# Patient Record
Sex: MALE | Race: BLACK OR AFRICAN AMERICAN | NOT HISPANIC OR LATINO | Employment: OTHER | URBAN - METROPOLITAN AREA
[De-identification: names, ages, dates, MRNs, and addresses within clinical notes are randomized per-mention and may not be internally consistent; named-entity substitution may affect disease eponyms.]

---

## 2017-01-04 ENCOUNTER — TELEPHONE (OUTPATIENT)
Dept: CARDIOLOGY | Facility: CLINIC | Age: 57
End: 2017-01-04

## 2017-01-04 NOTE — TELEPHONE ENCOUNTER
Spoke with Dr. Vanegas and patient. Will refer to dysautonomia clinic. No work for 2 more weeks. Will send letter.

## 2017-01-18 ENCOUNTER — OFFICE VISIT (OUTPATIENT)
Dept: CARDIOLOGY | Facility: CLINIC | Age: 57
End: 2017-01-18

## 2017-01-18 VITALS
BODY MASS INDEX: 38.3 KG/M2 | SYSTOLIC BLOOD PRESSURE: 140 MMHG | HEART RATE: 64 BPM | HEIGHT: 75 IN | WEIGHT: 308 LBS | DIASTOLIC BLOOD PRESSURE: 88 MMHG

## 2017-01-18 DIAGNOSIS — I10 BENIGN ESSENTIAL HTN: ICD-10-CM

## 2017-01-18 DIAGNOSIS — I48.0 PAF (PAROXYSMAL ATRIAL FIBRILLATION) (HCC): ICD-10-CM

## 2017-01-18 DIAGNOSIS — R55 NEAR SYNCOPE: ICD-10-CM

## 2017-01-18 DIAGNOSIS — R42 DIZZY SPELLS: Primary | ICD-10-CM

## 2017-01-18 PROCEDURE — 99213 OFFICE O/P EST LOW 20 MIN: CPT | Performed by: NURSE PRACTITIONER

## 2017-01-18 PROCEDURE — 93000 ELECTROCARDIOGRAM COMPLETE: CPT | Performed by: NURSE PRACTITIONER

## 2017-01-18 NOTE — PROGRESS NOTES
Date of Office Visit: 2017  Encounter Provider: DENNIS Conklin  Place of Service: [facility] CARDIOLOGY  Patient Name: Shashi Engel  :1960    Chief Complaint   Patient presents with   • Dizziness   :     HPI: Shashi Engel is a 56 y.o. male comes in today for follow up for dizziness. He is a patient of Dr. Vanegas and myself.        We first saw him in 2013 when he came in to the Chest Pain Unit. He had had five days of short-windedness at that time and had risk factors for cardiac issues including diabetes mellitus, hypertension, and hyperlipidemia. He underwent an echocardiogram on 2013 which revealed severe concentric left ventricular hypertrophy, an ejection fraction of 66%, mild-to-moderate mitral insufficiency, mild-to-moderate tricuspid insufficiency, mildelevation of right ventricular systolic pressure at 40 mmHg. He then wore a 24-hour Holter monitor which showed atrial fibrillation with average heart rate of 70 beats per minute with brief episodes of tachycardia and bradycardia. He had had a stress nuclear perfusion study performed in 2012 which showed no evidence of ischemia. Ejection fraction was mildly reduced due to atrial fibrillation.       He had a repeat 2D echocardiogram dated 2015, which showed an ejection fraction of 67%, severe concentric left ventricular hypertrophy, left and right atrium severely dilated, mild mitral valve daily with mild mitral regurgitation, aortic valve calcification without evidence of stenosis, mild aortic regurgitation, mild tricuspid regurgitation, and RV systolic pressure elevated at 54 mmHg, which is moderately elevated. He had a nonischemic 2 day stress nuclear done 2015.       He does have obstructive sleep apnea and uses his CPAP faithfully.       His CHADS-VASc score is 2 giving him a 2.2% risk of strokes per year.      He started having episodes of dizziness. An event recorder was  ordered. He was seen by Sabrina Lopez for dizziness and near syncope which occurred in our waiting room as he was waiting to get is that recorder. She changed the order to a Zio patch.       He was in atrial fibrillation at the time and on Xarelto. He then presented to the emergency department 5/1/2016 with lightheadedness which lasted a few minutes. In the emergency department they did not see anything that was unusual. His CMP was normal, CBC was normal and magnesium are normal. His blood pressure at that time was 116/63.      Early May 2016 he was having dizzy episodes lasting 30 minutes but no syncope. He had no chest pain with near syncope. He has no dyspnea but after these episodes, he is very fatigued. They only happen when he is head-upright and he doesn't feel his heart racing. His blood sugars have been good when it happens but his BP has been a little low when it happens.   a heart monitor was placed and he was found to have some significant bradycardia. He went for pacemaker placement on June 6 2016.       He complains that since his pacemaker has been placed he has not been much better. In the office, we paced him to see if it was making him feel worse and he did not notice a difference and he did not feel worse.       He continued to have issues with short-windedness and fatigue, and some episodes of near syncope. He did feel like he could feel his heart racing. On interrogation of his pacemaker, he was having tachycardia due to atrial fibrillation with ventricular pacing. His pacemaker was tracking the atrial fibrillation. His heart rate was running up into the 170s. It was not ventricular tachycardia. Metoprolol was initiated at 25 mg twice daily and he is feeling much better.      I saw him in office last week and he was well enough to go back to work.He presented to the emergency room on November 1, 2016 complaining of feeling like he was given a pass out while driving. His episode lasted  approximately 15 minutes. His blood pressure was stable at 119/76 when he went to the ER. His CMP was stable, troponin negative and CBC unremarkable. His ICD was interrogated and it was noted that he had a 10 beat run of unknown tachyarrhythmia on October 14, 2016 but does not have any on the day of the event. His threshold was set to record a greater than 160. His threshold was reduced to 140.    We have asked him to see EP and they did not think his dizziness is related to his device. Dr. Vanegas has concluded that the pt has dysautonomia.     He comes in today just for follow-up.  He has had 2 episodes since he was last seen in the office.  He did not faint but felt like he was going to.  He is wearing his compression stockings.  His wife is working on his diet never beginning to exercise.  At this time today.  He denies palpitations or tachycardia, shortness of air, edema, lightheadedness, chest pain, fatigue, orthopnea or PND.  We were looking into referring him to a dysautonomia clinic at [facility].  This does not exist per staff at [facility].  We will refer him to [place].    Past Medical History   Diagnosis Date   • Abnormal electrocardiogram    • Atrial fibrillation    • Cardiomyopathy, hypertrophic, primary familial    • Diabetes mellitus    • Difficulty breathing 05/02/2013     DURING EXERTION; RESOLVED 03/07/2014   • Dizziness 03/07/2014     RESOLVED; 04/18/2014   • ED (erectile dysfunction)    • Health care maintenance    • Hyperlipidemia    • Hypertension    • Near syncope    • Noncompliance    • VENESSA (obstructive sleep apnea)    • PAF (paroxysmal atrial fibrillation)    • Palpitations        Past Surgical History   Procedure Laterality Date   • Other surgical history       physical therapy for rotator cuff   • Knee arthroscopy     • Cardiac electrophysiology procedure Left 6/6/2016     Procedure: Pacemaker DC new  [place];  Surgeon: Juan Chacon MD;  Location: [facility] INVASIVE LOCATION;   "Service:    • Insert / replace / remove pacemaker             Review of Systems   Constitution: Negative for fever and malaise/fatigue.   HENT: Negative for ear pain, hearing loss, nosebleeds and sore throat.    Eyes: Negative for double vision, pain, vision loss in left eye, vision loss in right eye and visual disturbance.   Cardiovascular: Negative for claudication and leg swelling.   Respiratory: Negative for cough, snoring and wheezing.    Endocrine: Negative for cold intolerance, heat intolerance and polyuria.   Skin: Negative for color change, itching and rash.   Musculoskeletal: Negative for joint pain, joint swelling and muscle cramps.   Gastrointestinal: Negative for abdominal pain, diarrhea, melena, nausea and vomiting.   Genitourinary: Negative for bladder incontinence and hematuria.   Neurological: Positive for dizziness. Negative for excessive daytime sleepiness, light-headedness, paresthesias and seizures.   Psychiatric/Behavioral: Negative for depression. The patient is not nervous/anxious.    All other systems reviewed and are negative.    All other systems reviewed and are negative    No Known Allergies    All aspects of family and social history reviewed.          Objective:     Vitals:    01/18/17 1250   BP: 140/88   BP Location: Left arm   Pulse: 64   Weight: (!) 308 lb (140 kg)   Height: 75\" (190.5 cm)     Body mass index is 38.5 kg/(m^2).    PHYSICAL EXAM:  Physical Exam   Constitutional: He is oriented to person, place, and time. He appears well-developed and well-nourished. No distress.   obese   HENT:   Head: Normocephalic and atraumatic.   Eyes: Conjunctivae are normal. No scleral icterus.   Neck: Neck supple. No JVD present. Carotid bruit is not present. No thyromegaly present.   Cardiovascular: Normal rate, regular rhythm, S1 normal, S2 normal, normal heart sounds and intact distal pulses.   No extrasystoles are present. PMI is not displaced.  Exam reveals no gallop.    No murmur " heard.  Pulses:       Carotid pulses are 2+ on the right side, and 2+ on the left side.       Radial pulses are 2+ on the right side, and 2+ on the left side.        Dorsalis pedis pulses are 2+ on the right side, and 2+ on the left side.        Posterior tibial pulses are 2+ on the right side, and 2+ on the left side.   Pulmonary/Chest: Effort normal and breath sounds normal. No respiratory distress. He has no wheezes. He has no rhonchi. He has no rales. He exhibits no tenderness.   Abdominal: Soft. Bowel sounds are normal. He exhibits no distension, no abdominal bruit and no mass. There is no tenderness.   Musculoskeletal: He exhibits no edema or deformity.   Lymphadenopathy:     He has no cervical adenopathy.   Neurological: He is alert and oriented to person, place, and time. No cranial nerve deficit.   Skin: Skin is warm and dry. No rash noted. He is not diaphoretic. No cyanosis. No pallor. Nails show no clubbing.   Psychiatric: He has a normal mood and affect. Judgment normal.   Vitals reviewed.        ECG 12 Lead  Date/Time: 1/18/2017 1:14 PM  Performed by: ARLEY MITCHELL  Authorized by: ARLEY MITCHELL   Comparison: compared with previous ECG from 12/20/2016  Similar to previous ECG  Rhythm: atrial fibrillation  BPM: 64  Clinical impression: abnormal ECG  Comments: Indication: afib                Assessment:       Diagnosis Plan   1. Dizzy spells  Ambulatory Referral to Neurology   2. PAF (paroxysmal atrial fibrillation)     3. Benign essential HTN     4. Near syncope          Orders Placed This Encounter   Procedures   • Ambulatory Referral to Neurology     Referral Priority:   Routine     Referral Type:   Consultation     Referral Reason:   Specialty Services Required     Referral Location[facility]   [facility]     Requested Specialty:   Neurology     Number of Visits Requested:   1   • ECG 12 Lead     This order was created via procedure documentation       Current Outpatient  Prescriptions   Medication Sig Dispense Refill   • amLODIPine (NORVASC) 5 MG tablet Take 1 tablet by mouth Daily. 30 tablet 3   • atorvastatin (LIPITOR) 40 MG tablet TAKE 1 TABLET BY MOUTH EVERY NIGHT AT BEDTIME 90 tablet 0   • hydrochlorothiazide (HYDRODIURIL) 25 MG tablet Take 1/2 tablet (12.5mg) daily 45 tablet 1   • metFORMIN (GLUCOPHAGE) 1000 MG tablet Take 1 tablet by mouth 2 (Two) Times a Day With Meals. 60 tablet 3   • metoprolol tartrate (LOPRESSOR) 25 MG tablet Take 1 tablet by mouth 2 (two) times a day. 60 tablet 5   • Multiple Vitamins-Minerals (MULTIVITAMIN ADULT PO) Take 1 tablet by mouth daily.     • quinapril (ACCUPRIL) 40 MG tablet TAKE ONE TABLET BY MOUTH TWICE A DAY. 180 tablet 1   • rivaroxaban (XARELTO) 20 MG tablet Take 1 tablet by mouth Daily. 28 tablet 0     No current facility-administered medications for this visit.             Plan:       Pt continues to have dizzy episodes. Likely dysautonomia. We have discussed exercise. Wife is going to a nutrition class tonight. We will refer to Dysautonomia clinic at [place]. Pt has concerns about working and the dizziness. Pt is going to apply for disability. I will put him off work till his next visit.         Follow up in office in 6 weeks    As always, it has been a pleasure to participate in this patient's care.      Sincerely,      DENNIS Conklin

## 2017-01-25 ENCOUNTER — TELEPHONE (OUTPATIENT)
Dept: FAMILY MEDICINE CLINIC | Facility: CLINIC | Age: 57
End: 2017-01-25

## 2017-01-25 NOTE — TELEPHONE ENCOUNTER
"----- Message from Oxana Arvizu sent at 1/23/2017  2:51 PM EST -----  Regarding: Cancellation of Order # [id number]  Order number [id number] for the procedure COLOGUARD [[id number]] has   been canceled by Oxana Arvizu [[id number]]. This procedure was   ordered by you on Nov 8, 2016 for the patient Shashi Engel   [[id number]]. The reason for cancellation was \"Other\".  "

## 2017-01-26 NOTE — TELEPHONE ENCOUNTER
[facility] I have placed an order online and advised him to call me by Friday if he has not heard from them.

## 2017-02-07 ENCOUNTER — CLINICAL SUPPORT NO REQUIREMENTS (OUTPATIENT)
Dept: CARDIOLOGY | Facility: CLINIC | Age: 57
End: 2017-02-07

## 2017-02-07 DIAGNOSIS — I42.0 CONGESTIVE CARDIOMYOPATHY (HCC): Primary | ICD-10-CM

## 2017-02-07 PROCEDURE — 93294 REM INTERROG EVL PM/LDLS PM: CPT | Performed by: INTERNAL MEDICINE

## 2017-02-07 PROCEDURE — 93296 REM INTERROG EVL PM/IDS: CPT | Performed by: INTERNAL MEDICINE

## 2017-02-13 ENCOUNTER — TELEPHONE (OUTPATIENT)
Dept: FAMILY MEDICINE CLINIC | Facility: CLINIC | Age: 57
End: 2017-02-13

## 2017-02-13 NOTE — TELEPHONE ENCOUNTER
02.13.17 [facility]  I spoke to patient and he is going to continue to see Dr. Kirkpatrick. F/U scheduled.    ----- Message from Keesha Kirkpatrick MD sent at 2/13/2017  8:38 AM EST -----  He needs a doctor. He can schedule with me or he can go elsewhere. No, they need to be filled by me    ----- Message -----     From: Myrna Kendall MA     Sent: 2/10/2017   1:34 PM       To: Keesha Kirkpatrick MD    I'm a little confused as to what to do for his Rx's. Pt wanted to switch to [facility]. She did not mind seeing him, but because his health is so complex she wanted you to continue to care for him. He has been in to see [facility] multiple times, BUT will not schedule with you. Should [facility] fill his medication?

## 2017-02-24 ENCOUNTER — OFFICE VISIT (OUTPATIENT)
Dept: FAMILY MEDICINE CLINIC | Facility: CLINIC | Age: 57
End: 2017-02-24

## 2017-02-24 VITALS
HEIGHT: 75 IN | SYSTOLIC BLOOD PRESSURE: 150 MMHG | HEART RATE: 66 BPM | DIASTOLIC BLOOD PRESSURE: 88 MMHG | WEIGHT: 313 LBS | BODY MASS INDEX: 38.92 KG/M2 | OXYGEN SATURATION: 98 %

## 2017-02-24 DIAGNOSIS — E78.2 MIXED HYPERLIPIDEMIA: ICD-10-CM

## 2017-02-24 DIAGNOSIS — R11.0 NAUSEA: ICD-10-CM

## 2017-02-24 DIAGNOSIS — I10 BENIGN ESSENTIAL HTN: ICD-10-CM

## 2017-02-24 DIAGNOSIS — E11.9 DIABETES MELLITUS TYPE 2, NONINSULIN DEPENDENT (HCC): Primary | ICD-10-CM

## 2017-02-24 DIAGNOSIS — Z00.00 HEALTHCARE MAINTENANCE: ICD-10-CM

## 2017-02-24 PROCEDURE — 90715 TDAP VACCINE 7 YRS/> IM: CPT | Performed by: FAMILY MEDICINE

## 2017-02-24 PROCEDURE — 99214 OFFICE O/P EST MOD 30 MIN: CPT | Performed by: FAMILY MEDICINE

## 2017-02-24 PROCEDURE — 90471 IMMUNIZATION ADMIN: CPT | Performed by: FAMILY MEDICINE

## 2017-02-24 RX ORDER — TADALAFIL 20 MG
TABLET ORAL
Refills: 3 | COMMUNITY
Start: 2017-01-21 | End: 2017-11-25 | Stop reason: SDUPTHER

## 2017-02-24 RX ORDER — DIMETHICONE 13 MG/ML
LOTION TOPICAL
Qty: 100 EACH | Refills: 11 | Status: SHIPPED | OUTPATIENT
Start: 2017-02-24 | End: 2018-02-23 | Stop reason: SDUPTHER

## 2017-02-24 RX ORDER — QUINAPRIL 40 MG/1
40 TABLET ORAL DAILY
Qty: 180 TABLET | Refills: 1 | Status: SHIPPED | OUTPATIENT
Start: 2017-02-24 | End: 2017-02-24 | Stop reason: SDUPTHER

## 2017-02-24 RX ORDER — ATORVASTATIN CALCIUM 40 MG/1
40 TABLET, FILM COATED ORAL DAILY
Qty: 90 TABLET | Refills: 3 | Status: SHIPPED | OUTPATIENT
Start: 2017-02-24 | End: 2018-02-23 | Stop reason: SDUPTHER

## 2017-02-24 RX ORDER — QUINAPRIL 40 MG/1
80 TABLET ORAL DAILY
Qty: 180 TABLET | Refills: 1 | Status: SHIPPED | OUTPATIENT
Start: 2017-02-24 | End: 2017-11-25 | Stop reason: SDUPTHER

## 2017-02-24 NOTE — PROGRESS NOTES
Subjective   Shashi Engel is a 56 y.o. male.     Chief Complaint   Patient presents with   • Diabetes   • Hypertension   • Med Refill     Social History   Substance Use Topics   • Smoking status: Former Smoker   • Smokeless tobacco: None      Comment: caffeine use   • Alcohol use Yes      Comment: RARE       Diabetes   He has type 2 diabetes mellitus. No MedicAlert identification noted. The initial diagnosis of diabetes was made 8 years ago. Hypoglycemia symptoms include dizziness and nervousness/anxiousness. Pertinent negatives for hypoglycemia include no confusion, headaches, hunger, mood changes, pallor, seizures, sleepiness, speech difficulty, sweats or tremors. Associated symptoms include fatigue and weakness. Pertinent negatives for diabetes include no chest pain. Pertinent negatives for hypoglycemia complications include no blackouts, no hospitalization, no nocturnal hypoglycemia, no required assistance and no required glucagon injection. Symptoms are stable. Pertinent negatives for diabetic complications include no CVA, heart disease, nephropathy, peripheral neuropathy or retinopathy. Risk factors for coronary artery disease include dyslipidemia, hypertension and obesity. Current diabetic treatment includes oral agent (monotherapy). He is compliant with treatment all of the time. His weight is fluctuating minimally. He is following a generally healthy diet. Meal planning includes avoidance of concentrated sweets. He has not had a previous visit with a dietitian. He participates in exercise intermittently. He monitors blood glucose at home 1-2 x per day. Blood glucose monitoring compliance is fair. There is no change in his home blood glucose trend. His highest blood glucose is 110-130 mg/dl. His overall blood glucose range is 110-130 mg/dl. An ACE inhibitor/angiotensin II receptor blocker is being taken. He does not see a podiatrist.Eye exam is not current.      He is having dizzy spells that he has been  "unable to work with his exhaustion. He is very heat sensitive with it. He has found other individuals with similar autonomic dysfunction. [facility] is supposed to see him about this. He is having some am nausea. One episode with blood but he thinks that is from his mouth. It is not obviously related to the BP problems. He recently did the cologuard. No abdominal pain, just nausea.     The following portions of the patient's history were reviewed and updated as appropriate: allergies, current medications, past social history and problem list.     Review of Systems   Constitutional: Positive for fatigue. Negative for activity change, appetite change, chills, fever and unexpected weight change.   HENT: Negative for congestion, ear pain, hearing loss, mouth sores, nosebleeds, rhinorrhea and sore throat.    Eyes: Negative for pain and visual disturbance.   Respiratory: Positive for shortness of breath. Negative for cough and wheezing.    Cardiovascular: Negative for chest pain, palpitations and leg swelling.   Gastrointestinal: Negative for abdominal distention, abdominal pain, blood in stool, constipation, diarrhea, nausea and vomiting.   Endocrine: Negative for cold intolerance and heat intolerance.   Genitourinary: Negative for difficulty urinating, discharge, dysuria, frequency, hematuria and urgency.   Musculoskeletal: Negative for back pain and joint swelling.   Skin: Negative for pallor, rash and wound.   Neurological: Positive for dizziness and weakness. Negative for tremors, seizures, speech difficulty, numbness and headaches.   Hematological: Does not bruise/bleed easily.   Psychiatric/Behavioral: Negative for confusion, dysphoric mood, sleep disturbance and suicidal ideas. The patient is nervous/anxious.        Objective   Vitals:    02/24/17 1012   BP: 150/88   Pulse: 66   SpO2: 98%   Weight: (!) 313 lb (142 kg)   Height: 75\" (190.5 cm)     Body mass index is 39.12 kg/(m^2).    Physical Exam "   Constitutional: He is oriented to person, place, and time. Vital signs are normal. He appears well-developed. No distress.   obese   HENT:   Head: Normocephalic.   Cardiovascular: Normal rate, regular rhythm and normal heart sounds.    Pulmonary/Chest: Effort normal and breath sounds normal.   Neurological: He is alert and oriented to person, place, and time. Gait normal.   Psychiatric: He has a normal mood and affect. His behavior is normal. Judgment and thought content normal.   Vitals reviewed.      Assessment/Plan   Problem List Items Addressed This Visit        Cardiovascular and Mediastinum    Benign essential HTN    Overview     Impression: 01/09/2015 - Not controlled, off meds;          Relevant Medications    quinapril (ACCUPRIL) 40 MG tablet    HLD (hyperlipidemia)    Relevant Medications    atorvastatin (LIPITOR) 40 MG tablet       Endocrine    Diabetes mellitus type 2, noninsulin dependent - Primary    Relevant Medications    [facility] ORIGINAL misc    quinapril (ACCUPRIL) 40 MG tablet    Other Relevant Orders    Microalbumin / Creatinine Urine Ratio      Other Visit Diagnoses     Nausea        When he occ vomits he has on occ seen blood. If this persists will see GI. He needs to d/w [place].     Healthcare maintenance        Relevant Orders    Tdap Vaccine Greater Than or Equal To 6yo IM      See the dentist.

## 2017-02-25 LAB
ALBUMIN/CREAT UR: 3.5 MG/G CREAT (ref 0–30)
CREAT UR-MCNC: 133.2 MG/DL
MICROALBUMIN UR-MCNC: 4.7 UG/ML

## 2017-03-14 ENCOUNTER — TELEPHONE (OUTPATIENT)
Dept: FAMILY MEDICINE CLINIC | Facility: CLINIC | Age: 57
End: 2017-03-14

## 2017-03-15 ENCOUNTER — TELEPHONE (OUTPATIENT)
Dept: FAMILY MEDICINE CLINIC | Facility: CLINIC | Age: 57
End: 2017-03-15

## 2017-03-21 ENCOUNTER — OFFICE VISIT (OUTPATIENT)
Dept: CARDIOLOGY | Facility: CLINIC | Age: 57
End: 2017-03-21

## 2017-03-21 VITALS
SYSTOLIC BLOOD PRESSURE: 140 MMHG | HEIGHT: 75 IN | HEART RATE: 69 BPM | BODY MASS INDEX: 39.17 KG/M2 | WEIGHT: 315 LBS | DIASTOLIC BLOOD PRESSURE: 80 MMHG

## 2017-03-21 DIAGNOSIS — I48.0 PAF (PAROXYSMAL ATRIAL FIBRILLATION) (HCC): Primary | ICD-10-CM

## 2017-03-21 DIAGNOSIS — I11.9 LVH (LEFT VENTRICULAR HYPERTROPHY) DUE TO HYPERTENSIVE DISEASE, WITHOUT HEART FAILURE: ICD-10-CM

## 2017-03-21 DIAGNOSIS — G47.33 OBSTRUCTIVE APNEA: ICD-10-CM

## 2017-03-21 DIAGNOSIS — I49.5 SSS (SICK SINUS SYNDROME) (HCC): ICD-10-CM

## 2017-03-21 DIAGNOSIS — R55 NEAR SYNCOPE: ICD-10-CM

## 2017-03-21 DIAGNOSIS — E11.9 DIABETES MELLITUS TYPE 2, NONINSULIN DEPENDENT (HCC): ICD-10-CM

## 2017-03-21 DIAGNOSIS — I10 BENIGN ESSENTIAL HTN: ICD-10-CM

## 2017-03-21 PROCEDURE — 99214 OFFICE O/P EST MOD 30 MIN: CPT | Performed by: INTERNAL MEDICINE

## 2017-03-21 PROCEDURE — 93000 ELECTROCARDIOGRAM COMPLETE: CPT | Performed by: INTERNAL MEDICINE

## 2017-03-21 NOTE — PROGRESS NOTES
Date of Office Visit: 2017  Encounter Provider: Jennie Vanegas MD  Place of Service: [facility] CARDIOLOGY  Patient Name: Shashi Engel  :1960      Patient ID:  Shashi Engel is a 56 y.o. male is here for  followup for near syncope        History of Present Illness       I first saw him in 2013 when he came in to the Chest Pain Unit. He had had five days of short-windedness at that time and had risk factors for cardiac issues including diabetes mellitus, hypertension, and hyperlipidemia. He underwent an echocardiogram on 2013 which revealed severe concentric left ventricular hypertrophy, an ejection fraction of 66%, mild-to-moderate mitral insufficiency, mild-to-moderate tricuspid insufficiency, mildelevation of right ventricular systolic pressure at 40 mmHg. He then wore a 24-hour Holter monitor which showed atrial fibrillation with average heart rate of 70 beats per minute with brief episodes of tachycardia and bradycardia. He had had a stress nuclear perfusion study performed in 2012 which showed no evidence of ischemia. Ejection fraction was mildly reduced due to atrial fibrillation.        He had a repeat 2D echocardiogram dated 2015, which showed an ejection fraction of 67%, severe concentric left ventricular hypertrophy, left and right atrium severely dilated, mild mitral valve daily with mild mitral regurgitation, aortic valve calcification without evidence of stenosis, mild aortic regurgitation, mild tricuspid regurgitation, and RV systolic pressure elevated at 54 mmHg, which is moderately elevated. He had a nonischemic 2 day stress nuclear done 2015.        He does have obstructive sleep apnea and uses his CPAP faithfully.        His CHADS-VASc score is 2 giving him a 2.2% risk of strokes per year. He is on Xarelto.      Early May 2016 he was having dizzy episodes lasting 30 minutes but no syncope. He had no chest pain with near  syncope. He has no dyspnea but after these episodes, he is very fatigued. They only happen when he is head-upright and he doesn't feel his heart racing. His blood sugars have been good when it happens but his BP has been a little low when it happens.    a heart monitor was placed and he was found to have some significant bradycardia. He went for pacemaker placement on June 6 2016.           He continued to have issues with short-windedness and fatigue, and some episodes of near syncope. He did feel like he could feel his heart racing. On interrogation of his pacemaker, he was having tachycardia due to atrial fibrillation with ventricular pacing. His pacemaker was tracking the atrial fibrillation. His heart rate was running up into the 170s. It was not ventricular tachycardia. Metoprolol was initiated at 25 mg twice daily and he felt better. He did have a 2-D echocardiogram with Doppler performed on 09/08/2016 which revealed an ejection fraction of 60%, severe biatrial enlargement, mild mitral and mild tricuspid insufficiency. There was mild-to-moderate left ventricular hypertrophy.       Mr. Engel has seen our nurse practitioner Heidi Espinoza twice since I have seen him last. He saw her on 10/25/2016 for followup of near syncope and hypertension. He was feeling better on metoprolol. She then saw him again on 11/03/2016 when he was having some episodes of near syncope and his pacemaker showed what appeared to be a tachyarrhythmia. His blood pressure at that time was somewhat labile as well. He then saw my partner Dr. Eimliano Covarrubias on 11/08/2016 and Emiliano did not feel that the tachycardia was problematic and certainly was not causing his symptoms of near syncope, and he felt the patient's pacemaker was working well to prevent bradycardia. Emiliano felt that the patient's episodes of near syncope were more related to dysautonomia and recommended compression stockings as he thought he was having some orthostasis.          He  saw Heidi Espinoza for dizziness on 01/18/2017.  He had had two episodes of near syncope without palpitations.  When Heidi saw him, she thought that this was dysautonomia and referred him to the dysautonomia clinic at [facility] and advised him to go ahead and apply for disability.  He is here today for follow-up from that visit.       At this time, he continues to have episodes of near syncope.  He can feel them coming on and, when he does, he sits down and tries to elevate his legs.  He does not feel his heart racing or skipping and he has had no exertional chest tightness or pressure, but when he does feel near syncopal he gets very fatigued and has dyspnea.  He is wearing his compression stockings faithfully.  He is really trying to watch what he is eating.  He is taking his medications as directed.  He has an appointment at [facility] but now it is not until 07/2017.  He thankfully has had no full-blown syncopal episodes.          Past Medical History   Diagnosis Date   • Abnormal electrocardiogram    • Anxiety    • Atrial fibrillation    • Cardiomyopathy, hypertrophic, primary familial    • Diabetes mellitus    • Difficulty breathing 05/02/2013     DURING EXERTION; RESOLVED 03/07/2014   • Dizziness 03/07/2014     RESOLVED; 04/18/2014   • ED (erectile dysfunction)    • Erectile dysfunction    • Health care maintenance    • Hyperlipidemia    • Hypertension    • Near syncope    • Noncompliance    • Obesity    • VENESSA (obstructive sleep apnea)    • PAF (paroxysmal atrial fibrillation)    • Palpitations    • Pneumonia    • Type 2 diabetes mellitus          Past Surgical History   Procedure Laterality Date   • Other surgical history       physical therapy for rotator cuff   • Knee arthroscopy     • Cardiac electrophysiology procedure Left 6/6/2016     Procedure: Pacemaker JONATHAN MARX;  Surgeon: Juan Chacon MD;  Location: [facility] INVASIVE LOCATION;  Service:    • Insert / replace / remove pacemaker          Current Outpatient Prescriptions on File Prior to Visit   Medication Sig Dispense Refill   • amLODIPine (NORVASC) 5 MG tablet Take 1 tablet by mouth Daily. 30 tablet 3   • atorvastatin (LIPITOR) 40 MG tablet Take 1 tablet by mouth Daily. 90 tablet 3   • CIALIS 20 MG tablet TAKE ONE TABLET BY MOUTH EVERY 3 DAYS AS DIRECTED  3   • [employer] LANCETS ORIGINAL misc Use as directed and appropo lancet for his monitor 100 each 11   • glucose blood test strip Use as instructed 100 each 12   • hydrochlorothiazide (HYDRODIURIL) 25 MG tablet Take 1/2 tablet (12.5mg) daily 45 tablet 1   • metFORMIN (GLUCOPHAGE) 1000 MG tablet Take 1 tablet by mouth 2 (Two) Times a Day With Meals. 60 tablet 3   • metoprolol tartrate (LOPRESSOR) 25 MG tablet Take 1 tablet by mouth 2 (two) times a day. 60 tablet 5   • Multiple Vitamins-Minerals (MULTIVITAMIN ADULT PO) Take 1 tablet by mouth daily.     • quinapril (ACCUPRIL) 40 MG tablet Take 2 tablets by mouth Daily. 180 tablet 1   • rivaroxaban (XARELTO) 20 MG tablet Take 1 tablet by mouth Daily. 56 tablet 0     No current facility-administered medications on file prior to visit.        Social History     Social History   • Marital status:      Spouse name: N/A   • Number of children: N/A   • Years of education: N/A     Occupational History   • Not on file.     Social History Main Topics   • Smoking status: Never Smoker   • Smokeless tobacco: Not on file      Comment: No caffeine use   • Alcohol use Yes      Comment: RARE   • Drug use: No      Comment: FORMER,    • Sexual activity: Not Currently     Partners: Female     Other Topics Concern   • Not on file     Social History Narrative           Review of Systems   Constitution: Negative.   HENT: Negative for congestion and headaches.    Eyes: Negative for vision loss in left eye and vision loss in right eye.   Respiratory: Negative.  Negative for cough, hemoptysis, shortness of breath, sleep disturbances due to breathing, snoring, sputum  "production and wheezing.    Endocrine: Negative.    Hematologic/Lymphatic: Negative.    Skin: Negative for poor wound healing and rash.   Musculoskeletal: Negative for falls, gout, muscle cramps and myalgias.   Gastrointestinal: Negative for abdominal pain, diarrhea, dysphagia, hematemesis, melena, nausea and vomiting.   Neurological: Negative for excessive daytime sleepiness, dizziness, light-headedness, loss of balance, seizures and vertigo.   Psychiatric/Behavioral: Negative for depression and substance abuse. The patient is not nervous/anxious.        Procedures    ECG 12 Lead  Date/Time: 3/21/2017 10:20 AM  Performed by: GUICHO MCKEON  Authorized by: GUICHO MCKEON   Comparison: compared with previous ECG   Similar to previous ECG  Rhythm: atrial fibrillation  Clinical impression: abnormal ECG  Comments: V paced               Objective:      Vitals:    03/21/17 0944   BP: 140/80   Pulse: 69   Weight: (!) 318 lb (144 kg)   Height: 75\" (190.5 cm)     Body mass index is 39.75 kg/(m^2).    Physical Exam   Constitutional: He is oriented to person, place, and time. He appears well-developed and well-nourished. No distress.   obese   HENT:   Head: Normocephalic and atraumatic.   Eyes: Conjunctivae are normal. No scleral icterus.   Neck: Neck supple. No JVD present. Carotid bruit is not present. No thyromegaly present.   Cardiovascular: Normal rate, regular rhythm, S1 normal, S2 normal, normal heart sounds and intact distal pulses.   No extrasystoles are present. PMI is not displaced.  Exam reveals no gallop.    No murmur heard.  Pulses:       Carotid pulses are 2+ on the right side, and 2+ on the left side.       Radial pulses are 2+ on the right side, and 2+ on the left side.        Dorsalis pedis pulses are 2+ on the right side, and 2+ on the left side.        Posterior tibial pulses are 2+ on the right side, and 2+ on the left side.   Pulmonary/Chest: Effort normal and breath sounds normal. No " respiratory distress. He has no wheezes. He has no rhonchi. He has no rales. He exhibits no tenderness.   Abdominal: Soft. Bowel sounds are normal. He exhibits no distension, no abdominal bruit and no mass. There is no tenderness.   Musculoskeletal: He exhibits no edema or deformity.   Lymphadenopathy:     He has no cervical adenopathy.   Neurological: He is alert and oriented to person, place, and time. No cranial nerve deficit.   Skin: Skin is warm and dry. No rash noted. He is not diaphoretic. No cyanosis. No pallor. Nails show no clubbing.   Psychiatric: He has a normal mood and affect. Judgment normal.   Vitals reviewed.      Lab Review:       Assessment:      Diagnosis Plan   1. PAF (paroxysmal atrial fibrillation)     2. SSS (sick sinus syndrome)     3. Near syncope     4. Benign essential HTN     5. LVH (left ventricular hypertrophy) due to hypertensive disease, without heart failure     6. Obstructive apnea     7. Diabetes mellitus type 2, noninsulin dependent          1. Fatigue and lightheadedness. Continued due to dysautonomia.   2. Atrial fibrillation, rate controlled. On xarelto.   3. Hypertension. This is under fair control.   4. Hyperlipidemia. We will get the patient's labs from Dr. Kirkpatrick's office.   5. History of left ventricular hypertrophy on echocardiogram. Will recheck the  echocardiogram.   6. Diabetes mellitus type 2.   7. Obstructive sleep apnea on CPAP.   8. Obesity.   9. SSS, s/p pacer.      Plan:       See back in 3 months. Stop hctz and see [place]    Atrial Fibrillation and Atrial Flutter  Assessment  • The patient has paroxysmal atrial fibrillation  • This is non-valvular in etiology  • The patient's CHADS2-VASc score is 1  • A [id number] score of 1 is considered an intermediate risk for a thromboembolic event    Plan  • Continue in atrial fibrillation with rate control  • Continue rivaroxaban for antithrombotic therapy, bleeding issues discussed  • Continue beta blocker for rate  control

## 2017-03-30 ENCOUNTER — TELEPHONE (OUTPATIENT)
Dept: CARDIOLOGY | Facility: CLINIC | Age: 57
End: 2017-03-30

## 2017-03-30 NOTE — TELEPHONE ENCOUNTER
NEEMAI:Pt called and states that his left arm is twitching. He contacted his PCP he had an appointment tomorrow at 11:30....Yvette SHAH

## 2017-03-31 ENCOUNTER — OFFICE VISIT (OUTPATIENT)
Dept: FAMILY MEDICINE CLINIC | Facility: CLINIC | Age: 57
End: 2017-03-31

## 2017-03-31 VITALS
OXYGEN SATURATION: 97 % | WEIGHT: 315 LBS | HEART RATE: 67 BPM | HEIGHT: 75 IN | SYSTOLIC BLOOD PRESSURE: 138 MMHG | BODY MASS INDEX: 39.17 KG/M2 | DIASTOLIC BLOOD PRESSURE: 82 MMHG

## 2017-03-31 DIAGNOSIS — R25.3 MUSCLE TWITCH: Primary | ICD-10-CM

## 2017-03-31 PROCEDURE — 99212 OFFICE O/P EST SF 10 MIN: CPT | Performed by: FAMILY MEDICINE

## 2017-03-31 NOTE — PROGRESS NOTES
Subjective   Shashi Engel is a 56 y.o. male.     Chief Complaint   Patient presents with   • L arm twitching     x 5 days      Social History   Substance Use Topics   • Smoking status: Never Smoker   • Smokeless tobacco: None      Comment: No caffeine use   • Alcohol use Yes      Comment: RARE       History of Present Illness     His left arm is twitching and started about five days ago. His right leg was sore but that is better. He moved a treadmill on Saturday. This started on Monday, he had no symptoms on Tuesday. Then when he was lifting his 8 month old twin grandkids it came back.    The following portions of the patient's history were reviewed and updated as appropriate:PMHroutine: Social history , Allergies, Current Medications, Active Problem List and Health Maintenance    Review of Systems   Constitutional: Negative for activity change, appetite change, chills, fatigue, fever and unexpected weight change.   HENT: Negative for congestion, ear pain, hearing loss, mouth sores, nosebleeds, rhinorrhea and sore throat.    Eyes: Negative for pain and visual disturbance.   Respiratory: Negative for cough, shortness of breath and wheezing.    Cardiovascular: Negative for chest pain, palpitations and leg swelling.   Gastrointestinal: Negative for abdominal distention, abdominal pain, blood in stool, constipation, diarrhea, nausea and vomiting.   Endocrine: Negative for cold intolerance and heat intolerance.   Genitourinary: Negative for difficulty urinating, discharge, dysuria, frequency, hematuria and urgency.   Musculoskeletal: Negative for back pain and joint swelling.   Skin: Negative for rash and wound.   Neurological: Negative for dizziness, weakness, numbness and headaches.   Hematological: Does not bruise/bleed easily.   Psychiatric/Behavioral: Negative for confusion, dysphoric mood, sleep disturbance and suicidal ideas. The patient is not nervous/anxious.        Objective   Vitals:    03/31/17 1157   BP:  "138/82   Pulse: 67   SpO2: 97%   Weight: (!) 316 lb (143 kg)   Height: 75\" (190.5 cm)     Body mass index is 39.5 kg/(m^2).    Physical Exam   Constitutional: He appears well-developed and well-nourished.   Musculoskeletal: Normal range of motion.   Psychiatric: He has a normal mood and affect. His behavior is normal. Judgment and thought content normal.   Vitals reviewed.         Assessment/Plan   Problem List Items Addressed This Visit     None      Visit Diagnoses     Muscle twitch - Left arm    -  Primary    Probably re to lifting a heavy object, then reinjuring with lifting twin babies. Expect full recovery           "

## 2017-04-17 RX ORDER — HYDROCHLOROTHIAZIDE 25 MG/1
TABLET ORAL
Qty: 45 TABLET | Refills: 0 | OUTPATIENT
Start: 2017-04-17

## 2017-05-09 ENCOUNTER — CLINICAL SUPPORT NO REQUIREMENTS (OUTPATIENT)
Dept: CARDIOLOGY | Facility: CLINIC | Age: 57
End: 2017-05-09

## 2017-05-09 DIAGNOSIS — I48.20 CHRONIC ATRIAL FIBRILLATION (HCC): Primary | ICD-10-CM

## 2017-05-09 PROCEDURE — 93296 REM INTERROG EVL PM/IDS: CPT | Performed by: INTERNAL MEDICINE

## 2017-05-09 PROCEDURE — 93294 REM INTERROG EVL PM/LDLS PM: CPT | Performed by: INTERNAL MEDICINE

## 2017-06-21 ENCOUNTER — CLINICAL SUPPORT NO REQUIREMENTS (OUTPATIENT)
Dept: CARDIOLOGY | Facility: CLINIC | Age: 57
End: 2017-06-21

## 2017-06-21 ENCOUNTER — OFFICE VISIT (OUTPATIENT)
Dept: CARDIOLOGY | Facility: CLINIC | Age: 57
End: 2017-06-21

## 2017-06-21 VITALS
BODY MASS INDEX: 39.17 KG/M2 | HEART RATE: 51 BPM | SYSTOLIC BLOOD PRESSURE: 146 MMHG | WEIGHT: 315 LBS | DIASTOLIC BLOOD PRESSURE: 90 MMHG | HEIGHT: 75 IN

## 2017-06-21 DIAGNOSIS — I11.9 LVH (LEFT VENTRICULAR HYPERTROPHY) DUE TO HYPERTENSIVE DISEASE, WITHOUT HEART FAILURE: ICD-10-CM

## 2017-06-21 DIAGNOSIS — E78.2 MIXED HYPERLIPIDEMIA: ICD-10-CM

## 2017-06-21 DIAGNOSIS — E11.9 DIABETES MELLITUS TYPE 2, NONINSULIN DEPENDENT (HCC): ICD-10-CM

## 2017-06-21 DIAGNOSIS — I42.9 CARDIOMYOPATHY (HCC): Primary | ICD-10-CM

## 2017-06-21 DIAGNOSIS — G47.33 OBSTRUCTIVE APNEA: ICD-10-CM

## 2017-06-21 DIAGNOSIS — R55 NEAR SYNCOPE: ICD-10-CM

## 2017-06-21 DIAGNOSIS — I95.1 ORTHOSTASIS: ICD-10-CM

## 2017-06-21 DIAGNOSIS — I10 BENIGN ESSENTIAL HTN: Primary | ICD-10-CM

## 2017-06-21 DIAGNOSIS — I49.5 SSS (SICK SINUS SYNDROME) (HCC): ICD-10-CM

## 2017-06-21 DIAGNOSIS — I48.0 PAF (PAROXYSMAL ATRIAL FIBRILLATION) (HCC): ICD-10-CM

## 2017-06-21 PROCEDURE — 99214 OFFICE O/P EST MOD 30 MIN: CPT | Performed by: INTERNAL MEDICINE

## 2017-06-21 PROCEDURE — 93282 PRGRMG EVAL IMPLANTABLE DFB: CPT | Performed by: INTERNAL MEDICINE

## 2017-06-21 NOTE — PROGRESS NOTES
Date of Office Visit: 2017  Encounter Provider: Jennie Vanegas MD  Place of Service: [facility] CARDIOLOGY  Patient Name: Shashi Engel  :1960      Patient ID:  Shashi Engel is a 56 y.o. male is here for  followup for Near syncope.         History of Present Illness    I first saw him in 2013 when he came in to the Chest Pain Unit. He had had five days of short-windedness at that time and had risk factors for cardiac issues including diabetes mellitus, hypertension, and hyperlipidemia. He underwent an echocardiogram on 2013 which revealed severe concentric left ventricular hypertrophy, an ejection fraction of 66%, mild-to-moderate mitral insufficiency, mild-to-moderate tricuspid insufficiency, mildelevation of right ventricular systolic pressure at 40 mmHg. He then wore a 24-hour Holter monitor which showed atrial fibrillation with average heart rate of 70 beats per minute with brief episodes of tachycardia and bradycardia. He had had a stress nuclear perfusion study performed in 2012 which showed no evidence of ischemia. Ejection fraction was mildly reduced due to atrial fibrillation.        He had a repeat 2D echocardiogram dated 2015, which showed an ejection fraction of 67%, severe concentric left ventricular hypertrophy, left and right atrium severely dilated, mild mitral valve daily with mild mitral regurgitation, aortic valve calcification without evidence of stenosis, mild aortic regurgitation, mild tricuspid regurgitation, and RV systolic pressure elevated at 54 mmHg, which is moderately elevated. He had a nonischemic 2 day stress nuclear done 2015.        He does have obstructive sleep apnea and uses his CPAP faithfully.        His CHADS-VASc score is 2 giving him a 2.2% risk of strokes per year. He is on Xarelto.      Early May 2016 he was having dizzy episodes lasting 30 minutes but no syncope. He had no chest pain with near  syncope. He has no dyspnea but after these episodes, he is very fatigued. They only happen when he is head-upright and he doesn't feel his heart racing. His blood sugars have been good when it happens but his BP has been a little low when it happens.    a heart monitor was placed and he was found to have some significant bradycardia. He went for pacemaker placement on June 6 2016.            He continued to have issues with short-windedness and fatigue, and some episodes of near syncope. He did feel like he could feel his heart racing. On interrogation of his pacemaker, he was having tachycardia due to atrial fibrillation with ventricular pacing. His pacemaker was tracking the atrial fibrillation. His heart rate was running up into the 170s. It was not ventricular tachycardia. Metoprolol was initiated at 25 mg twice daily and he felt better. He did have a 2-D echocardiogram with Doppler performed on 09/08/2016 which revealed an ejection fraction of 60%, severe biatrial enlargement, mild mitral and mild tricuspid insufficiency. There was mild-to-moderate left ventricular hypertrophy.        Mr. Engel has seen our nurse practitioner Heidi Espinoza twice since I have seen him last. He saw her on 10/25/2016 for followup of near syncope and hypertension. He was feeling better on metoprolol. She then saw him again on 11/03/2016 when he was having some episodes of near syncope and his pacemaker showed what appeared to be a tachyarrhythmia. His blood pressure at that time was somewhat labile as well. He then saw my partner Dr. Emiliano Covarrubias on 11/08/2016 and Emiliano did not feel that the tachycardia was problematic and certainly was not causing his symptoms of near syncope, and he felt the patient's pacemaker was working well to prevent bradycardia. Emiliano felt that the patient's episodes of near syncope were more related to dysautonomia and recommended compression stockings as he thought he was having some orthostasis.             He saw Heidi Espinoza for dizziness on 01/18/2017. He had had two episodes of near syncope without palpitations. When Heidi saw him, she thought that this was dysautonomia and referred him to the dysautonomia clinic at [place] and advised him to go ahead and apply for disability. He is here today for follow-up from that visit.           The patient had his AICD checked today.  It shows chronic atrial fibrillation with ventricular pacing 64% of the time.  He had two tachycardic events, which were both atrial fibrillation with RVR, heart rate of 140-214 beats per minute.  The longest episode was 34 seconds long.       The patient is here today for followup, and he continues to struggle. He is essentially unchanged from when I saw him in March. He has hypertension which has required multiple medications to treat, but he also has episodes of severe near-syncope usually associated with bending over or trying to pick something up. When this occurs, he has to lie down. His blood pressure drops precipitously, and after about 10-15 minutes, he gets better. It happens on almost a daily basis. He has not had any fredy syncope in a while. He does not feel his heart racing or skipping often, but it does occur occasionally. Because of this, he really cannot tolerate prolonged standing. He cannot tolerate heat. He cannot tolerate bending over. He has difficulty picking up anything more than 30 pounds. This has been an ongoing issue for him. I have referred him to the Dysautonomic Clinic at [facility], and they are going to see him 08/2017. I do not think at this point that he is able to work with this ongoing issue. It basically has been going on since 2016. There are no good therapies for this other than lying down to prevent syncope. The other things are avoidance of dehydration, heat and straining. Because of this, I do not think he is going to be able to continue his current employment which  really requires all of the above. He does not have any active chest pain or pressure. He does get somewhat short-winded with activity. It is interesting because he also says that when he has a near-syncopal episode, he needs to lie down in a quiet room, that noise even bothers him. He has really curtailed a lot of his activities because of this. I have recommended that he remain in air conditioning, that he consider using a cooling scarf, that when an episode comes on he lies down and gets his legs up, and to continue the use of compression stockings as well as hydration.         Past Medical History:   Diagnosis Date   • Abnormal electrocardiogram    • Anxiety    • Atrial fibrillation    • Cardiomyopathy, hypertrophic, primary familial    • Diabetes mellitus    • Difficulty breathing 05/02/2013    DURING EXERTION; RESOLVED 03/07/2014   • Dizziness 03/07/2014    RESOLVED; 04/18/2014   • ED (erectile dysfunction)    • Erectile dysfunction    • Health care maintenance    • Hyperlipidemia    • Hypertension    • Near syncope    • Noncompliance    • Obesity    • VENESSA (obstructive sleep apnea)    • Osteoarthritis    • PAF (paroxysmal atrial fibrillation)    • Palpitations    • Pneumonia    • Type 2 diabetes mellitus          Past Surgical History:   Procedure Laterality Date   • CARDIAC ELECTROPHYSIOLOGY PROCEDURE Left 6/6/2016    Procedure: Pacemaker DC new  [place];  Surgeon: Juan Chacon MD;  Location: [facility] INVASIVE LOCATION;  Service:    • INSERT / REPLACE / REMOVE PACEMAKER     • KNEE ARTHROSCOPY     • OTHER SURGICAL HISTORY      physical therapy for rotator cuff       Current Outpatient Prescriptions on File Prior to Visit   Medication Sig Dispense Refill   • amLODIPine (NORVASC) 5 MG tablet Take 1 tablet by mouth Daily. 30 tablet 3   • atorvastatin (LIPITOR) 40 MG tablet Take 1 tablet by mouth Daily. 90 tablet 3   • CIALIS 20 MG tablet TAKE ONE TABLET BY MOUTH EVERY 3 DAYS AS DIRECTED  3   • [employer] LANCETS  ORIGINAL misc Use as directed and appropo lancet for his monitor 100 each 11   • glucose blood test strip Use as instructed 100 each 12   • metFORMIN (GLUCOPHAGE) 1000 MG tablet Take 1 tablet by mouth 2 (Two) Times a Day With Meals. 60 tablet 3   • metoprolol tartrate (LOPRESSOR) 25 MG tablet Take 1 tablet by mouth 2 (two) times a day. 60 tablet 5   • Multiple Vitamins-Minerals (MULTIVITAMIN ADULT PO) Take 1 tablet by mouth daily.     • quinapril (ACCUPRIL) 40 MG tablet Take 2 tablets by mouth Daily. 180 tablet 1   • rivaroxaban (XARELTO) 20 MG tablet Take 1 tablet by mouth Daily. 30 tablet 3     No current facility-administered medications on file prior to visit.        Social History     Social History   • Marital status:      Spouse name: N/A   • Number of children: N/A   • Years of education: N/A     Occupational History   • Not on file.     Social History Main Topics   • Smoking status: Never Smoker   • Smokeless tobacco: Not on file      Comment: No caffeine use   • Alcohol use Yes      Comment: RARE   • Drug use: No      Comment: FORMER,    • Sexual activity: Not Currently     Partners: Female     Other Topics Concern   • Not on file     Social History Narrative           Review of Systems   Constitution: Negative. Negative for fever.   HENT: Negative for congestion, ear pain, headaches and sore throat.    Eyes: Negative for vision loss in left eye and vision loss in right eye.   Cardiovascular: Positive for leg swelling and orthopnea. Negative for chest pain, claudication, paroxysmal nocturnal dyspnea and syncope.   Respiratory: Positive for shortness of breath and sputum production. Negative for cough, hemoptysis, sleep disturbances due to breathing, snoring and wheezing.    Endocrine: Negative.    Hematologic/Lymphatic: Negative.    Skin: Negative for poor wound healing and rash.   Musculoskeletal: Negative for falls, gout, muscle cramps, myalgias and neck pain.   Gastrointestinal: Negative for  "abdominal pain, diarrhea, dysphagia, hematemesis, melena, nausea and vomiting.   Neurological: Negative for excessive daytime sleepiness, dizziness, light-headedness, loss of balance, seizures and vertigo.   Psychiatric/Behavioral: Negative for depression and substance abuse. The patient is not nervous/anxious.        Procedures  Procedures       Objective:      Vitals:    06/21/17 1403   BP: 146/90   BP Location: Right arm   Patient Position: Sitting   Pulse: 51   Weight: (!) 316 lb (143 kg)   Height: 75\" (190.5 cm)     Body mass index is 39.5 kg/(m^2).    Physical Exam   Constitutional: He is oriented to person, place, and time. He appears well-developed and well-nourished. No distress.   HENT:   Head: Normocephalic and atraumatic.   Eyes: Conjunctivae are normal. No scleral icterus.   Neck: Neck supple. No JVD present. Carotid bruit is not present. No thyromegaly present.   Cardiovascular: Normal rate, regular rhythm, S1 normal, S2 normal, normal heart sounds and intact distal pulses.   No extrasystoles are present. PMI is not displaced.  Exam reveals no gallop.    No murmur heard.  Pulses:       Carotid pulses are 2+ on the right side, and 2+ on the left side.       Radial pulses are 2+ on the right side, and 2+ on the left side.        Dorsalis pedis pulses are 2+ on the right side, and 2+ on the left side.        Posterior tibial pulses are 2+ on the right side, and 2+ on the left side.   Pulmonary/Chest: Effort normal and breath sounds normal. No respiratory distress. He has no wheezes. He has no rhonchi. He has no rales. He exhibits no tenderness.   Abdominal: Soft. Bowel sounds are normal. He exhibits no distension, no abdominal bruit and no mass. There is no tenderness.   Musculoskeletal: He exhibits no edema or deformity.   Lymphadenopathy:     He has no cervical adenopathy.   Neurological: He is alert and oriented to person, place, and time. No cranial nerve deficit.   Skin: Skin is warm and dry. No rash " noted. He is not diaphoretic. No cyanosis. No pallor. Nails show no clubbing.   Psychiatric: He has a normal mood and affect. Judgment normal.   Vitals reviewed.      Lab Review:       Assessment:      Diagnosis Plan   1. Benign essential HTN     2. LVH (left ventricular hypertrophy) due to hypertensive disease, without heart failure     3. Near syncope     4. PAF (paroxysmal atrial fibrillation)     5. SSS (sick sinus syndrome)     6. Obstructive apnea     7. Diabetes mellitus type 2, noninsulin dependent     8. Mixed hyperlipidemia     9. Orthostasis       1. Fatigue and lightheadedness. Continued, due to dysautonomia.   2. Atrial fibrillation, rate controlled. On xarelto.   3. Hypertension. This is under fair control.   4. Hyperlipidemia. We will get the patient's labs from Dr. Kirkpatrick's office.   5. History of left ventricular hypertrophy on echocardiogram. Will recheck the  echocardiogram.   6. Diabetes mellitus type 2.   7. Obstructive sleep apnea on CPAP.   8. Obesity.   9. SSS, s/p pacer.     Mr. Engel is difficult to manage medically as he has hypertension requiring multiple medications but he also has dysautonomia causing sudden near-syncope due to low BP.  His near-syncope occurs multiple times per week and almost daily.  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      Plan:       Is to go to the dyautonomia clinic at [place] august 2017.  See back in 3 months, take amlodipine 2.5 BID.  Remain off work. Continue all other conservative measures.

## 2017-07-11 ENCOUNTER — OFFICE VISIT (OUTPATIENT)
Dept: FAMILY MEDICINE CLINIC | Facility: CLINIC | Age: 57
End: 2017-07-11

## 2017-07-11 VITALS
OXYGEN SATURATION: 98 % | DIASTOLIC BLOOD PRESSURE: 80 MMHG | SYSTOLIC BLOOD PRESSURE: 140 MMHG | WEIGHT: 312.5 LBS | HEART RATE: 64 BPM | BODY MASS INDEX: 39.06 KG/M2

## 2017-07-11 DIAGNOSIS — D17.21 LIPOMA OF RIGHT UPPER EXTREMITY: Primary | ICD-10-CM

## 2017-07-11 PROCEDURE — 99213 OFFICE O/P EST LOW 20 MIN: CPT | Performed by: NURSE PRACTITIONER

## 2017-07-11 NOTE — PROGRESS NOTES
Shashi Engel is a 56 y.o. male.  Seen 07/11/2017    Assessment/Plan   Problem List Items Addressed This Visit     None      Visit Diagnoses     Lipoma of right upper extremity    -  Primary             No Follow-up on file.  There are no Patient Instructions on file for this visit.    Vitals:    07/11/17 1146   BP: 140/80   Pulse: 64   SpO2: 98%   Weight: (!) 312 lb 8 oz (142 kg)     Body mass index is 39.06 kg/(m^2).    Subjective   Pt of Dr Kirkpatrick, here today with the complaint of 2 nodules on his rt upper arm. He reports that they have been there for over a year. They are not painful and have not gotten any bigger.  Also reports that his legs sometime twitch. He had the same complaint of his left arm and was seen on 3/17 but that stopped after a while.  Is scheduled to go to the dysautonomia clinic in [place] in August of 2017 as scheduled by his cardiologist Dr Vanegas.  Chief Complaint   Patient presents with   • Mass     Right arm and chest off and on for a year   • twitiching     arm and leg     Social History   Substance Use Topics   • Smoking status: Never Smoker   • Smokeless tobacco: Never Used      Comment: No caffeine use   • Alcohol use Yes      Comment: RARE       History of Present Illness     The following portions of the patient's history were reviewed and updated as appropriate:PMHroutine: Social history , Past Medical History, Allergies, Current Medications and Active Problem List    Review of Systems   Musculoskeletal:        Leg and arm twitching.   Skin:        Bump on chest and arm   Neurological: Negative for dizziness, tremors, light-headedness and headaches.        Legs twitch intermittently   All other systems reviewed and are negative.      Objective   Physical Exam   Constitutional: He appears well-developed and well-nourished. No distress.   HENT:   Head: Normocephalic and atraumatic.   Eyes: EOM are normal.   Musculoskeletal: Normal range of motion.   2 small nontender areas  to left upper arm medial aspect   Nursing note and vitals reviewed.    Reviewed Data:  No visits with results within 1 Month(s) from this visit.  Latest known visit with results is:    Office Visit on 02/24/2017   Component Date Value Ref Range Status   • Creatinine, Urine 02/24/2017 133.2  Not Estab. mg/dL Final   • Microalbumin, Urine 02/24/2017 4.7  Not Estab. ug/mL Final   • Microalbumin/Creatinine Ratio Urine 02/24/2017 3.5  0.0 - 30.0 mg/g creat Final       Will talk to Dr Kirkpatrick on Friday and see what she thinks, i think that a watchful wait would be in order

## 2017-09-20 ENCOUNTER — OFFICE VISIT (OUTPATIENT)
Dept: CARDIOLOGY | Facility: CLINIC | Age: 57
End: 2017-09-20

## 2017-09-20 ENCOUNTER — TELEPHONE (OUTPATIENT)
Dept: CARDIOLOGY | Facility: CLINIC | Age: 57
End: 2017-09-20

## 2017-09-20 ENCOUNTER — CLINICAL SUPPORT NO REQUIREMENTS (OUTPATIENT)
Dept: CARDIOLOGY | Facility: CLINIC | Age: 57
End: 2017-09-20

## 2017-09-20 ENCOUNTER — LAB (OUTPATIENT)
Dept: LAB | Facility: HOSPITAL | Age: 57
End: 2017-09-20

## 2017-09-20 VITALS
HEIGHT: 75 IN | SYSTOLIC BLOOD PRESSURE: 160 MMHG | WEIGHT: 308 LBS | DIASTOLIC BLOOD PRESSURE: 100 MMHG | BODY MASS INDEX: 38.3 KG/M2 | HEART RATE: 63 BPM

## 2017-09-20 DIAGNOSIS — E11.9 DIABETES MELLITUS TYPE 2, NONINSULIN DEPENDENT (HCC): ICD-10-CM

## 2017-09-20 DIAGNOSIS — R55 NEAR SYNCOPE: ICD-10-CM

## 2017-09-20 DIAGNOSIS — I10 BENIGN ESSENTIAL HTN: ICD-10-CM

## 2017-09-20 DIAGNOSIS — I47.29 PAROXYSMAL VT (HCC): ICD-10-CM

## 2017-09-20 DIAGNOSIS — I11.9 LVH (LEFT VENTRICULAR HYPERTROPHY) DUE TO HYPERTENSIVE DISEASE, WITHOUT HEART FAILURE: ICD-10-CM

## 2017-09-20 DIAGNOSIS — I95.1 ORTHOSTASIS: ICD-10-CM

## 2017-09-20 DIAGNOSIS — I48.0 PAF (PAROXYSMAL ATRIAL FIBRILLATION) (HCC): ICD-10-CM

## 2017-09-20 DIAGNOSIS — I48.0 PAF (PAROXYSMAL ATRIAL FIBRILLATION) (HCC): Primary | ICD-10-CM

## 2017-09-20 DIAGNOSIS — G47.33 OBSTRUCTIVE APNEA: ICD-10-CM

## 2017-09-20 DIAGNOSIS — I49.5 SSS (SICK SINUS SYNDROME) (HCC): ICD-10-CM

## 2017-09-20 DIAGNOSIS — I49.5 SSS (SICK SINUS SYNDROME) (HCC): Primary | ICD-10-CM

## 2017-09-20 DIAGNOSIS — R06.02 SHORTNESS OF BREATH: ICD-10-CM

## 2017-09-20 LAB
ANION GAP SERPL CALCULATED.3IONS-SCNC: 10.9 MMOL/L
BUN BLD-MCNC: 14 MG/DL (ref 6–20)
BUN/CREAT SERPL: 16.5 (ref 7–25)
CALCIUM SPEC-SCNC: 9.3 MG/DL (ref 8.6–10.5)
CHLORIDE SERPL-SCNC: 104 MMOL/L (ref 98–107)
CO2 SERPL-SCNC: 26.1 MMOL/L (ref 22–29)
CREAT BLD-MCNC: 0.85 MG/DL (ref 0.76–1.27)
DEPRECATED RDW RBC AUTO: 46.4 FL (ref 37–54)
ERYTHROCYTE [DISTWIDTH] IN BLOOD BY AUTOMATED COUNT: 15.2 % (ref 11.5–14.5)
GFR SERPL CREATININE-BSD FRML MDRD: 113 ML/MIN/1.73
GLUCOSE BLD-MCNC: 86 MG/DL (ref 65–99)
HCT VFR BLD AUTO: 40.9 % (ref 40.4–52.2)
HGB BLD-MCNC: 13.4 G/DL (ref 13.7–17.6)
MAGNESIUM SERPL-MCNC: 2.1 MG/DL (ref 1.6–2.6)
MCH RBC QN AUTO: 27.2 PG (ref 27–32.7)
MCHC RBC AUTO-ENTMCNC: 32.8 G/DL (ref 32.6–36.4)
MCV RBC AUTO: 83.1 FL (ref 79.8–96.2)
PLATELET # BLD AUTO: 251 10*3/MM3 (ref 140–500)
PMV BLD AUTO: 10.1 FL (ref 6–12)
POTASSIUM BLD-SCNC: 3.9 MMOL/L (ref 3.5–5.2)
RBC # BLD AUTO: 4.92 10*6/MM3 (ref 4.6–6)
SODIUM BLD-SCNC: 141 MMOL/L (ref 136–145)
TSH SERPL DL<=0.05 MIU/L-ACNC: 2.21 MIU/ML (ref 0.27–4.2)
WBC NRBC COR # BLD: 5.8 10*3/MM3 (ref 4.5–10.7)

## 2017-09-20 PROCEDURE — 85027 COMPLETE CBC AUTOMATED: CPT

## 2017-09-20 PROCEDURE — 99214 OFFICE O/P EST MOD 30 MIN: CPT | Performed by: INTERNAL MEDICINE

## 2017-09-20 PROCEDURE — 36415 COLL VENOUS BLD VENIPUNCTURE: CPT | Performed by: INTERNAL MEDICINE

## 2017-09-20 PROCEDURE — 80048 BASIC METABOLIC PNL TOTAL CA: CPT | Performed by: INTERNAL MEDICINE

## 2017-09-20 PROCEDURE — 83735 ASSAY OF MAGNESIUM: CPT

## 2017-09-20 PROCEDURE — 84443 ASSAY THYROID STIM HORMONE: CPT

## 2017-09-20 PROCEDURE — 82652 VIT D 1 25-DIHYDROXY: CPT

## 2017-09-20 PROCEDURE — 93279 PRGRMG DEV EVAL PM/LDLS PM: CPT | Performed by: INTERNAL MEDICINE

## 2017-09-20 RX ORDER — SPIRONOLACTONE 25 MG/1
25 TABLET ORAL DAILY
Qty: 90 TABLET | Refills: 3 | Status: SHIPPED | OUTPATIENT
Start: 2017-09-20 | End: 2018-12-22 | Stop reason: SDUPTHER

## 2017-09-20 NOTE — PROGRESS NOTES
Date of Office Visit: 2017  Encounter Provider: Jennie Vanegas MD  Place of Service: [facility] CARDIOLOGY  Patient Name: Shashi Engel  :1960      Patient ID:  Shashi Engel is a 56 y.o. male is here for  followup for near-syncope        History of Present Illness       I first saw him in 2013 when he came in to the Chest Pain Unit. He had had five days of short-windedness at that time and had risk factors for cardiac issues including diabetes mellitus, hypertension, and hyperlipidemia. He underwent an echocardiogram on 2013 which revealed severe concentric left ventricular hypertrophy, an ejection fraction of 66%, mild-to-moderate mitral insufficiency, mild-to-moderate tricuspid insufficiency, mildelevation of right ventricular systolic pressure at 40 mmHg. He then wore a 24-hour Holter monitor which showed atrial fibrillation with average heart rate of 70 beats per minute with brief episodes of tachycardia and bradycardia. He had had a stress nuclear perfusion study performed in 2012 which showed no evidence of ischemia. Ejection fraction was mildly reduced due to atrial fibrillation.        He had a repeat 2D echocardiogram dated 2015, which showed an ejection fraction of 67%, severe concentric left ventricular hypertrophy, left and right atrium severely dilated, mild mitral valve daily with mild mitral regurgitation, aortic valve calcification without evidence of stenosis, mild aortic regurgitation, mild tricuspid regurgitation, and RV systolic pressure elevated at 54 mmHg, which is moderately elevated. He had a nonischemic 2 day stress nuclear done 2015.        He does have obstructive sleep apnea and uses his CPAP faithfully.        His CHADS-VASc score is 2 giving him a 2.2% risk of strokes per year. He is on Xarelto.      Early May 2016 he was having dizzy episodes lasting 30 minutes but no syncope. He had no chest pain with near  syncope. He has no dyspnea but after these episodes, he is very fatigued. They only happen when he is head-upright and he doesn't feel his heart racing. His blood sugars have been good when it happens but his BP has been a little low when it happens.    a heart monitor was placed and he was found to have some significant bradycardia. He went for pacemaker placement on June 6 2016.            He continued to have issues with short-windedness and fatigue, and some episodes of near syncope. He did feel like he could feel his heart racing. On interrogation of his pacemaker, he was having tachycardia due to atrial fibrillation with ventricular pacing. His pacemaker was tracking the atrial fibrillation. His heart rate was running up into the 170s. It was not ventricular tachycardia. Metoprolol was initiated at 25 mg twice daily and he felt better. He did have a 2-D echocardiogram with Doppler performed on 09/08/2016 which revealed an ejection fraction of 60%, severe biatrial enlargement, mild mitral and mild tricuspid insufficiency. There was mild-to-moderate left ventricular hypertrophy.        Mr. Engel has seen our nurse practitioner Heidi Espinoza twice since I have seen him last. He saw her on 10/25/2016 for followup of near syncope and hypertension. He was feeling better on metoprolol. She then saw him again on 11/03/2016 when he was having some episodes of near syncope and his pacemaker showed what appeared to be a tachyarrhythmia. His blood pressure at that time was somewhat labile as well. He then saw my partner Dr. Emiliano Covarrubias on 11/08/2016 and Emiliano did not feel that the tachycardia was problematic and certainly was not causing his symptoms of near syncope, and he felt the patient's pacemaker was working well to prevent bradycardia. Emiliano felt that the patient's episodes of near syncope were more related to dysautonomia and recommended compression stockings as he thought he was having some orthostasis.                He saw Heidi Espinoza for dizziness on 01/18/2017. He had had two episodes of near syncope without palpitations. When Heidi saw him, she thought that this was dysautonomia and referred him to the dysautonomia clinic at [place] and advised him to go ahead and apply for disability. He is here today for follow-up from that visit.                The patient had his AICD checked today.  It shows chronic atrial fibrillation with ventricular pacing 64% of the time.  He had two tachycardic events, which were both atrial fibrillation with RVR, heart rate of 140-214 beats per minute.  The longest episode was 34 seconds long.        He has hypertension which has required multiple medications to treat, but he also has episodes of severe near-syncope usually associated with bending over or trying to pick something up. When this occurs, he has to lie down. His blood pressure drops precipitously, and after about 10-15 minutes, he gets better. It happens on almost a daily basis. He has not had any fredy syncope in a while. He does not feel his heart racing or skipping often, but it does occur occasionally. Because of this, he really cannot tolerate prolonged standing. He cannot tolerate heat. He cannot tolerate bending over. He has difficulty picking up anything more than 30 pounds. This has been an ongoing issue for him. I have referred him to the Dysautonomic Clinic at [facility], and they are going to see him [id number]. I do not think at this point that he is able to work with this ongoing issue. It basically has been going on since 2016. There are no good therapies for this other than lying down to prevent syncope. The other things are avoidance of dehydration, heat and straining. Because of this, I do not think he is going to be able to continue his current employment which really requires all of the above.           He did go to [place] and was evaluated in the dysautonomic clinic  8/23/17.  There testing showed grossly intact autonomic function.  They thought that possibly his atrial fibrillation was driving his symptoms of fatigue and near syncope.  Device interrogation today shows short bursts of ventricular ventricular tachycardia, 13 beats as well as premature ventricular complexes noted 25% at time.       He is still having daily near-syncope which requires him to lie down.  He cannot tolerate the heat.  He cannot tolerate standing really more than about 10 or 15 minutes.  He can't repetitively  anything more than about 25-30 pounds.  His energy level is low.  His blood pressures now high again in the morning.  He has not had his sleep apnea machine evaluated in some time.  With a multiple medication changes.  Despite this he still very fatigued.  His breathing is somewhat difficult with activity but is having no chest pain.  He doesn't feels heart racing or skipping.    He really has difficulty with any task that requires him to be head upright for more than about 15 minutes and he really doesn't have any exercise tolerance at all this point.  If he does lift anything from 25-30 pounds, it exhausts him.  I'm not sure if this is all related to his atrial fibrillation hypertension but I think that the PVCs and nonsustained ventricular tachycardia is also causing a problem for him.      Because of this I have advised him that he can no longer work.  I do not want him having a syncopal episode at work because it is dangerous to him.    Past Medical History:   Diagnosis Date   • Abnormal electrocardiogram    • Anxiety    • Atrial fibrillation    • Cardiomyopathy, hypertrophic, primary familial    • Diabetes mellitus    • Difficulty breathing 05/02/2013    DURING EXERTION; RESOLVED 03/07/2014   • Dizziness 03/07/2014    RESOLVED; 04/18/2014   • ED (erectile dysfunction)    • Erectile dysfunction    • Health care maintenance    • Hyperlipidemia    • Hypertension    • Near syncope    •  Noncompliance    • Obesity    • VENESSA (obstructive sleep apnea)    • Osteoarthritis    • PAF (paroxysmal atrial fibrillation)    • Palpitations    • Pneumonia    • Type 2 diabetes mellitus          Past Surgical History:   Procedure Laterality Date   • CARDIAC ELECTROPHYSIOLOGY PROCEDURE Left 6/6/2016    Procedure: Pacemaker DC gerson MARX;  Surgeon: Juan Chacon MD;  Location: [facility] INVASIVE LOCATION;  Service:    • INSERT / REPLACE / REMOVE PACEMAKER     • KNEE ARTHROSCOPY     • OTHER SURGICAL HISTORY      physical therapy for rotator cuff       Current Outpatient Prescriptions on File Prior to Visit   Medication Sig Dispense Refill   • amLODIPine (NORVASC) 5 MG tablet Take 1 tablet by mouth Daily. 30 tablet 3   • atorvastatin (LIPITOR) 40 MG tablet Take 1 tablet by mouth Daily. 90 tablet 3   • CIALIS 20 MG tablet TAKE ONE TABLET BY MOUTH EVERY 3 DAYS AS DIRECTED  3   • [facility] LANCETS [facility]IG[facility]AL [facility] Use as directed and appropo lancet for his monitor 100 each 11   • glucose blood test strip Use as instructed 100 each 12   • metFORMIN (GLUCOPHAGE) 1000 MG tablet Take 1 tablet by mouth 2 (Two) Times a Day With Meals. 60 tablet 3   • metoprolol tartrate (LOPRESSOR) 25 MG tablet Take 1 tablet by mouth 2 (two) times a day. 60 tablet 5   • Multiple Vitamins-Minerals (MULTIVITAMIN ADULT PO) Take 1 tablet by mouth daily.     • quinapril (ACCUPRIL) 40 MG tablet Take 2 tablets by mouth Daily. 180 tablet 1   • rivaroxaban (XARELTO) 20 MG tablet Take 1 tablet by mouth Daily. 42 tablet 3     No current facility-administered medications on file prior to visit.        Social History     Social History   • Marital status:      Spouse name: N/A   • Number of children: N/A   • Years of education: N/A     Occupational History   • Not on file.     Social History Main Topics   • Smoking status: Never Smoker   • Smokeless tobacco: Never Used      Comment: No caffeine use   • Alcohol use Yes      Comment: RARE   • Drug use:  "No      Comment: FORMER,    • Sexual activity: Not Currently     Partners: Female     Other Topics Concern   • Not on file     Social History Narrative           Review of Systems   Constitution: Negative.   HENT: Negative for congestion.    Eyes: Negative for vision loss in left eye and vision loss in right eye.   Respiratory: Negative.  Negative for cough, hemoptysis, shortness of breath, sleep disturbances due to breathing, snoring, sputum production and wheezing.    Endocrine: Negative.    Hematologic/Lymphatic: Negative.    Skin: Negative for poor wound healing and rash.   Musculoskeletal: Negative for falls, gout, muscle cramps and myalgias.   Gastrointestinal: Negative for abdominal pain, diarrhea, dysphagia, hematemesis, melena, nausea and vomiting.   Neurological: Negative for excessive daytime sleepiness, dizziness, headaches, light-headedness, loss of balance, seizures and vertigo.   Psychiatric/Behavioral: Negative for depression and substance abuse. The patient is not nervous/anxious.        Procedures  Procedures       Objective:      Vitals:    09/20/17 1206   BP: 160/100   Pulse: 63   Weight: (!) 308 lb (140 kg)   Height: 75\" (190.5 cm)     Body mass index is 38.5 kg/(m^2).    Physical Exam   Constitutional: He is oriented to person, place, and time. He appears well-developed and well-nourished. No distress.   obese   HENT:   Head: Normocephalic and atraumatic.   Eyes: Conjunctivae are normal. No scleral icterus.   Neck: Neck supple. No JVD present. Carotid bruit is not present. No thyromegaly present.   Cardiovascular: Normal rate, regular rhythm, S1 normal, S2 normal, normal heart sounds and intact distal pulses.   No extrasystoles are present. PMI is not displaced.  Exam reveals no gallop.    No murmur heard.  Pulses:       Carotid pulses are 2+ on the right side, and 2+ on the left side.       Radial pulses are 2+ on the right side, and 2+ on the left side.        Dorsalis pedis pulses are 2+ on " the right side, and 2+ on the left side.        Posterior tibial pulses are 2+ on the right side, and 2+ on the left side.   Pulmonary/Chest: Effort normal and breath sounds normal. No respiratory distress. He has no wheezes. He has no rhonchi. He has no rales. He exhibits no tenderness.   Abdominal: Soft. Bowel sounds are normal. He exhibits no distension, no abdominal bruit and no mass. There is no tenderness.   Musculoskeletal: He exhibits no edema or deformity.   Lymphadenopathy:     He has no cervical adenopathy.   Neurological: He is alert and oriented to person, place, and time. No cranial nerve deficit.   Skin: Skin is warm and dry. No rash noted. He is not diaphoretic. No cyanosis. No pallor. Nails show no clubbing.   Psychiatric: He has a normal mood and affect. Judgment normal.   Vitals reviewed.      Lab Review:       Assessment:      Diagnosis Plan   1. PAF (paroxysmal atrial fibrillation)     2. SSS (sick sinus syndrome)     3. Near syncope     4. Orthostasis     5. LVH (left ventricular hypertrophy) due to hypertensive disease, without heart failure     6. Benign essential HTN     7. Obstructive apnea     8. Diabetes mellitus type 2, noninsulin dependent       1. Fatigue and lightheadedness. Continued, likely due to labile BP, PVCs and PAF.   2. Atrial fibrillation, rate controlled. On xarelto.   3. Hypertension. BP is high.   4. Hyperlipidemia. We will get the patient's labs from Dr. Kirkpatrick's office.   5. History of left ventricular hypertrophy on echocardiogram. Will recheck the  echocardiogram.   6. Diabetes mellitus type 2.   7. Obstructive sleep apnea on CPAP.   8. Obesity.   9. SSS, s/p pacer.   10. Nonsustained VT and frequent PVCs.      Plan:       Referral to sleep medicine, start spironolactone 25mg daily.  See dominga in 4 weeks to recheck BP.  May need to see Dr. Sánchez for frequent PVCs.     Dr. Sánchez reviewed his rhythm strips.  He recommended Zio patch for probable paroxysmal atrial  fibrillation and also recommended doing a stress echocardiogram to see if he has pacemaker induced cardiomyopathy.    Atrial Fibrillation and Atrial Flutter  Assessment  • The patient has paroxysmal atrial fibrillation  • This is non-valvular in etiology  • The patient's CHADS2-VASc score is 1  • A [id number] score of 1 is considered an intermediate risk for a thromboembolic event  • Rivaroxaban prescribed    Plan  • Attempt to maintain sinus rhythm  • Continue rivaroxaban for antithrombotic therapy, bleeding issues discussed  • Continue beta blocker for rate control

## 2017-09-20 NOTE — TELEPHONE ENCOUNTER
----- Message from Jennie Vanegas MD sent at 9/20/2017  3:39 PM EDT -----  I called and left message to call back about other testing.

## 2017-09-22 LAB — 1,25(OH)2D3 SERPL-MCNC: 49.9 PG/ML (ref 19.9–79.3)

## 2017-11-13 DIAGNOSIS — R55 SYNCOPE, UNSPECIFIED SYNCOPE TYPE: Primary | ICD-10-CM

## 2017-11-20 ENCOUNTER — APPOINTMENT (OUTPATIENT)
Dept: SLEEP MEDICINE | Facility: HOSPITAL | Age: 57
End: 2017-11-20
Attending: INTERNAL MEDICINE

## 2017-11-21 ENCOUNTER — HOSPITAL ENCOUNTER (OUTPATIENT)
Dept: CARDIOLOGY | Facility: HOSPITAL | Age: 57
Discharge: HOME | End: 2017-11-21
Attending: INTERNAL MEDICINE

## 2017-11-21 VITALS — BODY MASS INDEX: 38.3 KG/M2 | WEIGHT: 308 LBS | HEIGHT: 75 IN

## 2017-11-21 DIAGNOSIS — I48.0 PAF (PAROXYSMAL ATRIAL FIBRILLATION) (HCC): ICD-10-CM

## 2017-11-21 DIAGNOSIS — R55 NEAR SYNCOPE: ICD-10-CM

## 2017-11-21 DIAGNOSIS — R06.02 SHORTNESS OF BREATH: ICD-10-CM

## 2017-11-25 DIAGNOSIS — I10 BENIGN ESSENTIAL HTN: ICD-10-CM

## 2017-11-25 DIAGNOSIS — E11.9 DIABETES MELLITUS TYPE 2, NONINSULIN DEPENDENT (HCC): ICD-10-CM

## 2017-11-27 RX ORDER — QUINAPRIL 40 MG/1
TABLET ORAL
Qty: 60 TABLET | Refills: 0 | Status: SHIPPED | OUTPATIENT
Start: 2017-11-27 | End: 2018-01-05 | Stop reason: SDUPTHER

## 2017-11-27 RX ORDER — TADALAFIL 20 MG
TABLET ORAL
Qty: 6 TABLET | Refills: 2 | Status: SHIPPED | OUTPATIENT
Start: 2017-11-27 | End: 2020-04-23 | Stop reason: SDUPTHER

## 2017-12-01 ENCOUNTER — LAB (OUTPATIENT)
Dept: LAB | Facility: HOSPITAL | Age: 57
End: 2017-12-01

## 2017-12-01 ENCOUNTER — OFFICE VISIT (OUTPATIENT)
Dept: CARDIOLOGY | Facility: CLINIC | Age: 57
End: 2017-12-01

## 2017-12-01 VITALS
WEIGHT: 312 LBS | BODY MASS INDEX: 38.79 KG/M2 | HEART RATE: 65 BPM | SYSTOLIC BLOOD PRESSURE: 150 MMHG | HEIGHT: 75 IN | DIASTOLIC BLOOD PRESSURE: 68 MMHG

## 2017-12-01 DIAGNOSIS — I48.0 PAF (PAROXYSMAL ATRIAL FIBRILLATION) (HCC): ICD-10-CM

## 2017-12-01 DIAGNOSIS — R55 NEAR SYNCOPE: ICD-10-CM

## 2017-12-01 DIAGNOSIS — I10 BENIGN ESSENTIAL HTN: ICD-10-CM

## 2017-12-01 DIAGNOSIS — I10 BENIGN ESSENTIAL HTN: Primary | ICD-10-CM

## 2017-12-01 LAB
ANION GAP SERPL CALCULATED.3IONS-SCNC: 9.1 MMOL/L
BUN BLD-MCNC: 16 MG/DL (ref 6–20)
BUN/CREAT SERPL: 17.8 (ref 7–25)
CALCIUM SPEC-SCNC: 9.3 MG/DL (ref 8.6–10.5)
CHLORIDE SERPL-SCNC: 103 MMOL/L (ref 98–107)
CO2 SERPL-SCNC: 29.9 MMOL/L (ref 22–29)
CREAT BLD-MCNC: 0.9 MG/DL (ref 0.76–1.27)
GFR SERPL CREATININE-BSD FRML MDRD: 106 ML/MIN/1.73
GLUCOSE BLD-MCNC: 120 MG/DL (ref 65–99)
POTASSIUM BLD-SCNC: 4.3 MMOL/L (ref 3.5–5.2)
SODIUM BLD-SCNC: 142 MMOL/L (ref 136–145)

## 2017-12-01 PROCEDURE — 36415 COLL VENOUS BLD VENIPUNCTURE: CPT

## 2017-12-01 PROCEDURE — 80048 BASIC METABOLIC PNL TOTAL CA: CPT

## 2017-12-01 PROCEDURE — 99213 OFFICE O/P EST LOW 20 MIN: CPT | Performed by: NURSE PRACTITIONER

## 2017-12-01 PROCEDURE — 93000 ELECTROCARDIOGRAM COMPLETE: CPT | Performed by: NURSE PRACTITIONER

## 2017-12-01 NOTE — PROGRESS NOTES
Date of Office Visit: 2017  Encounter Provider: DENNIS Conklin  Place of Service: [facility] CARDIOLOGY  Patient Name: Shashi Engel  :1960    Chief Complaint   Patient presents with   • Loss of Consciousness   • Hypertension   :     HPI: Shashi Engel is a 56 y.o. male comes in today for 4 week follow up for hypertension.     He has a history of syncope, afib (Xarelto), hypertrophic cardiomyopathy, HTN, hyperlipidemia and pacemaker placement for bradycardia.     He was first seen here at [facility] in May/2013 for chest pain and dyspnea. Work up showed LVH on echo and afib on holter monitor. Negative nuclear for ischemia. Negative ischemic workup again in .     Over the past year, he has had episodes of near syncope. 2016 PM placed for bradycardia but did not resolve his near syncopal episodes. Continued to have episodes of rapid afib so was started on metoprolol. IT was thought that he may have dysautonomia and he was advised to wear compression stockings, watch diet, exercise and we referred to [facility] for evaluation. Their testing showed grossly intact autonomic function.  They thought that possibly his atrial fibrillation was driving his symptoms of fatigue and near syncope    He still has episodes of near syncope which require him to lay down . He cannot stand for more than 10-15 min without having episodes. At his last visit, he was having more PVCs and NSVT. Dr. Vanegas discussed with Dr. Sánchez and he advised a Zio and stress echo. Zio to eval afib/PVCs and stress echo to eval for pacemaker induced cardiomyopathy. BP also high last visit, spironolactone started.     I cannot find where either the Zio nor the stress echocardiogram were completed. They were scheduled on 17.     Today, he comes in for follow-up and has been feeling well.  He did have an episode 4 days ago where he felt very fatigued and syncopal.  He did  not have a full fledged syncopal episode.  He does full better after starting the spironolactone.  Sometimes he does full palpitations.  He has not had his testing done as there is an issue with his insurance.  He denies edema, dizziness at this time.  Denies chest pain, orthopnea or PND.        Past Medical History:   Diagnosis Date   • Abnormal electrocardiogram    • Anxiety    • Atrial fibrillation    • Bradycardia    • Cardiomyopathy, hypertrophic, primary familial    • Diabetes mellitus    • Difficulty breathing 05/02/2013    DURING EXERTION; RESOLVED 03/07/2014   • Dizziness 03/07/2014    RESOLVED; 04/18/2014   • ED (erectile dysfunction)    • Erectile dysfunction    • Health care maintenance    • Hyperlipidemia    • Hypertension    • Mild concentric left ventricular hypertrophy (LVH)    • Mild mitral regurgitation    • Mild tricuspid regurgitation    • Mitral insufficiency     mild to moderate   • Near syncope    • Noncompliance    • Obesity    • VENESSA (obstructive sleep apnea)     uses CPAP faithfully   • Osteoarthritis    • PAF (paroxysmal atrial fibrillation)    • Palpitations    • Pneumonia    • Tachycardia    • Tricuspid insufficiency     mild to moderate   • Type 2 diabetes mellitus        Past Surgical History:   Procedure Laterality Date   • CARDIAC ELECTROPHYSIOLOGY PROCEDURE Left 6/6/2016    Procedure: Pacemaker DC new  [employer];  Surgeon: Juan Chacon MD;  Location: [facility] INVASIVE LOCATION;  Service:    • INSERT / REPLACE / REMOVE PACEMAKER     • KNEE ARTHROSCOPY     • OTHER SURGICAL HISTORY      physical therapy for rotator cuff           Review of Systems   Constitution: Positive for malaise/fatigue. Negative for fever.   HENT: Negative for ear pain, hearing loss, nosebleeds and sore throat.    Eyes: Negative for double vision, pain, vision loss in left eye, vision loss in right eye and visual disturbance.   Cardiovascular: Positive for dyspnea on exertion. Negative for claudication and  "leg swelling.   Respiratory: Negative for cough, snoring and wheezing.    Endocrine: Negative for cold intolerance, heat intolerance and polyuria.   Skin: Positive for color change. Negative for itching and rash.   Musculoskeletal: Negative for joint pain, joint swelling and muscle cramps.   Gastrointestinal: Negative for abdominal pain, diarrhea, melena, nausea and vomiting.   Genitourinary: Negative for bladder incontinence and hematuria.   Neurological: Negative for excessive daytime sleepiness, dizziness, light-headedness, paresthesias and seizures.   Psychiatric/Behavioral: Negative for depression. The patient is not nervous/anxious.    All other systems reviewed and are negative.    All other systems reviewed and are negative    No Known Allergies    All aspects of family and social history reviewed.          Objective:     Vitals:    12/01/17 0815   BP: 150/68   BP Location: Left arm   Pulse: 65   Weight: (!) 312 lb (142 kg)   Height: 75\" (190.5 cm)     Body mass index is 39 kg/(m^2).    PHYSICAL EXAM:  Physical Exam   Constitutional: He is oriented to person, place, and time. He appears well-developed and well-nourished. No distress.   obese   HENT:   Head: Normocephalic and atraumatic.   Eyes: Conjunctivae are normal. No scleral icterus.   Neck: Neck supple. No JVD present. Carotid bruit is not present. No thyromegaly present.   Cardiovascular: Normal rate, regular rhythm, S1 normal, S2 normal, normal heart sounds and intact distal pulses.   No extrasystoles are present. PMI is not displaced.  Exam reveals no gallop.    No murmur heard.  Pulses:       Carotid pulses are 2+ on the right side, and 2+ on the left side.       Radial pulses are 2+ on the right side, and 2+ on the left side.        Dorsalis pedis pulses are 2+ on the right side, and 2+ on the left side.        Posterior tibial pulses are 2+ on the right side, and 2+ on the left side.   Pulmonary/Chest: Effort normal and breath sounds normal. No " respiratory distress. He has no wheezes. He has no rhonchi. He has no rales. He exhibits no tenderness.   Abdominal: Soft. Bowel sounds are normal. He exhibits no distension, no abdominal bruit and no mass. There is no tenderness.   Musculoskeletal: He exhibits no edema or deformity.   Lymphadenopathy:     He has no cervical adenopathy.   Neurological: He is alert and oriented to person, place, and time. No cranial nerve deficit.   Skin: Skin is warm and dry. No rash noted. He is not diaphoretic. No cyanosis. No pallor. Nails show no clubbing.   Psychiatric: He has a normal mood and affect. Judgment normal.   Vitals reviewed.        ECG 12 Lead  Date/Time: 12/1/2017 8:46 AM  Performed by: ARLEY MITCHELL  Authorized by: ARLEY MITCHELL   Comparison: compared with previous ECG from 3/21/2017  Similar to previous ECG  Rhythm: atrial fibrillation  Ectopy: PVCs  Rate: normal  BPM: 65  Conduction: conduction normal  ST Segments: ST segments normal  T Waves: T waves normal  QRS axis: normal  Clinical impression: abnormal ECG  Comments: Indication: afib/ pvcs                Assessment:       Diagnosis Plan   1. Benign essential HTN  Basic Metabolic Panel   2. PAF (paroxysmal atrial fibrillation)     3. Near syncope          Orders Placed This Encounter   Procedures   • Basic Metabolic Panel     Standing Status:   Future     Standing Expiration Date:   12/1/2018   • ECG 12 Lead     This order was created via procedure documentation       Current Outpatient Prescriptions   Medication Sig Dispense Refill   • amLODIPine (NORVASC) 5 MG tablet Take 1 tablet by mouth Daily. 30 tablet 3   • atorvastatin (LIPITOR) 40 MG tablet Take 1 tablet by mouth Daily. 90 tablet 3   • CIALIS 20 MG tablet TAKE ONE TABLET BY MOUTH EVERY 3 DAYS AS DIRECTED 6 tablet 2   • [facility] LANCETS [facility] Use as directed and appropo lancet for his monitor 100 each 11   • glucose blood test strip Use as instructed 100 each 12   • metFORMIN (GLUCOPHAGE)  1000 MG tablet Take 1 tablet by mouth 2 (Two) Times a Day With Meals. 60 tablet 3   • metoprolol tartrate (LOPRESSOR) 25 MG tablet Take 1 tablet by mouth 2 (two) times a day. 60 tablet 5   • Multiple Vitamins-Minerals (MULTIVITAMIN ADULT PO) Take 1 tablet by mouth daily.     • rivaroxaban (XARELTO) 20 MG tablet Take 1 tablet by mouth Daily. 30 tablet 0   • spironolactone (ALDACTONE) 25 MG tablet Take 1 tablet by mouth Daily. 90 tablet 3   • quinapril (ACCUPRIL) 40 MG tablet TAKE 2 TABLETS BY MOUTH EVERY DAY (Patient taking differently: one tablet bid) 60 tablet 0     No current facility-administered medications for this visit.             Plan:       1. HTN-more controlled on spironolactone.  Patient feels better.  Continue amlodipine 5 mg daily, metoprolol 25 twice a day and spironolactone 25 mg daily along with quinapril 40 twice a day.  Check a BMP.    2.Atrial Fibrillation and Atrial Flutter  Assessment  • The patient has paroxysmal atrial fibrillation  • This is non-valvular in etiology  • The patient's CHADS2-VASc score is 1  • A [id number] score of 1 is considered an intermediate risk for a thromboembolic event  • Rivaroxaban prescribed    Plan  • Attempt to maintain sinus rhythm  • Continue rivaroxaban for antithrombotic therapy, bleeding issues discussed  • Continue beta blocker for rate control    3.  Near syncope-near syncopal episodes still continue.  Awaiting Zio patch to even valve for A. fib and PVC burden.  Also await stress echocardiogram.    I will have the patient come back to see Dr. Vanegas in 2 months.  If testing abnormal will be seen before then.    As always, it has been a pleasure to participate in this patient's care.      Sincerely,      DENNIS Conklin

## 2017-12-04 ENCOUNTER — OFFICE VISIT (OUTPATIENT)
Dept: SLEEP MEDICINE | Facility: HOSPITAL | Age: 57
End: 2017-12-04
Attending: INTERNAL MEDICINE

## 2017-12-04 VITALS
BODY MASS INDEX: 38.54 KG/M2 | HEART RATE: 59 BPM | DIASTOLIC BLOOD PRESSURE: 93 MMHG | HEIGHT: 75 IN | WEIGHT: 310 LBS | SYSTOLIC BLOOD PRESSURE: 158 MMHG

## 2017-12-04 DIAGNOSIS — E66.9 OBESITY (BMI 30-39.9): ICD-10-CM

## 2017-12-04 DIAGNOSIS — I48.0 PAF (PAROXYSMAL ATRIAL FIBRILLATION) (HCC): Primary | ICD-10-CM

## 2017-12-04 DIAGNOSIS — G47.34 SLEEP RELATED HYPOXIA: ICD-10-CM

## 2017-12-04 DIAGNOSIS — G47.10 HYPERSOMNIA WITH SLEEP APNEA: ICD-10-CM

## 2017-12-04 DIAGNOSIS — I11.9 HYPERTENSIVE LEFT VENTRICULAR HYPERTROPHY, WITHOUT HEART FAILURE: ICD-10-CM

## 2017-12-04 DIAGNOSIS — I10 BENIGN ESSENTIAL HTN: ICD-10-CM

## 2017-12-04 DIAGNOSIS — G47.33 OSA ON CPAP: ICD-10-CM

## 2017-12-04 DIAGNOSIS — Z99.89 OSA ON CPAP: ICD-10-CM

## 2017-12-04 DIAGNOSIS — G47.30 HYPERSOMNIA WITH SLEEP APNEA: ICD-10-CM

## 2017-12-04 PROCEDURE — G0463 HOSPITAL OUTPT CLINIC VISIT: HCPCS

## 2017-12-04 NOTE — PROGRESS NOTES
Sleep Consultation    Patient Name: Shashi Engel  Age/Sex: 57 y.o. male  : 1960  MRN: [id number]    Date of Encounter Visit: 2017  Encounter Provider: Ashley Collins MD  Referring Provider: Jennie Vanegas MD  Place of Service: [facility]  Patient Care Team:  Keesha Kirkpatrcik MD as PCP - General (Family Medicine)  Keesha Kirkpatrick MD as PCP - Family Medicine    Subjective:     Reason for Consult: history of VENESSA and needs a new machine    History of Present Illness:  Shashi Engel is a 57 y.o. male is here for evaluation of VENESSA. Patient was diagnosed with severe obstructive sleep apnea in  by Dr. Holman.  His initial sleep study showed severe obstructive sleep apnea with an AHI of 80 and was started on CPAP at 17 cm H2O.  He was re-titrated on CPAP on 2010 and ended up on BiPAP at 15/10 cm H2O. Current compliance is poor because of machine malfunction.  He did better on BiPAP when first started and would like to continue BiPAP therapy.  In , he weighed 320 pounds and is down 10 pounds since then despite his reports of recent weight gain.    Please see sleep questionnaire on page 2 for more details.   Patient complains of daytime fatigue and sleepiness with an [place] Sleepiness Scale (ESS) of 0.  Patient denies any complaints of snoring or gasping for breath since he has been on CPAP therapy.   Denies any symptoms of restless leg syndrome.   Patient denies any cataplexy, sleep paralysis or other symptoms to suggest narcolepsy.  Patient denies any parasomnias.  Denies any history of seizure disorder or recent head trauma.  Patient spends adequate amount of time in bed with no evidence of sleep restriction or improper sleep hygiene. He goes to bed around midnight and wakes up at 6 or 7 in the morning averaging 6 hours of sleep per night.  On the weekends he will occasionally go to bed between 1-2 in the morning and wake up at 7 AM averaging about 5 or 6  hours of sleep per night.  He reports that it takes him 1 hour to fall asleep and wakes up feeling okay.  He denies taking any naps.  He denies any rotating her night shift work.  Comorbidities include: hypertension, hyperlipidemia, paroxysmal atrial fibrillation, SSS s/p PPM, diabetes, sleep-related hypoxemia and obesity.    Review of Systems:   Positive for irregular heartbeat and shortness of breath.  A twelve-system review was conducted and was otherwise negative.   Please refer to page 4 of on the sleep questionnaire for more details on system review findings.    Past Medical History:  Past Medical History:   Diagnosis Date   • Abnormal electrocardiogram    • Anxiety    • Atrial fibrillation    • Bradycardia    • Cardiomyopathy, hypertrophic, primary familial    • Diabetes mellitus    • Difficulty breathing 05/02/2013    DURING EXERTION; RESOLVED 03/07/2014   • Dizziness 03/07/2014    RESOLVED; 04/18/2014   • ED (erectile dysfunction)    • Erectile dysfunction    • Health care maintenance    • Hyperlipidemia    • Hypertension    • Mild concentric left ventricular hypertrophy (LVH)    • Mild mitral regurgitation    • Mild tricuspid regurgitation    • Mitral insufficiency     mild to moderate   • Near syncope    • Noncompliance    • Obesity    • VENESSA (obstructive sleep apnea)     uses CPAP faithfully   • Osteoarthritis    • PAF (paroxysmal atrial fibrillation)    • Palpitations    • Pneumonia    • Tachycardia    • Tricuspid insufficiency     mild to moderate   • Type 2 diabetes mellitus        Past Surgical History:   Procedure Laterality Date   • CARDIAC ELECTROPHYSIOLOGY PROCEDURE Left 6/6/2016    Procedure: Pacemaker JONATHAN MARX;  Surgeon: Juan Chacon MD;  [facility]: [facility] INVAS[facility];  Service:    • INSERT / REPLACE / REMOVE PACEMAKER     • KNEE ARTHROSCOPY     • OTHER SURGICAL HISTORY      physical therapy for rotator cuff       Home Medications:     Current Outpatient Prescriptions:   •   amLODIPine (NORVASC) 5 MG tablet, Take 1 tablet by mouth Daily., Disp: 30 tablet, Rfl: 3  •  atorvastatin (LIPITOR) 40 MG tablet, Take 1 tablet by mouth Daily., Disp: 90 tablet, Rfl: 3  •  CIALIS 20 MG tablet, TAKE ONE TABLET BY MOUTH EVERY 3 DAYS AS DIRECTED, Disp: 6 tablet, Rfl: 2  •  [employer] LANCETS ORIGINAL misc, Use as directed and appropo lancet for his monitor, Disp: 100 each, Rfl: 11  •  glucose blood test strip, Use as instructed, Disp: 100 each, Rfl: 12  •  metFORMIN (GLUCOPHAGE) 1000 MG tablet, Take 1 tablet by mouth 2 (Two) Times a Day With Meals., Disp: 60 tablet, Rfl: 3  •  metoprolol tartrate (LOPRESSOR) 25 MG tablet, Take 1 tablet by mouth 2 (two) times a day., Disp: 60 tablet, Rfl: 5  •  Multiple Vitamins-Minerals (MULTIVITAMIN ADULT PO), Take 1 tablet by mouth daily., Disp: , Rfl:   •  quinapril (ACCUPRIL) 40 MG tablet, TAKE 2 TABLETS BY MOUTH EVERY DAY (Patient taking differently: one tablet bid), Disp: 60 tablet, Rfl: 0  •  rivaroxaban (XARELTO) 20 MG tablet, Take 1 tablet by mouth Daily., Disp: 30 tablet, Rfl: 0  •  spironolactone (ALDACTONE) 25 MG tablet, Take 1 tablet by mouth Daily., Disp: 90 tablet, Rfl: 3    Allergies:  No Known Allergies    Past Social History:  Social History     Social History   • Marital status:      Spouse name: N/A   • Number of children: N/A   • Years of education: N/A     Occupational History   • disabled      Social History Main Topics   • Smoking status: Never Smoker   • Smokeless tobacco: Never Used   • Alcohol use Yes      Comment: RARE   • Drug use: No      Comment: FORMER.  No caffeine use   • Sexual activity: Not Currently     Partners: Female     Other Topics Concern   • None     Social History Narrative       Past Family History:  Family History   Problem Relation Age of Onset   • Depression Mother    • Hypertension Mother    • Heart disease Mother    • Kidney disease Other    • Sleep apnea Other    • Atrial fibrillation Sister    • Hypertension Sister   "  • Heart disease Sister    • Hypertension Brother    • Hypertension Sister    • Heart disease Sister    • Diabetes Neg Hx        Objective:   Done and documented on sleep disorders center physical examination sheet, please refer to the hand written note on records for details about the pertinent negative findings  Vital Signs:   Visit Vitals   • /93 (BP Location: Left arm, Patient Position: Sitting)   • Pulse 59   • Ht 190.5 cm (75\")   • Wt (!) 141 kg (310 lb)   • BMI 38.75 kg/m2     Wt Readings from Last 3 Encounters:   12/11/17 (!) 141 kg (310 lb)   12/04/17 (!) 141 kg (310 lb)   12/01/17 (!) 142 kg (312 lb)     Neck Circumference: 18.25 inches    Physical Exam:   General: AAOx3. Normal mood and affect.   HEENT:  Conjunctiva normal.  PERRLA.  Moist mucous membranes.  Septum midline. Mallampati 4 airway. enlarged tongue. edematous uvula. Enlarged tonsils   Neck: Neck supple. Trachea midline.  Normal thyroid. No cervical or supraclavicular lymphadenopathy.  LUNGS: Non-labored breathing. CTAB.  No wheezing.  HEART: regular rate and rhythm. No murmur. Normal s1/s2.  EXTREMITIES: trace edema. No cyanosis or clubbing. Normal gait.    Diagnostic Data:  NPSG 3/4/2004 showed severe checked sleep apnea with an H of 80 and sleep-related hypoxemia with 33.5% total sleep time spent less than 90%.  Arousal index of 81.7.  Was started on CPAP at 17 cm H2O.    Re-titration study on 5/20/2010 at a weight of 320 pounds.  Patient was not able to tolerate CPAP and was titrated to a BiPAP at 15/10.    Compliance download from the last 30 days showed overall compliance of 23.3% compliance and 0% compliance of greater than 4 hours with average use of 1 hours and 51 minutes per night. Residual AHI of 13.5 (CA 1.3, OA 2.4, hypopnea 9.8, RERA 0.2) on BiPAP at 15/10 cm H20 with 1 hours 24 minutes and 34 seconds of average time in large air leak per day.    Assessment and Plan:       ICD-10-CM ICD-9-CM   1. PAF (paroxysmal atrial " fibrillation) I48.0 427.31   2. VNEESSA on CPAP G47.33 327.23    Z99.89 V46.8   3. Sleep related hypoxia G47.34 327.24   4. Benign essential HTN I10 401.1   5. Hypertensive left ventricular hypertrophy, without heart failure I11.9 402.90   6. Obesity (BMI 30-39.9) E66.9 278.00   7. Hypersomnia with sleep apnea G47.10 780.53    G47.30        Recommendations:     Patient has severe objective sleep apnea with significant sleep-related hypoxemia and felt better on BiPAP, but is currently not using his machine due to malfunctioning of the machine. He had prior complaints of hypersomnia that improved with BiPAP therapy and is expected to improve with optimizing treatment of underlying sleep apnea.     Reorder BiPAP at 15/10 and patient will obtain new supplies.  Check overnight pulse oximetry and compliance download in 2 months as he did have a residual apnea with mostly hypoxemia is on his last download.    If residual AHI is still greater than 5 and air leak is minimal will consider increasing pressure to optimize control of sleep apnea and sleep-related hypoxemia.    Patient was educated in depth about VENESSA and cardiovascular consequences if left untreated, including but not limited to CHF, CAD, arrhythmias, CVA, and/or HTN. Adherence to the BiPAP is a key factor in successful treatment of VENESSA and the patient was encouraged to contact us in case of problem with the BiPAP or the mask that can limit the tolerance of the compliance with the therapy.    Encouraged weight loss as can help reduce symptoms of sleep apnea and improve overall health.     Blood pressure was mildly elevated on today's exam patient was encouraged to continue to monitor pressure and follow-up with cardiology and primary care physician.    No orders of the defined types were placed in this encounter.    Return in about 2 months (around 2/4/2018). for further adjustments and recommendations.     Ashley Collins MD   [facility]    12/28/17  4:37 AM    KATE Dragon/Transcription disclaimer:   Much of this encounter note is an electronic transcription/translation of spoken language to printed text. The electronic translation of spoken language may permit erroneous, or at times, nonsensical words or phrases to be inadvertently transcribed; Although I have reviewed the note for such errors, some may still exist.

## 2017-12-11 ENCOUNTER — HOSPITAL ENCOUNTER (OUTPATIENT)
Dept: CARDIOLOGY | Facility: HOSPITAL | Age: 57
Discharge: HOME | End: 2017-12-11
Attending: INTERNAL MEDICINE | Admitting: INTERNAL MEDICINE

## 2017-12-11 VITALS — HEIGHT: 75 IN | WEIGHT: 310 LBS | BODY MASS INDEX: 38.54 KG/M2 | HEART RATE: 67 BPM

## 2017-12-11 PROCEDURE — 93325 DOPPLER ECHO COLOR FLOW MAPG: CPT

## 2017-12-11 PROCEDURE — 93016 CV STRESS TEST SUPVJ ONLY: CPT | Performed by: INTERNAL MEDICINE

## 2017-12-11 PROCEDURE — 93018 CV STRESS TEST I&R ONLY: CPT | Performed by: INTERNAL MEDICINE

## 2017-12-11 PROCEDURE — 93017 CV STRESS TEST TRACING ONLY: CPT

## 2017-12-11 PROCEDURE — 93320 DOPPLER ECHO COMPLETE: CPT

## 2017-12-11 PROCEDURE — 93325 DOPPLER ECHO COLOR FLOW MAPG: CPT | Performed by: INTERNAL MEDICINE

## 2017-12-11 PROCEDURE — 93350 STRESS TTE ONLY: CPT | Performed by: INTERNAL MEDICINE

## 2017-12-11 PROCEDURE — 93350 STRESS TTE ONLY: CPT

## 2017-12-11 PROCEDURE — 93320 DOPPLER ECHO COMPLETE: CPT | Performed by: INTERNAL MEDICINE

## 2017-12-13 LAB
BH CV ECHO MEAS - ACS: 2.3 CM
BH CV ECHO MEAS - AO MAX PG: 9.2 MMHG
BH CV ECHO MEAS - AO ROOT AREA (BSA CORRECTED): 1.1
BH CV ECHO MEAS - AO ROOT AREA: 6.5 CM^2
BH CV ECHO MEAS - AO ROOT DIAM: 2.9 CM
BH CV ECHO MEAS - AO V2 MAX: 152 CM/SEC
BH CV ECHO MEAS - BSA(HAYCOCK): 2.8 M^2
BH CV ECHO MEAS - BSA: 2.6 M^2
BH CV ECHO MEAS - BZI_BMI: 38.7 KILOGRAMS/M^2
BH CV ECHO MEAS - BZI_METRIC_HEIGHT: 190.5 CM
BH CV ECHO MEAS - BZI_METRIC_WEIGHT: 140.6 KG
BH CV ECHO MEAS - CONTRAST EF 4CH: 50 ML/M^2
BH CV ECHO MEAS - EDV(MOD-SP4): 94 ML
BH CV ECHO MEAS - EDV(TEICH): 192.3 ML
BH CV ECHO MEAS - EF(CUBED): 62.1 %
BH CV ECHO MEAS - EF(MOD-SP4): 50 %
BH CV ECHO MEAS - EF(TEICH): 52.7 %
BH CV ECHO MEAS - ESV(MOD-SP4): 47 ML
BH CV ECHO MEAS - ESV(TEICH): 91.1 ML
BH CV ECHO MEAS - FS: 27.6 %
BH CV ECHO MEAS - IVS/LVPW: 1.2
BH CV ECHO MEAS - IVSD: 1.7 CM
BH CV ECHO MEAS - LAT PEAK E' VEL: 15 CM/SEC
BH CV ECHO MEAS - LV DIASTOLIC VOL/BSA (35-75): 35.6 ML/M^2
BH CV ECHO MEAS - LV MASS(C)D: 466.2 GRAMS
BH CV ECHO MEAS - LV MASS(C)DI: 176.3 GRAMS/M^2
BH CV ECHO MEAS - LV SYSTOLIC VOL/BSA (12-30): 17.8 ML/M^2
BH CV ECHO MEAS - LVIDD: 6.2 CM
BH CV ECHO MEAS - LVIDS: 4.5 CM
BH CV ECHO MEAS - LVLD AP4: 7.1 CM
BH CV ECHO MEAS - LVLS AP4: 6.5 CM
BH CV ECHO MEAS - LVPWD: 1.4 CM
BH CV ECHO MEAS - MED PEAK E' VEL: 7 CM/SEC
BH CV ECHO MEAS - MV DEC SLOPE: 483.2 CM/SEC^2
BH CV ECHO MEAS - MV DEC TIME: 0.23 SEC
BH CV ECHO MEAS - MV E MAX VEL: 108.2 CM/SEC
BH CV ECHO MEAS - MV P1/2T MAX VEL: 109.1 CM/SEC
BH CV ECHO MEAS - MV P1/2T: 66.1 MSEC
BH CV ECHO MEAS - MVA P1/2T LCG: 2 CM^2
BH CV ECHO MEAS - MVA(P1/2T): 3.3 CM^2
BH CV ECHO MEAS - RVSP: 46 MMHG
BH CV ECHO MEAS - SI(CUBED): 55.3 ML/M^2
BH CV ECHO MEAS - SI(MOD-SP4): 17.8 ML/M^2
BH CV ECHO MEAS - SI(TEICH): 38.3 ML/M^2
BH CV ECHO MEAS - SV(CUBED): 146.2 ML
BH CV ECHO MEAS - SV(MOD-SP4): 47 ML
BH CV ECHO MEAS - SV(TEICH): 101.3 ML
BH CV ECHO MEAS - TAPSE (>1.6): 1.2 CM2
BH CV ECHO MEAS - TR MAX VEL: 283.7 CM/SEC
BH CV STRESS BP STAGE 1: NORMAL
BH CV STRESS BP STAGE 2: NORMAL
BH CV STRESS BP STAGE 3: NORMAL
BH CV STRESS DURATION MIN STAGE 1: 3
BH CV STRESS DURATION MIN STAGE 2: 3
BH CV STRESS DURATION MIN STAGE 3: 3
BH CV STRESS DURATION MIN STAGE 4: 1
BH CV STRESS DURATION SEC STAGE 1: 0
BH CV STRESS DURATION SEC STAGE 2: 0
BH CV STRESS DURATION SEC STAGE 3: 0
BH CV STRESS DURATION SEC STAGE 4: 0
BH CV STRESS ECHO POST STRESS EJECTION FRACTION EF: 53 %
BH CV STRESS GRADE STAGE 1: 0
BH CV STRESS GRADE STAGE 2: 5
BH CV STRESS GRADE STAGE 3: 10
BH CV STRESS GRADE STAGE 4: 12
BH CV STRESS HR STAGE 1: 92
BH CV STRESS HR STAGE 2: 92
BH CV STRESS HR STAGE 3: 109
BH CV STRESS HR STAGE 4: 135
BH CV STRESS METS STAGE 1: 2.3
BH CV STRESS METS STAGE 2: 3.5
BH CV STRESS METS STAGE 3: 4.6
BH CV STRESS METS STAGE 4: 7
BH CV STRESS PROTOCOL 1: NORMAL
BH CV STRESS SPEED STAGE 1: 1.7
BH CV STRESS SPEED STAGE 2: 1.7
BH CV STRESS SPEED STAGE 3: 1.7
BH CV STRESS SPEED STAGE 4: 2.4
BH CV STRESS STAGE 1: 1
BH CV STRESS STAGE 2: 2
BH CV STRESS STAGE 3: 3
BH CV STRESS STAGE 4: 4
BH CV STRESS STAGE 5: 5
BH CV XLRA - RV BASE: 4.8 CM
BH CV XLRA - TDI S': 9 CM/SEC
E/E' RATIO: 11
LEFT ATRIUM VOLUME INDEX: 60 ML/M2
LV EF 2D ECHO EST: 50 %
MAXIMAL PREDICTED HEART RATE: 165 BPM
PERCENT MAX PREDICTED HR: 81.82 %
STRESS BASELINE BP: NORMAL MMHG
STRESS BASELINE HR: 65 BPM
STRESS O2 SAT REST: 99 %
STRESS PERCENT HR: 96 %
STRESS POST ESTIMATED WORKLOAD: 7 METS
STRESS POST EXERCISE DUR MIN: 10 MIN
STRESS POST EXERCISE DUR SEC: 0 SEC
STRESS POST PEAK BP: NORMAL MMHG
STRESS POST PEAK HR: 135 BPM
STRESS TARGET HR: 140 BPM

## 2017-12-14 DIAGNOSIS — I51.7 LEFT VENTRICULAR HYPERTROPHY: Primary | ICD-10-CM

## 2017-12-18 ENCOUNTER — LAB (OUTPATIENT)
Dept: LAB | Facility: HOSPITAL | Age: 57
End: 2017-12-18

## 2017-12-18 DIAGNOSIS — I51.7 LEFT VENTRICULAR HYPERTROPHY: ICD-10-CM

## 2017-12-18 PROCEDURE — 86334 IMMUNOFIX E-PHORESIS SERUM: CPT

## 2017-12-18 PROCEDURE — 83883 ASSAY NEPHELOMETRY NOT SPEC: CPT

## 2017-12-18 PROCEDURE — 36415 COLL VENOUS BLD VENIPUNCTURE: CPT

## 2017-12-18 PROCEDURE — 84165 PROTEIN E-PHORESIS SERUM: CPT

## 2017-12-18 PROCEDURE — 84155 ASSAY OF PROTEIN SERUM: CPT

## 2017-12-19 LAB
ALBUMIN SERPL-MCNC: 3.7 G/DL (ref 2.9–4.4)
ALBUMIN/GLOB SERPL: 1.1 {RATIO} (ref 0.7–1.7)
ALPHA1 GLOB FLD ELPH-MCNC: 0.2 G/DL (ref 0–0.4)
ALPHA2 GLOB SERPL ELPH-MCNC: 0.8 G/DL (ref 0.4–1)
B-GLOBULIN SERPL ELPH-MCNC: 1.3 G/DL (ref 0.7–1.3)
GAMMA GLOB SERPL ELPH-MCNC: 1.2 G/DL (ref 0.4–1.8)
GLOBULIN SER CALC-MCNC: 3.4 G/DL (ref 2.2–3.9)
IGA SERPL-MCNC: 247 MG/DL (ref 90–386)
IGG SERPL-MCNC: 1118 MG/DL (ref 700–1600)
IGM SERPL-MCNC: 64 MG/DL (ref 20–172)
INTERPRETATION SERPL IEP-IMP: NORMAL
KAPPA LC SERPL-MCNC: 16.4 MG/L (ref 3.3–19.4)
KAPPA LC/LAMBDA SER: 1.1 {RATIO} (ref 0.26–1.65)
LAMBDA LC FREE SERPL-MCNC: 14.9 MG/L (ref 5.7–26.3)
Lab: NORMAL
M-SPIKE: NORMAL G/DL
PROT SERPL-MCNC: 7.1 G/DL (ref 6–8.5)

## 2017-12-20 ENCOUNTER — CLINICAL SUPPORT NO REQUIREMENTS (OUTPATIENT)
Dept: CARDIOLOGY | Facility: CLINIC | Age: 57
End: 2017-12-20

## 2017-12-20 DIAGNOSIS — I48.0 PAF (PAROXYSMAL ATRIAL FIBRILLATION) (HCC): Primary | ICD-10-CM

## 2017-12-20 PROCEDURE — 93296 REM INTERROG EVL PM/IDS: CPT | Performed by: INTERNAL MEDICINE

## 2017-12-20 PROCEDURE — 93294 REM INTERROG EVL PM/LDLS PM: CPT | Performed by: INTERNAL MEDICINE

## 2018-01-05 DIAGNOSIS — I10 BENIGN ESSENTIAL HTN: ICD-10-CM

## 2018-01-05 DIAGNOSIS — E11.9 DIABETES MELLITUS TYPE 2, NONINSULIN DEPENDENT (HCC): ICD-10-CM

## 2018-01-05 RX ORDER — QUINAPRIL 40 MG/1
TABLET ORAL
Qty: 30 TABLET | Refills: 0 | Status: SHIPPED | OUTPATIENT
Start: 2018-01-05 | End: 2018-02-19 | Stop reason: SDUPTHER

## 2018-01-29 ENCOUNTER — OFFICE VISIT (OUTPATIENT)
Dept: SLEEP MEDICINE | Facility: HOSPITAL | Age: 58
End: 2018-01-29
Attending: INTERNAL MEDICINE

## 2018-01-29 VITALS
DIASTOLIC BLOOD PRESSURE: 89 MMHG | SYSTOLIC BLOOD PRESSURE: 149 MMHG | HEART RATE: 62 BPM | WEIGHT: 304 LBS | HEIGHT: 75 IN | BODY MASS INDEX: 37.8 KG/M2

## 2018-01-29 DIAGNOSIS — E66.9 OBESITY (BMI 30-39.9): ICD-10-CM

## 2018-01-29 DIAGNOSIS — I10 BENIGN ESSENTIAL HTN: ICD-10-CM

## 2018-01-29 DIAGNOSIS — I48.0 PAF (PAROXYSMAL ATRIAL FIBRILLATION) (HCC): ICD-10-CM

## 2018-01-29 DIAGNOSIS — G47.33 OSA TREATED WITH BIPAP: Primary | ICD-10-CM

## 2018-01-29 DIAGNOSIS — G47.10 HYPERSOMNIA WITH SLEEP APNEA: ICD-10-CM

## 2018-01-29 DIAGNOSIS — G47.34 SLEEP RELATED HYPOXIA: ICD-10-CM

## 2018-01-29 DIAGNOSIS — G47.30 HYPERSOMNIA WITH SLEEP APNEA: ICD-10-CM

## 2018-01-29 DIAGNOSIS — E78.2 MIXED HYPERLIPIDEMIA: ICD-10-CM

## 2018-01-29 PROBLEM — Z99.89 OSA ON CPAP: Status: ACTIVE | Noted: 2018-01-29

## 2018-01-29 PROCEDURE — G0463 HOSPITAL OUTPT CLINIC VISIT: HCPCS

## 2018-02-01 DIAGNOSIS — E11.9 DIABETES MELLITUS TYPE 2, NONINSULIN DEPENDENT (HCC): ICD-10-CM

## 2018-02-01 DIAGNOSIS — I10 BENIGN ESSENTIAL HTN: ICD-10-CM

## 2018-02-01 RX ORDER — QUINAPRIL 40 MG/1
TABLET ORAL
Qty: 30 TABLET | Refills: 0 | OUTPATIENT
Start: 2018-02-01

## 2018-02-14 DIAGNOSIS — I10 BENIGN ESSENTIAL HYPERTENSION: ICD-10-CM

## 2018-02-14 RX ORDER — AMLODIPINE BESYLATE 5 MG/1
TABLET ORAL
Qty: 30 TABLET | Refills: 0 | Status: SHIPPED | OUTPATIENT
Start: 2018-02-14 | End: 2018-02-23 | Stop reason: SDUPTHER

## 2018-02-17 DIAGNOSIS — E11.9 DIABETES MELLITUS TYPE 2, NONINSULIN DEPENDENT (HCC): ICD-10-CM

## 2018-02-17 DIAGNOSIS — I10 BENIGN ESSENTIAL HTN: ICD-10-CM

## 2018-02-19 DIAGNOSIS — I10 BENIGN ESSENTIAL HTN: ICD-10-CM

## 2018-02-19 DIAGNOSIS — E11.9 DIABETES MELLITUS TYPE 2, NONINSULIN DEPENDENT (HCC): ICD-10-CM

## 2018-02-19 RX ORDER — QUINAPRIL 40 MG/1
80 TABLET ORAL DAILY
Qty: 60 TABLET | Refills: 0 | Status: SHIPPED | OUTPATIENT
Start: 2018-02-19 | End: 2018-03-30 | Stop reason: SDUPTHER

## 2018-02-19 RX ORDER — QUINAPRIL 40 MG/1
TABLET ORAL
Qty: 30 TABLET | Refills: 0 | OUTPATIENT
Start: 2018-02-19

## 2018-02-23 ENCOUNTER — OFFICE VISIT (OUTPATIENT)
Dept: FAMILY MEDICINE CLINIC | Facility: CLINIC | Age: 58
End: 2018-02-23

## 2018-02-23 VITALS
OXYGEN SATURATION: 98 % | HEART RATE: 60 BPM | SYSTOLIC BLOOD PRESSURE: 120 MMHG | DIASTOLIC BLOOD PRESSURE: 82 MMHG | WEIGHT: 307 LBS | BODY MASS INDEX: 38.17 KG/M2 | HEIGHT: 75 IN

## 2018-02-23 DIAGNOSIS — R55 NEAR SYNCOPE: ICD-10-CM

## 2018-02-23 DIAGNOSIS — E11.9 DIABETES MELLITUS TYPE 2, NONINSULIN DEPENDENT (HCC): Primary | ICD-10-CM

## 2018-02-23 DIAGNOSIS — E78.2 MIXED HYPERLIPIDEMIA: ICD-10-CM

## 2018-02-23 DIAGNOSIS — I10 BENIGN ESSENTIAL HTN: ICD-10-CM

## 2018-02-23 PROCEDURE — 99214 OFFICE O/P EST MOD 30 MIN: CPT | Performed by: FAMILY MEDICINE

## 2018-02-23 RX ORDER — ATORVASTATIN CALCIUM 40 MG/1
40 TABLET, FILM COATED ORAL DAILY
Qty: 90 TABLET | Refills: 3 | Status: SHIPPED | OUTPATIENT
Start: 2018-02-23 | End: 2020-04-02 | Stop reason: SDUPTHER

## 2018-02-23 RX ORDER — DIMETHICONE 13 MG/ML
LOTION TOPICAL
Qty: 100 EACH | Refills: 11 | Status: SHIPPED | OUTPATIENT
Start: 2018-02-23 | End: 2021-04-01 | Stop reason: SDUPTHER

## 2018-02-23 RX ORDER — AMLODIPINE BESYLATE 5 MG/1
5 TABLET ORAL DAILY
Qty: 90 TABLET | Refills: 1 | Status: SHIPPED | OUTPATIENT
Start: 2018-02-23 | End: 2019-01-14 | Stop reason: SDUPTHER

## 2018-02-23 NOTE — PROGRESS NOTES
Shashi Engel is a 57 y.o. male.      Assessment/Plan   Problem List Items Addressed This Visit        Cardiovascular and Mediastinum    Benign essential HTN    Current Assessment & Plan     [facility] 2/23/2018 His BP is sometimes so low he is dizzy and fatigued. Wonders if he should skip med at that time. Suggested that he hold the spironolatone but disc w/dr. Anders when he sees her next week.            Relevant Medications    amLODIPine (NORVASC) 5 MG tablet    HLD (hyperlipidemia)    Relevant Medications    atorvastatin (LIPITOR) 40 MG tablet    Other Relevant Orders    Lipid Panel With LDL / HDL Ratio    Near syncope    Current Assessment & Plan     [facility] 2/23/2018  This is better lately. [place] to be related to afib and low heart rate. Seen at [facility] and not felt to have Banks            Endocrine    Diabetes mellitus type 2, noninsulin dependent - Primary    Current Assessment & Plan     [facility] 2/23/2018  Diabetes is improving with treatment.   Continue current treatment regimen.  Diabetes will be reassessed in 6 months.         Relevant Medications    [employer] LANCETS ORIGINAL misc    metFORMIN (GLUCOPHAGE) 1000 MG tablet    Other Relevant Orders    Hemoglobin A1c    Microalbumin / Creatinine Urine Ratio - Urine, Clean Catch             Return in about 6 months (around 8/23/2018).      Chief Complaint   Patient presents with   • Diabetes   • Hypertension   • Med Refill     Social History   Substance Use Topics   • Smoking status: Never Smoker   • Smokeless tobacco: Never Used   • Alcohol use Yes      Comment: RARE       History of Present Illness     Hasn't had an A1c in some time. He is due for the eye doctor. He denies numbness in his feet. Heart has been better lately. He is taking meds as ordered. He checks sugars about four times a week. Occ he will have a very low BP and wonders about skipping his BP meds when that happens. He needs lipids and other labs done.     The following portions of the  "patient's history were reviewed and updated as appropriate: Problem List, SH, Medications    Review of Systems   Constitutional: Negative for activity change, appetite change, chills, fatigue, fever and unexpected weight change.   HENT: Negative for congestion, ear pain, hearing loss, mouth sores, nosebleeds, rhinorrhea and sore throat.    Eyes: Negative for pain and visual disturbance.   Respiratory: Negative for cough, shortness of breath and wheezing.    Cardiovascular: Negative for chest pain, palpitations and leg swelling.   Gastrointestinal: Negative for abdominal distention, abdominal pain, blood in stool, constipation, diarrhea, nausea and vomiting.   Endocrine: Negative for cold intolerance and heat intolerance.   Genitourinary: Negative for difficulty urinating, discharge, dysuria, frequency, hematuria and urgency.   Musculoskeletal: Negative for back pain and joint swelling.   Skin: Negative for rash and wound.   Neurological: Negative for dizziness, weakness, numbness and headaches.   Hematological: Does not bruise/bleed easily.   Psychiatric/Behavioral: Negative for confusion, dysphoric mood, sleep disturbance and suicidal ideas. The patient is not nervous/anxious.        Objective   Vitals:    02/23/18 1503   BP: 120/82   Pulse: 60   SpO2: 98%   Weight: (!) 139 kg (307 lb)   Height: 190.5 cm (75\")     Body mass index is 38.37 kg/(m^2).  Physical Exam   Constitutional: He is oriented to person, place, and time. Vital signs are normal. He appears well-developed. No distress.   Obese     HENT:   Head: Normocephalic.   Cardiovascular: Normal rate and normal heart sounds.    Bradycardiac, irreg irreg   Pulmonary/Chest: Effort normal and breath sounds normal.   Neurological: He is alert and oriented to person, place, and time. Gait normal.   Psychiatric: He has a normal mood and affect. His behavior is normal. Judgment and thought content normal.   Vitals reviewed.    Reviewed Data:        "

## 2018-02-23 NOTE — ASSESSMENT & PLAN NOTE
[facility] 2/23/2018 His BP is sometimes so low he is dizzy and fatigued. Wonders if he should skip med at that time. Suggested that he hold the spironolatone but disc w/dr. Anders when he sees her next week.

## 2018-02-23 NOTE — ASSESSMENT & PLAN NOTE
[facility] 2/23/2018  This is better lately. [place] to be related to afib and low heart rate. Seen at [place] and not felt to have Banks

## 2018-02-24 LAB
ALBUMIN/CREAT UR: 2.3 MG/G CREAT (ref 0–30)
CHOLEST SERPL-MCNC: 177 MG/DL (ref 100–199)
CREAT UR-MCNC: 163.2 MG/DL
HBA1C MFR BLD: 6.3 % (ref 4.8–5.6)
HDLC SERPL-MCNC: 50 MG/DL
LDLC SERPL CALC-MCNC: 111 MG/DL (ref 0–99)
LDLC/HDLC SERPL: 2.2 RATIO UNITS (ref 0–3.6)
MICROALBUMIN UR-MCNC: 3.8 UG/ML
TRIGL SERPL-MCNC: 78 MG/DL (ref 0–149)
VLDLC SERPL CALC-MCNC: 16 MG/DL (ref 5–40)

## 2018-02-26 ENCOUNTER — OFFICE VISIT (OUTPATIENT)
Dept: SLEEP MEDICINE | Facility: HOSPITAL | Age: 58
End: 2018-02-26
Attending: INTERNAL MEDICINE

## 2018-02-26 VITALS
BODY MASS INDEX: 37.92 KG/M2 | SYSTOLIC BLOOD PRESSURE: 162 MMHG | WEIGHT: 305 LBS | DIASTOLIC BLOOD PRESSURE: 85 MMHG | HEIGHT: 75 IN | HEART RATE: 62 BPM

## 2018-02-26 DIAGNOSIS — G47.30 HYPERSOMNIA WITH SLEEP APNEA: ICD-10-CM

## 2018-02-26 DIAGNOSIS — G47.10 HYPERSOMNIA WITH SLEEP APNEA: ICD-10-CM

## 2018-02-26 DIAGNOSIS — Z99.89 OSA ON CPAP: Primary | ICD-10-CM

## 2018-02-26 DIAGNOSIS — G47.33 OSA ON CPAP: Primary | ICD-10-CM

## 2018-02-26 DIAGNOSIS — I10 BENIGN ESSENTIAL HTN: ICD-10-CM

## 2018-02-26 DIAGNOSIS — I48.0 PAF (PAROXYSMAL ATRIAL FIBRILLATION) (HCC): ICD-10-CM

## 2018-02-26 DIAGNOSIS — E66.01 OBESITY, MORBID, BMI 40.0-49.9 (HCC): ICD-10-CM

## 2018-02-26 DIAGNOSIS — E11.9 DIABETES MELLITUS TYPE 2, NONINSULIN DEPENDENT (HCC): ICD-10-CM

## 2018-02-26 PROCEDURE — G0463 HOSPITAL OUTPT CLINIC VISIT: HCPCS

## 2018-02-26 NOTE — PROGRESS NOTES
I have reviewed all lab results which are normal or stable.  Patient  Has viewed his results on [facility].

## 2018-03-01 ENCOUNTER — OFFICE VISIT (OUTPATIENT)
Dept: CARDIOLOGY | Facility: CLINIC | Age: 58
End: 2018-03-01

## 2018-03-01 VITALS
SYSTOLIC BLOOD PRESSURE: 130 MMHG | WEIGHT: 306.8 LBS | DIASTOLIC BLOOD PRESSURE: 80 MMHG | HEIGHT: 75 IN | HEART RATE: 60 BPM | BODY MASS INDEX: 38.15 KG/M2

## 2018-03-01 DIAGNOSIS — E11.9 DIABETES MELLITUS TYPE 2, NONINSULIN DEPENDENT (HCC): ICD-10-CM

## 2018-03-01 DIAGNOSIS — I11.9 HYPERTENSIVE LEFT VENTRICULAR HYPERTROPHY, WITHOUT HEART FAILURE: ICD-10-CM

## 2018-03-01 DIAGNOSIS — E66.9 OBESITY (BMI 30-39.9): ICD-10-CM

## 2018-03-01 DIAGNOSIS — G47.33 OSA ON CPAP: ICD-10-CM

## 2018-03-01 DIAGNOSIS — E78.2 MIXED HYPERLIPIDEMIA: ICD-10-CM

## 2018-03-01 DIAGNOSIS — R55 NEAR SYNCOPE: Primary | ICD-10-CM

## 2018-03-01 DIAGNOSIS — I10 BENIGN ESSENTIAL HTN: ICD-10-CM

## 2018-03-01 DIAGNOSIS — I49.5 SSS (SICK SINUS SYNDROME) (HCC): ICD-10-CM

## 2018-03-01 DIAGNOSIS — I48.0 PAF (PAROXYSMAL ATRIAL FIBRILLATION) (HCC): ICD-10-CM

## 2018-03-01 DIAGNOSIS — Z99.89 OSA ON CPAP: ICD-10-CM

## 2018-03-01 PROBLEM — E66.01 OBESITY, MORBID, BMI 40.0-49.9: Status: RESOLVED | Noted: 2018-02-26 | Resolved: 2018-03-01

## 2018-03-01 PROCEDURE — 99214 OFFICE O/P EST MOD 30 MIN: CPT | Performed by: INTERNAL MEDICINE

## 2018-03-01 NOTE — PROGRESS NOTES
Date of Office Visit: 2018  Encounter Provider: Jennie Vanegas MD  Place of Service: [facility] CARDIOLOGY  Patient Name: Shashi Engel  :1960      Patient ID:  Shashi Engel is a 57 y.o. male is here for  followup for near-syncope.       History of Present Illness     I first saw him in 2013 when he came in to the Chest Pain Unit. He had had five days of short-windedness at that time and had risk factors for cardiac issues including diabetes mellitus, hypertension, and hyperlipidemia. He underwent an echocardiogram on 2013 which revealed severe concentric left ventricular hypertrophy, an ejection fraction of 66%, mild-to-moderate mitral insufficiency, mild-to-moderate tricuspid insufficiency, mildelevation of right ventricular systolic pressure at 40 mmHg. He then wore a 24-hour Holter monitor which showed atrial fibrillation with average heart rate of 70 beats per minute with brief episodes of tachycardia and bradycardia. He had had a stress nuclear perfusion study performed in 2012 which showed no evidence of ischemia. Ejection fraction was mildly reduced due to atrial fibrillation.           He does have obstructive sleep apnea and uses his CPAP faithfully.        His CHADS-VASc score is 2 giving him a 2.2% risk of strokes per year. He is on Xarelto.         Mr. Engel has seen our nurse practitioner Heidi Espinoza twice since I have seen him last. He saw her on 10/25/2016 for followup of near syncope and hypertension. She then saw him again on 2016 when he was having some episodes of near syncope and his pacemaker showed what appeared to be a tachyarrhythmia. His blood pressure at that time was somewhat labile as well. He then saw my partner Dr. Emiliano Covarrubias on 2016 and Emiliano did not feel that the tachycardia was problematic and certainly was not causing his symptoms of near syncope, and he felt the patient's pacemaker was working well  to prevent bradycardia. Emiliano felt that the patient's episodes of near syncope were more related to dysautonomia and recommended compression stockings as he thought he was having some orthostasis.     He did go to [place] and was evaluated in the dysautonomic clinic 8/23/17.  There testing showed grossly intact autonomic function.  They thought that possibly his atrial fibrillation was driving his symptoms of fatigue and near syncope.  Device interrogation today shows short bursts of ventricular ventricular tachycardia, 13 beats as well as premature ventricular complexes noted 25% at time.         He has stress echocardiogram done 12/11/17 for dyspnea and decreased exercise tolerance.  This showed ejection fraction 50% with severe concentric left hypertrophy, severe left atrial enlargement, RVSP 46 mmHg mildly reduced right ventricular systolic function but there is no evidence of ischemia on the stress echocardiogram portion.  He also had a 12 day monitor done 12/2017 which showed atrial fibrillation with bursts of tachycardia but there were no significant pulses are bradycardia.  He had normal serum protein electrophoresis done December 2017.  This is done due to severe left hypertrophy.    He's had recurrent episodes of near syncope which we believe are probably due to his blood pressure dropping as he is document this at times.  In addition all been may also be related to bursts of atrial fibrillation with rapid ventricular response.  They also could be due to low blood sugar.    He is back today for follow-up.  He is overall feeling better.  He doesn't feels heart racing at times.  He's had 2 episodes of near syncope.  One of which she document his blood pressure was low but he also thinks it might be related to blood sugar.  He is exercising and feels well.  He got a new BiPAP mask and he feels like it's really helping his obstructive sleep apnea.  He has no chest pain or pressure.  He says his breathing is  getting better.  He has no orthopnea or PND.      Past Medical History:   Diagnosis Date   • Abnormal electrocardiogram    • Anxiety    • Atrial fibrillation    • Bradycardia    • Cardiomyopathy, hypertrophic, primary familial    • Diabetes mellitus    • Difficulty breathing 05/02/2013    DURING EXERTION; RESOLVED 03/07/2014   • Dizziness 03/07/2014    RESOLVED; 04/18/2014   • ED (erectile dysfunction)    • Erectile dysfunction    • Health care maintenance    • Hyperlipidemia    • Hypertension    • Mild concentric left ventricular hypertrophy (LVH)    • Mild mitral regurgitation    • Mild tricuspid regurgitation    • Mitral insufficiency     mild to moderate   • Near syncope    • Noncompliance    • Obesity    • VENESSA (obstructive sleep apnea)     uses CPAP faithfully   • Osteoarthritis    • PAF (paroxysmal atrial fibrillation)    • Palpitations    • Pneumonia    • Tachycardia    • Tricuspid insufficiency     mild to moderate   • Type 2 diabetes mellitus          Past Surgical History:   Procedure Laterality Date   • CARDIAC ELECTROPHYSIOLOGY PROCEDURE Left 6/6/2016    Procedure: Pacemaker JONATHAN MARX;  Surgeon: Juan Chacon MD;  Location: [facility] INVASIVE LOCATION;  Service:    • INSERT / REPLACE / REMOVE PACEMAKER     • KNEE ARTHROSCOPY     • OTHER SURGICAL HISTORY      physical therapy for rotator cuff       Current Outpatient Prescriptions on File Prior to Visit   Medication Sig Dispense Refill   • amLODIPine (NORVASC) 5 MG tablet Take 1 tablet by mouth Daily. 90 tablet 1   • atorvastatin (LIPITOR) 40 MG tablet Take 1 tablet by mouth Daily. 90 tablet 3   • CIALIS 20 MG tablet TAKE ONE TABLET BY MOUTH EVERY 3 DAYS AS DIRECTED 6 tablet 2   • [facility] LANCETS ORIGINAL [facility] Use as directed and appropo lancet for his monitor 100 each 11   • glucose blood test strip Use as instructed 100 each 12   • metFORMIN (GLUCOPHAGE) 1000 MG tablet Take 1 tablet by mouth 2 (Two) Times a Day With Meals. 180 tablet 1   •  metoprolol tartrate (LOPRESSOR) 25 MG tablet Take 1 tablet by mouth 2 (two) times a day. 60 tablet 5   • Multiple Vitamins-Minerals (MULTIVITAMIN ADULT PO) Take 1 tablet by mouth daily.     • quinapril (ACCUPRIL) 40 MG tablet Take 2 tablets by mouth Daily. 60 tablet 0   • rivaroxaban (XARELTO) 20 MG tablet Take 1 tablet by mouth Daily. 35 tablet 0   • spironolactone (ALDACTONE) 25 MG tablet Take 1 tablet by mouth Daily. 90 tablet 3     No current facility-administered medications on file prior to visit.        Social History     Social History   • Marital status:      Spouse name: N/A   • Number of children: N/A   • Years of education: N/A     Occupational History   • disabled      Social History Main Topics   • Smoking status: Never Smoker   • Smokeless tobacco: Never Used   • Alcohol use Yes      Comment: RARE   • Drug use: No      Comment: FORMER.  No caffeine use   • Sexual activity: Not Currently     Partners: Female     Other Topics Concern   • Not on file     Social History Narrative           Review of Systems   Constitution: Negative.   HENT: Negative for congestion.    Eyes: Negative for vision loss in left eye and vision loss in right eye.   Respiratory: Negative.  Negative for cough, hemoptysis, shortness of breath, sleep disturbances due to breathing, snoring, sputum production and wheezing.    Endocrine: Negative.    Hematologic/Lymphatic: Negative.    Skin: Negative for poor wound healing and rash.   Musculoskeletal: Negative for falls, gout, muscle cramps and myalgias.   Gastrointestinal: Negative for abdominal pain, diarrhea, dysphagia, hematemesis, melena, nausea and vomiting.   Neurological: Negative for excessive daytime sleepiness, dizziness, headaches, light-headedness, loss of balance, seizures and vertigo.   Psychiatric/Behavioral: Negative for depression and substance abuse. The patient is not nervous/anxious.        Procedures  Procedures       Objective:      Vitals:    03/01/18  "1207   BP: 130/80   BP Location: Right arm   Patient Position: Sitting   Pulse: 60   Weight: (!) 139 kg (306 lb 12.8 oz)   Height: 190.5 cm (75\")     Body mass index is 38.35 kg/(m^2).    Physical Exam   Constitutional: He is oriented to person, place, and time. He appears well-developed and well-nourished. No distress.   HENT:   Head: Normocephalic and atraumatic.   Eyes: Conjunctivae are normal. No scleral icterus.   Neck: Neck supple. No JVD present. Carotid bruit is not present. No thyromegaly present.   Cardiovascular: Normal rate, S1 normal, S2 normal, normal heart sounds and intact distal pulses.  An irregularly irregular rhythm present.  No extrasystoles are present. PMI is not displaced.  Exam reveals no gallop.    No murmur heard.  Pulses:       Carotid pulses are 2+ on the right side, and 2+ on the left side.       Radial pulses are 2+ on the right side, and 2+ on the left side.        Dorsalis pedis pulses are 2+ on the right side, and 2+ on the left side.        Posterior tibial pulses are 2+ on the right side, and 2+ on the left side.   Pulmonary/Chest: Effort normal and breath sounds normal. No respiratory distress. He has no wheezes. He has no rhonchi. He has no rales. He exhibits no tenderness.   Abdominal: Soft. Bowel sounds are normal. He exhibits no distension, no abdominal bruit and no mass. There is no tenderness.   Musculoskeletal: He exhibits no edema or deformity.   Lymphadenopathy:     He has no cervical adenopathy.   Neurological: He is alert and oriented to person, place, and time. No cranial nerve deficit.   Skin: Skin is warm and dry. No rash noted. He is not diaphoretic. No cyanosis. No pallor. Nails show no clubbing.   Psychiatric: He has a normal mood and affect. Judgment normal.   Vitals reviewed.      Lab Review:       Assessment:      Diagnosis Plan   1. Near syncope     2. PAF (paroxysmal atrial fibrillation)     3. SSS (sick sinus syndrome)     4. Hypertensive left ventricular " hypertrophy, without heart failure     5. Mixed hyperlipidemia     6. Benign essential HTN     7. Severe VENESSA on CPAP     8. Obesity (BMI 30-39.9)     9. Diabetes mellitus type 2, noninsulin dependent       1. Fatigue and lightheadedness. Continued, likely due to labile BP, PVCs and PAF and low blood sugar.   2. Atrial fibrillation, rate controlled. On xarelto. Occasional gets fast.   3. Hypertension. BP is well controlled.   4. Hyperlipidemia. We will get the patient's labs from Dr. Kirkpatrick's office.   5. History of left ventricular hypertrophy on echocardiogram.   6. Diabetes mellitus type 2.   7. Obstructive sleep apnea on CPAP. Has a new bipap mask and feels better.   8. Obesity.   9. SSS, s/p pacer.   10. Nonsustained VT and frequent PVCs.      Plan:       Overall he is feeling better.  See amy in 6 months.  No changes at this time.  I did tell him he can take an extra metoprolol when he has atrial fibrillation with rapid ventricular response.    Atrial Fibrillation and Atrial Flutter  Assessment  • The patient has permanent atrial fibrillation  • This is non-valvular in etiology  • The patient's CHADS2-VASc score is 2  • A [id number] score of 2 or more is considered a high risk for a thromboembolic event  • Rivaroxaban prescribed    Plan  • Continue in atrial fibrillation with rate control  • Continue rivaroxaban for antithrombotic therapy, bleeding issues discussed  • Continue beta blocker for rate control

## 2018-03-30 DIAGNOSIS — E11.9 DIABETES MELLITUS TYPE 2, NONINSULIN DEPENDENT (HCC): ICD-10-CM

## 2018-03-30 DIAGNOSIS — I10 BENIGN ESSENTIAL HTN: ICD-10-CM

## 2018-04-02 RX ORDER — QUINAPRIL 40 MG/1
80 TABLET ORAL DAILY
Qty: 60 TABLET | Refills: 0 | Status: SHIPPED | OUTPATIENT
Start: 2018-04-02 | End: 2018-07-16 | Stop reason: SDUPTHER

## 2018-04-23 ENCOUNTER — CLINICAL SUPPORT NO REQUIREMENTS (OUTPATIENT)
Dept: CARDIOLOGY | Facility: CLINIC | Age: 58
End: 2018-04-23

## 2018-04-23 DIAGNOSIS — I48.20 CHRONIC ATRIAL FIBRILLATION (HCC): Primary | ICD-10-CM

## 2018-04-23 PROCEDURE — 93294 REM INTERROG EVL PM/LDLS PM: CPT | Performed by: INTERNAL MEDICINE

## 2018-04-23 PROCEDURE — 93296 REM INTERROG EVL PM/IDS: CPT | Performed by: INTERNAL MEDICINE

## 2018-04-24 ENCOUNTER — TELEPHONE (OUTPATIENT)
Dept: CARDIOLOGY | Facility: CLINIC | Age: 58
End: 2018-04-24

## 2018-04-24 NOTE — TELEPHONE ENCOUNTER
Pt is calling for samples of xarelto 20mg. Can you see if you all have some at the office? Pt can be reached at [phone].

## 2018-07-16 DIAGNOSIS — I10 BENIGN ESSENTIAL HTN: ICD-10-CM

## 2018-07-16 DIAGNOSIS — E11.9 DIABETES MELLITUS TYPE 2, NONINSULIN DEPENDENT (HCC): ICD-10-CM

## 2018-07-16 RX ORDER — QUINAPRIL 40 MG/1
80 TABLET ORAL DAILY
Qty: 60 TABLET | Refills: 0 | Status: SHIPPED | OUTPATIENT
Start: 2018-07-16 | End: 2018-10-10 | Stop reason: SDUPTHER

## 2018-07-27 ENCOUNTER — TELEPHONE (OUTPATIENT)
Dept: CARDIOLOGY | Facility: CLINIC | Age: 58
End: 2018-07-27

## 2018-07-27 ENCOUNTER — CLINICAL SUPPORT NO REQUIREMENTS (OUTPATIENT)
Dept: CARDIOLOGY | Facility: CLINIC | Age: 58
End: 2018-07-27

## 2018-07-27 DIAGNOSIS — I48.20 CHRONIC ATRIAL FIBRILLATION (HCC): Primary | ICD-10-CM

## 2018-07-27 PROCEDURE — 93296 REM INTERROG EVL PM/IDS: CPT | Performed by: INTERNAL MEDICINE

## 2018-07-27 PROCEDURE — 93294 REM INTERROG EVL PM/LDLS PM: CPT | Performed by: INTERNAL MEDICINE

## 2018-07-27 NOTE — TELEPHONE ENCOUNTER
Routine pacer transmission revealed he has had 12 new nst episodes since last remote on 4/23/18.  The most recent was yesterday morning at 0535 and is 10 beats.  There are only 2 with available egms to view and the other is 11 beats.  Each of these ha sudden onset and sudden termination and are fairly regular.  Pt denied any symptoms yesterday morning, stating he was either asleep or up to bathroom.  He has an apt to see Sabrina in Sept with a pacer check.  These events are not new for him and similar events were thought to be rvr in the past.  I feel like they could be vt due to one occurred when pt was  at lrl of 61 and once terminated went back to lrl pacing at 61.

## 2018-08-29 ENCOUNTER — TELEPHONE (OUTPATIENT)
Dept: CARDIOLOGY | Facility: CLINIC | Age: 58
End: 2018-08-29

## 2018-09-05 ENCOUNTER — OFFICE VISIT (OUTPATIENT)
Dept: CARDIOLOGY | Facility: CLINIC | Age: 58
End: 2018-09-05

## 2018-09-05 ENCOUNTER — CLINICAL SUPPORT NO REQUIREMENTS (OUTPATIENT)
Dept: CARDIOLOGY | Facility: CLINIC | Age: 58
End: 2018-09-05

## 2018-09-05 VITALS
HEIGHT: 75 IN | BODY MASS INDEX: 39.17 KG/M2 | SYSTOLIC BLOOD PRESSURE: 140 MMHG | HEART RATE: 60 BPM | DIASTOLIC BLOOD PRESSURE: 82 MMHG | WEIGHT: 315 LBS

## 2018-09-05 DIAGNOSIS — G47.33 OSA ON CPAP: ICD-10-CM

## 2018-09-05 DIAGNOSIS — Z95.0 STATUS POST PLACEMENT OF CARDIAC PACEMAKER: ICD-10-CM

## 2018-09-05 DIAGNOSIS — I42.9 CARDIOMYOPATHY, UNSPECIFIED TYPE (HCC): Primary | ICD-10-CM

## 2018-09-05 DIAGNOSIS — I49.5 SSS (SICK SINUS SYNDROME) (HCC): ICD-10-CM

## 2018-09-05 DIAGNOSIS — I48.0 PAF (PAROXYSMAL ATRIAL FIBRILLATION) (HCC): Primary | ICD-10-CM

## 2018-09-05 DIAGNOSIS — IMO0001 CLASS 3 OBESITY WITHOUT SERIOUS COMORBIDITY WITH BODY MASS INDEX (BMI) OF 40.0 TO 44.9 IN ADULT, UNSPECIFIED OBESITY TYPE: ICD-10-CM

## 2018-09-05 DIAGNOSIS — Z99.89 OSA ON CPAP: ICD-10-CM

## 2018-09-05 DIAGNOSIS — E11.9 DIABETES MELLITUS TYPE 2, NONINSULIN DEPENDENT (HCC): ICD-10-CM

## 2018-09-05 DIAGNOSIS — E78.2 MIXED HYPERLIPIDEMIA: ICD-10-CM

## 2018-09-05 DIAGNOSIS — I10 BENIGN ESSENTIAL HTN: ICD-10-CM

## 2018-09-05 DIAGNOSIS — Z79.01 CHRONIC ANTICOAGULATION: ICD-10-CM

## 2018-09-05 PROBLEM — G47.10 HYPERSOMNIA WITH SLEEP APNEA: Status: RESOLVED | Noted: 2017-12-04 | Resolved: 2018-09-05

## 2018-09-05 PROBLEM — I95.1 ORTHOSTASIS: Status: RESOLVED | Noted: 2017-06-21 | Resolved: 2018-09-05

## 2018-09-05 PROBLEM — G47.30 HYPERSOMNIA WITH SLEEP APNEA: Status: RESOLVED | Noted: 2017-12-04 | Resolved: 2018-09-05

## 2018-09-05 PROBLEM — E66.9 OBESITY (BMI 30-39.9): Status: RESOLVED | Noted: 2017-12-04 | Resolved: 2018-09-05

## 2018-09-05 PROBLEM — I47.29 NONSUSTAINED VENTRICULAR TACHYCARDIA: Status: ACTIVE | Noted: 2018-09-05

## 2018-09-05 PROCEDURE — 99214 OFFICE O/P EST MOD 30 MIN: CPT | Performed by: NURSE PRACTITIONER

## 2018-09-05 PROCEDURE — 93000 ELECTROCARDIOGRAM COMPLETE: CPT | Performed by: NURSE PRACTITIONER

## 2018-09-05 PROCEDURE — 93282 PRGRMG EVAL IMPLANTABLE DFB: CPT | Performed by: INTERNAL MEDICINE

## 2018-09-05 NOTE — PROGRESS NOTES
Date of Office Visit: 2018  Encounter Provider: DENNIS Martinez  Place of Service: [facility] CARDIOLOGY  Patient Name: Shashi Engel  :1960    Chief Complaint   Patient presents with   • Atrial Fibrillation   :     HPI: Shashi Engel is a 57 y.o. male who presents today for cardiac follow up.  He is a new patient to me and her previous records have been reviewed.  He has a history of diabetes mellitus, hypertension, hyperlipidemia, left ventricular hypertrophy, atrial fibrillation, and sleep apnea.  He recently received a new BiPAP machine.      He also has had intermittent episodes of lightheadedness and near syncope dating back to 2016.  His pacemaker was showed tachyarrhythmias in the past, but not felt to have caused near syncopal episodes.  He was referred to the dysautonomia clinic at [place] and the testing showed grossly intact autonomic function.  They felt that possibly the atrial fibrillation was driving symptoms of fatigue and near syncope.  He is had interrogation of his pacemaker in the past which showed nonsustained ventricular tachycardia.    In  he had a stress echocardiogram completed which revealed an EF of 50%, severe LVH, severe left atrial enlargement, aortic sclerosis, RVSP elevated at 46 mmHg, and no ischemia.  He wore a 12 day monitor in 2017 which showed atrial fibrillation with bursts of tachycardia.  Had a normal serum protein electrophoresis done in 2017 for evaluation of severe left hypertrophy and this was normal.    He is an established patient of Dr. Jennie Vanegas and was last evaluated by her in 2018.  He was recommended to continue with rivaroxaban for stroke prevention.  She told him that he could take an extra metoprolol if he developed atrial fibrillation with rapid ventricular response.    He presents today with his wife accompanying him.  His blood pressure is elevated today  in the office he states is averaging 130-140 over 80s at home.  He continues to experience shortness of breath both with exertion and nonexertional activities which he feels is unchanged. He's had a couple episodes of dizziness, but no near-syncope or syncopal episodes.  He has intermittent fatigue which he feels has improved.  He has a lot of sinus drainage from allergies.  He denies chest pain, PND, orthopnea, edema, palpitations or bleeding on the blood thinner.    Pacemaker Encounter Summary 9/5/2018  Normal single chamber ICD function and RV lead testing results. Presenting egm,  @ 60bpm. Chronic AF, =77% Since last remote on 7/27/18, 3  nonsustained episodes with 2 stored egms. 9/3/18 @ 8:48am, 12 beats @ 146bpm and 8/29/18 @ 4:53am, 20 beats @ 145bpm. Strips on the chart  for Sabrina's review, seeing pt. today. 0 permanent changes. remote eri. in 3 months. am [id number]    The following portion of the patient's history were reviewed and updated as appropriate: past medical history, past surgical history, past social history, past family history, allergies, current medications, and problem list.    Past Medical History:   Diagnosis Date   • Abnormal electrocardiogram    • Anxiety    • Cardiomyopathy, hypertrophic, primary familial (CMS/HCC)    • Erectile dysfunction    • Health care maintenance    • Hyperlipidemia    • Hypertension    • Mild concentric left ventricular hypertrophy (LVH)    • Mild mitral regurgitation    • Mild tricuspid regurgitation    • Near syncope    • Noncompliance    • Obesity    • VENESSA (obstructive sleep apnea)     uses CPAP faithfully   • Osteoarthritis    • PAF (paroxysmal atrial fibrillation) (CMS/HCC)    • Pneumonia    • Type 2 diabetes mellitus (CMS/HCC)        Past Surgical History:   Procedure Laterality Date   • CARDIAC ELECTROPHYSIOLOGY PROCEDURE Left 6/6/2016    Procedure: Pacemaker DC new  [place];  Surgeon: Juan Chacon MD;  Location: [facility] INVASIVE LOCATION;   Service:    • INSERT / REPLACE / REMOVE PACEMAKER     • KNEE ARTHROSCOPY     • OTHER SURGICAL HISTORY      physical therapy for rotator cuff       Social History     Social History   • Marital status:      Spouse name: N/A   • Number of children: N/A   • Years of education: N/A     Occupational History   • disabled      Social History Main Topics   • Smoking status: Former Smoker   • Smokeless tobacco: Never Used      Comment: caffeine use   • Alcohol use No   • Drug use: No      Comment: FORMER.  No caffeine use   • Sexual activity: Not Currently     Partners: Female       Family History   Problem Relation Age of Onset   • Depression Mother    • Hypertension Mother    • Heart disease Mother    • Kidney disease Other    • Sleep apnea Other    • Atrial fibrillation Sister    • Hypertension Sister    • Heart disease Sister    • Hypertension Brother    • Hypertension Sister    • Heart disease Sister    • Diabetes Neg Hx        Review of Systems   Constitution: Positive for malaise/fatigue. Negative for chills, diaphoresis, fever, night sweats, weight gain and weight loss.   HENT: Negative for hearing loss, nosebleeds, sore throat and tinnitus.    Eyes: Negative for blurred vision, double vision, pain and visual disturbance.   Cardiovascular: Positive for dyspnea on exertion. Negative for chest pain, claudication, cyanosis, irregular heartbeat, leg swelling, near-syncope, orthopnea, palpitations, paroxysmal nocturnal dyspnea and syncope.   Respiratory: Positive for shortness of breath. Negative for cough, hemoptysis, snoring and wheezing.    Endocrine: Negative for cold intolerance, heat intolerance and polyuria.   Hematologic/Lymphatic: Negative for bleeding problem. Does not bruise/bleed easily.   Skin: Negative for color change, dry skin, flushing and itching.   Musculoskeletal: Negative for falls, joint pain, joint swelling, muscle cramps, muscle weakness and myalgias.   Gastrointestinal: Negative for  "abdominal pain, constipation, heartburn, melena, nausea and vomiting.   Genitourinary: Negative for dysuria and hematuria.   Neurological: Positive for dizziness. Negative for excessive daytime sleepiness, light-headedness, loss of balance, numbness, paresthesias, seizures and vertigo.   Psychiatric/Behavioral: Negative for altered mental status, depression, memory loss and substance abuse. The patient does not have insomnia and is not nervous/anxious.    Allergic/Immunologic: Negative for environmental allergies.       No Known Allergies      Current Outpatient Prescriptions:   •  amLODIPine (NORVASC) 5 MG tablet, Take 1 tablet by mouth Daily. (Patient taking differently: Take 2.5 mg by mouth Daily.), Disp: 90 tablet, Rfl: 1  •  atorvastatin (LIPITOR) 40 MG tablet, Take 1 tablet by mouth Daily., Disp: 90 tablet, Rfl: 3  •  CIALIS 20 MG tablet, TAKE ONE TABLET BY MOUTH EVERY 3 DAYS AS DIRECTED, Disp: 6 tablet, Rfl: 2  •  [employer] LANCETS ORIGINAL [facility] [place], Use as directed and appropo lancet for his monitor, Disp: 100 each, Rfl: 11  •  glucose blood test strip, Use as instructed, Disp: 100 each, Rfl: 12  •  metFORMIN (GLUCOPHAGE) 1000 MG tablet, Take 1 tablet by mouth 2 (Two) Times a Day With Meals., Disp: 180 tablet, Rfl: 1  •  metoprolol tartrate (LOPRESSOR) 25 MG tablet, Take 1 tablet by mouth 2 (two) times a day., Disp: 60 tablet, Rfl: 5  •  Multiple Vitamins-Minerals (MULTIVITAMIN ADULT PO), Take 1 tablet by mouth daily., Disp: , Rfl:   •  quinapril (ACCUPRIL) 40 MG tablet, TAKE 2 TABLETS BY MOUTH DAILY., Disp: 60 tablet, Rfl: 0  •  rivaroxaban (XARELTO) 20 MG tablet, Take 1 tablet by mouth Daily., Disp: 30 tablet, Rfl: 3  •  spironolactone (ALDACTONE) 25 MG tablet, Take 1 tablet by mouth Daily., Disp: 90 tablet, Rfl: 3      Objective:     Vitals:    09/05/18 1114   BP: 140/82   Pulse: 60   Weight: (!) 146 kg (322 lb)   Height: 190.5 cm (75\")     Body mass index is 40.25 kg/m².    PHYSICAL EXAM:    Vitals Reviewed. "   General Appearance: No acute distress, well developed and well nourished. Obese.   Eyes: Conjunctiva and lids: No erythema, swelling, or discharge. Sclera non-icteric.   HENT: Atraumatic, normocephalic. External eyes, ears, and nose normal. No hearing loss noted. Mucous membranes normal. Lips not cyanotic. Neck supple with no tenderness.  Respiratory: No signs of respiratory distress. Respiration rhythm and depth normal.   Clear to auscultation. No rales, crackles, rhonchi, or wheezing auscultated.   Cardiovascular:  Jugular Venous Pressure: Normal  Heart Rate and Rhythm: Normal, Heart Sounds: Normal S1 and S2. No S3 or S4 noted.  Pacemaker.  Murmurs: RUSB grade 1/6 systolic murmurs noted. No rubs, thrills, or gallops.   Arterial Pulses: Carotid pulses normal. No carotid bruit noted. Posterior tibialis and dorsalis pedis pulses normal.   Lower Extremities: No edema noted.  Gastrointestinal:  Abdomen soft, non-distended, non-tender. Normal bowel sounds. No hepatomegaly.   Musculoskeletal: Normal movement of extremities  Skin and Nails: General appearance normal. No pallor, cyanosis, diaphoresis. Skin temperature normal. No clubbing of fingernails.   Psychiatric: Patient alert and oriented to person, place, and time. Speech and behavior appropriate. Normal mood and affect.       ECG 12 Lead  Date/Time: 2018 11:08 AM  Performed by: PAULA ARREOLA  Authorized by: PAULA ARREOLA   Comparison: compared with previous ECG from 2017  Similar to previous ECG  Rhythm: sinus rhythm and paced  Rate: normal  BPM: 60  QRS axis: normal  Pacin% capture  Clinical impression: abnormal ECG              Assessment:       Diagnosis Plan   1. PAF (paroxysmal atrial fibrillation) (CMS/HCC)     2. Chronic anticoagulation     3. SSS (sick sinus syndrome) (CMS/HCC)     4. Status post placement of cardiac pacemaker     5. Benign essential HTN     6. Mixed hyperlipidemia     7. Diabetes mellitus type 2, noninsulin dependent  (CMS/[facility])     8. Severe VENESSA on CPAP     9. Class 3 obesity without serious comorbidity with body mass index (BMI) of 40.0 to 44.9 in adult, unspecified obesity type (CMS/[facility])            Plan:       1.  Paroxysmal Atrial Fibrillation: He has a history of paroxysmal atrial fibrillation.  He remains on metoprolol tartrate and rivaroxaban for stroke prevention.  He denies any adverse effects of the medications and they will be continued.  The rivaroxaban is expensive and I have provided him the 's patient assistant phone number.  One bottle of samples was provided today.  He needs to have a repeat CBC and CMP completed.  I offered for him to have the blood work completed at [facility] and he said he will follow-up with his PCP.     Atrial Fibrillation and Atrial Flutter  Assessment  • The patient has paroxysmal atrial fibrillation  • The patient's CHADS2-VASc score is 2  • A [id number] score of 2 or more is considered a high risk for a thromboembolic event  • Rivaroxaban prescribed    Plan  • Continue in atrial fibrillation with rate control  • Continue rivaroxaban for antithrombotic therapy, bleeding issues discussed  • Continue beta blocker for rate control    2.  Pacemaker Check: 3 nonsustained runs of possible ventricular tachycardia noted on today's pacemaker check and this was occurred in the past.  Patient is asymptomatic and recommended to continue with metoprolol tartrate.    3.  Hypertension: Blood pressure borderline elevated.  He says he is only taking amlodipine 2.5 mg daily.  Before increasing that to 5 mg daily he would like to try diet and exercise.  We discussed a low-sodium diet.  I told him I will follow-up with him in 2-3 months for further evaluation.    4.  Hyperlipidemia: He remains on atorvastatin.    5.  Type 2 diabetes Mellitus: Counseled on the importance of good blood sugar control.    6.  Obstructive Sleep Apnea: Compliant with BiPAP machine.    7.  Obesity: BMI is 40.2.   He is going to follow a heart healthy diet, low sodium and exercise regularly.    8.  Follow-up with Dr. Jennie Vanegas in 6 months, unless otherwise needed sooner.    As always, it has been a pleasure to participate in your patient's care.      Sincerely,         DENNIS Sahu

## 2018-10-10 DIAGNOSIS — E11.9 DIABETES MELLITUS TYPE 2, NONINSULIN DEPENDENT (HCC): ICD-10-CM

## 2018-10-10 DIAGNOSIS — I10 BENIGN ESSENTIAL HTN: ICD-10-CM

## 2018-10-10 RX ORDER — QUINAPRIL 40 MG/1
80 TABLET ORAL DAILY
Qty: 60 TABLET | Refills: 0 | Status: SHIPPED | OUTPATIENT
Start: 2018-10-10 | End: 2019-01-21 | Stop reason: SDUPTHER

## 2018-11-12 ENCOUNTER — TELEPHONE (OUTPATIENT)
Dept: CARDIOLOGY | Facility: CLINIC | Age: 58
End: 2018-11-12

## 2018-11-12 NOTE — TELEPHONE ENCOUNTER
----- Message from DENNIS Seals sent at 9/5/2018  2:32 PM EDT -----    Call patient in 2-3 months for reassessment of blood pressure and exercise  If blood pressure remains elevated increase amlodipine to 5 mg daily

## 2018-11-13 NOTE — TELEPHONE ENCOUNTER
I spoke with pt.  He said he is taking Amlodipine 5mg and that has really helped his b/p.  He said his b/p is now running good.  (Patient did not have the actual readings with him.)  Rosangela

## 2018-12-07 ENCOUNTER — TELEPHONE (OUTPATIENT)
Dept: CARDIOLOGY | Facility: CLINIC | Age: 58
End: 2018-12-07

## 2018-12-07 ENCOUNTER — CLINICAL SUPPORT NO REQUIREMENTS (OUTPATIENT)
Dept: CARDIOLOGY | Facility: CLINIC | Age: 58
End: 2018-12-07

## 2018-12-07 DIAGNOSIS — I48.20 CHRONIC ATRIAL FIBRILLATION (HCC): Primary | ICD-10-CM

## 2018-12-07 PROCEDURE — 93296 REM INTERROG EVL PM/IDS: CPT | Performed by: INTERNAL MEDICINE

## 2018-12-07 PROCEDURE — 93294 REM INTERROG EVL PM/LDLS PM: CPT | Performed by: INTERNAL MEDICINE

## 2018-12-07 NOTE — TELEPHONE ENCOUNTER
Pt continues to have nst episodes.  Pacer checked routine remote today and there have been a total of 6 episodes recorded since he was here on 9/5/18.  There are 3 with egms and they are all suspicious for nst vt adnare 10, 16 and 30 beats each.  These are not new.  He has a single chamber pacer.  Next pacer check is [facility] for remote in March.  His next apt is with Dr. Vanegas in March.  Sabrina saw him last.

## 2018-12-24 RX ORDER — SPIRONOLACTONE 25 MG/1
25 TABLET ORAL DAILY
Qty: 90 TABLET | Refills: 3 | Status: SHIPPED | OUTPATIENT
Start: 2018-12-24 | End: 2019-10-14

## 2019-01-14 DIAGNOSIS — I10 BENIGN ESSENTIAL HTN: ICD-10-CM

## 2019-01-14 RX ORDER — AMLODIPINE BESYLATE 5 MG/1
5 TABLET ORAL DAILY
Qty: 90 TABLET | Refills: 0 | Status: SHIPPED | OUTPATIENT
Start: 2019-01-14 | End: 2019-06-24 | Stop reason: SDUPTHER

## 2019-01-15 ENCOUNTER — TELEPHONE (OUTPATIENT)
Dept: CARDIOLOGY | Facility: CLINIC | Age: 59
End: 2019-01-15

## 2019-01-15 DIAGNOSIS — I48.0 PAF (PAROXYSMAL ATRIAL FIBRILLATION) (HCC): Primary | ICD-10-CM

## 2019-01-15 DIAGNOSIS — E78.2 MIXED HYPERLIPIDEMIA: ICD-10-CM

## 2019-01-15 DIAGNOSIS — I10 BENIGN ESSENTIAL HTN: ICD-10-CM

## 2019-01-15 NOTE — TELEPHONE ENCOUNTER
----- Message from DENNIS Seals sent at 9/5/2018  2:34 PM EDT -----    Follow-up to see if CBC and CMP has been completed at PCP office

## 2019-01-15 NOTE — TELEPHONE ENCOUNTER
I spoke to patient via telephone.  He is still not had the blood work completed and he said he will have it done at [facility] laboratory.  I've placed an order for a CBC, CMP, and lipid panel.  I've asked him to have the blood work completed by 1/31/19.

## 2019-01-16 ENCOUNTER — TELEPHONE (OUTPATIENT)
Dept: CARDIOLOGY | Facility: CLINIC | Age: 59
End: 2019-01-16

## 2019-01-16 ENCOUNTER — LAB (OUTPATIENT)
Dept: LAB | Facility: HOSPITAL | Age: 59
End: 2019-01-16

## 2019-01-16 DIAGNOSIS — E78.2 MIXED HYPERLIPIDEMIA: ICD-10-CM

## 2019-01-16 DIAGNOSIS — I48.0 PAF (PAROXYSMAL ATRIAL FIBRILLATION) (HCC): ICD-10-CM

## 2019-01-16 DIAGNOSIS — I10 BENIGN ESSENTIAL HTN: ICD-10-CM

## 2019-01-16 LAB
ALBUMIN SERPL-MCNC: 3.8 G/DL (ref 3.5–5.2)
ALBUMIN/GLOB SERPL: 1.1 G/DL
ALP SERPL-CCNC: 75 U/L (ref 39–117)
ALT SERPL W P-5'-P-CCNC: 45 U/L (ref 1–41)
ANION GAP SERPL CALCULATED.3IONS-SCNC: 10.9 MMOL/L
AST SERPL-CCNC: 44 U/L (ref 1–40)
BASOPHILS # BLD AUTO: 0.01 10*3/MM3 (ref 0–0.2)
BASOPHILS NFR BLD AUTO: 0.1 % (ref 0–1.5)
BILIRUB SERPL-MCNC: 0.7 MG/DL (ref 0.1–1.2)
BUN BLD-MCNC: 14 MG/DL (ref 6–20)
BUN/CREAT SERPL: 16.9 (ref 7–25)
CALCIUM SPEC-SCNC: 9.3 MG/DL (ref 8.6–10.5)
CHLORIDE SERPL-SCNC: 102 MMOL/L (ref 98–107)
CHOLEST SERPL-MCNC: 179 MG/DL (ref 0–200)
CO2 SERPL-SCNC: 27.1 MMOL/L (ref 22–29)
CREAT BLD-MCNC: 0.83 MG/DL (ref 0.76–1.27)
DEPRECATED RDW RBC AUTO: 48.2 FL (ref 37–54)
EOSINOPHIL # BLD AUTO: 0.13 10*3/MM3 (ref 0–0.7)
EOSINOPHIL NFR BLD AUTO: 1.8 % (ref 0.3–6.2)
ERYTHROCYTE [DISTWIDTH] IN BLOOD BY AUTOMATED COUNT: 15.1 % (ref 11.5–14.5)
GFR SERPL CREATININE-BSD FRML MDRD: 116 ML/MIN/1.73
GLOBULIN UR ELPH-MCNC: 3.5 GM/DL
GLUCOSE BLD-MCNC: 153 MG/DL (ref 65–99)
HCT VFR BLD AUTO: 43.6 % (ref 40.4–52.2)
HDLC SERPL-MCNC: 50 MG/DL (ref 40–60)
HGB BLD-MCNC: 14.1 G/DL (ref 13.7–17.6)
IMM GRANULOCYTES # BLD AUTO: 0.02 10*3/MM3 (ref 0–0.03)
IMM GRANULOCYTES NFR BLD AUTO: 0.3 % (ref 0–0.5)
LDLC SERPL CALC-MCNC: 101 MG/DL (ref 0–100)
LDLC/HDLC SERPL: 2.02 {RATIO}
LYMPHOCYTES # BLD AUTO: 1.93 10*3/MM3 (ref 0.9–4.8)
LYMPHOCYTES NFR BLD AUTO: 26.8 % (ref 19.6–45.3)
MCH RBC QN AUTO: 27.9 PG (ref 27–32.7)
MCHC RBC AUTO-ENTMCNC: 32.3 G/DL (ref 32.6–36.4)
MCV RBC AUTO: 86.2 FL (ref 79.8–96.2)
MONOCYTES # BLD AUTO: 0.52 10*3/MM3 (ref 0.2–1.2)
MONOCYTES NFR BLD AUTO: 7.2 % (ref 5–12)
NEUTROPHILS # BLD AUTO: 4.62 10*3/MM3 (ref 1.9–8.1)
NEUTROPHILS NFR BLD AUTO: 64.1 % (ref 42.7–76)
PLATELET # BLD AUTO: 264 10*3/MM3 (ref 140–500)
PMV BLD AUTO: 9.7 FL (ref 6–12)
POTASSIUM BLD-SCNC: 3.8 MMOL/L (ref 3.5–5.2)
PROT SERPL-MCNC: 7.3 G/DL (ref 6–8.5)
RBC # BLD AUTO: 5.06 10*6/MM3 (ref 4.6–6)
SODIUM BLD-SCNC: 140 MMOL/L (ref 136–145)
TRIGL SERPL-MCNC: 139 MG/DL (ref 0–150)
VLDLC SERPL-MCNC: 27.8 MG/DL (ref 5–40)
WBC NRBC COR # BLD: 7.21 10*3/MM3 (ref 4.5–10.7)

## 2019-01-16 PROCEDURE — 80061 LIPID PANEL: CPT

## 2019-01-16 PROCEDURE — 80053 COMPREHEN METABOLIC PANEL: CPT

## 2019-01-16 PROCEDURE — 85025 COMPLETE CBC W/AUTO DIFF WBC: CPT

## 2019-01-16 PROCEDURE — 36415 COLL VENOUS BLD VENIPUNCTURE: CPT

## 2019-01-16 NOTE — TELEPHONE ENCOUNTER
CBC showed normal hemoglobin and hematocrit.  Lipid panel excellent.  .  CMP showed elevated glucose of 153, ALT and AST elevated, kidney function normal.    Patient informed & verbalizes understanding. I have recommended follow up with Dr. Kirkpatrick about liver function testing and blood sugars.

## 2019-01-18 ENCOUNTER — OFFICE VISIT (OUTPATIENT)
Dept: CARDIOLOGY | Facility: CLINIC | Age: 59
End: 2019-01-18

## 2019-01-18 VITALS
BODY MASS INDEX: 39.17 KG/M2 | HEIGHT: 75 IN | WEIGHT: 315 LBS | HEART RATE: 60 BPM | SYSTOLIC BLOOD PRESSURE: 124 MMHG | DIASTOLIC BLOOD PRESSURE: 84 MMHG

## 2019-01-18 DIAGNOSIS — E11.9 DIABETES MELLITUS TYPE 2, NONINSULIN DEPENDENT (HCC): ICD-10-CM

## 2019-01-18 DIAGNOSIS — E78.2 MIXED HYPERLIPIDEMIA: ICD-10-CM

## 2019-01-18 DIAGNOSIS — I10 BENIGN ESSENTIAL HTN: ICD-10-CM

## 2019-01-18 DIAGNOSIS — I48.0 PAF (PAROXYSMAL ATRIAL FIBRILLATION) (HCC): Primary | ICD-10-CM

## 2019-01-18 DIAGNOSIS — I47.29 NONSUSTAINED VENTRICULAR TACHYCARDIA (HCC): ICD-10-CM

## 2019-01-18 DIAGNOSIS — I11.9 HYPERTENSIVE LEFT VENTRICULAR HYPERTROPHY, WITHOUT HEART FAILURE: ICD-10-CM

## 2019-01-18 DIAGNOSIS — I49.5 SSS (SICK SINUS SYNDROME) (HCC): ICD-10-CM

## 2019-01-18 PROCEDURE — 99214 OFFICE O/P EST MOD 30 MIN: CPT | Performed by: INTERNAL MEDICINE

## 2019-01-18 NOTE — PROGRESS NOTES
Date of Office Visit: 2019  Encounter Provider: Jennie Vanegas MD  Place of Service: [facility] CARDIOLOGY  Patient Name: Shashi Engel  :1960      Patient ID:  Shashi Engel is a 58 y.o. male is here for  followup for atrial fibrillation.         History of Present Illness    I first saw him in 2013 when he came in to the Chest Pain Unit. He had had five days of short-windedness at that time and had risk factors for cardiac issues including diabetes mellitus, hypertension, and hyperlipidemia. He underwent an echocardiogram on 2013 which revealed severe concentric left ventricular hypertrophy, an ejection fraction of 66%, mild-to-moderate mitral insufficiency, mild-to-moderate tricuspid insufficiency, mildelevation of right ventricular systolic pressure at 40 mmHg. He then wore a 24-hour Holter monitor which showed atrial fibrillation with average heart rate of 70 beats per minute with brief episodes of tachycardia and bradycardia. He had had a stress nuclear perfusion study performed in 2012 which showed no evidence of ischemia. Ejection fraction was mildly reduced due to atrial fibrillation.           He does have obstructive sleep apnea and uses his CPAP faithfully.        His CHADS-VASc score is 2 giving him a 2.2% risk of strokes per year. He is on Xarelto.         Mr. Engel has seen our nurse practitioner Heidi Espinoza twice since I have seen him last. He saw her on 10/25/2016 for followup of near syncope and hypertension. She then saw him again on 2016 when he was having some episodes of near syncope and his pacemaker showed what appeared to be a tachyarrhythmia. His blood pressure at that time was somewhat labile as well. He then saw my partner Dr. Emiliano Covarrubias on 2016 and Emiliano did not feel that the tachycardia was problematic and certainly was not causing his symptoms of near syncope, and he felt the patient's pacemaker was  working well to prevent bradycardia. Emiliano felt that the patient's episodes of near syncope were more related to dysautonomia and recommended compression stockings as he thought he was having some orthostasis.      He did go to [place] and was evaluated in the dysautonomic clinic 8/23/17.  There testing showed grossly intact autonomic function.  They thought that possibly his atrial fibrillation was driving his symptoms of fatigue and near syncope.  Device interrogation today shows short bursts of ventricular ventricular tachycardia, 13 beats as well as premature ventricular complexes noted 25% at time.          He has stress echocardiogram done 12/11/17 for dyspnea and decreased exercise tolerance.  This showed ejection fraction 50% with severe concentric left hypertrophy, severe left atrial enlargement, RVSP 46 mmHg mildly reduced right ventricular systolic function but there is no evidence of ischemia on the stress echocardiogram portion.  He also had a 12 day monitor done 12/2017 which showed atrial fibrillation with bursts of tachycardia but there were no significant pulses are bradycardia.  He had normal serum protein electrophoresis done December 2017.  This is done due to severe left hypertrophy.     He's had recurrent episodes of near syncope which we believe are probably due to his blood pressure dropping as he has documented this at times.  In addition all been may also be related to bursts of atrial fibrillation with rapid ventricular response.  They also could be due to low blood sugar.      He uses BiPAP mask for his obstructive sleep apnea.      He had a pacer interrogation done 12/2018 showing nonsustained VT.  He had laboratory values done 1/60/19 showing normal CMP except slight elevation of AST and ALT and slight blood sugar elevation.  CBC was normal.  HDL 50 and .    He's been trying to exercise.  If he gets 10,000 steps, he notices a lot of palpitations and next day.  If he does  6-7,000 steps, he does not have any palpitations.  He's had mild dizziness but no syncope.  His energy level is somewhat low at times but overall, he thinks he feels well.  He has no chest pain or pressure.  He has no orthopnea or PND.      Past Medical History:   Diagnosis Date   • Abnormal electrocardiogram    • Anxiety    • Cardiomyopathy, hypertrophic, primary familial (CMS/HCC)    • Erectile dysfunction    • Health care maintenance    • Hyperlipidemia    • Hypertension    • Mild concentric left ventricular hypertrophy (LVH)    • Mild mitral regurgitation    • Mild tricuspid regurgitation    • Near syncope    • Noncompliance    • Nonsustained ventricular tachycardia (CMS/HCC) 9/5/2018   • Obesity    • VENESSA (obstructive sleep apnea)     uses CPAP faithfully   • Osteoarthritis    • PAF (paroxysmal atrial fibrillation) (CMS/HCC)    • Pneumonia    • Type 2 diabetes mellitus (CMS/HCC)          Past Surgical History:   Procedure Laterality Date   • CARDIAC ELECTROPHYSIOLOGY PROCEDURE Left 6/6/2016    Procedure: Pacemaker DC gerson MARX;  Surgeon: Juan Chacon MD;  Location: [facility] INVASIVE LOCATION;  Service:    • INSERT / REPLACE / REMOVE PACEMAKER     • KNEE ARTHROSCOPY     • OTHER SURGICAL HISTORY      physical therapy for rotator cuff       Current Outpatient Medications on File Prior to Visit   Medication Sig Dispense Refill   • amLODIPine (NORVASC) 5 MG tablet TAKE 1 TABLET BY MOUTH DAILY. 90 tablet 0   • atorvastatin (LIPITOR) 40 MG tablet Take 1 tablet by mouth Daily. 90 tablet 3   • CIALIS 20 MG tablet TAKE ONE TABLET BY MOUTH EVERY 3 DAYS AS DIRECTED 6 tablet 2   • [facility] LANCETS [facility] Use as directed and appropo lancet for his monitor 100 each 11   • glucose blood test strip Use as instructed 100 each 12   • metFORMIN (GLUCOPHAGE) 1000 MG tablet Take 1 tablet by mouth 2 (Two) Times a Day With Meals. 180 tablet 1   • metoprolol tartrate (LOPRESSOR) 25 MG tablet Take 1 tablet by mouth 2 (two)  times a day. 60 tablet 5   • Multiple Vitamins-Minerals (MULTIVITAMIN ADULT PO) Take 1 tablet by mouth daily.     • quinapril (ACCUPRIL) 40 MG tablet TAKE 2 TABLETS BY MOUTH DAILY. 60 tablet 0   • rivaroxaban (XARELTO) 20 MG tablet Take 1 tablet by mouth Daily. 28 tablet 0   • spironolactone (ALDACTONE) 25 MG tablet TAKE 1 TABLET BY MOUTH DAILY. 90 tablet 3     No current facility-administered medications on file prior to visit.        Social History     Socioeconomic History   • Marital status:      Spouse name: Not on file   • Number of children: Not on file   • Years of education: Not on file   • Highest education level: Not on file   Social Needs   • Financial resource strain: Not on file   • Food insecurity - worry: Not on file   • Food insecurity - inability: Not on file   • Transportation needs - medical: Not on file   • Transportation needs - non-medical: Not on file   Occupational History   • Occupation: disabled   Tobacco Use   • Smoking status: Former Smoker   • Smokeless tobacco: Never Used   • Tobacco comment: caffeine use   Substance and Sexual Activity   • Alcohol use: No   • Drug use: No     Comment: FORMER.  No caffeine use   • Sexual activity: Not Currently     Partners: Female   Other Topics Concern   • Not on file   Social History Narrative   • Not on file           Review of Systems   Constitution: Negative.   HENT: Negative for congestion.    Eyes: Negative for vision loss in left eye and vision loss in right eye.   Respiratory: Negative.  Negative for cough, hemoptysis, shortness of breath, sleep disturbances due to breathing, snoring, sputum production and wheezing.    Endocrine: Negative.    Hematologic/Lymphatic: Negative.    Skin: Negative for poor wound healing and rash.   Musculoskeletal: Negative for falls, gout, muscle cramps and myalgias.   Gastrointestinal: Negative for abdominal pain, diarrhea, dysphagia, hematemesis, melena, nausea and vomiting.   Neurological: Negative for  "excessive daytime sleepiness, dizziness, headaches, light-headedness, loss of balance, seizures and vertigo.   Psychiatric/Behavioral: Negative for depression and substance abuse. The patient is not nervous/anxious.        Procedures  Procedures        Objective:      Vitals:    01/18/19 1104   BP: 124/84   BP Location: Left arm   Patient Position: Sitting   Pulse: 60   Weight: (!) 144 kg (318 lb 4.8 oz)   Height: 190.5 cm (75\")     Body mass index is 39.78 kg/m².    Physical Exam   Constitutional: He is oriented to person, place, and time. He appears well-developed and well-nourished. No distress.   HENT:   Head: Normocephalic and atraumatic.   Eyes: Conjunctivae are normal. No scleral icterus.   Neck: Neck supple. No JVD present. Carotid bruit is not present. No thyromegaly present.   Cardiovascular: Normal rate, regular rhythm, S1 normal, S2 normal, normal heart sounds and intact distal pulses.  No extrasystoles are present. PMI is not displaced. Exam reveals no gallop.   No murmur heard.  Pulses:       Carotid pulses are 2+ on the right side, and 2+ on the left side.       Radial pulses are 2+ on the right side, and 2+ on the left side.        Dorsalis pedis pulses are 2+ on the right side, and 2+ on the left side.        Posterior tibial pulses are 2+ on the right side, and 2+ on the left side.   Pulmonary/Chest: Effort normal and breath sounds normal. No respiratory distress. He has no wheezes. He has no rhonchi. He has no rales. He exhibits no tenderness.   Abdominal: Soft. Bowel sounds are normal. He exhibits no distension, no abdominal bruit and no mass. There is no tenderness.   Musculoskeletal: He exhibits no edema or deformity.   Lymphadenopathy:     He has no cervical adenopathy.   Neurological: He is alert and oriented to person, place, and time. No cranial nerve deficit.   Skin: Skin is warm and dry. No rash noted. He is not diaphoretic. No cyanosis. No pallor. Nails show no clubbing.   Psychiatric: " He has a normal mood and affect. Judgment normal.   Vitals reviewed.      Lab Review:       Assessment:      Diagnosis Plan   1. PAF (paroxysmal atrial fibrillation) (CMS/HCC)     2. Benign essential HTN     3. Mixed hyperlipidemia     4. Hypertensive left ventricular hypertrophy, without heart failure     5. Diabetes mellitus type 2, noninsulin dependent (CMS/HCC)     6. Nonsustained ventricular tachycardia (CMS/HCC)     7. SSS (sick sinus syndrome) (CMS/[facility])       1. Fatigue and lightheadedness. Continued, likely due to labile BP, PVCs and PAF and low blood sugar.   2. Atrial fibrillation, rate controlled. On xarelto. Occasional gets fast.   3. Hypertension. BP is well controlled.   4. Hyperlipidemia. Controlled on atorvastatin.   5. History of left ventricular hypertrophy on echocardiogram.   6. Diabetes mellitus type 2.   7. Obstructive sleep apnea on CPAP. Has a new bipap mask and feels better.   8. Obesity.   9. SSS, s/p pacer.   10. Nonsustained VT and frequent PVCs.           Plan:       See amy in 6 months.  Take an extra 1/2 metoprolol 25mg daily.  No further testing at this time.     Atrial Fibrillation and Atrial Flutter  Assessment  • The patient has paroxysmal atrial fibrillation  • The patient's CHADS2-VASc score is 2  • A [id number] score of 2 or more is considered a high risk for a thromboembolic event  • Rivaroxaban prescribed    Plan  • Attempt to maintain sinus rhythm  • Continue rivaroxaban for antithrombotic therapy, bleeding issues discussed  • Continue beta blocker for rate control

## 2019-01-20 DIAGNOSIS — E11.9 DIABETES MELLITUS TYPE 2, NONINSULIN DEPENDENT (HCC): ICD-10-CM

## 2019-01-20 DIAGNOSIS — I10 BENIGN ESSENTIAL HTN: ICD-10-CM

## 2019-01-21 DIAGNOSIS — E11.9 DIABETES MELLITUS TYPE 2, NONINSULIN DEPENDENT (HCC): ICD-10-CM

## 2019-01-21 DIAGNOSIS — I10 BENIGN ESSENTIAL HTN: ICD-10-CM

## 2019-01-21 RX ORDER — QUINAPRIL 40 MG/1
TABLET ORAL
Qty: 60 TABLET | Refills: 0 | OUTPATIENT
Start: 2019-01-21

## 2019-01-21 RX ORDER — QUINAPRIL 40 MG/1
80 TABLET ORAL DAILY
Qty: 30 TABLET | Refills: 0 | Status: SHIPPED | OUTPATIENT
Start: 2019-01-21 | End: 2019-02-01 | Stop reason: SDUPTHER

## 2019-02-01 ENCOUNTER — OFFICE VISIT (OUTPATIENT)
Dept: FAMILY MEDICINE CLINIC | Facility: CLINIC | Age: 59
End: 2019-02-01

## 2019-02-01 VITALS
OXYGEN SATURATION: 99 % | HEART RATE: 62 BPM | BODY MASS INDEX: 39.17 KG/M2 | SYSTOLIC BLOOD PRESSURE: 142 MMHG | HEIGHT: 75 IN | DIASTOLIC BLOOD PRESSURE: 84 MMHG | WEIGHT: 315 LBS | RESPIRATION RATE: 28 BRPM

## 2019-02-01 DIAGNOSIS — G47.33 OSA (OBSTRUCTIVE SLEEP APNEA): ICD-10-CM

## 2019-02-01 DIAGNOSIS — I10 BENIGN ESSENTIAL HTN: ICD-10-CM

## 2019-02-01 DIAGNOSIS — E11.9 DIABETES MELLITUS TYPE 2, NONINSULIN DEPENDENT (HCC): ICD-10-CM

## 2019-02-01 DIAGNOSIS — E78.2 MIXED HYPERLIPIDEMIA: Primary | ICD-10-CM

## 2019-02-01 LAB — HBA1C MFR BLD: 6.81 % (ref 4.8–5.6)

## 2019-02-01 PROCEDURE — 99214 OFFICE O/P EST MOD 30 MIN: CPT | Performed by: FAMILY MEDICINE

## 2019-02-01 RX ORDER — QUINAPRIL 40 MG/1
80 TABLET ORAL DAILY
Qty: 90 TABLET | Refills: 1 | Status: SHIPPED | OUTPATIENT
Start: 2019-02-01 | End: 2019-03-21 | Stop reason: SDUPTHER

## 2019-02-01 NOTE — ASSESSMENT & PLAN NOTE
Ida 2/1/2019  His BP is up here today but was good in cardiology office and good at home when he checks.

## 2019-02-01 NOTE — PATIENT INSTRUCTIONS
MyPlate from [employer]  The general, healthful diet is based on the 2010 Dietary Guidelines for Americans. The amount of food you need to eat from each food group depends on your age, sex, and level of physical activity and can be individualized by a dietitian. Go to ChooseMyPlate.gov for more information.  What do I need to know about the MyPlate plan?  · Enjoy your food, but eat less.  · Avoid oversized portions.  ? ½ of your plate should include fruits and vegetables.  ? ¼ of your plate should be grains.  ? ¼ of your plate should be protein.  Grains  · Make at least half of your grains whole grains.  · For a 2,000 calorie daily food plan, eat 6 oz every day.  · 1 oz is about 1 slice bread, 1 cup cereal, or ½ cup cooked rice, cereal, or pasta.  Vegetables  · Make half your plate fruits and vegetables.  · For a 2,000 calorie daily food plan, eat 2½ cups every day.  · 1 cup is about 1 cup raw or cooked vegetables or vegetable juice or 2 cups raw leafy greens.  Fruits  · Make half your plate fruits and vegetables.  · For a 2,000 calorie daily food plan, eat 2 cups every day.  · 1 cup is about 1 cup fruit or 100% fruit juice or ½ cup dried fruit.  Protein  · For a 2,000 calorie daily food plan, eat 5½ oz every day.  · 1 oz is about 1 oz meat, poultry, or fish, ¼ cup cooked beans, 1 egg, 1 Tbsp peanut butter, or ½ oz nuts or seeds.  Dairy  · Switch to fat-free or low-fat (1%) milk.  · For a 2,000 calorie daily food plan, eat 3 cups every day.  · 1 cup is about 1 cup milk or yogurt or soy milk (soy beverage), 1½ oz natural cheese, or 2 oz processed cheese.  Fats, Oils, and Empty Calories  · Only small amounts of oils are recommended.  · Empty calories are calories from solid fats or added sugars.  · Compare sodium in foods like soup, bread, and frozen meals. Choose the foods with lower numbers.  · Drink water instead of sugary drinks.  What foods can I eat?  Grains  Whole grains such as whole wheat, quinoa, millet, and  bulgur. Bread, rolls, and pasta made from whole grains. Brown or wild rice. Hot or cold cereals made from whole grains and without added sugar.  Vegetables  All fresh vegetables, especially fresh red, dark green, or orange vegetables. Peas and beans. Low-sodium frozen or canned vegetables prepared without added salt. Low-sodium vegetable juices.  Fruits  All fresh, frozen, and dried fruits. Canned fruit packed in water or fruit juice without added sugar. Fruit juices without added sugar.  Meats and Other Protein Sources  Boiled, baked, or grilled lean meat trimmed of fat. Skinless poultry. Fresh seafood and shellfish. Canned seafood packed in water. Unsalted nuts and unsalted nut butters. Tofu. Dried beans and pea. Eggs.  Dairy  Low-fat or fat-free milk, yogurt, and cheeses.  Sweets and Desserts  Frozen desserts made from low-fat milk.  Fats and Oils  Olive, peanut, and canola oils and margarine. Salad dressing and mayonnaise made from these oils.  Other  Soups and casseroles made from allowed ingredients and without added fat or salt.  The items listed above may not be a complete list of recommended foods or beverages. Contact your dietitian for more options.  What foods are not recommended?  Grains  Sweetened, low-fiber cereals. Packaged baked goods. Snack crackers and chips. Cheese crackers, butter crackers, and biscuits. Frozen waffles, sweet breads, doughnuts, pastries, packaged baking mixes, pancakes, cakes, and cookies.  Vegetables  Regular canned or frozen vegetables or vegetables prepared with salt. Canned tomatoes. Canned tomato sauce. Fried vegetables. Vegetables in cream sauce or cheese sauce.  Fruits  Fruits packed in syrup or made with added sugar.  Meats and Other Protein Sources  Marbled or fatty meats such as ribs. Poultry with skin. Fried meats, poultry, eggs, or fish. Sausages, hot dogs, and deli meats such as pastrami, bologna, or salami.  Dairy  Whole milk, cream, cheeses made from whole milk,  sour cream. Ice cream or yogurt made from whole milk or with added sugar.  Beverages  For adults, no more than one alcoholic drink per day. Regular soft drinks or other sugary beverages. Juice drinks.  Sweets and Desserts  Sugary or fatty desserts, candy, and other sweets.  Fats and Oils  Solid shortening or partially hydrogenated oils. Solid margarine. Margarine that contains trans fats. Butter.  The items listed above may not be a complete list of foods and beverages to avoid. Contact your dietitian for more information.  This information is not intended to replace advice given to you by your health care provider. Make sure you discuss any questions you have with your health care provider.  Document Released: 01/06/2009 Document Revised: 05/25/2017 Document Reviewed: 11/26/2014  [employer] Interactive Patient Education © 2018 [employer]

## 2019-02-01 NOTE — PROGRESS NOTES
Problem List Items Addressed This Visit        Cardiovascular and Mediastinum    Benign essential HTN    Current Assessment & Plan     [facility] 2/1/2019  His BP is up here today but was good in cardiology office and good at home when he checks.          Relevant Medications    quinapril (ACCUPRIL) 40 MG tablet    metoprolol tartrate (LOPRESSOR) 25 MG tablet    HLD (hyperlipidemia) - Primary    Current Assessment & Plan     [facility] 2/1/2019  His lipids were close to perfect            Respiratory    VENESSA (obstructive sleep apnea)    Overview     uses CPAP faithfully            Endocrine    Diabetes mellitus type 2, noninsulin dependent (CMS/HCC)    Current Assessment & Plan     [facility] 2/1/2019  Labs are drawn today.           Relevant Medications    quinapril (ACCUPRIL) 40 MG tablet    Other Relevant Orders    Hemoglobin A1c    Ambulatory Referral for Diabetic Eye Exam-Ophthalmology         Return in about 6 months (around 8/1/2019) for Annual physical.  Patient Instructions   MyPlate from [employer]  The general, healthful diet is based on the 2010 Dietary Guidelines for Americans. The amount of food you need to eat from each food group depends on your age, sex, and level of physical activity and can be individualized by a dietitian. Go to ChooseMyPlate.gov for more information.    Shashi Engel is a 58 y.o. male being seen in our office today for Hypertension and Diabetes                 He  reports that he has quit smoking. he has never used smokeless tobacco. He reports that he does not drink alcohol or use drugs.             HPI   Subjective   Patient is here for follow-up of evaluation and treatment of, HTN, DM2, Hyperlipidemia and obesity    Diabetes Mellitus Type II    Current symptoms/problems include none and have been stable.   Known diabetic complications: impotence  Cardiovascular risk factors: diabetes mellitus, dyslipidemia, hypertension, male gender, sedentary lifestyle  Eye exam current (within one  "year): no  Foot exam current (within one year) yes  Weight trend: increasing  Current diet: in general, an \"unhealthy\" diet  Current exercise:walking  Current monitoring regimen: home blood tests - one times daily and office lab tests - two times yearly  Home blood sugar records: fasting range: under 125  Any episodes of hypoglycemia? no  Is He on ACE inhibitor or angiotensin II receptor blocker? Yes     HYPERTENSION  He is not exercising and is not adherent to a low-salt diet. Patient does check BP occasionally.. Patient denies chest pain and dyspnea. Use of agents associated with hypertension: none. History of target organ damage: left ventricular hypertrophy. He is compliant with meds.    LIPIDS  LDL result does not yet meet goal, HDL normal, triglycerides normal, liver functions normal    Most recent labs  Lab Results   Component Value Date    HGBA1C 6.3 (H) 02/23/2018    HGBA1C 6.45 (H) 11/07/2016    HGBA1C 6.8 (H) 11/03/2015    CREATININE 0.83 01/16/2019     (H) 01/16/2019    MICROALBUR 3.8 02/23/2018                 The following portions of the patient's history were reviewed and updated as appropriate:PMHroutine: Social history , Allergies, Current Medications, Active Problem List and Health Maintenance            Review of Systems   Constitutional: Negative for activity change, appetite change, chills, fatigue, fever and unexpected weight change.   HENT: Negative for congestion, ear pain, hearing loss, mouth sores, nosebleeds, rhinorrhea and sore throat.    Eyes: Negative for pain and visual disturbance.   Respiratory: Negative for cough, shortness of breath and wheezing.    Cardiovascular: Negative for chest pain, palpitations and leg swelling.   Gastrointestinal: Negative for abdominal distention, abdominal pain, blood in stool, constipation, diarrhea, nausea and vomiting.   Endocrine: Negative for cold intolerance and heat intolerance.   Genitourinary: Negative for difficulty urinating, " discharge, dysuria, frequency, hematuria and urgency.   Musculoskeletal: Negative for back pain and joint swelling.   Skin: Negative for rash and wound.   Neurological: Negative for dizziness, weakness, numbness and headaches.   Hematological: Does not bruise/bleed easily.   Psychiatric/Behavioral: Negative for confusion, dysphoric mood, sleep disturbance and suicidal ideas. The patient is not nervous/anxious.                 BP Readings from Last 1 Encounters:   02/01/19 142/84     Wt Readings from Last 3 Encounters:   02/01/19 (!) 146 kg (321 lb)   01/18/19 (!) 144 kg (318 lb 4.8 oz)   09/05/18 (!) 146 kg (322 lb)   Body mass index is 40.12 kg/m².             Physical Exam   Constitutional: He is oriented to person, place, and time. Vital signs are normal. He appears well-developed and well-nourished. No distress.   HENT:   Head: Normocephalic.   Cardiovascular: Normal rate, regular rhythm and normal heart sounds.   Pulmonary/Chest: Effort normal and breath sounds normal.   Neurological: He is alert and oriented to person, place, and time. Gait normal.   Psychiatric: He has a normal mood and affect. His behavior is normal. Judgment and thought content normal.   Vitals reviewed.        Lab on 01/16/2019   Component Date Value Ref Range Status   • Glucose 01/16/2019 153* 65 - 99 mg/dL Final   • BUN 01/16/2019 14  6 - 20 mg/dL Final   • Creatinine 01/16/2019 0.83  0.76 - 1.27 mg/dL Final   • Sodium 01/16/2019 140  136 - 145 mmol/L Final   • Potassium 01/16/2019 3.8  3.5 - 5.2 mmol/L Final   • Chloride 01/16/2019 102  98 - 107 mmol/L Final   • CO2 01/16/2019 27.1  22.0 - 29.0 mmol/L Final   • Calcium 01/16/2019 9.3  8.6 - 10.5 mg/dL Final   • Total Protein 01/16/2019 7.3  6.0 - 8.5 g/dL Final   • Albumin 01/16/2019 3.80  3.50 - 5.20 g/dL Final   • ALT (SGPT) 01/16/2019 45* 1 - 41 U/L Final   • AST (SGOT) 01/16/2019 44* 1 - 40 U/L Final   • Alkaline Phosphatase 01/16/2019 75  39 - 117 U/L Final   • Total Bilirubin  01/16/2019 0.7  0.1 - 1.2 mg/dL Final   • eGFR   Amer 01/16/2019 116  >60 mL/min/1.73 Final   • Globulin 01/16/2019 3.5  gm/dL Final   • A/G Ratio 01/16/2019 1.1  g/dL Final   • BUN/Creatinine Ratio 01/16/2019 16.9  7.0 - 25.0 Final   • Anion Gap 01/16/2019 10.9  mmol/L Final   • WBC 01/16/2019 7.21  4.50 - 10.70 10*3/mm3 Final   • RBC 01/16/2019 5.06  4.60 - 6.00 10*6/mm3 Final   • Hemoglobin 01/16/2019 14.1  13.7 - 17.6 g/dL Final   • Hematocrit 01/16/2019 43.6  40.4 - 52.2 % Final   • MCV 01/16/2019 86.2  79.8 - 96.2 fL Final   • MCH 01/16/2019 27.9  27.0 - 32.7 pg Final   • MCHC 01/16/2019 32.3* 32.6 - 36.4 g/dL Final   • RDW 01/16/2019 15.1* 11.5 - 14.5 % Final   • RDW-SD 01/16/2019 48.2  37.0 - 54.0 fl Final   • MPV 01/16/2019 9.7  6.0 - 12.0 fL Final   • Platelets 01/16/2019 264  140 - 500 10*3/mm3 Final   • Neutrophil % 01/16/2019 64.1  42.7 - 76.0 % Final   • Lymphocyte % 01/16/2019 26.8  19.6 - 45.3 % Final   • Monocyte % 01/16/2019 7.2  5.0 - 12.0 % Final   • Eosinophil % 01/16/2019 1.8  0.3 - 6.2 % Final   • Basophil % 01/16/2019 0.1  0.0 - 1.5 % Final   • Immature Grans % 01/16/2019 0.3  0.0 - 0.5 % Final   • Neutrophils, Absolute 01/16/2019 4.62  1.90 - 8.10 10*3/mm3 Final   • Lymphocytes, Absolute 01/16/2019 1.93  0.90 - 4.80 10*3/mm3 Final   • Monocytes, Absolute 01/16/2019 0.52  0.20 - 1.20 10*3/mm3 Final   • Eosinophils, Absolute 01/16/2019 0.13  0.00 - 0.70 10*3/mm3 Final   • Basophils, Absolute 01/16/2019 0.01  0.00 - 0.20 10*3/mm3 Final   • Immature Grans, Absolute 01/16/2019 0.02  0.00 - 0.03 10*3/mm3 Final   • Total Cholesterol 01/16/2019 179  0 - 200 mg/dL Final   • Triglycerides 01/16/2019 139  0 - 150 mg/dL Final   • HDL Cholesterol 01/16/2019 50  40 - 60 mg/dL Final   • LDL Cholesterol  01/16/2019 101* 0 - 100 mg/dL Final   • VLDL Cholesterol 01/16/2019 27.8  5 - 40 mg/dL Final   • LDL/HDL Ratio 01/16/2019 2.02   Final

## 2019-03-04 ENCOUNTER — APPOINTMENT (OUTPATIENT)
Dept: SLEEP MEDICINE | Facility: HOSPITAL | Age: 59
End: 2019-03-04
Attending: INTERNAL MEDICINE

## 2019-03-11 ENCOUNTER — CLINICAL SUPPORT NO REQUIREMENTS (OUTPATIENT)
Dept: CARDIOLOGY | Facility: CLINIC | Age: 59
End: 2019-03-11

## 2019-03-11 DIAGNOSIS — I48.20 CHRONIC ATRIAL FIBRILLATION (HCC): Primary | ICD-10-CM

## 2019-03-11 PROCEDURE — 93296 REM INTERROG EVL PM/IDS: CPT | Performed by: INTERNAL MEDICINE

## 2019-03-11 PROCEDURE — 93294 REM INTERROG EVL PM/LDLS PM: CPT | Performed by: INTERNAL MEDICINE

## 2019-03-21 DIAGNOSIS — E11.9 DIABETES MELLITUS TYPE 2, NONINSULIN DEPENDENT (HCC): ICD-10-CM

## 2019-03-21 DIAGNOSIS — I10 BENIGN ESSENTIAL HTN: ICD-10-CM

## 2019-03-21 RX ORDER — QUINAPRIL 40 MG/1
TABLET ORAL
Qty: 90 TABLET | Refills: 1 | Status: SHIPPED | OUTPATIENT
Start: 2019-03-21 | End: 2019-10-12 | Stop reason: SDUPTHER

## 2019-05-01 DIAGNOSIS — I10 BENIGN ESSENTIAL HTN: ICD-10-CM

## 2019-06-20 ENCOUNTER — CLINICAL SUPPORT NO REQUIREMENTS (OUTPATIENT)
Dept: CARDIOLOGY | Facility: CLINIC | Age: 59
End: 2019-06-20

## 2019-06-20 DIAGNOSIS — I48.20 CHRONIC ATRIAL FIBRILLATION (HCC): Primary | ICD-10-CM

## 2019-06-20 PROCEDURE — 93294 REM INTERROG EVL PM/LDLS PM: CPT | Performed by: INTERNAL MEDICINE

## 2019-06-20 PROCEDURE — 93296 REM INTERROG EVL PM/IDS: CPT | Performed by: INTERNAL MEDICINE

## 2019-06-24 DIAGNOSIS — I10 BENIGN ESSENTIAL HTN: ICD-10-CM

## 2019-06-24 RX ORDER — AMLODIPINE BESYLATE 5 MG/1
5 TABLET ORAL DAILY
Qty: 90 TABLET | Refills: 0 | Status: SHIPPED | OUTPATIENT
Start: 2019-06-24 | End: 2019-09-16 | Stop reason: SDUPTHER

## 2019-08-12 ENCOUNTER — OFFICE VISIT (OUTPATIENT)
Dept: CARDIOLOGY | Facility: CLINIC | Age: 59
End: 2019-08-12

## 2019-08-12 ENCOUNTER — CLINICAL SUPPORT NO REQUIREMENTS (OUTPATIENT)
Dept: CARDIOLOGY | Facility: CLINIC | Age: 59
End: 2019-08-12

## 2019-08-12 VITALS
HEART RATE: 60 BPM | DIASTOLIC BLOOD PRESSURE: 70 MMHG | HEIGHT: 75 IN | BODY MASS INDEX: 39.17 KG/M2 | SYSTOLIC BLOOD PRESSURE: 132 MMHG | WEIGHT: 315 LBS

## 2019-08-12 DIAGNOSIS — I48.20 CHRONIC ATRIAL FIBRILLATION (HCC): Primary | ICD-10-CM

## 2019-08-12 DIAGNOSIS — E78.2 MIXED HYPERLIPIDEMIA: ICD-10-CM

## 2019-08-12 DIAGNOSIS — G47.33 OSA (OBSTRUCTIVE SLEEP APNEA): ICD-10-CM

## 2019-08-12 DIAGNOSIS — E66.01 CLASS 2 SEVERE OBESITY DUE TO EXCESS CALORIES WITH SERIOUS COMORBIDITY AND BODY MASS INDEX (BMI) OF 39.0 TO 39.9 IN ADULT (HCC): ICD-10-CM

## 2019-08-12 DIAGNOSIS — I47.29 NONSUSTAINED VENTRICULAR TACHYCARDIA (HCC): ICD-10-CM

## 2019-08-12 DIAGNOSIS — I49.5 SSS (SICK SINUS SYNDROME) (HCC): ICD-10-CM

## 2019-08-12 DIAGNOSIS — I10 BENIGN ESSENTIAL HTN: ICD-10-CM

## 2019-08-12 DIAGNOSIS — E11.9 DIABETES MELLITUS TYPE 2, NONINSULIN DEPENDENT (HCC): ICD-10-CM

## 2019-08-12 DIAGNOSIS — I11.9 HYPERTENSIVE LEFT VENTRICULAR HYPERTROPHY, WITHOUT HEART FAILURE: ICD-10-CM

## 2019-08-12 DIAGNOSIS — I48.0 PAF (PAROXYSMAL ATRIAL FIBRILLATION) (HCC): Primary | ICD-10-CM

## 2019-08-12 PROCEDURE — 93000 ELECTROCARDIOGRAM COMPLETE: CPT | Performed by: NURSE PRACTITIONER

## 2019-08-12 PROCEDURE — 93279 PRGRMG DEV EVAL PM/LDLS PM: CPT | Performed by: INTERNAL MEDICINE

## 2019-08-12 PROCEDURE — 99214 OFFICE O/P EST MOD 30 MIN: CPT | Performed by: NURSE PRACTITIONER

## 2019-08-12 NOTE — PROGRESS NOTES
Date of Office Visit: 2019  Encounter Provider: DENNIS Martinez  Place of Service: [facility] CARDIOLOGY  Patient Name: Shashi Engel  :1960   Primary Cardiologist: Dr. Jennie Vanegas     Chief Complaint   Patient presents with   • Atrial Fibrillation     HPI: Shashi Engel is a 58 y.o. male who presents today for cardiac follow up. He has a known history of diabetes mellitus, hypertension, hyperlipidemia, left ventricular hypertrophy, atrial fibrillation, and sleep apnea (compliant with BiPAP machine).      He also has had intermittent episodes of lightheadedness and near syncope dating back to 2016.  His pacemaker was showed tachyarrhythmias in the past, but not felt to have caused near syncopal episodes.  He was referred to the dysautonomia clinic at [place] and the testing showed grossly intact autonomic function.  They felt that possibly the atrial fibrillation was driving symptoms of fatigue and near syncope.  He is had interrogation of his pacemaker in the past which showed nonsustained ventricular tachycardia.    In , he had a stress echocardiogram completed which revealed an EF of 50%, severe LVH, severe left atrial enlargement, aortic sclerosis, RVSP elevated at 46 mmHg, and no ischemia.  He wore a 12 day monitor in 2017 which showed atrial fibrillation with bursts of tachycardia.  Had a normal serum protein electrophoresis done in 2017 for evaluation of severe left hypertrophy and this was normal.    He was last evaluated in our office by Dr. Vanegas in 2019. He continued to have fatigue and lightheadedness which was felt to be due to labile blood pressures, PAF, PVCs, nonsustained ventricular tachycardia and low blood sugars.    He presents today for a six-month follow-up.  He is now walking frequently and trying to obtain 5 or 6000 steps per day vegetarian diet weeks.  He continues to experience  dyspnea both with rest and exertion and this is unchanged.  He reports palpitations in which she describes a fast heartbeat for a few seconds sometimes associated with dizziness.  He continues to feel fatigued.  He is compliant with his BiPAP machine for treatment of sleep apnea. He denies chest pain, PND, orthopnea, cough, edema, syncope, or bleeding.    Pacemaker check in office 8/12/2019: Rhythm ventricular paced at 82% of the time.  Estimated battery life 6.5 years.  7 VT episodes recorded longest on 11/12/2018 lasting 30 beats in duration at a rate of 151.    Past Medical History:   Diagnosis Date   • Abnormal electrocardiogram    • Anxiety    • Cardiomyopathy, hypertrophic, primary familial (CMS/HCC)    • Erectile dysfunction    • Health care maintenance    • Hyperlipidemia    • Hypertension    • Mild concentric left ventricular hypertrophy (LVH)    • Mild mitral regurgitation    • Mild tricuspid regurgitation    • Near syncope    • Noncompliance    • Nonsustained ventricular tachycardia (CMS/HCC) 9/5/2018   • Obesity    • VENESSA (obstructive sleep apnea)     uses CPAP faithfully   • Osteoarthritis    • PAF (paroxysmal atrial fibrillation) (CMS/[facility])    • Pneumonia 01/01/2016   • Type 2 diabetes mellitus (CMS/[facility])        Past Surgical History:   Procedure Laterality Date   • CARDIAC ELECTROPHYSIOLOGY PROCEDURE Left 6/6/2016    Procedure: Pacemaker DC new  [place];  Surgeon: Juan Chacon MD;  Location: [facility] INVASIVE LOCATION;  Service:    • INSERT / REPLACE / REMOVE PACEMAKER     • KNEE ARTHROSCOPY     • OTHER SURGICAL HISTORY      physical therapy for rotator cuff       Social History     Social History   • Marital status:      Spouse name: N/A   • Number of children: N/A   • Years of education: N/A     Occupational History   • disabled      Social History Main Topics   • Smoking status: Former Smoker   • Smokeless tobacco: Never Used      Comment: caffeine use   • Alcohol use No   • Drug use:  No      Comment: FORMER.  No caffeine use   • Sexual activity: Not Currently     Partners: Female       Family History   Problem Relation Age of Onset   • Depression Mother    • Hypertension Mother    • Heart disease Mother    • Kidney disease Other    • Sleep apnea Other    • Atrial fibrillation Sister    • Hypertension Sister    • Heart disease Sister    • Hypertension Brother    • Hypertension Sister    • Heart disease Sister    • Diabetes Neg Hx        Review of Systems   Constitution: Positive for malaise/fatigue. Negative for chills, diaphoresis, fever, night sweats, weight gain and weight loss.   HENT: Negative for hearing loss, nosebleeds, sore throat and tinnitus.    Eyes: Negative for blurred vision, double vision, pain and visual disturbance.   Cardiovascular: Positive for dyspnea on exertion. Negative for chest pain, claudication, cyanosis, irregular heartbeat, leg swelling, near-syncope, orthopnea, palpitations, paroxysmal nocturnal dyspnea and syncope.   Respiratory: Positive for shortness of breath. Negative for cough, hemoptysis, snoring and wheezing.    Endocrine: Negative for cold intolerance, heat intolerance and polyuria.   Hematologic/Lymphatic: Negative for bleeding problem. Does not bruise/bleed easily.   Skin: Negative for color change, dry skin, flushing and itching.   Musculoskeletal: Negative for falls, joint pain, joint swelling, muscle cramps, muscle weakness and myalgias.   Gastrointestinal: Negative for abdominal pain, constipation, heartburn, melena, nausea and vomiting.   Genitourinary: Negative for dysuria and hematuria.   Neurological: Positive for dizziness. Negative for excessive daytime sleepiness, light-headedness, loss of balance, numbness, paresthesias, seizures and vertigo.   Psychiatric/Behavioral: Negative for altered mental status, depression, memory loss and substance abuse. The patient does not have insomnia and is not nervous/anxious.    Allergic/Immunologic: Negative  "for environmental allergies.       No Known Allergies      Current Outpatient Medications:   •  amLODIPine (NORVASC) 5 MG tablet, TAKE 1 TABLET BY MOUTH DAILY., Disp: 90 tablet, Rfl: 0  •  atorvastatin (LIPITOR) 40 MG tablet, Take 1 tablet by mouth Daily., Disp: 90 tablet, Rfl: 3  •  CIALIS 20 MG tablet, TAKE ONE TABLET BY MOUTH EVERY 3 DAYS AS DIRECTED, Disp: 6 tablet, Rfl: 2  •  [employer] LANCETS ORIGINAL [facility] [place], Use as directed and appropo lancet for his monitor, Disp: 100 each, Rfl: 11  •  glucose blood test strip, Use as instructed, Disp: 100 each, Rfl: 12  •  metFORMIN (GLUCOPHAGE) 1000 MG tablet, TAKE 1 TABLET BY MOUTH 2 (TWO) TIMES A DAY WITH MEALS., Disp: 180 tablet, Rfl: 0  •  metoprolol tartrate (LOPRESSOR) 25 MG tablet, TAKE 1 TABLET BY MOUTH TWICE A DAY, Disp: 90 tablet, Rfl: 1  •  Multiple Vitamins-Minerals (MULTIVITAMIN ADULT PO), Take 1 tablet by mouth daily., Disp: , Rfl:   •  quinapril (ACCUPRIL) 40 MG tablet, TAKE 2 TABLETS BY MOUTH EVERY DAY, Disp: 90 tablet, Rfl: 1  •  rivaroxaban (XARELTO) 20 MG tablet, Take 1 tablet by mouth Daily., Disp: 28 tablet, Rfl: 0  •  spironolactone (ALDACTONE) 25 MG tablet, TAKE 1 TABLET BY MOUTH DAILY., Disp: 90 tablet, Rfl: 3      Objective:     Vitals:    08/12/19 1001   BP: 132/70   BP Location: Left arm   Pulse: 60   Weight: (!) 145 kg (318 lb 12.8 oz)   Height: 190.5 cm (75\")     Body mass index is 39.85 kg/m².    PHYSICAL EXAM:    Vitals Reviewed.   General Appearance: No acute distress, well developed and well nourished. Obese.   Eyes: Conjunctiva and lids: No erythema, swelling, or discharge. Sclera non-icteric.   HENT: Atraumatic, normocephalic. External eyes, ears, and nose normal. No hearing loss noted. Mucous membranes normal. Lips not cyanotic. Neck supple with no tenderness.  Respiratory: No signs of respiratory distress. Respiration rhythm and depth normal.   Clear to auscultation. No rales, crackles, rhonchi, or wheezing auscultated. "   Cardiovascular:  Jugular Venous Pressure: Normal  Heart Rate and Rhythm: Normal, Heart Sounds: Normal S1 and S2. No S3 or S4 noted.  Pacemaker.  Murmurs: RUSB grade 1/6 systolic murmurs noted. No rubs, thrills, or gallops.   Arterial Pulses: Carotid pulses normal. No carotid bruit noted. Posterior tibialis and dorsalis pedis pulses normal.   Lower Extremities: No edema noted.  Gastrointestinal:  Abdomen soft, non-distended, non-tender. Normal bowel sounds. No hepatomegaly.   Musculoskeletal: Normal movement of extremities  Skin and Nails: General appearance normal. No pallor, cyanosis, diaphoresis. Skin temperature normal. No clubbing of fingernails.   Psychiatric: Patient alert and oriented to person, place, and time. Speech and behavior appropriate. Normal mood and affect.       ECG 12 Lead  Date/Time: 8/12/2019 10:02 AM  Performed by: Sabrina Lopez APRN  Authorized by: Sabrina Lopez APRN   Comparison: compared with previous ECG from 9/5/2018  Similar to previous ECG  Rhythm: paced  BPM: 60  Pacing: ventricular pacing  Clinical impression: abnormal EKG              Assessment:       Diagnosis Plan   1. PAF (paroxysmal atrial fibrillation) (CMS/[facility])  ECG 12 Lead   2. SSS (sick sinus syndrome) (CMS/[facility])  ECG 12 Lead   3. Nonsustained ventricular tachycardia (CMS/[facility])  ECG 12 Lead   4. Benign essential HTN     5. Hypertensive left ventricular hypertrophy, without heart failure     6. Mixed hyperlipidemia     7. Diabetes mellitus type 2, noninsulin dependent (CMS/[facility])     8. VENESSA (obstructive sleep apnea)     9. Class 2 severe obesity due to excess calories with serious comorbidity and body mass index (BMI) of 39.0 to 39.9 in adult (CMS/[facility])            Plan:       1.  Paroxysmal Atrial Fibrillation: He remains in normal sinus rhythm.  Continue metoprolol tartrate and rivaroxaban for stroke prevention.  I reviewed his last blood work from January 2019 which showed normal creatinine.     Atrial Fibrillation and  Atrial Flutter  Assessment  • The patient has paroxysmal atrial fibrillation  • The patient's CHADS2-VASc score is 2  • A [id number] score of 2 or more is considered a high risk for a thromboembolic event  • Rivaroxaban prescribed    Plan  • Continue in atrial fibrillation with rate control  • Continue rivaroxaban for antithrombotic therapy, bleeding issues discussed  • Continue beta blocker for rate control    2.  Sick Sinus Syndrome: Status post pacemaker.    3.  Nonsustained Ventricular Tachycardia: On pacemaker check he was noted to have 7 nonsustained episodes of ventricular tachycardia the longest 30 beats in duration of a rate of 151.  Occasionally has dizziness and he may be feeling this.  Continue beta-blocker.    4.  Hypertension: Blood pressure stable today.  Goal less than 120/80.    5.  Left Ventricular Hypertrophy: Noted to be severe per echocardiogram    6.  Hyperlipidemia: He remains on atorvastatin.    7.  Type 2 diabetes Mellitus: Counseled on the importance of good blood sugar control.    8.  Obstructive Sleep Apnea: Compliant with BiPAP machine.    9.  Obesity: BMI is 39.8.  He is actively trying to lose weight through exercise and a vegetarian diet.    10.  Follow-up with Dr. Jennie Vanegas in 6 months, unless otherwise needed sooner.    As always, it has been a pleasure to participate in your patient's care.      Sincerely,         DENNIS Sahu

## 2019-09-11 RX ORDER — RIVAROXABAN 20 MG/1
TABLET, FILM COATED ORAL
Qty: 90 TABLET | Refills: 1 | Status: SHIPPED | OUTPATIENT
Start: 2019-09-11 | End: 2020-03-16

## 2019-09-16 DIAGNOSIS — I10 BENIGN ESSENTIAL HTN: ICD-10-CM

## 2019-09-16 RX ORDER — AMLODIPINE BESYLATE 5 MG/1
5 TABLET ORAL DAILY
Qty: 90 TABLET | Refills: 0 | Status: SHIPPED | OUTPATIENT
Start: 2019-09-16 | End: 2020-01-13

## 2019-10-12 DIAGNOSIS — I10 BENIGN ESSENTIAL HTN: ICD-10-CM

## 2019-10-12 DIAGNOSIS — E11.9 DIABETES MELLITUS TYPE 2, NONINSULIN DEPENDENT (HCC): ICD-10-CM

## 2019-10-14 RX ORDER — QUINAPRIL 40 MG/1
TABLET ORAL
Qty: 90 TABLET | Refills: 1 | Status: SHIPPED | OUTPATIENT
Start: 2019-10-14 | End: 2020-01-13 | Stop reason: SDUPTHER

## 2019-10-16 RX ORDER — SPIRONOLACTONE 25 MG/1
TABLET ORAL
Qty: 90 TABLET | Refills: 1 | Status: SHIPPED | OUTPATIENT
Start: 2019-10-16 | End: 2020-01-13

## 2019-11-12 DIAGNOSIS — I10 BENIGN ESSENTIAL HTN: ICD-10-CM

## 2019-11-20 ENCOUNTER — CLINICAL SUPPORT NO REQUIREMENTS (OUTPATIENT)
Dept: CARDIOLOGY | Facility: CLINIC | Age: 59
End: 2019-11-20

## 2019-11-20 DIAGNOSIS — I48.20 CHRONIC ATRIAL FIBRILLATION (HCC): Primary | ICD-10-CM

## 2019-11-20 DIAGNOSIS — I49.5 SSS (SICK SINUS SYNDROME) (HCC): ICD-10-CM

## 2019-11-20 PROCEDURE — 93294 REM INTERROG EVL PM/LDLS PM: CPT | Performed by: INTERNAL MEDICINE

## 2019-11-20 PROCEDURE — 93296 REM INTERROG EVL PM/IDS: CPT | Performed by: INTERNAL MEDICINE

## 2019-12-13 ENCOUNTER — OFFICE VISIT (OUTPATIENT)
Dept: FAMILY MEDICINE CLINIC | Facility: CLINIC | Age: 59
End: 2019-12-13

## 2019-12-13 ENCOUNTER — HOSPITAL ENCOUNTER (OUTPATIENT)
Dept: GENERAL RADIOLOGY | Facility: HOSPITAL | Age: 59
Discharge: HOME | End: 2019-12-13
Admitting: FAMILY MEDICINE

## 2019-12-13 VITALS
HEART RATE: 62 BPM | WEIGHT: 310 LBS | BODY MASS INDEX: 38.54 KG/M2 | DIASTOLIC BLOOD PRESSURE: 88 MMHG | TEMPERATURE: 98 F | OXYGEN SATURATION: 98 % | SYSTOLIC BLOOD PRESSURE: 138 MMHG | RESPIRATION RATE: 18 BRPM | HEIGHT: 75 IN

## 2019-12-13 DIAGNOSIS — R04.2 COUGH WITH HEMOPTYSIS: Primary | ICD-10-CM

## 2019-12-13 DIAGNOSIS — J06.9 VIRAL URI: ICD-10-CM

## 2019-12-13 DIAGNOSIS — I11.9 HYPERTENSIVE LEFT VENTRICULAR HYPERTROPHY, WITHOUT HEART FAILURE: ICD-10-CM

## 2019-12-13 PROCEDURE — 99213 OFFICE O/P EST LOW 20 MIN: CPT | Performed by: FAMILY MEDICINE

## 2019-12-13 PROCEDURE — 71046 X-RAY EXAM CHEST 2 VIEWS: CPT

## 2019-12-13 NOTE — PROGRESS NOTES
ASSESSMENT AND PLAN    Problem List Items Addressed This Visit        Cardiovascular and Mediastinum    LVH (left ventricular hypertrophy) due to hypertensive disease      Other Visit Diagnoses     Cough with hemoptysis    -  Primary    Relevant Orders    XR Chest 2 View    BNP    Viral URI        continue symptomatic tx             Return for Next scheduled follow up for routine problems.  Patient was given instructions and counseling regarding his condition or for health maintenance advice. Please see specific information pulled into the AVS by me.          SUBJECTIVE  Shashi Engel is a 58 y.o. male being seen in our office today for Cough (started 6 days ago)               Social History  He  reports that he has quit smoking. He has never used smokeless tobacco. He reports that he has current or past drug history. Drug: Marijuana. He reports that he does not drink alcohol.    History of the Present Illness   HPI He started with it last week and on Monday he had a bad coughing spell and coughed up some blood. Still coughing but it is better. Taking a mucinex DM and chlortrimaton for HBP. Had a picture of what he coughed up and it looked to be about a tbsp of BRB. He has had no further episodes of blood but continues to cough, albeit not as badly. A little wheezing. Is somewhat concerned that this could be CHF which is what happened last year. His BP was up at home but now OK.   Significant Past History  The following portions of the patient's history were reviewed and updated as appropriate:PMHroutine: Social history , Allergies, Current Medications, Active Problem List and Health Maintenance    Review of Systems   Constitutional: Positive for fatigue. Negative for activity change, appetite change, chills, fever and unexpected weight change.   HENT: Positive for congestion, sinus pressure and sinus pain. Negative for ear pain, hearing loss, mouth sores, nosebleeds, rhinorrhea and sore throat.    Eyes:  Negative for pain and visual disturbance.   Respiratory: Positive for cough and shortness of breath. Negative for wheezing.    Cardiovascular: Negative for chest pain, palpitations and leg swelling.   Gastrointestinal: Negative for abdominal distention, abdominal pain, blood in stool, constipation, diarrhea, nausea and vomiting.   Endocrine: Negative for cold intolerance and heat intolerance.   Genitourinary: Negative for difficulty urinating, discharge, dysuria, frequency, hematuria and urgency.   Musculoskeletal: Negative for back pain and joint swelling.   Skin: Negative for rash and wound.   Neurological: Negative for dizziness, weakness, numbness and headaches.   Hematological: Does not bruise/bleed easily.   Psychiatric/Behavioral: Negative for confusion, dysphoric mood, sleep disturbance and suicidal ideas. The patient is not nervous/anxious.    I have reviewed the ROS as documented by the MA. Keesha Kirkpatrick MD      OBJECTIVE   Vital Signs          BP Readings from Last 1 Encounters:   12/13/19 138/88     Wt Readings from Last 3 Encounters:   12/13/19 (!) 141 kg (310 lb)   08/12/19 (!) 145 kg (318 lb 12.8 oz)   02/01/19 (!) 146 kg (321 lb)   Body mass index is 38.75 kg/m².     Physical Exam   Constitutional: He is oriented to person, place, and time. Vital signs are normal. He appears well-developed and well-nourished. No distress.   HENT:   Head: Normocephalic.   Cardiovascular: Normal rate and regular rhythm.   Murmur (2/6 KAVITHA worse in LLSB) heard.  Pulmonary/Chest: Effort normal and breath sounds normal.   Neurological: He is alert and oriented to person, place, and time. Gait normal.   Psychiatric: He has a normal mood and affect. His behavior is normal. Judgment and thought content normal.   Vitals reviewed.    Data Reviewed

## 2019-12-13 NOTE — PROGRESS NOTES
I have reviewed all lab results which are normal, stable or addressed in separate notation.  Patient views his results on MyChart.

## 2019-12-14 LAB — BNP SERPL-MCNC: 48.7 PG/ML (ref 0–100)

## 2019-12-21 DIAGNOSIS — I10 BENIGN ESSENTIAL HTN: ICD-10-CM

## 2020-01-13 ENCOUNTER — OFFICE VISIT (OUTPATIENT)
Dept: FAMILY MEDICINE CLINIC | Facility: CLINIC | Age: 60
End: 2020-01-13

## 2020-01-13 VITALS
SYSTOLIC BLOOD PRESSURE: 138 MMHG | OXYGEN SATURATION: 99 % | BODY MASS INDEX: 38.87 KG/M2 | WEIGHT: 311 LBS | RESPIRATION RATE: 14 BRPM | HEART RATE: 60 BPM | DIASTOLIC BLOOD PRESSURE: 90 MMHG

## 2020-01-13 DIAGNOSIS — I10 BENIGN ESSENTIAL HTN: ICD-10-CM

## 2020-01-13 DIAGNOSIS — E11.9 DIABETES MELLITUS TYPE 2, NONINSULIN DEPENDENT (HCC): ICD-10-CM

## 2020-01-13 DIAGNOSIS — E66.01 CLASS 2 SEVERE OBESITY DUE TO EXCESS CALORIES WITH SERIOUS COMORBIDITY AND BODY MASS INDEX (BMI) OF 38.0 TO 38.9 IN ADULT (HCC): ICD-10-CM

## 2020-01-13 DIAGNOSIS — Z00.00 HEALTHCARE MAINTENANCE: Primary | ICD-10-CM

## 2020-01-13 PROCEDURE — 99396 PREV VISIT EST AGE 40-64: CPT | Performed by: FAMILY MEDICINE

## 2020-01-13 RX ORDER — QUINAPRIL 40 MG/1
80 TABLET ORAL DAILY
Qty: 180 TABLET | Refills: 1 | Status: SHIPPED | OUTPATIENT
Start: 2020-01-13 | End: 2020-10-29

## 2020-01-13 RX ORDER — AMLODIPINE BESYLATE 5 MG/1
5 TABLET ORAL DAILY
Qty: 90 TABLET | Refills: 0 | Status: SHIPPED | OUTPATIENT
Start: 2020-01-13 | End: 2020-04-15

## 2020-01-13 RX ORDER — CHLORTHALIDONE 25 MG/1
25 TABLET ORAL DAILY
Qty: 90 TABLET | Refills: 1 | Status: SHIPPED | OUTPATIENT
Start: 2020-01-13 | End: 2020-07-10

## 2020-01-13 NOTE — PROGRESS NOTES
ASSESSMENT AND PLAN    Problem List Items Addressed This Visit        Cardiovascular and Mediastinum    Benign essential HTN    Current Assessment & Plan     [facility] 1/13/2020 BP remained elevated on repeat testing. He recalls that he did better on chlorthalidone than on spironolactone so will try switching him and see if we can get better BP control.           Relevant Medications    quinapril (ACCUPRIL) 40 MG tablet    chlorthalidone (HYGROTON) 25 MG tablet       Digestive    Class 2 severe obesity due to excess calories with serious comorbidity and body mass index (BMI) of 38.0 to 38.9 in adult (CMS/[facility])    Current Assessment & Plan     [facility] 1/13/2020  Weight is down a little. He intends to keep working on it.             Endocrine    Diabetes mellitus type 2, noninsulin dependent (CMS/[facility])    Current Assessment & Plan     [facility] 1/13/2020  He will return to office for his labs. His A1c has been good and he is still losing weight. He is thinking of going vegan.   Lab Results   Component Value Date    HGBA1C 6.81 (H) 02/01/2019    HGBA1C 6.3 (H) 02/23/2018    HGBA1C 6.45 (H) 11/07/2016    HGBA1C 6.8 (H) 11/03/2015             Relevant Medications    quinapril (ACCUPRIL) 40 MG tablet    Other Relevant Orders    Microalbumin / Creatinine Urine Ratio - Urine, Clean Catch      Other Visit Diagnoses     Healthcare maintenance    -  Primary    Relevant Orders    Hemoglobin A1c    CBC (No Diff)    Comprehensive Metabolic Panel    Lipid Panel With LDL / HDL Ratio    PSA Screen             No follow-ups on file.  Patient was given instructions and counseling regarding his condition or for health maintenance advice. Please see specific information pulled into the AVS by me.          SUBJECTIVE  Shashi Engel is a 59 y.o. male being seen in our office today for Annual Exam               Social History  He  reports that he has quit smoking. He has never used smokeless tobacco. He reports that he has current or  "past drug history. Drug: Marijuana. He reports that he does not drink alcohol.    History of the Present Illness   HPI Shashi Engel 59 y.o. male who presents for yearly preventive exam.  He exercises walking 20 minutes/day  3 days/week and weight lifting..  History; colonoscopy: Last colonoscopy: colonoscopy 4 years ago without abnormalities.  Immunizations: up to date  psa was ordered He has no increased risk of prostate cancer  He does not see his dentist regularly  --been w/i a year  His diet is in general, a \"healthy\" diet  , He is considering doing a vegan diet.   He describes his alcohol intake as none  His cardiovascular risk is: LDL goal is under 100  This patient has ever been tested for HepC: yes   He checks his sugar -- usually in the 100's.  He checks his BP. His diastolic has been staying in the 90s.    Significant Past History  The following portions of the patient's history were reviewed and updated as appropriate:PMHroutine: Social history , Allergies, Current Medications, Active Problem List and Health Maintenance    Review of Systems   Constitutional: Negative for activity change, appetite change, chills, fatigue, fever and unexpected weight change.   HENT: Negative for congestion, ear pain, hearing loss, mouth sores, nosebleeds, rhinorrhea and sore throat.    Eyes: Negative for pain and visual disturbance.   Respiratory: Negative for cough, shortness of breath and wheezing.    Cardiovascular: Negative for chest pain, palpitations and leg swelling.   Gastrointestinal: Negative for abdominal distention, abdominal pain, blood in stool, constipation, diarrhea, nausea and vomiting.   Endocrine: Negative for cold intolerance and heat intolerance.   Genitourinary: Negative for difficulty urinating, discharge, dysuria, frequency, hematuria and urgency.   Musculoskeletal: Negative for back pain and joint swelling.   Skin: Negative for rash and wound.   Neurological: Negative for dizziness, weakness, " numbness and headaches.   Hematological: Does not bruise/bleed easily.   Psychiatric/Behavioral: Negative for confusion, dysphoric mood, sleep disturbance and suicidal ideas. The patient is not nervous/anxious.    I have reviewed the ROS as documented by the MA. Keesha Kirkpatrick MD      OBJECTIVE   Vital Signs          BP Readings from Last 1 Encounters:   01/13/20 138/90     Wt Readings from Last 3 Encounters:   01/13/20 (!) 141 kg (311 lb)   12/13/19 (!) 141 kg (310 lb)   08/12/19 (!) 145 kg (318 lb 12.8 oz)   Body mass index is 38.87 kg/m².     Physical Exam   Constitutional: He is oriented to person, place, and time. He appears well-developed and well-nourished. No distress.   HENT:   Head: Normocephalic and atraumatic.   Right Ear: Tympanic membrane, external ear and ear canal normal.   Left Ear: Tympanic membrane, external ear and ear canal normal.   Mouth/Throat: Oropharynx is clear and moist.   Eyes: Conjunctivae are normal.   Neck: Neck supple. No thyromegaly present.   Cardiovascular: Normal rate, regular rhythm and intact distal pulses.   Murmur (1/6 holosystolic murmur LLSB) heard.  Pulmonary/Chest: Effort normal and breath sounds normal. No respiratory distress. He has no wheezes. He has no rales.   Abdominal: Soft. Bowel sounds are normal. He exhibits no distension and no mass. There is no tenderness.   Musculoskeletal: Normal range of motion. He exhibits no edema or deformity.   Lymphadenopathy:     He has no cervical adenopathy.   Neurological: He is alert and oriented to person, place, and time.   Skin: Skin is warm and dry.   Psychiatric: He has a normal mood and affect. His behavior is normal. Judgment and thought content normal.   Vitals reviewed.    Data Reviewed

## 2020-01-13 NOTE — ASSESSMENT & PLAN NOTE
[facility] 1/13/2020  He will return to office for his labs. His A1c has been good and he is still losing weight. He is thinking of going vegan.   Lab Results   Component Value Date    HGBA1C 6.81 (H) 02/01/2019    HGBA1C 6.3 (H) 02/23/2018    HGBA1C 6.45 (H) 11/07/2016    HGBA1C 6.8 (H) 11/03/2015

## 2020-01-13 NOTE — ASSESSMENT & PLAN NOTE
[facility] 1/13/2020 BP remained elevated on repeat testing. He recalls that he did better on chlorthalidone than on spironolactone so will try switching him and see if we can get better BP control.

## 2020-01-16 ENCOUNTER — RESULTS ENCOUNTER (OUTPATIENT)
Dept: FAMILY MEDICINE CLINIC | Facility: CLINIC | Age: 60
End: 2020-01-16

## 2020-01-16 DIAGNOSIS — Z00.00 HEALTHCARE MAINTENANCE: ICD-10-CM

## 2020-01-18 ENCOUNTER — RESULTS ENCOUNTER (OUTPATIENT)
Dept: FAMILY MEDICINE CLINIC | Facility: CLINIC | Age: 60
End: 2020-01-18

## 2020-01-18 DIAGNOSIS — E11.9 DIABETES MELLITUS TYPE 2, NONINSULIN DEPENDENT (HCC): ICD-10-CM

## 2020-01-24 LAB
ALBUMIN SERPL-MCNC: 4.1 G/DL (ref 3.5–5.2)
ALBUMIN/CREAT UR: 3 MG/G CREAT (ref 0–29)
ALBUMIN/GLOB SERPL: 1.6 G/DL
ALP SERPL-CCNC: 76 U/L (ref 39–117)
ALT SERPL-CCNC: 16 U/L (ref 1–41)
AST SERPL-CCNC: 19 U/L (ref 1–40)
BILIRUB SERPL-MCNC: 0.7 MG/DL (ref 0.2–1.2)
BUN SERPL-MCNC: 11 MG/DL (ref 6–20)
BUN/CREAT SERPL: 13.8 (ref 7–25)
CALCIUM SERPL-MCNC: 9.1 MG/DL (ref 8.6–10.5)
CHLORIDE SERPL-SCNC: 104 MMOL/L (ref 98–107)
CHOLEST SERPL-MCNC: 167 MG/DL (ref 0–200)
CO2 SERPL-SCNC: 27.5 MMOL/L (ref 22–29)
CREAT SERPL-MCNC: 0.8 MG/DL (ref 0.76–1.27)
CREAT UR-MCNC: 144.7 MG/DL
ERYTHROCYTE [DISTWIDTH] IN BLOOD BY AUTOMATED COUNT: 14.1 % (ref 12.3–15.4)
GLOBULIN SER CALC-MCNC: 2.6 GM/DL
GLUCOSE SERPL-MCNC: 107 MG/DL (ref 65–99)
HBA1C MFR BLD: 6.9 % (ref 4.8–5.6)
HCT VFR BLD AUTO: 43.1 % (ref 37.5–51)
HDLC SERPL-MCNC: 51 MG/DL (ref 40–60)
HGB BLD-MCNC: 13.5 G/DL (ref 13–17.7)
LDLC SERPL CALC-MCNC: 105 MG/DL (ref 0–100)
LDLC/HDLC SERPL: 2.07 {RATIO}
MCH RBC QN AUTO: 26.4 PG (ref 26.6–33)
MCHC RBC AUTO-ENTMCNC: 31.3 G/DL (ref 31.5–35.7)
MCV RBC AUTO: 84.2 FL (ref 79–97)
MICROALBUMIN UR-MCNC: 4.8 UG/ML
PLATELET # BLD AUTO: 288 10*3/MM3 (ref 140–450)
POTASSIUM SERPL-SCNC: 4.5 MMOL/L (ref 3.5–5.2)
PROT SERPL-MCNC: 6.7 G/DL (ref 6–8.5)
PSA SERPL-MCNC: 0.24 NG/ML (ref 0–4)
RBC # BLD AUTO: 5.12 10*6/MM3 (ref 4.14–5.8)
SODIUM SERPL-SCNC: 141 MMOL/L (ref 136–145)
TRIGL SERPL-MCNC: 53 MG/DL (ref 0–150)
VLDLC SERPL CALC-MCNC: 10.6 MG/DL
WBC # BLD AUTO: 4.85 10*3/MM3 (ref 3.4–10.8)

## 2020-02-03 ENCOUNTER — TELEPHONE (OUTPATIENT)
Dept: FAMILY MEDICINE CLINIC | Facility: CLINIC | Age: 60
End: 2020-02-03

## 2020-02-03 NOTE — TELEPHONE ENCOUNTER
As requested I called him and he said his Blood Pressures have been in 115 up to 120's  And 60 -70 on the bottom      Pt advised to schedule a follow up appt in July

## 2020-02-26 ENCOUNTER — CLINICAL SUPPORT NO REQUIREMENTS (OUTPATIENT)
Dept: CARDIOLOGY | Facility: CLINIC | Age: 60
End: 2020-02-26

## 2020-02-26 DIAGNOSIS — I48.20 CHRONIC ATRIAL FIBRILLATION (HCC): Primary | ICD-10-CM

## 2020-02-26 PROCEDURE — 93294 REM INTERROG EVL PM/LDLS PM: CPT | Performed by: INTERNAL MEDICINE

## 2020-02-26 PROCEDURE — 93296 REM INTERROG EVL PM/IDS: CPT | Performed by: INTERNAL MEDICINE

## 2020-03-16 RX ORDER — RIVAROXABAN 20 MG/1
TABLET, FILM COATED ORAL
Qty: 90 TABLET | Refills: 1 | Status: SHIPPED | OUTPATIENT
Start: 2020-03-16 | End: 2020-09-21

## 2020-03-27 ENCOUNTER — TELEPHONE (OUTPATIENT)
Dept: CARDIOLOGY | Facility: CLINIC | Age: 60
End: 2020-03-27

## 2020-03-27 NOTE — TELEPHONE ENCOUNTER
I spoke with patient via telephone.  With the recent concern of COVID-19 we are trying to decrease the risk of unnecessary exposure.  We have recommended canceling the upcoming appointment scheduled on 4/2.     Yvette-please cancel office appointment.  He would like to schedule a video visit for next Thursday.  Thank you

## 2020-04-02 ENCOUNTER — TELEMEDICINE (OUTPATIENT)
Dept: CARDIOLOGY | Facility: CLINIC | Age: 60
End: 2020-04-02

## 2020-04-02 VITALS
DIASTOLIC BLOOD PRESSURE: 80 MMHG | HEIGHT: 75 IN | BODY MASS INDEX: 38.67 KG/M2 | HEART RATE: 60 BPM | WEIGHT: 311 LBS | SYSTOLIC BLOOD PRESSURE: 120 MMHG

## 2020-04-02 DIAGNOSIS — I48.20 CHRONIC ATRIAL FIBRILLATION (HCC): Primary | ICD-10-CM

## 2020-04-02 DIAGNOSIS — E66.01 CLASS 2 SEVERE OBESITY DUE TO EXCESS CALORIES WITH SERIOUS COMORBIDITY AND BODY MASS INDEX (BMI) OF 39.0 TO 39.9 IN ADULT (HCC): ICD-10-CM

## 2020-04-02 DIAGNOSIS — G47.33 OSA (OBSTRUCTIVE SLEEP APNEA): ICD-10-CM

## 2020-04-02 DIAGNOSIS — G47.33 OSA ON CPAP: ICD-10-CM

## 2020-04-02 DIAGNOSIS — I11.9 HYPERTENSIVE LEFT VENTRICULAR HYPERTROPHY, WITHOUT HEART FAILURE: ICD-10-CM

## 2020-04-02 DIAGNOSIS — E78.2 MIXED HYPERLIPIDEMIA: ICD-10-CM

## 2020-04-02 DIAGNOSIS — Z99.89 OSA ON CPAP: ICD-10-CM

## 2020-04-02 DIAGNOSIS — Z79.01 CHRONIC ANTICOAGULATION: ICD-10-CM

## 2020-04-02 PROCEDURE — 99214 OFFICE O/P EST MOD 30 MIN: CPT | Performed by: INTERNAL MEDICINE

## 2020-04-02 RX ORDER — ATORVASTATIN CALCIUM 40 MG/1
40 TABLET, FILM COATED ORAL DAILY
Qty: 90 TABLET | Refills: 3 | Status: SHIPPED | OUTPATIENT
Start: 2020-04-02 | End: 2020-10-14

## 2020-04-02 NOTE — PROGRESS NOTES
Date of Office Visit: 2020  Encounter Provider: Jennie Vanegas MD  Place of Service: [facility] CARDIOLOGY  Patient Name: Shashi Engel  :1960      Patient ID:  Shashi Engel is a 59 y.o. male is here for  followup for atrial fibrillation, near-syncope        History of Present Illness    I first saw him in 2013 when he came in to the Chest Pain Unit. He had had five days of short-windedness at that time and had risk factors for cardiac issues including diabetes mellitus, hypertension, and hyperlipidemia. He underwent an echocardiogram on 2013 which revealed severe concentric left ventricular hypertrophy, an ejection fraction of 66%, mild-to-moderate mitral insufficiency, mild-to-moderate tricuspid insufficiency, mildelevation of right ventricular systolic pressure at 40 mmHg. He then wore a 24-hour Holter monitor which showed atrial fibrillation with average heart rate of 70 beats per minute with brief episodes of tachycardia and bradycardia. He had had a stress nuclear perfusion study performed in 2012 which showed no evidence of ischemia. Ejection fraction was mildly reduced due to atrial fibrillation.           He does have obstructive sleep apnea and uses his CPAP faithfully.        His CHADS-VASc score is 2 giving him a 2.2% risk of strokes per year. He is on Xarelto.         Mr. Engel has seen our nurse practitioner Heidi Espinoza twice since I have seen him last. He saw her on 10/25/2016 for followup of near syncope and hypertension. She then saw him again on 2016 when he was having some episodes of near syncope and his pacemaker showed what appeared to be a tachyarrhythmia. His blood pressure at that time was somewhat labile as well. He then saw my partner Dr. Emiliano Covarrubias on 2016 and Emiliano did not feel that the tachycardia was problematic and certainly was not causing his symptoms of near syncope, and he felt the patient's  pacemaker was working well to prevent bradycardia. Meiliano felt that the patient's episodes of near syncope were more related to dysautonomia and recommended compression stockings as he thought he was having some orthostasis.      He did go to [place] and was evaluated in the dysautonomic clinic 8/23/17.  There testing showed grossly intact autonomic function.  They thought that possibly his atrial fibrillation was driving his symptoms of fatigue and near syncope.  Device interrogation today shows short bursts of ventricular ventricular tachycardia, 13 beats as well as premature ventricular complexes noted 25% at time.          He has stress echocardiogram done 12/11/17 for dyspnea and decreased exercise tolerance.  This showed ejection fraction 50% with severe concentric left hypertrophy, severe left atrial enlargement, RVSP 46 mmHg mildly reduced right ventricular systolic function but there is no evidence of ischemia on the stress echocardiogram portion.  He also had a 12 day monitor done 12/2017 which showed atrial fibrillation with bursts of tachycardia but there were no significant pulses are bradycardia.  He had normal serum protein electrophoresis done December 2017.  This is done due to severe left hypertrophy.     He's had recurrent episodes of near syncope which we believe are probably due to his blood pressure dropping as he has documented this at times.  In addition all been may also be related to bursts of atrial fibrillation with rapid ventricular response.  They also could be due to low blood sugar.      He uses BiPAP mask for his obstructive sleep apnea.       He's been trying to exercise daily getting [id number] daily.  He has had no dizziness, syncope, heart racing or skipping.  He is eating fish and vegetables.  His BP at home has been 120/80.  He has no exertional chest pain, dyspnea.  He has no orthopnea or PND and is using his BiPAP.  His last device interrogation 2/2020 was normal.  He had  labs done 1/2020 showing cmp normal except glucose, hemoglobin A1 C 6.9, hdl 51, ldl 105.  Normal CBC.  He has no LE edema and wears pressure stockings.  He has felt good.     Past Medical History:   Diagnosis Date   • Abnormal electrocardiogram    • Anxiety    • Cardiomyopathy, hypertrophic, primary familial (CMS/HCC)    • Erectile dysfunction    • Health care maintenance    • Hyperlipidemia    • Hypertension    • Mild concentric left ventricular hypertrophy (LVH)    • Mild mitral regurgitation    • Mild tricuspid regurgitation    • Near syncope    • Noncompliance    • Nonsustained ventricular tachycardia (CMS/HCC) 9/5/2018   • Obesity    • VENESSA (obstructive sleep apnea)     uses CPAP faithfully   • Osteoarthritis    • PAF (paroxysmal atrial fibrillation) (CMS/HCC)    • Pneumonia 01/01/2016   • Type 2 diabetes mellitus (CMS/HCC)          Past Surgical History:   Procedure Laterality Date   • CARDIAC ELECTROPHYSIOLOGY PROCEDURE Left 6/6/2016    Procedure: Pacemaker DC gerson MARX;  Surgeon: Juan Chacon MD;  Location: [facility] INVASIVE LOC[facility];  Service:    • INSERT / REPLACE / REMOVE PACEMAKER     • KNEE ARTHROSCOPY     • OTHER SURGICAL HISTORY      physical therapy for rotator cuff       Current Outpatient Medications on File Prior to Visit   Medication Sig Dispense Refill   • amLODIPine (NORVASC) 5 MG tablet TAKE 1 TABLET BY MOUTH DAILY. 90 tablet 0   • atorvastatin (LIPITOR) 40 MG tablet Take 1 tablet by mouth Daily. 90 tablet 3   • chlorthalidone (HYGROTON) 25 MG tablet Take 1 tablet by mouth Daily. 90 tablet 1   • CIALIS 20 MG tablet TAKE ONE TABLET BY MOUTH EVERY 3 DAYS AS DIRECTED 6 tablet 2   • [facility] LANCETS [facility] Use as directed and appropo lancet for his monitor 100 each 11   • glucose blood test strip Use as instructed 100 each 12   • metFORMIN (GLUCOPHAGE) 1000 MG tablet TAKE 1 TABLET BY MOUTH 2 (TWO) TIMES A DAY WITH MEALS. 180 tablet 2   • metoprolol tartrate (LOPRESSOR) 25 MG tablet  TAKE 1 TABLET BY MOUTH TWICE A DAY 30 tablet 0   • Multiple Vitamins-Minerals (MULTIVITAMIN ADULT PO) Take 1 tablet by mouth daily.     • quinapril (ACCUPRIL) 40 MG tablet Take 2 tablets by mouth Daily. 180 tablet 1   • XARELTO 20 MG tablet TAKE 1 TABLET BY MOUTH EVERY DAY WITH DINNER 90 tablet 1     No current facility-administered medications on file prior to visit.        Social History     Socioeconomic History   • Marital status:      Spouse name: Not on file   • Number of children: Not on file   • Years of education: Not on file   • Highest education level: Not on file   Occupational History   • Occupation: disabled   Tobacco Use   • Smoking status: Former Smoker   • Smokeless tobacco: Never Used   Substance and Sexual Activity   • Alcohol use: No     Comment: No caffeine use   • Drug use: Yes     Types: Marijuana     Comment: FORMER.  No caffeine use   • Sexual activity: Not Currently     Partners: Female           Review of Systems   Constitution: Negative.   HENT: Negative for congestion.    Eyes: Negative for vision loss in left eye and vision loss in right eye.   Respiratory: Negative.  Negative for cough, hemoptysis, shortness of breath, sleep disturbances due to breathing, snoring, sputum production and wheezing.    Endocrine: Negative.    Hematologic/Lymphatic: Negative.    Skin: Negative for poor wound healing and rash.   Musculoskeletal: Negative for falls, gout, muscle cramps and myalgias.   Gastrointestinal: Negative for abdominal pain, diarrhea, dysphagia, hematemesis, melena, nausea and vomiting.   Neurological: Negative for excessive daytime sleepiness, dizziness, headaches, light-headedness, loss of balance, seizures and vertigo.   Psychiatric/Behavioral: Negative for depression and substance abuse. The patient is not nervous/anxious.        Procedures  Procedures        Objective:      Vitals:    04/02/20 1141   BP: 120/80   Pulse: 60   Weight: (!) 141 kg (311 lb)   Height: 190.5 cm  "(75\")     Body mass index is 38.87 kg/m².    Physical Exam    Video visit due to covid-19    Lab Review:       Assessment:      Diagnosis Plan   1. Chronic atrial fibrillation     2. Chronic anticoagulation     3. VENESSA (obstructive sleep apnea)     4. Class 2 severe obesity due to excess calories with serious comorbidity and body mass index (BMI) of 39.0 to 39.9 in adult (CMS/[facility])     5. Severe VENESSA on CPAP     6. Hypertensive left ventricular hypertrophy, without heart failure       1. H/o Fatigue and lightheadedness, resolved, likely due to labile BP, PVCs and PAF and low blood sugar.   2. Atrial fibrillation, rate controlled. On xarelto. Occasional gets fast.   3. Hypertension. BP is well controlled.   4. Hyperlipidemia. Controlled on atorvastatin.   5. History of left ventricular hypertrophy on echocardiogram.   6. Diabetes mellitus type 2.   7. Obstructive sleep apnea on CPAP. Has a new bipap mask and feels better.   8. Obesity.   9. SSS, s/p pacer.   10. Nonsustained VT and frequent PVCs.      Plan:       Refill atorvastatin, see back in 1year, advised continued lifestyle changes.  Overall doing well, no other testing at this time.     This patient has consented to a telehealth visit via video. The visit was scheduled as a video visit to comply with patient safety concerns in accordance with CDC recommendations.  All vitals recorded within this visit are reported by the patient.  I spent  26 minutes in total including but not limited to the 16 minutes spent in direct conversation with this patient.     "

## 2020-04-15 DIAGNOSIS — I10 BENIGN ESSENTIAL HTN: ICD-10-CM

## 2020-04-15 RX ORDER — AMLODIPINE BESYLATE 5 MG/1
TABLET ORAL
Qty: 90 TABLET | Refills: 0 | Status: SHIPPED | OUTPATIENT
Start: 2020-04-15 | End: 2020-07-09

## 2020-04-23 RX ORDER — TADALAFIL 20 MG/1
20 TABLET ORAL DAILY PRN
Qty: 6 TABLET | Refills: 2 | Status: SHIPPED | OUTPATIENT
Start: 2020-04-23 | End: 2021-01-20 | Stop reason: SDUPTHER

## 2020-05-19 ENCOUNTER — RESULTS ENCOUNTER (OUTPATIENT)
Dept: FAMILY MEDICINE CLINIC | Facility: CLINIC | Age: 60
End: 2020-05-19

## 2020-05-19 DIAGNOSIS — Z12.11 ENCOUNTER FOR SCREENING FOR MALIGNANT NEOPLASM OF COLON: Primary | ICD-10-CM

## 2020-05-19 DIAGNOSIS — Z12.11 ENCOUNTER FOR SCREENING FOR MALIGNANT NEOPLASM OF COLON: ICD-10-CM

## 2020-05-20 DIAGNOSIS — E11.9 DIABETES MELLITUS TYPE 2, NONINSULIN DEPENDENT (HCC): Primary | ICD-10-CM

## 2020-05-29 ENCOUNTER — TELEPHONE (OUTPATIENT)
Dept: CARDIOLOGY | Facility: CLINIC | Age: 60
End: 2020-05-29

## 2020-07-09 DIAGNOSIS — I10 BENIGN ESSENTIAL HTN: ICD-10-CM

## 2020-07-09 RX ORDER — AMLODIPINE BESYLATE 5 MG/1
TABLET ORAL
Qty: 30 TABLET | Refills: 0 | Status: SHIPPED | OUTPATIENT
Start: 2020-07-09 | End: 2020-08-10

## 2020-07-10 DIAGNOSIS — I10 BENIGN ESSENTIAL HTN: ICD-10-CM

## 2020-07-10 RX ORDER — CHLORTHALIDONE 25 MG/1
TABLET ORAL
Qty: 30 TABLET | Refills: 0 | Status: SHIPPED | OUTPATIENT
Start: 2020-07-10 | End: 2020-08-10

## 2020-08-10 DIAGNOSIS — I10 BENIGN ESSENTIAL HTN: ICD-10-CM

## 2020-08-10 RX ORDER — CHLORTHALIDONE 25 MG/1
TABLET ORAL
Qty: 30 TABLET | Refills: 0 | Status: SHIPPED | OUTPATIENT
Start: 2020-08-10 | End: 2020-09-08

## 2020-08-10 RX ORDER — AMLODIPINE BESYLATE 5 MG/1
TABLET ORAL
Qty: 30 TABLET | Refills: 0 | Status: SHIPPED | OUTPATIENT
Start: 2020-08-10 | End: 2020-09-08

## 2020-09-07 DIAGNOSIS — I10 BENIGN ESSENTIAL HTN: ICD-10-CM

## 2020-09-08 RX ORDER — AMLODIPINE BESYLATE 5 MG/1
TABLET ORAL
Qty: 30 TABLET | Refills: 0 | Status: SHIPPED | OUTPATIENT
Start: 2020-09-08 | End: 2020-10-02

## 2020-09-08 RX ORDER — CHLORTHALIDONE 25 MG/1
TABLET ORAL
Qty: 30 TABLET | Refills: 0 | Status: SHIPPED | OUTPATIENT
Start: 2020-09-08 | End: 2020-10-02

## 2020-09-17 ENCOUNTER — OFFICE VISIT (OUTPATIENT)
Dept: CARDIOLOGY | Facility: CLINIC | Age: 60
End: 2020-09-17

## 2020-09-17 ENCOUNTER — TELEPHONE (OUTPATIENT)
Dept: CARDIOLOGY | Facility: CLINIC | Age: 60
End: 2020-09-17

## 2020-09-17 ENCOUNTER — CLINICAL SUPPORT NO REQUIREMENTS (OUTPATIENT)
Dept: CARDIOLOGY | Facility: CLINIC | Age: 60
End: 2020-09-17

## 2020-09-17 VITALS
HEART RATE: 60 BPM | WEIGHT: 313 LBS | HEIGHT: 75 IN | SYSTOLIC BLOOD PRESSURE: 124 MMHG | DIASTOLIC BLOOD PRESSURE: 84 MMHG | BODY MASS INDEX: 38.92 KG/M2

## 2020-09-17 DIAGNOSIS — E66.01 OBESITY, MORBID, BMI 40.0-49.9 (HCC): ICD-10-CM

## 2020-09-17 DIAGNOSIS — I48.0 PAF (PAROXYSMAL ATRIAL FIBRILLATION) (HCC): Primary | ICD-10-CM

## 2020-09-17 DIAGNOSIS — I47.29 NONSUSTAINED VENTRICULAR TACHYCARDIA (HCC): ICD-10-CM

## 2020-09-17 DIAGNOSIS — I11.9 HYPERTENSIVE LEFT VENTRICULAR HYPERTROPHY, WITHOUT HEART FAILURE: ICD-10-CM

## 2020-09-17 DIAGNOSIS — E11.9 DIABETES MELLITUS TYPE 2, NONINSULIN DEPENDENT (HCC): ICD-10-CM

## 2020-09-17 DIAGNOSIS — E78.2 MIXED HYPERLIPIDEMIA: ICD-10-CM

## 2020-09-17 DIAGNOSIS — G47.33 OSA (OBSTRUCTIVE SLEEP APNEA): ICD-10-CM

## 2020-09-17 DIAGNOSIS — I10 BENIGN ESSENTIAL HTN: ICD-10-CM

## 2020-09-17 PROCEDURE — 93279 PRGRMG DEV EVAL PM/LDLS PM: CPT | Performed by: INTERNAL MEDICINE

## 2020-09-17 PROCEDURE — 99214 OFFICE O/P EST MOD 30 MIN: CPT | Performed by: INTERNAL MEDICINE

## 2020-09-17 NOTE — TELEPHONE ENCOUNTER
Patient was here today for a pacemaker device test and there were episodes of high vent. Rate recorded. The longest was on 9/4/2020 lasting 25 sec. Rate of 157 bpm. There were 32 nst and 2 tachy events. There were only markers to view . DM CCT

## 2020-09-17 NOTE — PROGRESS NOTES
Date of Office Visit: 2020  Encounter Provider: Jennie Vanegas MD  Place of Service: [facility]  Patient Name: Shashi Engel  :1960      Patient ID:  Shashi Engel is a 59 y.o. male is here for  followup for hypertension, PAF, PVCs.        History of Present Illness    I first saw him in 2013 when he came in to the Chest Pain Unit. He had had five days of short-windedness at that time and had risk factors for cardiac issues including diabetes mellitus, hypertension, and hyperlipidemia. He underwent an echocardiogram on 2013 which revealed severe concentric left ventricular hypertrophy, an ejection fraction of 66%, mild-to-moderate mitral insufficiency, mild-to-moderate tricuspid insufficiency, mildelevation of right ventricular systolic pressure at 40 mmHg. He then wore a 24-hour Holter monitor which showed atrial fibrillation with average heart rate of 70 beats per minute with brief episodes of tachycardia and bradycardia. He had had a stress nuclear perfusion study performed in 2012 which showed no evidence of ischemia. Ejection fraction was mildly reduced due to atrial fibrillation.           He does have obstructive sleep apnea and uses his CPAP faithfully.        His CHADS-VASc score is 2 giving him a 2.2% risk of strokes per year. He is on Xarelto.         Mr. Engel has seen our nurse practitioner Heidi Espinoza twice since I have seen him last. He saw her on 10/25/2016 for followup of near syncope and hypertension. She then saw him again on 2016 when he was having some episodes of near syncope and his pacemaker showed what appeared to be a tachyarrhythmia. His blood pressure at that time was somewhat labile as well. He then saw my partner Dr. Emiliano Covarrubias on 2016 and Emiliano did not feel that the tachycardia was problematic and certainly was not causing his symptoms of near syncope, and he felt the patient's pacemaker was  working well to prevent bradycardia. Emiliano felt that the patient's episodes of near syncope were more related to dysautonomia and recommended compression stockings as he thought he was having some orthostasis.      He did go to [place] and was evaluated in the dysautonomic clinic 8/23/17.  There testing showed grossly intact autonomic function.  They thought that possibly his atrial fibrillation was driving his symptoms of fatigue and near syncope.  Device interrogation today shows short bursts of ventricular ventricular tachycardia, 13 beats as well as premature ventricular complexes noted 25% at time.          He has stress echocardiogram done 12/11/17 for dyspnea and decreased exercise tolerance.  This showed ejection fraction 50% with severe concentric left hypertrophy, severe left atrial enlargement, RVSP 46 mmHg mildly reduced right ventricular systolic function but there is no evidence of ischemia on the stress echocardiogram portion.  He also had a 12 day monitor done 12/2017 which showed atrial fibrillation with bursts of tachycardia but there were no significant pulses are bradycardia.  He had normal serum protein electrophoresis done December 2017.  This is done due to severe left hypertrophy.     He's had recurrent episodes of near syncope which we believe are probably due to his blood pressure dropping as he has documented this at times.  In addition all been may also be related to bursts of atrial fibrillation with rapid ventricular response.  They also could be due to low blood sugar.      He uses BiPAP mask for his obstructive sleep apnea.      Labs on 1/23/2020 show CMP normal except glucose at 107, hemoglobin A1c 6.9, HDL 51, , normal CBC.  He has had no syncope or dizziness but has occasional palpitations.  He wears lower extremity compression stockings.  He has no chest pain or pressure.  He has no fevers, chills or cough.  He is waking up with nausea in the morning and has intermittent  nausea at times as well after eating.  He can wonders if it is gallbladder and I think he may be right.  He is getting a pacemaker check today.  He has no exertional chest tightness or pressure.  He has no exertional dyspnea.  He has no orthopnea or PND.  He continues to exercise getting about 5000 steps a day.    Pacemaker check today did show high heart rates, 30 episodes noted.  He does not feel these though he has these episodes of fatigue, I am not certain that they are related to this but could be due to low BP.     Past Medical History:   Diagnosis Date   • Abnormal electrocardiogram    • Anxiety    • Cardiomyopathy, hypertrophic, primary familial (CMS/HCC)    • Erectile dysfunction    • Health care maintenance    • Hyperlipidemia    • Hypertension    • Mild concentric left ventricular hypertrophy (LVH)    • Mild mitral regurgitation    • Mild tricuspid regurgitation    • Near syncope    • Noncompliance    • Nonsustained ventricular tachycardia (CMS/HCC) 9/5/2018   • Obesity    • VENESSA (obstructive sleep apnea)     uses CPAP faithfully   • Osteoarthritis    • PAF (paroxysmal atrial fibrillation) (CMS/[facility])    • Pneumonia 01/01/2016   • Type 2 diabetes mellitus (CMS/[facility])          Past Surgical History:   Procedure Laterality Date   • CARDIAC ELECTROPHYSIOLOGY PROCEDURE Left 6/6/2016    Procedure: Pacemaker DC new  [place];  Surgeon: Juan Chacon MD;  Location: [facility] INVASIVE LOCATION;  Service:    • INSERT / REPLACE / REMOVE PACEMAKER     • KNEE ARTHROSCOPY     • OTHER SURGICAL HISTORY      physical therapy for rotator cuff       Current Outpatient Medications on File Prior to Visit   Medication Sig Dispense Refill   • amLODIPine (NORVASC) 5 MG tablet TAKE 1 TABLET BY MOUTH EVERY DAY 30 tablet 0   • atorvastatin (LIPITOR) 40 MG tablet Take 1 tablet by mouth Daily. 90 tablet 3   • chlorthalidone (HYGROTON) 25 MG tablet TAKE 1 TABLET BY MOUTH EVERY DAY 30 tablet 0   • [employer] LANCETS ORIGINAL misc Use as  directed and appropo lancet for his monitor 100 each 11   • glucose blood test strip Use as instructed 100 each 12   • glucose blood test strip One touch strips. Test daily. 100 each 12   • metFORMIN (GLUCOPHAGE) 1000 MG tablet TAKE 1 TABLET BY MOUTH 2 (TWO) TIMES A DAY WITH MEALS. 180 tablet 2   • metoprolol tartrate (LOPRESSOR) 25 MG tablet TAKE 1 TABLET BY MOUTH TWICE A DAY 30 tablet 0   • Multiple Vitamins-Minerals (MULTIVITAMIN ADULT PO) Take 1 tablet by mouth daily.     • quinapril (ACCUPRIL) 40 MG tablet Take 2 tablets by mouth Daily. 180 tablet 1   • tadalafil (Cialis) 20 MG tablet Take 1 tablet by mouth Daily As Needed for Erectile Dysfunction. 6 tablet 2   • XARELTO 20 MG tablet TAKE 1 TABLET BY MOUTH EVERY DAY WITH DINNER 90 tablet 1     No current facility-administered medications on file prior to visit.        Social History     Socioeconomic History   • Marital status:      Spouse name: Not on file   • Number of children: Not on file   • Years of education: Not on file   • Highest education level: Not on file   Occupational History   • Occupation: disabled   Tobacco Use   • Smoking status: Former Smoker   • Smokeless tobacco: Never Used   Substance and Sexual Activity   • Alcohol use: No     Comment: No caffeine use   • Drug use: Yes     Types: Marijuana     Comment: FORMER.  No caffeine use   • Sexual activity: Not Currently     Partners: Female           Review of Systems   Constitution: Negative.   HENT: Negative for congestion.    Eyes: Negative for vision loss in left eye and vision loss in right eye.   Respiratory: Negative.  Negative for cough, hemoptysis, shortness of breath, sleep disturbances due to breathing, snoring, sputum production and wheezing.    Endocrine: Negative.    Hematologic/Lymphatic: Negative.    Skin: Negative for poor wound healing and rash.   Musculoskeletal: Negative for falls, gout, muscle cramps and myalgias.   Gastrointestinal: Negative for abdominal pain,  "diarrhea, dysphagia, hematemesis, melena, nausea and vomiting.   Neurological: Negative for excessive daytime sleepiness, dizziness, headaches, light-headedness, loss of balance, seizures and vertigo.   Psychiatric/Behavioral: Negative for depression and substance abuse. The patient is not nervous/anxious.        Procedures  Procedures        Objective:      Vitals:    09/17/20 0812   BP: 124/84   BP Location: Left arm   Patient Position: Sitting   Cuff Size: Adult   Pulse: 60   Weight: (!) 142 kg (313 lb)   Height: 190.5 cm (75\")     Body mass index is 39.12 kg/m².    Vitals signs reviewed.   Constitutional:       General: Not in acute distress.     Appearance: Well-developed. Not diaphoretic.   Eyes:      General: No scleral icterus.     Conjunctiva/sclera: Conjunctivae normal.   HENT:      Head: Normocephalic and atraumatic.   Neck:      Musculoskeletal: Neck supple.      Thyroid: No thyromegaly.      Vascular: No carotid bruit or JVD.      Lymphadenopathy: No cervical adenopathy.   Pulmonary:      Effort: Pulmonary effort is normal. No respiratory distress.      Breath sounds: Normal breath sounds. No wheezing. No rhonchi. No rales.   Chest:      Chest wall: Not tender to palpatation.   Cardiovascular:      Normal rate. Regular rhythm.      No gallop.   Edema:     Peripheral edema absent.   Abdominal:      General: Bowel sounds are normal. There is no distension or abdominal bruit.      Palpations: Abdomen is soft. There is no abdominal mass.      Tenderness: There is no abdominal tenderness.   Musculoskeletal:         General: No deformity.      Extremities: No clubbing present.  Skin:     General: Skin is warm and dry. There is no cyanosis.      Coloration: Skin is not pale.      Findings: No rash.   Neurological:      Mental Status: Alert and oriented to person, place, and time.      Cranial Nerves: No cranial nerve deficit.   Psychiatric:         Judgment: Judgment normal.         Lab Review:     "   Assessment:      Diagnosis Plan   1. PAF (paroxysmal atrial fibrillation) (CMS/[facility])     2. Benign essential HTN     3. Mixed hyperlipidemia     4. Hypertensive left ventricular hypertrophy, without heart failure     5. VENESSA (obstructive sleep apnea)     6. Nonsustained ventricular tachycardia (CMS/[facility])     7. Obesity, morbid, BMI 40.0-49.9 (CMS/[facility])     8. Diabetes mellitus type 2, noninsulin dependent (CMS/[facility])       1. H/o Fatigue and lightheadedness, resolved, likely due to labile BP, PVCs and PAF and low blood sugar.   2. Atrial fibrillation, rate controlled. On xarelto. Occasional gets fast.   3. Hypertension. BP is well controlled.  Has low blood pressures at times.  4. Hyperlipidemia. Controlled on atorvastatin.   5. History of left ventricular hypertrophy on echocardiogram.   6. Diabetes mellitus type 2.  Overall under fair control.  7. Obstructive sleep apnea on CPAP. Has a new bipap mask and feels better.   8. Obesity.   9. SSS, s/p pacer.   10. Nonsustained VT and frequent PVCs.      Plan:       Follow-up with Sabrina 3 months, change amlodipine to nighttime to take as he is having low blood pressures at times.  Take atorvastatin with dinner as he is waking up with nausea but the nausea may be due to gallbladder disease.  5 asked him to follow-up with his primary care about this.  He also needs labs with her including cholesterol.

## 2020-09-21 RX ORDER — RIVAROXABAN 20 MG/1
TABLET, FILM COATED ORAL
Qty: 90 TABLET | Refills: 1 | Status: SHIPPED | OUTPATIENT
Start: 2020-09-21 | End: 2021-03-23

## 2020-10-02 DIAGNOSIS — I10 BENIGN ESSENTIAL HTN: ICD-10-CM

## 2020-10-02 RX ORDER — AMLODIPINE BESYLATE 5 MG/1
TABLET ORAL
Qty: 30 TABLET | Refills: 0 | Status: SHIPPED | OUTPATIENT
Start: 2020-10-02 | End: 2021-04-01 | Stop reason: SDUPTHER

## 2020-10-02 RX ORDER — CHLORTHALIDONE 25 MG/1
TABLET ORAL
Qty: 30 TABLET | Refills: 2 | Status: SHIPPED | OUTPATIENT
Start: 2020-10-02 | End: 2021-04-01 | Stop reason: SDUPTHER

## 2020-10-14 DIAGNOSIS — E78.2 MIXED HYPERLIPIDEMIA: ICD-10-CM

## 2020-10-14 RX ORDER — ATORVASTATIN CALCIUM 40 MG/1
TABLET, FILM COATED ORAL
Qty: 90 TABLET | Refills: 3 | Status: SHIPPED | OUTPATIENT
Start: 2020-10-14 | End: 2021-12-16

## 2020-10-29 DIAGNOSIS — E11.9 DIABETES MELLITUS TYPE 2, NONINSULIN DEPENDENT (HCC): ICD-10-CM

## 2020-10-29 DIAGNOSIS — I10 BENIGN ESSENTIAL HTN: ICD-10-CM

## 2020-10-29 RX ORDER — QUINAPRIL 40 MG/1
TABLET ORAL
Qty: 60 TABLET | Refills: 0 | Status: SHIPPED | OUTPATIENT
Start: 2020-10-29 | End: 2020-11-16

## 2020-11-13 DIAGNOSIS — E11.9 DIABETES MELLITUS TYPE 2, NONINSULIN DEPENDENT (HCC): ICD-10-CM

## 2020-11-13 DIAGNOSIS — I10 BENIGN ESSENTIAL HTN: ICD-10-CM

## 2020-11-16 RX ORDER — QUINAPRIL 40 MG/1
TABLET ORAL
Qty: 15 TABLET | Refills: 0 | Status: SHIPPED | OUTPATIENT
Start: 2020-11-16 | End: 2021-03-22

## 2020-11-17 ENCOUNTER — TELEPHONE (OUTPATIENT)
Dept: CARDIOLOGY | Facility: CLINIC | Age: 60
End: 2020-11-17

## 2020-12-07 ENCOUNTER — HOSPITAL ENCOUNTER (EMERGENCY)
Facility: HOSPITAL | Age: 60
Discharge: HOME | End: 2020-12-07
Attending: EMERGENCY MEDICINE | Admitting: EMERGENCY MEDICINE

## 2020-12-07 VITALS
HEIGHT: 75 IN | RESPIRATION RATE: 16 BRPM | BODY MASS INDEX: 39.12 KG/M2 | HEART RATE: 74 BPM | OXYGEN SATURATION: 94 % | TEMPERATURE: 98.2 F

## 2020-12-07 DIAGNOSIS — R11.2 NON-INTRACTABLE VOMITING WITH NAUSEA, UNSPECIFIED VOMITING TYPE: Primary | ICD-10-CM

## 2020-12-07 DIAGNOSIS — R10.9 ABDOMINAL CRAMPING: ICD-10-CM

## 2020-12-07 LAB
ALBUMIN SERPL-MCNC: 3.7 G/DL (ref 3.5–5.2)
ALBUMIN/GLOB SERPL: 1 G/DL
ALP SERPL-CCNC: 71 U/L (ref 39–117)
ALT SERPL W P-5'-P-CCNC: 26 U/L (ref 1–41)
ANION GAP SERPL CALCULATED.3IONS-SCNC: 9.4 MMOL/L (ref 5–15)
AST SERPL-CCNC: 29 U/L (ref 1–40)
BASOPHILS # BLD AUTO: 0.01 10*3/MM3 (ref 0–0.2)
BASOPHILS NFR BLD AUTO: 0.2 % (ref 0–1.5)
BILIRUB SERPL-MCNC: 0.8 MG/DL (ref 0–1.2)
BUN SERPL-MCNC: 11 MG/DL (ref 6–20)
BUN/CREAT SERPL: 14.5 (ref 7–25)
CALCIUM SPEC-SCNC: 8.6 MG/DL (ref 8.6–10.5)
CHLORIDE SERPL-SCNC: 95 MMOL/L (ref 98–107)
CO2 SERPL-SCNC: 27.6 MMOL/L (ref 22–29)
CREAT SERPL-MCNC: 0.76 MG/DL (ref 0.76–1.27)
DEPRECATED RDW RBC AUTO: 39.7 FL (ref 37–54)
EOSINOPHIL # BLD AUTO: 0 10*3/MM3 (ref 0–0.4)
EOSINOPHIL NFR BLD AUTO: 0 % (ref 0.3–6.2)
ERYTHROCYTE [DISTWIDTH] IN BLOOD BY AUTOMATED COUNT: 13.8 % (ref 12.3–15.4)
GFR SERPL CREATININE-BSD FRML MDRD: 128 ML/MIN/1.73
GLOBULIN UR ELPH-MCNC: 3.8 GM/DL
GLUCOSE SERPL-MCNC: 144 MG/DL (ref 65–99)
HCT VFR BLD AUTO: 42.8 % (ref 37.5–51)
HGB BLD-MCNC: 13.8 G/DL (ref 13–17.7)
LYMPHOCYTES # BLD AUTO: 0.53 10*3/MM3 (ref 0.7–3.1)
LYMPHOCYTES NFR BLD AUTO: 10.8 % (ref 19.6–45.3)
MCH RBC QN AUTO: 26.3 PG (ref 26.6–33)
MCHC RBC AUTO-ENTMCNC: 32.2 G/DL (ref 31.5–35.7)
MCV RBC AUTO: 81.7 FL (ref 79–97)
MONOCYTES # BLD AUTO: 0.37 10*3/MM3 (ref 0.1–0.9)
MONOCYTES NFR BLD AUTO: 7.5 % (ref 5–12)
NEUTROPHILS NFR BLD AUTO: 4 10*3/MM3 (ref 1.7–7)
NEUTROPHILS NFR BLD AUTO: 81.3 % (ref 42.7–76)
PLATELET # BLD AUTO: 192 10*3/MM3 (ref 140–450)
PMV BLD AUTO: 10 FL (ref 6–12)
POTASSIUM SERPL-SCNC: 3.4 MMOL/L (ref 3.5–5.2)
PROT SERPL-MCNC: 7.5 G/DL (ref 6–8.5)
RBC # BLD AUTO: 5.24 10*6/MM3 (ref 4.14–5.8)
SODIUM SERPL-SCNC: 132 MMOL/L (ref 136–145)
TROPONIN T SERPL-MCNC: 0.02 NG/ML (ref 0–0.03)
WBC # BLD AUTO: 4.92 10*3/MM3 (ref 3.4–10.8)

## 2020-12-07 PROCEDURE — 84484 ASSAY OF TROPONIN QUANT: CPT | Performed by: EMERGENCY MEDICINE

## 2020-12-07 PROCEDURE — 96374 THER/PROPH/DIAG INJ IV PUSH: CPT

## 2020-12-07 PROCEDURE — 93010 ELECTROCARDIOGRAM REPORT: CPT | Performed by: INTERNAL MEDICINE

## 2020-12-07 PROCEDURE — 80053 COMPREHEN METABOLIC PANEL: CPT | Performed by: EMERGENCY MEDICINE

## 2020-12-07 PROCEDURE — 93005 ELECTROCARDIOGRAM TRACING: CPT | Performed by: EMERGENCY MEDICINE

## 2020-12-07 PROCEDURE — 85025 COMPLETE CBC W/AUTO DIFF WBC: CPT | Performed by: EMERGENCY MEDICINE

## 2020-12-07 PROCEDURE — 99283 EMERGENCY DEPT VISIT LOW MDM: CPT

## 2020-12-07 PROCEDURE — 85007 BL SMEAR W/DIFF WBC COUNT: CPT | Performed by: EMERGENCY MEDICINE

## 2020-12-07 PROCEDURE — 25010000002 ONDANSETRON PER 1 MG: Performed by: EMERGENCY MEDICINE

## 2020-12-07 RX ORDER — ONDANSETRON 4 MG/1
4 TABLET, FILM COATED ORAL EVERY 6 HOURS PRN
Qty: 12 TABLET | Refills: 0 | Status: SHIPPED | OUTPATIENT
Start: 2020-12-07 | End: 2022-02-23 | Stop reason: ALTCHOICE

## 2020-12-07 RX ORDER — SODIUM CHLORIDE 0.9 % (FLUSH) 0.9 %
10 SYRINGE (ML) INJECTION AS NEEDED
Status: DISCONTINUED | OUTPATIENT
Start: 2020-12-07 | End: 2020-12-08 | Stop reason: HOSPADM

## 2020-12-07 RX ORDER — ONDANSETRON 2 MG/ML
4 INJECTION INTRAMUSCULAR; INTRAVENOUS ONCE
Status: COMPLETED | OUTPATIENT
Start: 2020-12-07 | End: 2020-12-07

## 2020-12-07 RX ADMIN — ONDANSETRON HYDROCHLORIDE 4 MG: 2 SOLUTION INTRAMUSCULAR; INTRAVENOUS at 20:47

## 2020-12-07 RX ADMIN — SODIUM CHLORIDE, POTASSIUM CHLORIDE, SODIUM LACTATE AND CALCIUM CHLORIDE 1000 ML: 600; 310; 30; 20 INJECTION, SOLUTION INTRAVENOUS at 20:47

## 2020-12-08 LAB — QT INTERVAL: 496 MS

## 2020-12-08 NOTE — ED NOTES
"Pt arrived to ER with c/o N/V and dehydration since Thursday. Pt states he has had a low grade fever and a \"little cough\".       Halle Do RN  12/07/20 1925    "

## 2020-12-08 NOTE — DISCHARGE INSTRUCTIONS
Take medication as prescribed.  Return to the emergency department for worsening vomiting, increased pain, fever, or other concern.  Drink plenty of fluids.  Follow-up with your primary care doctor in the next several days if symptoms are not improving.

## 2020-12-08 NOTE — ED PROVIDER NOTES
EMERGENCY DEPARTMENT ENCOUNTER    Room Number:  21/21  Date of encounter:  12/7/2020  PCP: Keesha Kirkpatrick MD  Historian: Patient     I used full protective equipment while examining this patient.  This includes face mask, gloves and protective eyewear.  I washed my hands before entering the room and immediately upon leaving the room.  Patient was wearing a surgical mask.      HPI:  Chief Complaint: Nausea and vomiting  A complete HPI/ROS/PMH/PSH/SH/FH are unobtainable due to: None    Context: Shashi Engel is a 59 y.o. male who presents to the ED c/o intermittent nausea and vomiting for the past 4 days.  Patient also reports intermittent mid abdominal cramping.  Patient states the cramping occurs when he feels nauseated.  After he vomits, the cramping resolves.  Patient reports chills but denies fever, cough, shortness of breath, diarrhea, constipation, dysuria, flank pain, or known exposure to anyone diagnosed with COVID-19.      PAST MEDICAL HISTORY  Active Ambulatory Problems     Diagnosis Date Noted   • PAF (paroxysmal atrial fibrillation) (CMS/[facility]) 11/02/2015   • Benign essential HTN 11/02/2015   • ED (erectile dysfunction) of organic origin 11/02/2015   • HLD (hyperlipidemia) 11/02/2015   • Hypertrophic polyarthritis 11/02/2015   • Diabetes mellitus type 2, noninsulin dependent (CMS/HCC) 11/02/2015   • LVH (left ventricular hypertrophy) due to hypertensive disease 08/04/2016   • SSS (sick sinus syndrome) (CMS/[facility]) 11/10/2016   • Sleep related hypoxia 12/04/2017   • Obesity, morbid, BMI 40.0-49.9 (CMS/[facility]) 02/26/2018   • Class 2 severe obesity due to excess calories with serious comorbidity and body mass index (BMI) of 38.0 to 38.9 in adult (CMS/[facility])    • VENESSA (obstructive sleep apnea)    • Nonsustained ventricular tachycardia (CMS/HCC) 09/05/2018     Resolved Ambulatory Problems     Diagnosis Date Noted   • Cardiomyopathy, hypertrophic, primary familial (CMS/[facility]) 11/02/2015   • Dizzy spells 05/05/2016   •  Near syncope 08/04/2016   • Orthostasis 06/21/2017   • Obesity (BMI 30-39.9) 12/04/2017   • Hypersomnia with sleep apnea 12/04/2017   • Severe VENESSA on CPAP 01/29/2018     Past Medical History:   Diagnosis Date   • Abnormal electrocardiogram    • Anxiety    • Erectile dysfunction    • Health care maintenance    • Hyperlipidemia    • Hypertension    • Mild concentric left ventricular hypertrophy (LVH)    • Mild mitral regurgitation    • Mild tricuspid regurgitation    • Noncompliance    • Obesity    • Osteoarthritis    • Pneumonia 01/01/2016   • Type 2 diabetes mellitus (CMS/HCC)          PAST SURGICAL HISTORY  Past Surgical History:   Procedure Laterality Date   • CARDIAC ELECTROPHYSIOLOGY PROCEDURE Left 6/6/2016    Procedure: Pacemaker DC new  [place];  Surgeon: Juan Chacon MD;  Location: [facility] INVASIVE LOCATION;  Service:    • INSERT / REPLACE / REMOVE PACEMAKER     • KNEE ARTHROSCOPY     • OTHER SURGICAL HISTORY      physical therapy for rotator cuff         FAMILY HISTORY  Family History   Problem Relation Age of Onset   • Depression Mother    • Hypertension Mother    • Heart disease Mother    • Kidney disease Other    • Sleep apnea Other    • Atrial fibrillation Sister    • Hypertension Sister    • Heart disease Sister    • Hypertension Brother    • Hypertension Sister    • Heart disease Sister    • Diabetes Neg Hx          SOCIAL HISTORY  Social History     Socioeconomic History   • Marital status:      Spouse name: Not on file   • Number of children: Not on file   • Years of education: Not on file   • Highest education level: Not on file   Occupational History   • Occupation: disabled   Tobacco Use   • Smoking status: Former Smoker   • Smokeless tobacco: Never Used   Substance and Sexual Activity   • Alcohol use: No     Comment: No caffeine use   • Drug use: Yes     Types: Marijuana     Comment: FORMER.  No caffeine use   • Sexual activity: Not Currently     Partners: Female          ALLERGIES  Patient has no known allergies.       REVIEW OF SYSTEMS  Review of Systems      All systems have been reviewed and are negative except as as discussed in the HPI    PHYSICAL EXAM    I have reviewed the triage vital signs and nursing notes.    ED Triage Vitals [12/07/20 1925]   Temp Heart Rate Resp BP SpO2   98.2 °F (36.8 °C) 74 16 -- 94 %      Temp src Heart Rate Source Patient Position BP Location FiO2 (%)   Tympanic -- -- -- --       Physical Exam  GENERAL: Awake, alert  HENT: NCAT, nares patent, mildly dry mucous membranes  NECK: supple, no lymphadenopathy  EYES: no scleral icterus  CV: Irregularly irregular rhythm, regular rate  RESPIRATORY: normal effort, clear to auscultation bilaterally  ABDOMEN: soft, nontender  MUSCULOSKELETAL: Extremities are nontender and without obvious deformity.  There is normal range of motion in all extremities.  There is no calf tenderness or pedal edema  NEURO: Strength, sensation, and coordination are grossly intact.  Speech and mentation are unremarkable.  No facial droop.  SKIN: warm, dry, no rash  PSYCH: Normal mood and affect      LAB RESULTS  Recent Results (from the past 24 hour(s))   ECG 12 Lead    Collection Time: 12/07/20  8:25 PM   Result Value Ref Range    QT Interval 496 ms   Comprehensive Metabolic Panel    Collection Time: 12/07/20  8:50 PM    Specimen: Blood   Result Value Ref Range    Glucose 144 (H) 65 - 99 mg/dL    BUN 11 6 - 20 mg/dL    Creatinine 0.76 0.76 - 1.27 mg/dL    Sodium 132 (L) 136 - 145 mmol/L    Potassium 3.4 (L) 3.5 - 5.2 mmol/L    Chloride 95 (L) 98 - 107 mmol/L    CO2 27.6 22.0 - 29.0 mmol/L    Calcium 8.6 8.6 - 10.5 mg/dL    Total Protein 7.5 6.0 - 8.5 g/dL    Albumin 3.70 3.50 - 5.20 g/dL    ALT (SGPT) 26 1 - 41 U/L    AST (SGOT) 29 1 - 40 U/L    Alkaline Phosphatase 71 39 - 117 U/L    Total Bilirubin 0.8 0.0 - 1.2 mg/dL    eGFR  African Amer 128 >60 mL/min/1.73    Globulin 3.8 gm/dL    A/G Ratio 1.0 g/dL    BUN/Creatinine  Ratio 14.5 7.0 - 25.0    Anion Gap 9.4 5.0 - 15.0 mmol/L   Troponin    Collection Time: 12/07/20  8:50 PM    Specimen: Blood   Result Value Ref Range    Troponin T 0.017 0.000 - 0.030 ng/mL   CBC Auto Differential    Collection Time: 12/07/20  8:50 PM    Specimen: Blood   Result Value Ref Range    WBC 4.92 3.40 - 10.80 10*3/mm3    RBC 5.24 4.14 - 5.80 10*6/mm3    Hemoglobin 13.8 13.0 - 17.7 g/dL    Hematocrit 42.8 37.5 - 51.0 %    MCV 81.7 79.0 - 97.0 fL    MCH 26.3 (L) 26.6 - 33.0 pg    MCHC 32.2 31.5 - 35.7 g/dL    RDW 13.8 12.3 - 15.4 %    RDW-SD 39.7 37.0 - 54.0 fl    MPV 10.0 6.0 - 12.0 fL    Platelets 192 140 - 450 10*3/mm3    Neutrophil % 81.3 (H) 42.7 - 76.0 %    Lymphocyte % 10.8 (L) 19.6 - 45.3 %    Monocyte % 7.5 5.0 - 12.0 %    Eosinophil % 0.0 (L) 0.3 - 6.2 %    Basophil % 0.2 0.0 - 1.5 %    Neutrophils, Absolute 4.00 1.70 - 7.00 10*3/mm3    Lymphocytes, Absolute 0.53 (L) 0.70 - 3.10 10*3/mm3    Monocytes, Absolute 0.37 0.10 - 0.90 10*3/mm3    Eosinophils, Absolute 0.00 0.00 - 0.40 10*3/mm3    Basophils, Absolute 0.01 0.00 - 0.20 10*3/mm3       Ordered the above labs and independently reviewed the results.      RADIOLOGY  No Radiology Exams Resulted Within Past 24 Hours    I ordered the above noted radiological studies. Reviewed by me and discussed with radiologist.  See dictation for official radiology interpretation.      PROCEDURES  Procedures      MEDICATIONS GIVEN IN ER    Medications   sodium chloride 0.9 % flush 10 mL (has no administration in time range)   lactated ringers bolus 1,000 mL (0 mL Intravenous Stopped 12/7/20 2117)   ondansetron (ZOFRAN) injection 4 mg (4 mg Intravenous Given 12/7/20 2047)         PROGRESS, DATA ANALYSIS, CONSULTS, AND MEDICAL DECISION MAKING    All labs have been independently reviewed by me.  All radiology studies have been reviewed by me and discussed with radiologist dictating the report.   EKG's independently viewed and interpreted by me.  I have reviewed the  nurse's notes, vital signs, past medical history, and medication list.  Discussion below represents my analysis of pertinent findings related to patient's condition, differential diagnosis, treatment plan and final disposition.      Differential diagnosis includes but is not limited to:  - hepatobiliary pathology such as cholecystitis, cholangitis, and symptomatic cholelithiasis  - Pancreatitis  - Dyspepsia  - Small bowel obstruction  - Appendicitis  - Diverticulitis  - UTI including pyelonephritis  - Ureteral stone  - Zoster  - Colitis, including infectious and ischemic  - Atypical ACS      ED Course as of Dec 07 2323   Mon Dec 07, 2020   2012 Old records reviewed.  Patient was last seen here in the ER in November 2016 for near syncope and diarrhea.    [WH]   2028 EKG          EKG time: 2025  Rhythm/Rate: Atrial fibrillation with ventricular paced complexes, rate 62  P waves and NJ: Regular, varying  QRS, axis: Lady, IVCD  ST and T waves: Lateral T wave changes    Interpreted Contemporaneously by me, independently viewed  EKG is not significantly changed compared to prior EKG done 11/1/2016      []   2245 Test results discussed with the patient.  He is resting comfortably and states that he is feeling better.  He has not had any vomiting while in the ED.  Patient will be discharged with a prescription for Zofran.    [WH]      ED Course User Index  [WH] Byron Jain MD       AS OF 23:23 EST VITALS:    BP -    HR - 74  TEMP - 98.2 °F (36.8 °C) (Tympanic)  O2 SATS - 94%      DIAGNOSIS  Final diagnoses:   Non-intractable vomiting with nausea, unspecified vomiting type   Abdominal cramping         DISPOSITION  Discharge    DISCHARGE    Patient discharged in stable condition.    Reviewed implications of results, diagnosis, meds, responsibility to follow up, warning signs and symptoms of possible worsening, potential complications and reasons to return to ER, including persistent vomiting, worsening abdominal  pain, fever, or other concern..    Patient/Family voiced understanding of above instructions.    Discussed plan for discharge, as there is no emergent indication for admission. Patient referred to primary care provider for BP management due to today's BP. Pt/family is agreeable and understands need for follow up and repeat testing.  Pt is aware that discharge does not mean that nothing is wrong but it indicates no emergency is present that requires admission and they must continue care with follow-up as given below or physician of their choice.     FOLLOW-UP  Keesha Kirkpatrick MD  [address]  [place] [id number] [phone]    Call in 2 days  If symptoms persist         Medication List      New Prescriptions    ondansetron 4 MG tablet  Commonly known as: ZOFRAN  Take 1 tablet by mouth Every 6 (Six) Hours As Needed for Nausea or Vomiting.           Where to Get Your Medications      These medications were sent to [facility]/pharmacy #[id number] - [place], [place] - [address] - [phone]  - [phone]  [address], [place] [address]    Hours: 24-hours Phone: [phone]   · ondansetron 4 MG tablet           Dictated utilizing Dragon dictation:  Much of this encounter note is an electronic transcription/translation of spoken language to printed text. The electronic translation of spoken language may permit erroneous, or at times, nonsensical words or phrases to be inadvertently transcribed; Although I have reviewed the note for such errors, some may still exist.     Byron Jain MD  12/07/20 1182

## 2020-12-09 ENCOUNTER — TELEMEDICINE (OUTPATIENT)
Dept: FAMILY MEDICINE CLINIC | Facility: CLINIC | Age: 60
End: 2020-12-09

## 2020-12-09 VITALS — BODY MASS INDEX: 38.92 KG/M2 | HEIGHT: 75 IN | WEIGHT: 313 LBS

## 2020-12-09 DIAGNOSIS — R10.13 DYSPEPSIA: Primary | ICD-10-CM

## 2020-12-09 DIAGNOSIS — R11.2 NON-INTRACTABLE VOMITING WITH NAUSEA, UNSPECIFIED VOMITING TYPE: ICD-10-CM

## 2020-12-09 PROCEDURE — 99213 OFFICE O/P EST LOW 20 MIN: CPT | Performed by: FAMILY MEDICINE

## 2020-12-09 NOTE — PROGRESS NOTES
VIDEO VISIT  Shashi Engel is 59 y.o. and is seen via video today for:     Chief Complaint   Patient presents with   • Nausea   • Vomiting       HPI     Having problems with his stomach today. Was seen in ED a few days ago with nausea and vomiting. Given some meds for nausea, which have helped but has since run out. Currently has ongoing nausea, dry mouth, dry heaves. Only throwing up phlegm. Hasn't had much to eat over the last few days but is trying to stay well hydrated with water and Gatorade. Urinating frequently, most often clear or light yellow. No known sick contacts, nobody else in household has stomach symptoms.     Main concern at this point is that he was up most the night last night with abdominal cramping.  Denies any change in diet or activity yesterday.  Ate some toast prior to going to bed.  Has tried over-the-counter Mylanta and Pepto-Bismol without any relief.  Denies any abdominal distention.No change in bowel movements.    Taking the rest of his medications as prescribed.  Has never had a problem with his medications in the past, is not sure that they are contributing to his current symptoms.    The following portions of the patient's history were reviewed and updated as appropriate: allergies, current medications, past family history, past medical history, past social history, past surgical history and problem list.    Review of Systems   Constitutional: Negative for activity change, chills, fatigue and fever.   Respiratory: Negative for chest tightness and shortness of breath.    Cardiovascular: Negative for chest pain and palpitations.   Gastrointestinal: Positive for abdominal distention, abdominal pain, nausea and vomiting. Negative for blood in stool, constipation and diarrhea.         Objective  There were no vitals filed for this visit.  Body mass index is 39.12 kg/m².    Physical Exam  Constitutional:       General: He is not in acute distress.     Appearance: Normal appearance. He  is not ill-appearing.   Neck:      Musculoskeletal: Normal range of motion and neck supple.   Pulmonary:      Effort: Pulmonary effort is normal. No respiratory distress.   Skin:     Coloration: Skin is not jaundiced or pale.   Neurological:      Mental Status: He is alert and oriented to person, place, and time.   Psychiatric:         Mood and Affect: Mood normal.         Behavior: Behavior normal.           Current Outpatient Medications:   •  amLODIPine (NORVASC) 5 MG tablet, TAKE 1 TABLET BY MOUTH EVERY DAY, Disp: 30 tablet, Rfl: 0  •  atorvastatin (LIPITOR) 40 MG tablet, TAKE 1 TABLET BY MOUTH EVERY DAY, Disp: 90 tablet, Rfl: 3  •  chlorthalidone (HYGROTON) 25 MG tablet, TAKE 1 TABLET BY MOUTH EVERY DAY, Disp: 30 tablet, Rfl: 2  •  [employer] LANCETS ORIGINAL [facility] [place] [place], Use as directed and appropo lancet for his monitor, Disp: 100 each, Rfl: 11  •  glucose blood test strip, Use as instructed, Disp: 100 each, Rfl: 12  •  glucose blood test strip, One touch strips. Test daily., Disp: 100 each, Rfl: 12  •  metFORMIN (GLUCOPHAGE) 1000 MG tablet, TAKE 1 TABLET BY MOUTH 2 (TWO) TIMES A DAY WITH MEALS., Disp: 180 tablet, Rfl: 2  •  metoprolol tartrate (LOPRESSOR) 25 MG tablet, TAKE 1 TABLET BY MOUTH TWICE A DAY, Disp: 30 tablet, Rfl: 0  •  Multiple Vitamins-Minerals (MULTIVITAMIN ADULT PO), Take 1 tablet by mouth daily., Disp: , Rfl:   •  ondansetron (ZOFRAN) 4 MG tablet, Take 1 tablet by mouth Every 6 (Six) Hours As Needed for Nausea or Vomiting., Disp: 12 tablet, Rfl: 0  •  quinapril (ACCUPRIL) 40 MG tablet, TAKE 2 TABLETS BY MOUTH EVERY DAY, Disp: 15 tablet, Rfl: 0  •  tadalafil (Cialis) 20 MG tablet, Take 1 tablet by mouth Daily As Needed for Erectile Dysfunction., Disp: 6 tablet, Rfl: 2  •  Xarelto 20 MG tablet, TAKE 1 TABLET BY MOUTH EVERY DAY WITH DINNER, Disp: 90 tablet, Rfl: 1  Current outpatient and discharge medications have been reconciled for the patient.  Reviewed by: Jim Staples MD      Procedures    Lab  Results (most recent)     None                  Diagnoses and all orders for this visit:    1. Dyspepsia (Primary)    2. Non-intractable vomiting with nausea, unspecified vomiting type    Etiology of his current symptoms is not entirely clear.  Recommended continued persistent hydration, bland foods, and ondansetron as needed.  Does not need a refill at this time.  Has not tried any antacid, suggested that this might be helpful.  I do anticipate this improving with time.  Also think that he might be mildly dehydrated, and this could be contributing to his overall symptoms.    Recommended that he keep in touch via [employer] and keep me updated on his symptoms.  We can consider further interventions as needed.    Any information loaded into the AVS was placed there by OSCAR Staples MD, and patient was counseled on the same.   Return if symptoms worsen or fail to improve.      OSCAR Staples MD

## 2020-12-14 ENCOUNTER — OFFICE VISIT (OUTPATIENT)
Dept: CARDIOLOGY | Facility: CLINIC | Age: 60
End: 2020-12-14

## 2020-12-14 VITALS
SYSTOLIC BLOOD PRESSURE: 117 MMHG | DIASTOLIC BLOOD PRESSURE: 77 MMHG | HEIGHT: 75 IN | WEIGHT: 300 LBS | BODY MASS INDEX: 37.3 KG/M2

## 2020-12-14 DIAGNOSIS — E78.2 MIXED HYPERLIPIDEMIA: ICD-10-CM

## 2020-12-14 DIAGNOSIS — I10 BENIGN ESSENTIAL HTN: ICD-10-CM

## 2020-12-14 DIAGNOSIS — I49.5 SSS (SICK SINUS SYNDROME) (HCC): ICD-10-CM

## 2020-12-14 DIAGNOSIS — I48.0 PAF (PAROXYSMAL ATRIAL FIBRILLATION) (HCC): Primary | ICD-10-CM

## 2020-12-14 DIAGNOSIS — G47.33 OSA (OBSTRUCTIVE SLEEP APNEA): ICD-10-CM

## 2020-12-14 DIAGNOSIS — I10 ESSENTIAL HYPERTENSION: ICD-10-CM

## 2020-12-14 DIAGNOSIS — I47.29 NONSUSTAINED VENTRICULAR TACHYCARDIA (HCC): ICD-10-CM

## 2020-12-14 DIAGNOSIS — I11.9 HYPERTENSIVE LEFT VENTRICULAR HYPERTROPHY, WITHOUT HEART FAILURE: ICD-10-CM

## 2020-12-14 PROBLEM — E66.01 OBESITY, MORBID, BMI 40.0-49.9: Status: RESOLVED | Noted: 2018-02-26 | Resolved: 2020-12-14

## 2020-12-14 PROCEDURE — 99442 PR PHYS/QHP TELEPHONE EVALUATION 11-20 MIN: CPT | Performed by: NURSE PRACTITIONER

## 2020-12-14 NOTE — PROGRESS NOTES
Telehealth Visit    Date of Visit: 2020  Encounter Provider: DENNIS Martinez  Place of Service: [facility] CARDIOLOGY  Patient Name: Shashi Engel  :1960  Primary Cardiologist: Dr. Jennie Vanegas     Chief Complaint   Patient presents with   • Atrial Fibrillation   • Follow-up   :     Dear Dr. Keesha Kirkpatrick,     HPI: Shashi Engel is a pleasant 59 y.o. male who is an established patient of our practice. Due to COVID-19 virus, I am conducting a telehealth visit via audio with patient and he has consented to this visit today.     He has a known history of diabetes mellitus, obstructive sleep apnea (compliant with CPAP machine), hypertension, and hyperlipidemia.  He has been treated for paroxysmal atrial fibrillation and nonsustained ventricular tachycardia.     In , he had a stress echocardiogram completed which revealed an EF of 50%, severe LVH, severe left atrial enlargement, aortic sclerosis, RVSP elevated at 46 mmHg, and no ischemia.  In 2017, he wore a 12-day Holter monitor which showed atrial fibrillation with bursts of tachycardia.    Office in 2017, he had a normal serum protein electrophoresis done for evaluation of severe left hypertrophy.    In 2020, he saw Dr. Vanegas in the office.  He was having low blood pressures and she recommended that he take amlodipine at nighttime.  He was also experiencing nausea and she recommended that he take atorvastatin with dinner.    Today is his follow-up audio visit.  He said he recently had a cold and was feeling tired, had a productive cough, could not sleep, and decreased appetite.  He was tested for COVID-19 and this was negative.  He also went to the emergency department for vomiting and his potassium was noted to be low at 3.4.  He is feeling better today.  He reports some mild dyspnea with exertion when going up steps, but not with his daily activities.  On occasion he may  have a brief episode of palpitations, nothing sustained.  He denies chest pain, edema, or syncope.  He is compliant with his CPAP machine.    I reviewed his blood work from 12/7/2020: Hemoglobin and hematocrit normal.  CMP normal except for glucose of 144, sodium 132, and potassium 3.4.  He was experiencing vomiting at that time.  Troponin level normal.  Last lipid panel 1/23/2020 showed total cholesterol 167, triglycerides 53, HDL 51, and .    Past Medical History:   Diagnosis Date   • Abnormal electrocardiogram    • Anxiety    • Cardiomyopathy, hypertrophic, primary familial (CMS/HCC)    • Erectile dysfunction    • Health care maintenance    • Hyperlipidemia    • Hypertension    • Mild concentric left ventricular hypertrophy (LVH)    • Mild mitral regurgitation    • Mild tricuspid regurgitation    • Near syncope    • Noncompliance    • Nonsustained ventricular tachycardia (CMS/HCC) 9/5/2018   • Obesity    • VENESSA (obstructive sleep apnea)     uses CPAP faithfully   • Osteoarthritis    • PAF (paroxysmal atrial fibrillation) (CMS/[facility])    • Pneumonia 01/01/2016   • Type 2 diabetes mellitus (CMS/[facility])        Past Surgical History:   Procedure Laterality Date   • CARDIAC ELECTROPHYSIOLOGY PROCEDURE Left 6/6/2016    Procedure: Pacemaker DC new  [place];  Surgeon: Juan Chacon MD;  Location: [facility] INVASIVE LOCATION;  Service:    • INSERT / REPLACE / REMOVE PACEMAKER     • KNEE ARTHROSCOPY     • OTHER SURGICAL HISTORY      physical therapy for rotator cuff       Social History     Socioeconomic History   • Marital status:      Spouse name: Not on file   • Number of children: Not on file   • Years of education: Not on file   • Highest education level: Not on file   Occupational History   • Occupation: disabled   Tobacco Use   • Smoking status: Former Smoker   • Smokeless tobacco: Never Used   Substance and Sexual Activity   • Alcohol use: No     Comment: OCCASIONAL CAFFEINE USE.    • Drug use: Not  Currently     Types: Marijuana     Comment: FORMER.  No caffeine use   • Sexual activity: Not Currently     Partners: Female       Family History   Problem Relation Age of Onset   • Depression Mother    • Hypertension Mother    • Heart disease Mother    • Kidney disease Other    • Sleep apnea Other    • Atrial fibrillation Sister    • Hypertension Sister    • Heart disease Sister    • Hypertension Brother    • Hypertension Sister    • Heart disease Sister    • Diabetes Neg Hx        The following portion of the patient's history were reviewed and updated as appropriate: past medical history, past surgical history, past social history, past family history, allergies, current medications, and problem list.    Review of Systems   Constitution: Negative for chills, diaphoresis, fever, malaise/fatigue, night sweats, weight gain and weight loss.   HENT: Negative for hearing loss, nosebleeds, sore throat and tinnitus.    Eyes: Negative for blurred vision, double vision, pain and visual disturbance.   Cardiovascular: Positive for dyspnea on exertion and palpitations. Negative for chest pain, claudication, cyanosis, irregular heartbeat, leg swelling, near-syncope, orthopnea, paroxysmal nocturnal dyspnea and syncope.   Respiratory: Negative for cough, hemoptysis, shortness of breath, snoring and wheezing.    Endocrine: Negative for cold intolerance, heat intolerance and polyuria.   Hematologic/Lymphatic: Negative for bleeding problem. Does not bruise/bleed easily.   Skin: Negative for color change, dry skin, flushing and itching.   Musculoskeletal: Negative for falls, joint pain, joint swelling, muscle cramps, muscle weakness and myalgias.   Gastrointestinal: Negative for abdominal pain, constipation, heartburn, melena, nausea and vomiting.   Genitourinary: Negative for dysuria and hematuria.   Neurological: Negative for excessive daytime sleepiness, dizziness, light-headedness, loss of balance, numbness, paresthesias,  "seizures and vertigo.   Psychiatric/Behavioral: Negative for altered mental status, depression, memory loss and substance abuse. The patient does not have insomnia and is not nervous/anxious.    Allergic/Immunologic: Negative for environmental allergies.       No Known Allergies      Current Outpatient Medications:   •  amLODIPine (NORVASC) 5 MG tablet, TAKE 1 TABLET BY MOUTH EVERY DAY, Disp: 30 tablet, Rfl: 0  •  atorvastatin (LIPITOR) 40 MG tablet, TAKE 1 TABLET BY MOUTH EVERY DAY, Disp: 90 tablet, Rfl: 3  •  chlorthalidone (HYGROTON) 25 MG tablet, TAKE 1 TABLET BY MOUTH EVERY DAY, Disp: 30 tablet, Rfl: 2  •  [employer] LANCETS ORIGINAL [facility] [place], Use as directed and appropo lancet for his monitor, Disp: 100 each, Rfl: 11  •  glucose blood test strip, Use as instructed, Disp: 100 each, Rfl: 12  •  glucose blood test strip, One touch strips. Test daily., Disp: 100 each, Rfl: 12  •  metFORMIN (GLUCOPHAGE) 1000 MG tablet, TAKE 1 TABLET BY MOUTH 2 (TWO) TIMES A DAY WITH MEALS., Disp: 180 tablet, Rfl: 2  •  metoprolol tartrate (LOPRESSOR) 25 MG tablet, Take 1 tablet by mouth 2 (Two) Times a Day., Disp: 180 tablet, Rfl: 3  •  Multiple Vitamins-Minerals (MULTIVITAMIN ADULT PO), Take 1 tablet by mouth daily., Disp: , Rfl:   •  ondansetron (ZOFRAN) 4 MG tablet, Take 1 tablet by mouth Every 6 (Six) Hours As Needed for Nausea or Vomiting., Disp: 12 tablet, Rfl: 0  •  quinapril (ACCUPRIL) 40 MG tablet, TAKE 2 TABLETS BY MOUTH EVERY DAY, Disp: 15 tablet, Rfl: 0  •  tadalafil (Cialis) 20 MG tablet, Take 1 tablet by mouth Daily As Needed for Erectile Dysfunction., Disp: 6 tablet, Rfl: 2  •  Xarelto 20 MG tablet, TAKE 1 TABLET BY MOUTH EVERY DAY WITH DINNER, Disp: 90 tablet, Rfl: 1        Objective:     Vitals:    12/14/20 1206   BP: 117/77   Weight: 136 kg (300 lb)   Height: 190.5 cm (75\")     Body mass index is 37.5 kg/m².    Due to telehealth visit, there was no EKG, vitals, or weight performed in our office.  Vitals/Weight were reported " by the patient and conducted at home.       Assessment:       Diagnosis Plan   1. PAF (paroxysmal atrial fibrillation) (CMS/HCC)     2. SSS (sick sinus syndrome) (CMS/HCC)     3. Nonsustained ventricular tachycardia (CMS/HCC)     4. Essential hypertension     5. Hypertensive left ventricular hypertrophy, without heart failure     6. Mixed hyperlipidemia     7. VENESSA (obstructive sleep apnea)     8. Benign essential HTN  metoprolol tartrate (LOPRESSOR) 25 MG tablet          Plan:       1.  Paroxysmal Atrial Fibrillation: He remains on metoprolol and rivaroxaban for stroke prevention.    2.  Sick Sinus Syndrome: Continue with routine pacemaker check.    3.  Nonsustained Ventricular Tachycardia: He occasionally experiences palpitations and he discussed increasing his beta-blocker.  He wants to keep his beta-blocker dosage the same at this time.    4.  Hypertension: Blood pressure well controlled today.    5.  Left Ventricular Hypertrophy: Severe per echocardiogram in 2017 and felt to be due to hypertension.    6.  Hyperlipidemia: Continue atorvastatin.    7.  Obstructive Sleep Apnea: Compliant with CPAP machine.    8.  If he continues to experience palpitations, I asked him to call our office.  I recommended to follow-up with Dr. Jennie Vanegas in 6 months, unless otherwise needed sooner.    This patient has consented to a telehealth visit via audio. The visit was scheduled as a audio visit to comply with patient safety concerns in accordance with CDC recommendations.  All vitals recorded within this visit are reported by the patient.  I spent 25 minutes in total including but not limited to the 12 minutes spent in direct conversation with this patient.      As always, it has been a pleasure to participate in your patient's care. Thank you.       Sincerely,         DENNIS Sahu        Dictated utilizing Dragon dictation

## 2021-01-20 RX ORDER — TADALAFIL 20 MG/1
20 TABLET ORAL DAILY PRN
Qty: 6 TABLET | Refills: 2 | Status: SHIPPED | OUTPATIENT
Start: 2021-01-20 | End: 2021-04-01 | Stop reason: SDUPTHER

## 2021-02-19 ENCOUNTER — TELEPHONE (OUTPATIENT)
Dept: CARDIOLOGY | Facility: CLINIC | Age: 61
End: 2021-02-19

## 2021-02-19 NOTE — TELEPHONE ENCOUNTER
Remote transmission received from pt. Pt had 11 NSVT events and 1 tachy labeled event since last remote check in November. There were 2 NSVT egms to view and the tachy egm to view. Appears pt has known hx of these events. I tried to call pt to inquire about s/s associated with these events, but was unable to reach.     1/9 Tachy event  Lasting 21 secs with a max V rate of 179          2/5 NSVT event  11bts of NSVT with a max V rate of 181bpm        2/9 NSVT event  16bts of NSVT with a max V rate of 173bpm

## 2021-02-26 PROCEDURE — 93296 REM INTERROG EVL PM/IDS: CPT | Performed by: INTERNAL MEDICINE

## 2021-02-26 PROCEDURE — 93294 REM INTERROG EVL PM/LDLS PM: CPT | Performed by: INTERNAL MEDICINE

## 2021-03-22 DIAGNOSIS — I10 BENIGN ESSENTIAL HTN: ICD-10-CM

## 2021-03-22 DIAGNOSIS — E11.9 DIABETES MELLITUS TYPE 2, NONINSULIN DEPENDENT (HCC): ICD-10-CM

## 2021-03-22 RX ORDER — QUINAPRIL 40 MG/1
80 TABLET ORAL DAILY
Qty: 5 TABLET | Refills: 0 | Status: SHIPPED | OUTPATIENT
Start: 2021-03-22 | End: 2021-04-01 | Stop reason: SDUPTHER

## 2021-03-24 ENCOUNTER — BULK ORDERING (OUTPATIENT)
Dept: CASE MANAGEMENT | Facility: OTHER | Age: 61
End: 2021-03-24

## 2021-03-24 DIAGNOSIS — Z23 IMMUNIZATION DUE: ICD-10-CM

## 2021-03-25 RX ORDER — RIVAROXABAN 20 MG/1
TABLET, FILM COATED ORAL
Qty: 90 TABLET | Refills: 3 | Status: SHIPPED | OUTPATIENT
Start: 2021-03-25 | End: 2022-04-13 | Stop reason: SDUPTHER

## 2021-04-01 ENCOUNTER — OFFICE VISIT (OUTPATIENT)
Dept: FAMILY MEDICINE CLINIC | Facility: CLINIC | Age: 61
End: 2021-04-01

## 2021-04-01 VITALS
DIASTOLIC BLOOD PRESSURE: 90 MMHG | SYSTOLIC BLOOD PRESSURE: 130 MMHG | OXYGEN SATURATION: 98 % | HEART RATE: 70 BPM | RESPIRATION RATE: 16 BRPM | WEIGHT: 315 LBS | BODY MASS INDEX: 39.17 KG/M2 | HEIGHT: 75 IN | TEMPERATURE: 97.3 F

## 2021-04-01 DIAGNOSIS — E78.2 MIXED HYPERLIPIDEMIA: ICD-10-CM

## 2021-04-01 DIAGNOSIS — I10 BENIGN ESSENTIAL HTN: Primary | ICD-10-CM

## 2021-04-01 DIAGNOSIS — Z12.5 SPECIAL SCREENING FOR MALIGNANT NEOPLASM OF PROSTATE: ICD-10-CM

## 2021-04-01 DIAGNOSIS — Z13.89 ENCOUNTER FOR SCREENING FOR OTHER DISORDER: ICD-10-CM

## 2021-04-01 DIAGNOSIS — E11.9 DIABETES MELLITUS TYPE 2, NONINSULIN DEPENDENT (HCC): ICD-10-CM

## 2021-04-01 LAB
ALBUMIN SERPL-MCNC: 4.1 G/DL (ref 3.5–5.2)
ALBUMIN/GLOB SERPL: 1.5 G/DL
ALP SERPL-CCNC: 72 U/L (ref 39–117)
ALT SERPL-CCNC: 22 U/L (ref 1–41)
AST SERPL-CCNC: 21 U/L (ref 1–40)
BASOPHILS # BLD AUTO: 0.02 10*3/MM3 (ref 0–0.2)
BASOPHILS NFR BLD AUTO: 0.3 % (ref 0–1.5)
BILIRUB SERPL-MCNC: 0.8 MG/DL (ref 0–1.2)
BUN SERPL-MCNC: 17 MG/DL (ref 6–20)
BUN/CREAT SERPL: 22.1 (ref 7–25)
CALCIUM SERPL-MCNC: 9.4 MG/DL (ref 8.6–10.5)
CHLORIDE SERPL-SCNC: 102 MMOL/L (ref 98–107)
CHOLEST SERPL-MCNC: 137 MG/DL (ref 0–200)
CO2 SERPL-SCNC: 30.9 MMOL/L (ref 22–29)
CREAT SERPL-MCNC: 0.77 MG/DL (ref 0.76–1.27)
EOSINOPHIL # BLD AUTO: 0.07 10*3/MM3 (ref 0–0.4)
EOSINOPHIL NFR BLD AUTO: 1.2 % (ref 0.3–6.2)
ERYTHROCYTE [DISTWIDTH] IN BLOOD BY AUTOMATED COUNT: 14.2 % (ref 12.3–15.4)
GLOBULIN SER CALC-MCNC: 2.7 GM/DL
GLUCOSE SERPL-MCNC: 115 MG/DL (ref 65–99)
HCT VFR BLD AUTO: 43.1 % (ref 37.5–51)
HDLC SERPL-MCNC: 54 MG/DL (ref 40–60)
HGB BLD-MCNC: 13.6 G/DL (ref 13–17.7)
IMM GRANULOCYTES # BLD AUTO: 0.02 10*3/MM3 (ref 0–0.05)
IMM GRANULOCYTES NFR BLD AUTO: 0.3 % (ref 0–0.5)
LDLC SERPL CALC-MCNC: 70 MG/DL (ref 0–100)
LDLC/HDLC SERPL: 1.3 {RATIO}
LYMPHOCYTES # BLD AUTO: 1.76 10*3/MM3 (ref 0.7–3.1)
LYMPHOCYTES NFR BLD AUTO: 29.3 % (ref 19.6–45.3)
MCH RBC QN AUTO: 26.4 PG (ref 26.6–33)
MCHC RBC AUTO-ENTMCNC: 31.6 G/DL (ref 31.5–35.7)
MCV RBC AUTO: 83.7 FL (ref 79–97)
MONOCYTES # BLD AUTO: 0.53 10*3/MM3 (ref 0.1–0.9)
MONOCYTES NFR BLD AUTO: 8.8 % (ref 5–12)
NEUTROPHILS # BLD AUTO: 3.6 10*3/MM3 (ref 1.7–7)
NEUTROPHILS NFR BLD AUTO: 60.1 % (ref 42.7–76)
NRBC BLD AUTO-RTO: 0 /100 WBC (ref 0–0.2)
PLATELET # BLD AUTO: 246 10*3/MM3 (ref 140–450)
POTASSIUM SERPL-SCNC: 4.2 MMOL/L (ref 3.5–5.2)
PROT SERPL-MCNC: 6.8 G/DL (ref 6–8.5)
PSA SERPL-MCNC: 0.31 NG/ML (ref 0–4)
RBC # BLD AUTO: 5.15 10*6/MM3 (ref 4.14–5.8)
SODIUM SERPL-SCNC: 144 MMOL/L (ref 136–145)
TRIGL SERPL-MCNC: 63 MG/DL (ref 0–150)
VLDLC SERPL CALC-MCNC: 13 MG/DL (ref 5–40)
WBC # BLD AUTO: 6 10*3/MM3 (ref 3.4–10.8)

## 2021-04-01 PROCEDURE — 99214 OFFICE O/P EST MOD 30 MIN: CPT | Performed by: FAMILY MEDICINE

## 2021-04-01 RX ORDER — DIMETHICONE 13 MG/ML
LOTION TOPICAL
Qty: 100 EACH | Refills: 11 | Status: SHIPPED | OUTPATIENT
Start: 2021-04-01

## 2021-04-01 RX ORDER — QUINAPRIL 40 MG/1
80 TABLET ORAL DAILY
Qty: 90 TABLET | Refills: 1 | Status: SHIPPED | OUTPATIENT
Start: 2021-04-01 | End: 2021-06-25

## 2021-04-01 RX ORDER — CHLORTHALIDONE 25 MG/1
25 TABLET ORAL DAILY
Qty: 90 TABLET | Refills: 1 | Status: SHIPPED | OUTPATIENT
Start: 2021-04-01 | End: 2021-12-16 | Stop reason: SDUPTHER

## 2021-04-01 RX ORDER — AMLODIPINE BESYLATE 5 MG/1
5 TABLET ORAL DAILY
Qty: 90 TABLET | Refills: 1 | Status: SHIPPED | OUTPATIENT
Start: 2021-04-01 | End: 2021-09-29

## 2021-04-02 LAB
ALBUMIN/CREAT UR: 5 MG/G CREAT (ref 0–29)
CREAT UR-MCNC: 137.9 MG/DL
HBA1C MFR BLD: 7.2 % (ref 4.8–5.6)
MICROALBUMIN UR-MCNC: 6.9 UG/ML

## 2021-04-13 ENCOUNTER — IMMUNIZATION (OUTPATIENT)
Dept: VACCINE CLINIC | Facility: HOSPITAL | Age: 61
End: 2021-04-13

## 2021-04-13 DIAGNOSIS — Z23 IMMUNIZATION DUE: ICD-10-CM

## 2021-04-13 PROCEDURE — 91300 HC SARSCOV02 VAC 30MCG/0.3ML IM: CPT | Performed by: INTERNAL MEDICINE

## 2021-04-13 PROCEDURE — 0001A: CPT | Performed by: INTERNAL MEDICINE

## 2021-05-04 ENCOUNTER — IMMUNIZATION (OUTPATIENT)
Dept: VACCINE CLINIC | Facility: HOSPITAL | Age: 61
End: 2021-05-04

## 2021-05-04 PROCEDURE — 91300 HC SARSCOV02 VAC 30MCG/0.3ML IM: CPT | Performed by: INTERNAL MEDICINE

## 2021-05-04 PROCEDURE — 0002A: CPT | Performed by: INTERNAL MEDICINE

## 2021-06-25 DIAGNOSIS — I10 BENIGN ESSENTIAL HTN: ICD-10-CM

## 2021-06-25 DIAGNOSIS — E11.9 DIABETES MELLITUS TYPE 2, NONINSULIN DEPENDENT (HCC): ICD-10-CM

## 2021-06-25 RX ORDER — QUINAPRIL 40 MG/1
TABLET ORAL
Qty: 180 TABLET | Refills: 0 | Status: SHIPPED | OUTPATIENT
Start: 2021-06-25 | End: 2021-09-29

## 2021-07-08 ENCOUNTER — OFFICE VISIT (OUTPATIENT)
Dept: CARDIOLOGY | Facility: CLINIC | Age: 61
End: 2021-07-08

## 2021-07-08 VITALS
WEIGHT: 312.8 LBS | HEART RATE: 61 BPM | HEIGHT: 75 IN | SYSTOLIC BLOOD PRESSURE: 130 MMHG | DIASTOLIC BLOOD PRESSURE: 70 MMHG | BODY MASS INDEX: 38.89 KG/M2

## 2021-07-08 DIAGNOSIS — I48.0 PAF (PAROXYSMAL ATRIAL FIBRILLATION) (HCC): Primary | ICD-10-CM

## 2021-07-08 DIAGNOSIS — E78.2 MIXED HYPERLIPIDEMIA: ICD-10-CM

## 2021-07-08 DIAGNOSIS — E11.9 DIABETES MELLITUS TYPE 2, NONINSULIN DEPENDENT (HCC): ICD-10-CM

## 2021-07-08 DIAGNOSIS — Z95.0 S/P PLACEMENT OF CARDIAC PACEMAKER: ICD-10-CM

## 2021-07-08 DIAGNOSIS — I10 ESSENTIAL HYPERTENSION: ICD-10-CM

## 2021-07-08 PROCEDURE — 93000 ELECTROCARDIOGRAM COMPLETE: CPT | Performed by: INTERNAL MEDICINE

## 2021-07-08 PROCEDURE — 99214 OFFICE O/P EST MOD 30 MIN: CPT | Performed by: INTERNAL MEDICINE

## 2021-07-08 NOTE — PROGRESS NOTES
Date of Office Visit: 2021  Encounter Provider: Jennie Vanegas MD  Place of Service: [facility] CARDIOLOGY  Patient Name: Shashi Engel  :1960      Patient ID:  Shashi Engel is a 60 y.o. male is here for  followup for VT, SVT, pacemaker.        History of Present Illness    I first saw him in 2013 when he came in to the Chest Pain Unit. He had had five days of short-windedness at that time and had risk factors for cardiac issues including diabetes mellitus, hypertension, and hyperlipidemia. He underwent an echocardiogram on 2013 which revealed severe concentric left ventricular hypertrophy, an ejection fraction of 66%, mild-to-moderate mitral insufficiency, mild-to-moderate tricuspid insufficiency, mildelevation of right ventricular systolic pressure at 40 mmHg. He then wore a 24-hour Holter monitor which showed atrial fibrillation with average heart rate of 70 beats per minute with brief episodes of tachycardia and bradycardia. He had had a stress nuclear perfusion study performed in 2012 which showed no evidence of ischemia. Ejection fraction was mildly reduced due to atrial fibrillation.           He does have obstructive sleep apnea and uses his CPAP faithfully.        His CHADS-VASc score is 2 giving him a 2.2% risk of strokes per year. He is on Xarelto.         Mr. Engel has seen our nurse practitioner Heidi Espinoza twice since I have seen him last. He saw her on 10/25/2016 for followup of near syncope and hypertension. She then saw him again on 2016 when he was having some episodes of near syncope and his pacemaker showed what appeared to be a tachyarrhythmia. His blood pressure at that time was somewhat labile as well. He then saw my partner Dr. Emiliano Covarrubias on 2016 and Emiliano did not feel that the tachycardia was problematic and certainly was not causing his symptoms of near syncope, and he felt the patient's pacemaker was working  well to prevent bradycardia. Emiliano felt that the patient's episodes of near syncope were more related to dysautonomia and recommended compression stockings as he thought he was having some orthostasis.      He did go to [place] and was evaluated in the dysautonomic clinic 8/23/17.  There testing showed grossly intact autonomic function.  They thought that possibly his atrial fibrillation was driving his symptoms of fatigue and near syncope.  Device interrogation today shows short bursts of ventricular ventricular tachycardia, 13 beats as well as premature ventricular complexes noted 25% at time.          He has stress echocardiogram done 12/11/17 for dyspnea and decreased exercise tolerance.  This showed ejection fraction 50% with severe concentric left hypertrophy, severe left atrial enlargement, RVSP 46 mmHg mildly reduced right ventricular systolic function but there is no evidence of ischemia on the stress echocardiogram portion.  He also had a 12 day monitor done 12/2017 which showed atrial fibrillation with bursts of tachycardia but there were no significant pulses are bradycardia.  He had normal serum protein electrophoresis done December 2017.  This is done due to severe left hypertrophy.     He's had recurrent episodes of near syncope which we believe are probably due to his blood pressure dropping as he has documented this at times.  In addition all been may also be related to bursts of atrial fibrillation with rapid ventricular response.  They also could be due to low blood sugar.      He uses BiPAP mask for his obstructive sleep apnea.       Interrogation of the device done 5/18/2021 shows 3.5 years of battery, normal device function, nonsustained  beats 135 beats.  No changes were made.  Labs on 4/1/2021 showed glucose 115, otherwise normal CMP, hemoglobin A1c 7.2, total cholesterol 137, HDL 54, LDL 70, triglycerides 63, normal CBC.  He has had 2 spells of tachycardia for which he took extra  metoprolol and felt better.  Has had no syncope.  He tries to get about 5-10,000 steps in a day.  He has no chest pain or pressure.  Sometimes he has exertional dyspnea but it is not chronic.  He has no orthopnea or PND.  He has no fevers, chills or cough.  He is taking his medications as directed out difficulty.  He does typically treat his sleep apnea every night but her that there is a recall in the [employer] machine so he is going to talk with [facility] today to try to figure that out.    Past Medical History:   Diagnosis Date   • Abnormal electrocardiogram    • Anxiety    • Cardiomyopathy, hypertrophic, primary familial (CMS/HCC)    • Erectile dysfunction    • Health care maintenance    • Hyperlipidemia    • Hypertension    • Mild concentric left ventricular hypertrophy (LVH)    • Mild mitral regurgitation    • Mild tricuspid regurgitation    • Near syncope    • Noncompliance    • Nonsustained ventricular tachycardia (CMS/HCC) 9/5/2018   • Obesity    • VENESSA (obstructive sleep apnea)     uses CPAP faithfully   • Osteoarthritis    • PAF (paroxysmal atrial fibrillation) (CMS/HCC)    • Pneumonia 01/01/2016   • Type 2 diabetes mellitus (CMS/HCC)          Past Surgical History:   Procedure Laterality Date   • CARDIAC ELECTROPHYSIOLOGY PROCEDURE Left 6/6/2016    Procedure: Pacemaker DC new  [employer];  Surgeon: Juan Chacon MD;  Location: [facility] INVASIVE LOCATION;  Service:    • INSERT / REPLACE / REMOVE PACEMAKER     • KNEE ARTHROSCOPY     • OTHER SURGICAL HISTORY      physical therapy for rotator cuff       Current Outpatient Medications on File Prior to Visit   Medication Sig Dispense Refill   • amLODIPine (NORVASC) 5 MG tablet Take 1 tablet by mouth Daily. 90 tablet 1   • atorvastatin (LIPITOR) 40 MG tablet TAKE 1 TABLET BY MOUTH EVERY DAY 90 tablet 3   • chlorthalidone (HYGROTON) 25 MG tablet Take 1 tablet by mouth Daily. 90 tablet 1   • [employer] Lancets Original [facility] Use as directed and appropo lancet for his  "monitor 100 each 11   • glucose blood test strip He needs [employer] brand TrueTrack with lancets strips. Test daily. 100 each 12   • metFORMIN (GLUCOPHAGE) 1000 MG tablet Take 1 tablet by mouth 2 (Two) Times a Day With Meals. 180 tablet 1   • metoprolol tartrate (LOPRESSOR) 25 MG tablet Take 1 tablet by mouth 2 (Two) Times a Day. 180 tablet 3   • Multiple Vitamins-Minerals (MULTIVITAMIN ADULT PO) Take 1 tablet by mouth daily.     • ondansetron (ZOFRAN) 4 MG tablet Take 1 tablet by mouth Every 6 (Six) Hours As Needed for Nausea or Vomiting. 12 tablet 0   • quinapril (ACCUPRIL) 40 MG tablet TAKE 2 TABLETS BY MOUTH EVERY  tablet 0   • tadalafil (CIALIS) 20 MG tablet Take  by mouth.     • Xarelto 20 MG tablet TAKE 1 TABLET BY MOUTH EVERY DAY WITH DINNER 90 tablet 3     No current facility-administered medications on file prior to visit.       Social History     Socioeconomic History   • Marital status:      Spouse name: Not on file   • Number of children: Not on file   • Years of education: Not on file   • Highest education level: Not on file   Tobacco Use   • Smoking status: Former Smoker   • Smokeless tobacco: Never Used   Substance and Sexual Activity   • Alcohol use: No     Comment: Caffeine use: None   • Drug use: Not Currently     Types: Marijuana     Comment: FORMER.  No caffeine use   • Sexual activity: Not Currently     Partners: Female           ROS    Procedures    ECG 12 Lead    Date/Time: 7/8/2021 8:17 AM  Performed by: Jennie Vanegas MD  Authorized by: Jennie Vanegas MD   Comparison: compared with previous ECG   Similar to previous ECG  Pacing: ventricular pacing  Clinical impression: abnormal EKG                Objective:      Vitals:    07/08/21 0809   BP: 130/70   BP Location: Left arm   Pulse: 61   Weight: (!) 142 kg (312 lb 12.8 oz)   Height: 190.5 cm (75\")     Body mass index is 39.1 kg/m².    Vitals reviewed.   Constitutional:       General: Not in acute distress.     " Appearance: Well-developed. Not diaphoretic.   Eyes:      General: No scleral icterus.     Conjunctiva/sclera: Conjunctivae normal.   HENT:      Head: Normocephalic and atraumatic.   Neck:      Thyroid: No thyromegaly.      Vascular: No carotid bruit or JVD.      Lymphadenopathy: No cervical adenopathy.   Pulmonary:      Effort: Pulmonary effort is normal. No respiratory distress.      Breath sounds: Normal breath sounds. No wheezing. No rhonchi. No rales.   Chest:      Chest wall: Not tender to palpatation.   Cardiovascular:      Normal rate. Regular rhythm.      Murmurs: There is no murmur.      No gallop.   Pulses:     Intact distal pulses.   Edema:     Peripheral edema absent.   Abdominal:      General: Bowel sounds are normal. There is no distension or abdominal bruit.      Palpations: Abdomen is soft. There is no abdominal mass.      Tenderness: There is no abdominal tenderness.   Musculoskeletal:         General: No deformity.      Extremities: No clubbing present.     Cervical back: Neck supple. Skin:     General: Skin is warm and dry. There is no cyanosis.      Coloration: Skin is not pale.      Findings: No rash.   Neurological:      Mental Status: Alert and oriented to person, place, and time.      Cranial Nerves: No cranial nerve deficit.   Psychiatric:         Judgment: Judgment normal.         Lab Review:       Assessment:      Diagnosis Plan   1. PAF (paroxysmal atrial fibrillation) (CMS/HCC)     2. Essential hypertension     3. Mixed hyperlipidemia     4. S/P placement of cardiac pacemaker     5. Diabetes mellitus type 2, noninsulin dependent (CMS/HCC)       1. H/o Fatigue and lightheadedness, resolved, likely due to labile BP, PVCs and PAF and low blood sugar.   2. Atrial fibrillation, rate controlled. On xarelto. Occasional gets fast.   3. Hypertension. BP is well controlled.  Has low blood pressures at times.  4. Hyperlipidemia. Controlled on atorvastatin.   5. History of left ventricular  hypertrophy on echocardiogram.   6. Diabetes mellitus type 2.  Overall under fair control.  7. Obstructive sleep apnea on CPAP.  Is trying to figure out if he needs a new machine based on a recall on CPAP machines.  8. Obesity.   9. SSS, s/p pacer.   10. Nonsustained VT and frequent PVCs.      Plan:       See Malini in 6 months, no medication changes, no other testing at this time.  Overall doing well.

## 2021-08-27 PROCEDURE — 93294 REM INTERROG EVL PM/LDLS PM: CPT | Performed by: INTERNAL MEDICINE

## 2021-08-27 PROCEDURE — 93296 REM INTERROG EVL PM/IDS: CPT | Performed by: INTERNAL MEDICINE

## 2021-09-29 DIAGNOSIS — E11.9 DIABETES MELLITUS TYPE 2, NONINSULIN DEPENDENT (HCC): ICD-10-CM

## 2021-09-29 DIAGNOSIS — I10 BENIGN ESSENTIAL HTN: ICD-10-CM

## 2021-09-29 RX ORDER — AMLODIPINE BESYLATE 5 MG/1
TABLET ORAL
Qty: 30 TABLET | Refills: 0 | Status: SHIPPED | OUTPATIENT
Start: 2021-09-29 | End: 2021-11-02

## 2021-09-29 RX ORDER — QUINAPRIL 40 MG/1
TABLET ORAL
Qty: 60 TABLET | Refills: 0 | Status: SHIPPED | OUTPATIENT
Start: 2021-09-29 | End: 2021-11-02

## 2021-11-02 DIAGNOSIS — E11.9 DIABETES MELLITUS TYPE 2, NONINSULIN DEPENDENT (HCC): ICD-10-CM

## 2021-11-02 DIAGNOSIS — I10 BENIGN ESSENTIAL HTN: ICD-10-CM

## 2021-11-02 RX ORDER — QUINAPRIL 40 MG/1
TABLET ORAL
Qty: 30 TABLET | Refills: 0 | Status: SHIPPED | OUTPATIENT
Start: 2021-11-02 | End: 2022-01-05 | Stop reason: SDUPTHER

## 2021-11-02 RX ORDER — AMLODIPINE BESYLATE 5 MG/1
TABLET ORAL
Qty: 15 TABLET | Refills: 0 | Status: SHIPPED | OUTPATIENT
Start: 2021-11-02 | End: 2022-12-09 | Stop reason: SDUPTHER

## 2021-12-16 DIAGNOSIS — I10 BENIGN ESSENTIAL HTN: ICD-10-CM

## 2021-12-16 DIAGNOSIS — E78.2 MIXED HYPERLIPIDEMIA: ICD-10-CM

## 2021-12-16 RX ORDER — ATORVASTATIN CALCIUM 40 MG/1
TABLET, FILM COATED ORAL
Qty: 90 TABLET | Refills: 3 | Status: SHIPPED | OUTPATIENT
Start: 2021-12-16 | End: 2022-01-29 | Stop reason: SDUPTHER

## 2021-12-16 RX ORDER — CHLORTHALIDONE 25 MG/1
25 TABLET ORAL DAILY
Qty: 303 TABLET | Refills: 0 | Status: SHIPPED | OUTPATIENT
Start: 2021-12-16 | End: 2022-11-30

## 2022-01-05 DIAGNOSIS — E11.9 DIABETES MELLITUS TYPE 2, NONINSULIN DEPENDENT: ICD-10-CM

## 2022-01-05 DIAGNOSIS — I10 BENIGN ESSENTIAL HTN: ICD-10-CM

## 2022-01-05 RX ORDER — QUINAPRIL 40 MG/1
80 TABLET ORAL DAILY
Qty: 15 TABLET | Refills: 0 | Status: SHIPPED | OUTPATIENT
Start: 2022-01-05 | End: 2022-01-17 | Stop reason: SDUPTHER

## 2022-01-12 ENCOUNTER — HOSPITAL ENCOUNTER (OUTPATIENT)
Dept: GENERAL RADIOLOGY | Facility: HOSPITAL | Age: 62
Discharge: HOME | End: 2022-01-12
Admitting: NURSE PRACTITIONER

## 2022-01-12 ENCOUNTER — OFFICE VISIT (OUTPATIENT)
Dept: CARDIOLOGY | Facility: CLINIC | Age: 62
End: 2022-01-12

## 2022-01-12 ENCOUNTER — CLINICAL SUPPORT NO REQUIREMENTS (OUTPATIENT)
Dept: CARDIOLOGY | Facility: CLINIC | Age: 62
End: 2022-01-12

## 2022-01-12 VITALS
SYSTOLIC BLOOD PRESSURE: 132 MMHG | BODY MASS INDEX: 40.43 KG/M2 | WEIGHT: 315 LBS | HEART RATE: 60 BPM | HEIGHT: 74 IN | DIASTOLIC BLOOD PRESSURE: 80 MMHG

## 2022-01-12 DIAGNOSIS — G47.33 OSA (OBSTRUCTIVE SLEEP APNEA): ICD-10-CM

## 2022-01-12 DIAGNOSIS — I10 ESSENTIAL HYPERTENSION: Primary | ICD-10-CM

## 2022-01-12 DIAGNOSIS — R05.9 COUGH: ICD-10-CM

## 2022-01-12 DIAGNOSIS — E78.2 MIXED HYPERLIPIDEMIA: ICD-10-CM

## 2022-01-12 DIAGNOSIS — I48.0 PAF (PAROXYSMAL ATRIAL FIBRILLATION): ICD-10-CM

## 2022-01-12 DIAGNOSIS — I49.5 SSS (SICK SINUS SYNDROME): ICD-10-CM

## 2022-01-12 DIAGNOSIS — E11.9 DIABETES MELLITUS TYPE 2, NONINSULIN DEPENDENT: ICD-10-CM

## 2022-01-12 DIAGNOSIS — R06.09 DYSPNEA ON EXERTION: ICD-10-CM

## 2022-01-12 DIAGNOSIS — Z95.0 S/P PLACEMENT OF CARDIAC PACEMAKER: ICD-10-CM

## 2022-01-12 DIAGNOSIS — I11.9 HYPERTENSIVE LEFT VENTRICULAR HYPERTROPHY, WITHOUT HEART FAILURE: ICD-10-CM

## 2022-01-12 DIAGNOSIS — I47.29 NONSUSTAINED VENTRICULAR TACHYCARDIA: ICD-10-CM

## 2022-01-12 DIAGNOSIS — I44.2 AV BLOCK, COMPLETE: Primary | ICD-10-CM

## 2022-01-12 PROCEDURE — 93000 ELECTROCARDIOGRAM COMPLETE: CPT | Performed by: NURSE PRACTITIONER

## 2022-01-12 PROCEDURE — 99214 OFFICE O/P EST MOD 30 MIN: CPT | Performed by: NURSE PRACTITIONER

## 2022-01-12 PROCEDURE — 93279 PRGRMG DEV EVAL PM/LDLS PM: CPT | Performed by: INTERNAL MEDICINE

## 2022-01-12 PROCEDURE — 71046 X-RAY EXAM CHEST 2 VIEWS: CPT

## 2022-01-12 NOTE — PROGRESS NOTES
Date of Office Visit: 2022  Encounter Provider: DENNIS Henry  Place of Service: [facility] CARDIOLOGY  Patient Name: Shashi Engel  :1960      Patient ID:  Shashi Engel is a 61 y.o. male is here for followup for VT, SVT, and pacemaker.         History of Present Illness    I first saw him in 2013 when he came in to the Chest Pain Unit. He had had five days of short-windedness at that time and had risk factors for cardiac issues including diabetes mellitus, hypertension, and hyperlipidemia. He underwent an echocardiogram on 2013 which revealed severe concentric left ventricular hypertrophy, an ejection fraction of 66%, mild-to-moderate mitral insufficiency, mild-to-moderate tricuspid insufficiency, mildelevation of right ventricular systolic pressure at 40 mmHg. He then wore a 24-hour Holter monitor which showed atrial fibrillation with average heart rate of 70 beats per minute with brief episodes of tachycardia and bradycardia. He had had a stress nuclear perfusion study performed in 2012 which showed no evidence of ischemia. Ejection fraction was mildly reduced due to atrial fibrillation.         He does have obstructive sleep apnea and uses BiPAP faithfully.      His CHADS-VASc score is 2 giving him a 2.2% risk of strokes per year. He is on Xarelto.       He saw DENNIS Fleming on 10/25/2016 for followup of near syncope and hypertension. She then saw him again on 2016 when he was having some episodes of near syncope and his pacemaker showed what appeared to be a tachyarrhythmia. His blood pressure at that time was somewhat labile as well. He then saw my partner Dr. Emliiano Covarrubias on 2016 and Dr. Covarrubias did not feel that the tachycardia was problematic and certainly was not causing his symptoms of near syncope, and he felt the patient's pacemaker was working well to prevent bradycardia. Dr. Covarrubias also felt that the patient's  episodes of near syncope were more related to dysautonomia and recommended compression stockings as he thought he was having some orthostasis.      He did go to [place] and was evaluated in the dysautonomic clinic 8/23/17.  There testing showed grossly intact autonomic function.  They thought that possibly his atrial fibrillation was driving his symptoms of fatigue and near syncope.  Device interrogation today shows short bursts of ventricular ventricular tachycardia, 13 beats as well as premature ventricular complexes noted 25% at time.        He had a stress echocardiogram done 12/11/17 for dyspnea and decreased exercise tolerance that revealed an ejection fraction of 50% with severe left ventricular hypertrophy, severe left atrial enlargement, RVSP 46 mmHg the right reduced right ventricular systolic function, but no evidence of ischemia on the stress echo portion.  He had a 12-day monitor that showed atrial fibrillation with burst of tachycardia but no significant pauses or bradycardia.  He had a normal serum protein electrophoresis for severe left hypertrophy.     He has had recurrent episodes of near syncope which were believed to probably be due to his blood pressure dropping as he has documented this at times.  In addition, the may also be related to burst of atrial fibrillation with rapid ventricular response or low blood sugar.     Interrogation of his device done 5/18/2021 shows a 3-1/2-year battery, normal device function, nonsustained ventricular tachycardia 1 episode lasting 12 beats, and one episode lasting 35 beats.  No changes were made.    He saw Dr. Vanegas in the office 7/8/2021.  He had 2 episodes of tachycardia for which he took extra metoprolol and felt better.  He has had no syncope.  He had been trying to walk 5-10,000 steps a day.  He was doing well from a cardiac standpoint.    Labs 4/1/2021 revealed a glucose of 115, otherwise normal CMP, hemoglobin A1c 7.2, total cholesterol 137,  HDL 54, LDL 70, triglycerides 63, normal CBC.    He is here for a 6-month follow-up.  He reports having some shortness of breath with exertion.  Sometimes this is with him walking up stairs, other times it is just when he is walking on a flat surface.  He denies any chest tightness, pressure, or heaviness.  He does not feel any palpitations or tachycardia, he used to, but does not anymore.  He denies any lower extremity edema.  He denies any lightheadedness, dizziness, syncope, or near syncope.  He still has some episodes of fatigue.  He exercises occasionally once every couple of weeks he will get in 5000 steps in a day.  He seldom uses caffeine and watches his sodium intake closely.  His blood pressures at home have been running 120s to 130s over 70s to 80s.  He uses a CPAP at night.  His pacemaker was interrogated in the office today, the [facility] reports that he had good function, he had to nonsustained events with rhythm strips that showed a 9 beat, and an 11 beat run of nonsustained ventricular tachycardia with rates in the 150s to 160s.  One of his episodes was 12/21/2021 at 7 PM.  That was the night before his birthday, and he does not remember feeling anything in particular related to that event.  No changes were made to the function of his pacemaker.    Past Medical History:   Diagnosis Date   • Abnormal electrocardiogram    • Anxiety    • Cardiomyopathy, hypertrophic, primary familial (CMS/HCC)    • Erectile dysfunction    • Health care maintenance    • Hyperlipidemia    • Hypertension    • Mild concentric left ventricular hypertrophy (LVH)    • Mild mitral regurgitation    • Mild tricuspid regurgitation    • Near syncope    • Noncompliance    • Nonsustained ventricular tachycardia (CMS/HCC) 9/5/2018   • Obesity    • VENESSA (obstructive sleep apnea)     uses CPAP faithfully   • Osteoarthritis    • PAF (paroxysmal atrial fibrillation) (CMS/[facility])    • Pneumonia 01/01/2016   • Type 2 diabetes mellitus (CMS/[facility])           Past Surgical History:   Procedure Laterality Date   • CARDIAC ELECTROPHYSIOLOGY PROCEDURE Left 6/6/2016    Procedure: Pacemaker DC new  [place];  Surgeon: Juan Chacon MD;  Location: [facility] INVASIVE LOCATION;  Service:    • INSERT / REPLACE / REMOVE PACEMAKER     • KNEE ARTHROSCOPY     • OTHER SURGICAL HISTORY      physical therapy for rotator cuff       Current Outpatient Medications on File Prior to Visit   Medication Sig Dispense Refill   • amLODIPine (NORVASC) 5 MG tablet TAKE 1 TABLET BY MOUTH EVERY DAY 15 tablet 0   • atorvastatin (LIPITOR) 40 MG tablet TAKE 1 TABLET BY MOUTH EVERY DAY 90 tablet 3   • chlorthalidone (HYGROTON) 25 MG tablet Take 1 tablet by mouth Daily. 303 tablet 0   • [employer] Lancets Original [facility] Use as directed and appropo lancet for his monitor 100 each 11   • glucose blood test strip He needs [employer] brand TrueTrack with lancets strips. Test daily. 100 each 12   • metFORMIN (GLUCOPHAGE) 1000 MG tablet TAKE 1 TABLET BY MOUTH TWICE A DAY WITH MEALS 30 tablet 0   • metoprolol tartrate (LOPRESSOR) 25 MG tablet TAKE 1 TABLET BY MOUTH TWICE A  tablet 3   • Multiple Vitamins-Minerals (MULTIVITAMIN ADULT PO) Take 1 tablet by mouth daily.     • ondansetron (ZOFRAN) 4 MG tablet Take 1 tablet by mouth Every 6 (Six) Hours As Needed for Nausea or Vomiting. 12 tablet 0   • quinapril (ACCUPRIL) 40 MG tablet Take 2 tablets by mouth Daily. 15 tablet 0   • tadalafil (CIALIS) 20 MG tablet Take  by mouth.     • Xarelto 20 MG tablet TAKE 1 TABLET BY MOUTH EVERY DAY WITH DINNER 90 tablet 3     No current facility-administered medications on file prior to visit.       Social History     Socioeconomic History   • Marital status:    Tobacco Use   • Smoking status: Former Smoker   • Smokeless tobacco: Never Used   Substance and Sexual Activity   • Alcohol use: No     Comment: Caffeine use: None   • Drug use: Not Currently     Types: Marijuana     Comment: FORMER.  No caffeine use   •  Sexual activity: Not Currently     Partners: Female           Review of Systems   Constitutional: Positive for malaise/fatigue. Negative for chills, decreased appetite, diaphoresis and fever.   HENT: Negative for nosebleeds.    Eyes: Negative for blurred vision.   Cardiovascular: Positive for dyspnea on exertion. Negative for chest pain, claudication, cyanosis, irregular heartbeat, leg swelling, near-syncope, orthopnea, palpitations, paroxysmal nocturnal dyspnea and syncope.   Respiratory: Negative for cough, hemoptysis, shortness of breath, sleep disturbances due to breathing, snoring and wheezing.    Hematologic/Lymphatic: Negative for bleeding problem. Does not bruise/bleed easily.   Musculoskeletal: Negative for falls.   Gastrointestinal: Negative for heartburn.   Neurological: Negative for excessive daytime sleepiness, dizziness, headaches, light-headedness, numbness and weakness.       Procedures    ECG 12 Lead    Date/Time: 1/12/2022 1:30 PM  Performed by: Malini Shepard APRN  Authorized by: Malini Shepard APRN   Comparison: compared with previous ECG from 7/8/2021  Rhythm: sinus rhythm and paced  Ectopy: unifocal PVCs  Conduction: non-specific intraventricular conduction delay  Other findings: left ventricular hypertrophy    Clinical impression: abnormal EKG                Objective:    There were no vitals filed for this visit.  There is no height or weight on file to calculate BMI.    Constitutional:       Appearance: Normal and healthy appearance. Well-developed.   Eyes:      Conjunctiva/sclera: Conjunctivae normal.   Neck:      Vascular: No carotid bruit.   Pulmonary:      Effort: Pulmonary effort is normal.      Breath sounds: Examination of the right-upper field reveals decreased breath sounds. Examination of the left-upper field reveals decreased breath sounds. Decreased breath sounds present.      Comments: Productive cough in the past week.   Cardiovascular:      PMI at left midclavicular  line. Normal rate. Regular rhythm.      Murmurs: There is no murmur.   Pulses:     Intact distal pulses.   Edema:     Peripheral edema absent.   Abdominal:      General: There is no abdominal bruit.   Neurological:      Mental Status: Alert.   Psychiatric:         Behavior: Behavior is cooperative.         Lab Review:       Assessment:      Diagnosis Plan   1. Essential hypertension     2. PAF (paroxysmal atrial fibrillation) ([facility])     3. Nonsustained ventricular tachycardia ([facility])     4. Mixed hyperlipidemia     5. Hypertensive left ventricular hypertrophy, without heart failure     6. SSS (sick sinus syndrome) ([facility])     7. S/P placement of cardiac pacemaker     8. Diabetes mellitus type 2, noninsulin dependent ([facility])     9. VENESSA (obstructive sleep apnea)          1. H/o Fatigue and lightheadedness, mostly resolved.  Could be related to blood pressure or blood sugar.  2. Atrial fibrillation, rate controlled. On xarelto.  He has not felt any tachycardia.  3. Hypertension. BP is well controlled.  Has low blood pressures at times, but has not measured  4. Hyperlipidemia. Controlled on atorvastatin.   5. History of left ventricular hypertrophy on echocardiogram.   6. Diabetes mellitus type 2.  Overall under fair control.  Last hemoglobin A1c 7.20.  7. Obstructive sleep apnea on CPAP.   Has not been as compliant with his machine since the recall.  Encourage compliance.    8. Obesity.   9. SSS, s/p pacer.   10. Nonsustained VT and frequent PVCs.      Plan:       He has been try to get to his primary care provider as he is concerned about his cough.  We will check a chest x-ray today.  We will check a stress echo due to his dyspnea on exertion.  Is possible this is related to deconditioning, but he is noticing his shortness of breath with exertion more.  His testing comes back normal and encouraged him to increase exercise.  His wife states they have a treadmill at home, I have encouraged him to try to get 30 minutes of  cardiovascular exercise a day 4 to 5 days a week.  If testing is normal he will follow-up with Dr. Vanegas in 6 months.

## 2022-01-17 ENCOUNTER — TELEMEDICINE (OUTPATIENT)
Dept: FAMILY MEDICINE CLINIC | Facility: CLINIC | Age: 62
End: 2022-01-17

## 2022-01-17 VITALS
DIASTOLIC BLOOD PRESSURE: 80 MMHG | SYSTOLIC BLOOD PRESSURE: 122 MMHG | HEIGHT: 74 IN | BODY MASS INDEX: 39.53 KG/M2 | WEIGHT: 308 LBS

## 2022-01-17 DIAGNOSIS — J06.9 ACUTE URI: Primary | ICD-10-CM

## 2022-01-17 DIAGNOSIS — E11.9 DIABETES MELLITUS TYPE 2, NONINSULIN DEPENDENT: ICD-10-CM

## 2022-01-17 DIAGNOSIS — I10 BENIGN ESSENTIAL HTN: ICD-10-CM

## 2022-01-17 PROCEDURE — 99213 OFFICE O/P EST LOW 20 MIN: CPT | Performed by: FAMILY MEDICINE

## 2022-01-17 RX ORDER — QUINAPRIL 40 MG/1
80 TABLET ORAL DAILY
Qty: 90 TABLET | Refills: 0 | Status: SHIPPED | OUTPATIENT
Start: 2022-01-17 | End: 2022-02-23

## 2022-01-17 RX ORDER — PROMETHAZINE HYDROCHLORIDE AND CODEINE PHOSPHATE 6.25; 1 MG/5ML; MG/5ML
5 SYRUP ORAL EVERY 4 HOURS PRN
Qty: 180 ML | Refills: 0 | Status: SHIPPED | OUTPATIENT
Start: 2022-01-17 | End: 2022-02-23 | Stop reason: ALTCHOICE

## 2022-01-17 NOTE — PROGRESS NOTES
Assessment and Plan     Problem List Items Addressed This Visit        Endocrine and Metabolic    Diabetes mellitus type 2, noninsulin dependent (HCC)    Relevant Medications    quinapril (ACCUPRIL) 40 MG tablet      Other Visit Diagnoses     Acute URI    -  Primary    Relevant Medications    promethazine-codeine (PHENERGAN with CODEINE) 6.25-10 MG/5ML syrup    Benign essential HTN        Relevant Medications    quinapril (ACCUPRIL) 40 MG tablet        Return in about 6 weeks (around 2/28/2022) for yearly Medicare Exam.    Patient was given instructions and counseling regarding his condition or for health maintenance advice. Please see specific information pulled into the AVS if appropriate.        Shashi is a 61 y.o. being seen today for  Cough (started a little over 2 weeks ago has congestion as well)   Subjective   History of the Present Illness   He had a cough for about two weeks, bringing up a lot of watery phlegm. Seems to be getting better. He has not had the booster. He was using OTC cough med and mucinex. Seems to be getting. He would like something for the cough.   Social History  He  reports that he has never smoked. He has never used smokeless tobacco. He reports previous drug use. Drug: Marijuana. He reports that he does not drink alcohol.  Objective   Vital Signs        BP Readings from Last 1 Encounters:   01/17/22 122/80     Wt Readings from Last 3 Encounters:   01/17/22 (!) 140 kg (308 lb)   01/12/22 (!) 144 kg (317 lb)   07/08/21 (!) 142 kg (312 lb 12.8 oz)   Body mass index is 39.54 kg/m².     Physical Exam  Vitals reviewed.   Constitutional:       General: He is not in acute distress.     Appearance: Normal appearance. He is well-developed. He is not ill-appearing, toxic-appearing or diaphoretic.   Neurological:      Mental Status: He is alert.   Psychiatric:         Behavior: Behavior normal.         Thought Content: Thought content normal.         Judgment: Judgment normal.

## 2022-01-19 ENCOUNTER — HOSPITAL ENCOUNTER (OUTPATIENT)
Dept: CARDIOLOGY | Facility: HOSPITAL | Age: 62
Discharge: HOME | End: 2022-01-19
Admitting: NURSE PRACTITIONER

## 2022-01-19 VITALS
DIASTOLIC BLOOD PRESSURE: 80 MMHG | SYSTOLIC BLOOD PRESSURE: 130 MMHG | HEIGHT: 74 IN | HEART RATE: 69 BPM | WEIGHT: 308 LBS | BODY MASS INDEX: 39.53 KG/M2

## 2022-01-19 DIAGNOSIS — R06.09 DYSPNEA ON EXERTION: ICD-10-CM

## 2022-01-19 DIAGNOSIS — I47.29 NONSUSTAINED VENTRICULAR TACHYCARDIA: ICD-10-CM

## 2022-01-19 DIAGNOSIS — I48.0 PAF (PAROXYSMAL ATRIAL FIBRILLATION): ICD-10-CM

## 2022-01-19 DIAGNOSIS — I10 ESSENTIAL HYPERTENSION: ICD-10-CM

## 2022-01-19 LAB
ASCENDING AORTA: 3.8 CM
BH CV ECHO MEAS - ACS: 2.3 CM
BH CV ECHO MEAS - AO MAX PG: 8.6 MMHG
BH CV ECHO MEAS - AO ROOT AREA (BSA CORRECTED): 1.4
BH CV ECHO MEAS - AO ROOT AREA: 10.7 CM^2
BH CV ECHO MEAS - AO ROOT DIAM: 3.7 CM
BH CV ECHO MEAS - AO V2 MAX: 146.6 CM/SEC
BH CV ECHO MEAS - ASC AORTA: 3.8 CM
BH CV ECHO MEAS - BSA(HAYCOCK): 2.8 M^2
BH CV ECHO MEAS - BSA: 2.6 M^2
BH CV ECHO MEAS - BZI_BMI: 39.5 KILOGRAMS/M^2
BH CV ECHO MEAS - BZI_METRIC_HEIGHT: 188 CM
BH CV ECHO MEAS - BZI_METRIC_WEIGHT: 139.7 KG
BH CV ECHO MEAS - EDV(MOD-SP2): 201 ML
BH CV ECHO MEAS - EDV(MOD-SP4): 156 ML
BH CV ECHO MEAS - EDV(TEICH): 142.6 ML
BH CV ECHO MEAS - EF(CUBED): 70.9 %
BH CV ECHO MEAS - EF(MOD-BP): 62 %
BH CV ECHO MEAS - EF(MOD-SP2): 59.7 %
BH CV ECHO MEAS - EF(MOD-SP4): 60.3 %
BH CV ECHO MEAS - EF(TEICH): 62 %
BH CV ECHO MEAS - ESV(MOD-SP2): 81 ML
BH CV ECHO MEAS - ESV(MOD-SP4): 62 ML
BH CV ECHO MEAS - ESV(TEICH): 54.1 ML
BH CV ECHO MEAS - FS: 33.7 %
BH CV ECHO MEAS - IVS/LVPW: 0.98
BH CV ECHO MEAS - IVSD: 1.4 CM
BH CV ECHO MEAS - LV DIASTOLIC VOL/BSA (35-75): 59.8 ML/M^2
BH CV ECHO MEAS - LV MASS(C)D: 335.8 GRAMS
BH CV ECHO MEAS - LV MASS(C)DI: 128.6 GRAMS/M^2
BH CV ECHO MEAS - LV SYSTOLIC VOL/BSA (12-30): 23.7 ML/M^2
BH CV ECHO MEAS - LVIDD: 5.4 CM
BH CV ECHO MEAS - LVIDS: 3.6 CM
BH CV ECHO MEAS - LVLD AP2: 8.8 CM
BH CV ECHO MEAS - LVLD AP4: 8.5 CM
BH CV ECHO MEAS - LVLS AP2: 8.1 CM
BH CV ECHO MEAS - LVLS AP4: 7.8 CM
BH CV ECHO MEAS - LVPWD: 1.4 CM
BH CV ECHO MEAS - MR MAX PG: 94.7 MMHG
BH CV ECHO MEAS - MR MAX VEL: 486.5 CM/SEC
BH CV ECHO MEAS - MV DEC TIME: 0.18 SEC
BH CV ECHO MEAS - MV E MAX VEL: 94.1 CM/SEC
BH CV ECHO MEAS - RAP SYSTOLE: 15 MMHG
BH CV ECHO MEAS - RVSP: 53.3 MMHG
BH CV ECHO MEAS - SI(CUBED): 43.3 ML/M^2
BH CV ECHO MEAS - SI(MOD-SP2): 46 ML/M^2
BH CV ECHO MEAS - SI(MOD-SP4): 36 ML/M^2
BH CV ECHO MEAS - SI(TEICH): 33.9 ML/M^2
BH CV ECHO MEAS - SV(CUBED): 112.9 ML
BH CV ECHO MEAS - SV(MOD-SP2): 120 ML
BH CV ECHO MEAS - SV(MOD-SP4): 94 ML
BH CV ECHO MEAS - SV(TEICH): 88.4 ML
BH CV ECHO MEAS - TR MAX VEL: 309.5 CM/SEC
BH CV STRESS BP STAGE 1: NORMAL
BH CV STRESS DURATION MIN STAGE 1: 3
BH CV STRESS DURATION MIN STAGE 2: 1
BH CV STRESS DURATION SEC STAGE 1: 0
BH CV STRESS DURATION SEC STAGE 2: 0
BH CV STRESS ECHO POST STRESS EJECTION FRACTION EF: 60 %
BH CV STRESS GRADE STAGE 1: 10
BH CV STRESS GRADE STAGE 2: 12
BH CV STRESS HR STAGE 2: 130
BH CV STRESS METS STAGE 1: 5
BH CV STRESS METS STAGE 2: 7.5
BH CV STRESS PROTOCOL 1: NORMAL
BH CV STRESS RECOVERY BP: NORMAL MMHG
BH CV STRESS RECOVERY HR: 66 BPM
BH CV STRESS SPEED STAGE 1: 1.7
BH CV STRESS SPEED STAGE 2: 2.5
BH CV STRESS STAGE 1: 1
BH CV STRESS STAGE 2: 2
BH CV XLRA - RV BASE: 3.9 CM
BH CV XLRA - RV LENGTH: 7.8 CM
BH CV XLRA - RV MID: 3.2 CM
BH CV XLRA - TDI S': 8.9 CM/SEC
LEFT ATRIUM VOLUME INDEX: 44 ML/M2
MAXIMAL PREDICTED HEART RATE: 160 BPM
PERCENT MAX PREDICTED HR: 81.25 %
SINUS: 3.9 CM
STJ: 3.3 CM
STRESS BASELINE BP: NORMAL MMHG
STRESS BASELINE HR: 69 BPM
STRESS PERCENT HR: 96 %
STRESS POST EXERCISE DUR MIN: 4 MIN
STRESS POST PEAK BP: NORMAL MMHG
STRESS POST PEAK HR: 130 BPM
STRESS TARGET HR: 136 BPM

## 2022-01-19 PROCEDURE — 93017 CV STRESS TEST TRACING ONLY: CPT

## 2022-01-19 PROCEDURE — 93325 DOPPLER ECHO COLOR FLOW MAPG: CPT | Performed by: INTERNAL MEDICINE

## 2022-01-19 PROCEDURE — 93320 DOPPLER ECHO COMPLETE: CPT

## 2022-01-19 PROCEDURE — 93018 CV STRESS TEST I&R ONLY: CPT | Performed by: INTERNAL MEDICINE

## 2022-01-19 PROCEDURE — 93320 DOPPLER ECHO COMPLETE: CPT | Performed by: INTERNAL MEDICINE

## 2022-01-19 PROCEDURE — 25010000002 PERFLUTREN (DEFINITY) 8.476 MG IN SODIUM CHLORIDE (PF) 0.9 % 10 ML INJECTION: Performed by: NURSE PRACTITIONER

## 2022-01-19 PROCEDURE — 93016 CV STRESS TEST SUPVJ ONLY: CPT | Performed by: INTERNAL MEDICINE

## 2022-01-19 PROCEDURE — 93350 STRESS TTE ONLY: CPT

## 2022-01-19 PROCEDURE — 93350 STRESS TTE ONLY: CPT | Performed by: INTERNAL MEDICINE

## 2022-01-19 PROCEDURE — 93352 ADMIN ECG CONTRAST AGENT: CPT | Performed by: INTERNAL MEDICINE

## 2022-01-19 PROCEDURE — 93325 DOPPLER ECHO COLOR FLOW MAPG: CPT

## 2022-01-19 RX ADMIN — PERFLUTREN 3 ML: 6.52 INJECTION, SUSPENSION INTRAVENOUS at 13:39

## 2022-01-21 ENCOUNTER — TELEPHONE (OUTPATIENT)
Dept: CARDIOLOGY | Facility: CLINIC | Age: 62
End: 2022-01-21

## 2022-01-21 NOTE — TELEPHONE ENCOUNTER
Patient with results of stress echocardiogram.  I have sent a message to Dr. Vanegas for her to review and decide if she thinks a cath is appropriate. Patient verbalized understanding and was educated to come to the ER if he started experiencing chest pain or pressure.

## 2022-01-29 DIAGNOSIS — E78.2 MIXED HYPERLIPIDEMIA: ICD-10-CM

## 2022-01-31 RX ORDER — ATORVASTATIN CALCIUM 40 MG/1
40 TABLET, FILM COATED ORAL DAILY
Qty: 90 TABLET | Refills: 1 | Status: SHIPPED | OUTPATIENT
Start: 2022-01-31 | End: 2022-10-25

## 2022-02-17 ENCOUNTER — TELEPHONE (OUTPATIENT)
Dept: CARDIOLOGY | Facility: CLINIC | Age: 62
End: 2022-02-17

## 2022-02-17 NOTE — TELEPHONE ENCOUNTER
Try to get in to see aprn in main office tomorrow or next week    rm    ----- Message from Jessenia Emery MA sent at 2/17/2022  1:49 PM EST -----  Regarding: FW: Out of breathe    ----- Message -----  From: Shashi Engel  Sent: 2/17/2022  11:46 AM EST  To: [facility]  Subject: Out of breathe                                   I been getting shortness breathe, I went to the [facility] on Sunday walked up the steps got out of breathe didn’t go back down the steps to my seat. It’s Friday and I’m getting out of breathe

## 2022-02-18 ENCOUNTER — TELEPHONE (OUTPATIENT)
Dept: CARDIOLOGY | Facility: CLINIC | Age: 62
End: 2022-02-18

## 2022-02-18 NOTE — TELEPHONE ENCOUNTER
pls have him see me or aprn for this. Find out how he is doing    rm    ----- Message from Jessenia Emery MA sent at 2/17/2022  1:49 PM EST -----  Regarding: FW: Out of breathe    ----- Message -----  From: Shashi Engel  Sent: 2/17/2022  11:46 AM EST  To: [facility]  Subject: Out of breathe                                   I been getting shortness breathe, I went to the [facility] on Sunday walked up the steps got out of breathe didn’t go back down the steps to my seat. It’s Friday and I’m getting out of breathe

## 2022-02-18 NOTE — TELEPHONE ENCOUNTER
Called and spoke with pt. Scheduled to see AL next Wednesday.    Thank you,    Milena Judd RN  Triage MG

## 2022-02-23 ENCOUNTER — OFFICE VISIT (OUTPATIENT)
Dept: CARDIOLOGY | Facility: CLINIC | Age: 62
End: 2022-02-23

## 2022-02-23 ENCOUNTER — HOSPITAL ENCOUNTER (OUTPATIENT)
Dept: CARDIOLOGY | Facility: HOSPITAL | Age: 62
Discharge: HOME | End: 2022-02-23
Admitting: NURSE PRACTITIONER

## 2022-02-23 VITALS
BODY MASS INDEX: 40.43 KG/M2 | HEIGHT: 74 IN | DIASTOLIC BLOOD PRESSURE: 82 MMHG | WEIGHT: 315 LBS | HEART RATE: 61 BPM | SYSTOLIC BLOOD PRESSURE: 160 MMHG

## 2022-02-23 DIAGNOSIS — R06.02 SHORTNESS OF BREATH: ICD-10-CM

## 2022-02-23 DIAGNOSIS — E11.9 DIABETES MELLITUS TYPE 2, NONINSULIN DEPENDENT: ICD-10-CM

## 2022-02-23 DIAGNOSIS — E78.2 MIXED HYPERLIPIDEMIA: ICD-10-CM

## 2022-02-23 DIAGNOSIS — I49.5 SSS (SICK SINUS SYNDROME): ICD-10-CM

## 2022-02-23 DIAGNOSIS — I48.0 PAF (PAROXYSMAL ATRIAL FIBRILLATION): Primary | ICD-10-CM

## 2022-02-23 DIAGNOSIS — I47.29 NONSUSTAINED VENTRICULAR TACHYCARDIA: ICD-10-CM

## 2022-02-23 DIAGNOSIS — I10 ESSENTIAL HYPERTENSION: ICD-10-CM

## 2022-02-23 DIAGNOSIS — Z95.0 S/P PLACEMENT OF CARDIAC PACEMAKER: ICD-10-CM

## 2022-02-23 DIAGNOSIS — G47.33 OSA (OBSTRUCTIVE SLEEP APNEA): ICD-10-CM

## 2022-02-23 DIAGNOSIS — R94.39 ABNORMAL STRESS ECHO: ICD-10-CM

## 2022-02-23 DIAGNOSIS — I10 BENIGN ESSENTIAL HTN: ICD-10-CM

## 2022-02-23 LAB
ALBUMIN SERPL-MCNC: 3.5 G/DL (ref 3.5–5.2)
ALBUMIN/GLOB SERPL: 1.1 G/DL
ALP SERPL-CCNC: 93 U/L (ref 39–117)
ALT SERPL W P-5'-P-CCNC: 62 U/L (ref 1–41)
ANION GAP SERPL CALCULATED.3IONS-SCNC: 10.6 MMOL/L (ref 5–15)
AST SERPL-CCNC: 49 U/L (ref 1–40)
BILIRUB SERPL-MCNC: 1.6 MG/DL (ref 0–1.2)
BUN SERPL-MCNC: 15 MG/DL (ref 8–23)
BUN/CREAT SERPL: 19.5 (ref 7–25)
CALCIUM SPEC-SCNC: 9 MG/DL (ref 8.6–10.5)
CHLORIDE SERPL-SCNC: 106 MMOL/L (ref 98–107)
CO2 SERPL-SCNC: 26.4 MMOL/L (ref 22–29)
CREAT SERPL-MCNC: 0.77 MG/DL (ref 0.76–1.27)
DEPRECATED RDW RBC AUTO: 43.4 FL (ref 37–54)
ERYTHROCYTE [DISTWIDTH] IN BLOOD BY AUTOMATED COUNT: 14.1 % (ref 12.3–15.4)
GFR SERPL CREATININE-BSD FRML MDRD: 125 ML/MIN/1.73
GLOBULIN UR ELPH-MCNC: 3.1 GM/DL
GLUCOSE SERPL-MCNC: 186 MG/DL (ref 65–99)
HCT VFR BLD AUTO: 43.1 % (ref 37.5–51)
HGB BLD-MCNC: 13.7 G/DL (ref 13–17.7)
MCH RBC QN AUTO: 27.1 PG (ref 26.6–33)
MCHC RBC AUTO-ENTMCNC: 31.8 G/DL (ref 31.5–35.7)
MCV RBC AUTO: 85.2 FL (ref 79–97)
NT-PROBNP SERPL-MCNC: 442 PG/ML (ref 0–900)
PLATELET # BLD AUTO: 235 10*3/MM3 (ref 140–450)
PMV BLD AUTO: 9.9 FL (ref 6–12)
POTASSIUM SERPL-SCNC: 3.5 MMOL/L (ref 3.5–5.2)
PROT SERPL-MCNC: 6.6 G/DL (ref 6–8.5)
RBC # BLD AUTO: 5.06 10*6/MM3 (ref 4.14–5.8)
SODIUM SERPL-SCNC: 143 MMOL/L (ref 136–145)
TROPONIN T SERPL-MCNC: 0.01 NG/ML (ref 0–0.03)
WBC NRBC COR # BLD: 6.61 10*3/MM3 (ref 3.4–10.8)

## 2022-02-23 PROCEDURE — 85027 COMPLETE CBC AUTOMATED: CPT | Performed by: NURSE PRACTITIONER

## 2022-02-23 PROCEDURE — 84484 ASSAY OF TROPONIN QUANT: CPT | Performed by: NURSE PRACTITIONER

## 2022-02-23 PROCEDURE — 93000 ELECTROCARDIOGRAM COMPLETE: CPT | Performed by: NURSE PRACTITIONER

## 2022-02-23 PROCEDURE — 83880 ASSAY OF NATRIURETIC PEPTIDE: CPT | Performed by: NURSE PRACTITIONER

## 2022-02-23 PROCEDURE — 80053 COMPREHEN METABOLIC PANEL: CPT | Performed by: NURSE PRACTITIONER

## 2022-02-23 PROCEDURE — 36415 COLL VENOUS BLD VENIPUNCTURE: CPT

## 2022-02-23 PROCEDURE — 99214 OFFICE O/P EST MOD 30 MIN: CPT | Performed by: NURSE PRACTITIONER

## 2022-02-23 RX ORDER — QUINAPRIL 40 MG/1
80 TABLET ORAL DAILY
Qty: 180 TABLET | Refills: 1 | Status: SHIPPED | OUTPATIENT
Start: 2022-02-23 | End: 2022-08-19

## 2022-02-23 RX ORDER — HYDROXYZINE HYDROCHLORIDE 25 MG/1
25 TABLET, FILM COATED ORAL 3 TIMES DAILY PRN
Qty: 30 TABLET | Refills: 1 | Status: ON HOLD | OUTPATIENT
Start: 2022-02-23 | End: 2022-05-09

## 2022-02-23 NOTE — PROGRESS NOTES
Date of Office Visit: 22  Encounter Provider: DENNIS Panda  Place of Service: [facility] CARDIOLOGY  Patient Name: Shashi Engel  :1960    Chief Complaint   Patient presents with   • PAF (paroxysmal atrial fibrillation)   • Essential hypertension   • Shortness of Breath   • Hypertension   • Follow-up   :     HPI: Shashi Engel is a 61 y.o. male  with hypertension, hyperlipidemia, paroxysmal atrial fibrillation, sick sinus syndrome status post permanent pacemaker, left ventricular hypertrophy, nonsustained ventricular tachycardia    Had a permanent pacemaker implanted in 2016 for sick sinus syndrome and atrial fibrillation.  Echocardiogram at that time showed normal left ventricular systolic function, mild to moderate concentric hypertrophy and mild mitral regurgitation with severely dilated right atrium.  Nonischemic stress echo in 2017 which showed mildly reduced right ventricular systolic function and severe concentric hypertrophy.  RVSP was 46 mmHg.  He had a repeat stress echo in 2022 which showed ejection fraction of 60% with hypokinetic apical septal segment.  This was felt to be equivocal.  Stress ECG was felt to be significant with frequent PVCs, nonsustained ventricular tachycardia and couplets.    He presents for reassessment and reports increased shortness of breath with exertion over the last month.  The last 2 nights he is also noticed new shortness of breath awakening him at night despite wearing his BiPAP.  He has had increased swelling in his lower extremity particularly in the right lower extremity but that does improve with his compression stockings.  He is able to get through bathing and getting dressed but he needs to sit down and take a break afterwards.  He denies chest pain, chest tightness, chest pressure.  He denies palpitation, near-syncope or syncope.  He also reports some congestion and cough which is  "intermittent.  He reports compliance with all medication.  He also states his blood pressure is usually 120-140/70-80 but the last couple days it seems to be more elevated.  He is accompanied by his wife, Sunday.       No Known Allergies        Family and social history reviewed.     ROS  All other systems were reviewed and are negative          Objective:     Vitals:    02/23/22 1309   BP: 160/82   BP Location: Left arm   Patient Position: Sitting   Pulse: 61   Weight: (!) 147 kg (323 lb)   Height: 188 cm (74\")     Body mass index is 41.47 kg/m².    PHYSICAL EXAM:  Pulmonary:      Breath sounds: Examination of the right-lower field reveals decreased breath sounds. Examination of the left-lower field reveals decreased breath sounds. Decreased breath sounds present.   Cardiovascular:      Comments: Compression socks bilaterally  Edema:     Ankle: trace edema of the left ankle and 1+ edema of the right ankle.          ECG 12 Lead    Date/Time: 2/23/2022 1:21 PM  Performed by: Selina Vogt APRN  Authorized by: Selina Vogt APRN   Comparison: compared with previous ECG   Similar to previous ECG  Rhythm: atrial fibrillation and paced  Rate: normal  Pacing: ventricular paced rhythm  Clinical impression: abnormal EKG              Current Outpatient Medications   Medication Sig Dispense Refill   • amLODIPine (NORVASC) 5 MG tablet TAKE 1 TABLET BY MOUTH EVERY DAY 15 tablet 0   • atorvastatin (LIPITOR) 40 MG tablet Take 1 tablet by mouth Daily. 90 tablet 1   • chlorthalidone (HYGROTON) 25 MG tablet Take 1 tablet by mouth Daily. 303 tablet 0   • [employer] Lancets Original [facility] Use as directed and appropo lancet for his monitor 100 each 11   • glucose blood test strip He needs [employer] brand TrueTrack with lancets strips. Test daily. 100 each 12   • metFORMIN (GLUCOPHAGE) 1000 MG tablet TAKE 1 TABLET BY MOUTH TWICE A DAY WITH MEALS 30 tablet 0   • metoprolol tartrate (LOPRESSOR) 25 MG tablet TAKE 1 TABLET BY MOUTH TWICE A  " tablet 3   • Multiple Vitamins-Minerals (MULTIVITAMIN ADULT PO) Take 1 tablet by mouth daily.     • quinapril (ACCUPRIL) 40 MG tablet TAKE 2 TABLETS BY MOUTH DAILY 180 tablet 1   • Xarelto 20 MG tablet TAKE 1 TABLET BY MOUTH EVERY DAY WITH DINNER 90 tablet 3   • tadalafil (CIALIS) 20 MG tablet Take  by mouth Daily As Needed.       No current facility-administered medications for this visit.     Assessment:       Diagnosis Plan   1. PAF (paroxysmal atrial fibrillation) ([facility])  ECG 12 Lead   2. Essential hypertension     3. SSS (sick sinus syndrome) ([facility])     4. S/P placement of cardiac pacemaker     5. VENESSA (obstructive sleep apnea)     6. Diabetes mellitus type 2, noninsulin dependent ([facility])     7. Mixed hyperlipidemia     8. Nonsustained ventricular tachycardia ([facility])  Comprehensive Metabolic Panel   9. Shortness of breath  ECG 12 Lead    proBNP    CBC (No Diff)    Comprehensive Metabolic Panel    Troponin I        Orders Placed This Encounter   Procedures   • proBNP     Order Specific Question:   Release to patient     Answer:   Immediate   • CBC (No Diff)     Standing Status:   Future     Number of Occurrences:   1     Standing Expiration Date:   2/23/2023     Order Specific Question:   Release to patient     Answer:   Immediate   • Comprehensive Metabolic Panel     Standing Status:   Future     Number of Occurrences:   1     Standing Expiration Date:   2/24/2023     Order Specific Question:   Release to patient     Answer:   Immediate   • Troponin I     Standing Status:   Future     Number of Occurrences:   1     Standing Expiration Date:   2/23/2023     Order Specific Question:   Release to patient     Answer:   Immediate   • ECG 12 Lead     This order was created via procedure documentation     Order Specific Question:   Release to patient     Answer:   Immediate         Plan:       1.  61-year-old gentleman with paroxysmal atrial fibrillation-he is anticoagulated with rivaroxaban.  Continue current dose  "beta-blocker  2.  Sick sinus syndrome status post permanent pacemaker  3.  Diabetes mellitus on therapy  4.  Hyperlipidemia on atorvastatin 40 mg  5.  Obesity-BMI 41.47  6.  Obstructive sleep apnea on BiPAP.  He wears that \"most of the time\"  7.  Premature ventricular contraction and nonsustained ventricular tachycardia  8.  Abnormal stress echo which was equivocal last month.  He had progressive dyspnea on exertion felt to be his anginal equivalent.   9.  Progressive dyspnea on exertion-he had normal proBNP, unremarkable CMP, normal hemoglobin, and normal troponin.  Considering equivocal stress echo worsening shortness of breath with exertion felt to be anginal equivalent we will proceed with right and left heart catheterization.  He has multiple risk factors for coronary artery disease including hypertension, hyperlipidemia, diabetes mellitus, and obstructive sleep apnea.    Further recommendation pending the results of heart cath          It has been a pleasure to participate in this patient's care.      Thank you,  DENNIS Panda      **I used Dragon to dictate this note:**  "

## 2022-02-24 ENCOUNTER — TRANSCRIBE ORDERS (OUTPATIENT)
Dept: CARDIOLOGY | Facility: CLINIC | Age: 62
End: 2022-02-24

## 2022-02-24 DIAGNOSIS — Z13.6 SCREENING FOR ISCHEMIC HEART DISEASE: ICD-10-CM

## 2022-02-24 DIAGNOSIS — Z01.818 OTHER SPECIFIED PRE-OPERATIVE EXAMINATION: ICD-10-CM

## 2022-02-24 DIAGNOSIS — Z01.810 PRE-OPERATIVE CARDIOVASCULAR EXAMINATION: Primary | ICD-10-CM

## 2022-02-24 PROBLEM — R94.39 ABNORMAL STRESS ECHO: Status: ACTIVE | Noted: 2022-02-24

## 2022-02-24 PROBLEM — R06.02 SHORTNESS OF BREATH: Status: ACTIVE | Noted: 2022-02-24

## 2022-03-01 ENCOUNTER — LAB (OUTPATIENT)
Dept: LAB | Facility: HOSPITAL | Age: 62
End: 2022-03-01

## 2022-03-01 DIAGNOSIS — Z01.818 OTHER SPECIFIED PRE-OPERATIVE EXAMINATION: ICD-10-CM

## 2022-03-01 DIAGNOSIS — Z01.810 PRE-OPERATIVE CARDIOVASCULAR EXAMINATION: ICD-10-CM

## 2022-03-01 DIAGNOSIS — Z13.6 SCREENING FOR ISCHEMIC HEART DISEASE: ICD-10-CM

## 2022-03-01 LAB — SARS-COV-2 ORF1AB RESP QL NAA+PROBE: NOT DETECTED

## 2022-03-01 PROCEDURE — 93294 REM INTERROG EVL PM/LDLS PM: CPT | Performed by: INTERNAL MEDICINE

## 2022-03-01 PROCEDURE — U0004 COV-19 TEST NON-CDC HGH THRU: HCPCS

## 2022-03-01 PROCEDURE — C9803 HOPD COVID-19 SPEC COLLECT: HCPCS

## 2022-03-01 PROCEDURE — 93296 REM INTERROG EVL PM/IDS: CPT | Performed by: INTERNAL MEDICINE

## 2022-03-02 ENCOUNTER — HOSPITAL ENCOUNTER (OUTPATIENT)
Facility: HOSPITAL | Age: 62
Setting detail: HOSPITAL OUTPATIENT SURGERY
Discharge: HOME | End: 2022-03-02
Attending: INTERNAL MEDICINE | Admitting: INTERNAL MEDICINE

## 2022-03-02 ENCOUNTER — TELEPHONE (OUTPATIENT)
Dept: CARDIOLOGY | Facility: CLINIC | Age: 62
End: 2022-03-02

## 2022-03-02 VITALS
RESPIRATION RATE: 16 BRPM | TEMPERATURE: 98.2 F | HEART RATE: 61 BPM | WEIGHT: 297.7 LBS | SYSTOLIC BLOOD PRESSURE: 135 MMHG | OXYGEN SATURATION: 97 % | BODY MASS INDEX: 38.2 KG/M2 | HEIGHT: 74 IN | DIASTOLIC BLOOD PRESSURE: 86 MMHG

## 2022-03-02 DIAGNOSIS — E11.9 DIABETES MELLITUS TYPE 2, NONINSULIN DEPENDENT: ICD-10-CM

## 2022-03-02 DIAGNOSIS — I49.5 SSS (SICK SINUS SYNDROME): Primary | ICD-10-CM

## 2022-03-02 DIAGNOSIS — R94.39 ABNORMAL STRESS ECHO: ICD-10-CM

## 2022-03-02 DIAGNOSIS — R06.02 SHORTNESS OF BREATH: ICD-10-CM

## 2022-03-02 DIAGNOSIS — I47.29 NONSUSTAINED VENTRICULAR TACHYCARDIA: ICD-10-CM

## 2022-03-02 LAB
GLUCOSE BLDC GLUCOMTR-MCNC: 111 MG/DL (ref 70–130)
HCT VFR BLDA CALC: 44 % (ref 38–51)
HCT VFR BLDA CALC: 44 % (ref 38–51)
HGB BLDA-MCNC: 15 G/DL (ref 12–17)
HGB BLDA-MCNC: 15 G/DL (ref 12–17)
SAO2 % BLDA: 69 % (ref 95–98)
SAO2 % BLDA: 93 % (ref 95–98)

## 2022-03-02 PROCEDURE — C1894 INTRO/SHEATH, NON-LASER: HCPCS | Performed by: INTERNAL MEDICINE

## 2022-03-02 PROCEDURE — 85014 HEMATOCRIT: CPT

## 2022-03-02 PROCEDURE — 93460 R&L HRT ART/VENTRICLE ANGIO: CPT | Performed by: INTERNAL MEDICINE

## 2022-03-02 PROCEDURE — 25010000002 MIDAZOLAM PER 1 MG: Performed by: INTERNAL MEDICINE

## 2022-03-02 PROCEDURE — C1769 GUIDE WIRE: HCPCS | Performed by: INTERNAL MEDICINE

## 2022-03-02 PROCEDURE — 85018 HEMOGLOBIN: CPT

## 2022-03-02 PROCEDURE — 0 IOPAMIDOL PER 1 ML: Performed by: INTERNAL MEDICINE

## 2022-03-02 PROCEDURE — 25010000002 HEPARIN (PORCINE) PER 1000 UNITS: Performed by: INTERNAL MEDICINE

## 2022-03-02 PROCEDURE — 82962 GLUCOSE BLOOD TEST: CPT

## 2022-03-02 PROCEDURE — 25010000002 FENTANYL CITRATE (PF) 50 MCG/ML SOLUTION: Performed by: INTERNAL MEDICINE

## 2022-03-02 RX ORDER — LIDOCAINE HYDROCHLORIDE 10 MG/ML
0.1 INJECTION, SOLUTION EPIDURAL; INFILTRATION; INTRACAUDAL; PERINEURAL ONCE AS NEEDED
Status: DISCONTINUED | OUTPATIENT
Start: 2022-03-02 | End: 2022-03-02 | Stop reason: HOSPADM

## 2022-03-02 RX ORDER — SODIUM CHLORIDE 9 MG/ML
100 INJECTION, SOLUTION INTRAVENOUS CONTINUOUS
Status: ACTIVE | OUTPATIENT
Start: 2022-03-02 | End: 2022-03-02

## 2022-03-02 RX ORDER — LIDOCAINE HYDROCHLORIDE 20 MG/ML
INJECTION, SOLUTION INFILTRATION; PERINEURAL AS NEEDED
Status: DISCONTINUED | OUTPATIENT
Start: 2022-03-02 | End: 2022-03-02 | Stop reason: HOSPADM

## 2022-03-02 RX ORDER — SODIUM CHLORIDE 0.9 % (FLUSH) 0.9 %
3 SYRINGE (ML) INJECTION EVERY 12 HOURS SCHEDULED
Status: DISCONTINUED | OUTPATIENT
Start: 2022-03-02 | End: 2022-03-02 | Stop reason: HOSPADM

## 2022-03-02 RX ORDER — SODIUM CHLORIDE 0.9 % (FLUSH) 0.9 %
10 SYRINGE (ML) INJECTION AS NEEDED
Status: DISCONTINUED | OUTPATIENT
Start: 2022-03-02 | End: 2022-03-02 | Stop reason: HOSPADM

## 2022-03-02 RX ORDER — MELATONIN
1000 DAILY
COMMUNITY

## 2022-03-02 RX ORDER — FENTANYL CITRATE 50 UG/ML
INJECTION, SOLUTION INTRAMUSCULAR; INTRAVENOUS AS NEEDED
Status: DISCONTINUED | OUTPATIENT
Start: 2022-03-02 | End: 2022-03-02 | Stop reason: HOSPADM

## 2022-03-02 RX ORDER — ACETAMINOPHEN 325 MG/1
650 TABLET ORAL EVERY 4 HOURS PRN
Status: DISCONTINUED | OUTPATIENT
Start: 2022-03-02 | End: 2022-03-02 | Stop reason: HOSPADM

## 2022-03-02 RX ORDER — MIDAZOLAM HYDROCHLORIDE 1 MG/ML
INJECTION INTRAMUSCULAR; INTRAVENOUS AS NEEDED
Status: DISCONTINUED | OUTPATIENT
Start: 2022-03-02 | End: 2022-03-02 | Stop reason: HOSPADM

## 2022-03-02 RX ORDER — LANOLIN ALCOHOL/MO/W.PET/CERES
1000 CREAM (GRAM) TOPICAL DAILY
COMMUNITY

## 2022-03-02 RX ORDER — SODIUM CHLORIDE 9 MG/ML
75 INJECTION, SOLUTION INTRAVENOUS CONTINUOUS
Status: DISCONTINUED | OUTPATIENT
Start: 2022-03-02 | End: 2022-03-02 | Stop reason: HOSPADM

## 2022-03-02 RX ADMIN — SODIUM CHLORIDE 75 ML/HR: 9 INJECTION, SOLUTION INTRAVENOUS at 09:06

## 2022-03-02 NOTE — TELEPHONE ENCOUNTER
"Found on remote from 3/1/22    Pt with [employer] Essentio L110 pacemaker. Pt is dependent by Hx.     Estim to LO is reported as 1 yr. However, battery voltage depletion rate appears premature.    # Battery estim to LO was 3 yrs at 8/2021 check, 2 years at Nov check and 1.5yrs at Jan check. Now at estim 1 yr to LO. Battery status report on PDF states \" This device is using 200% of the power it would use at the following parameters : 100% pacing, 60 ppm, 2.5 V, 0.4 ms, 500 ?. Device is actually programmed at lower output than this currently 1.5V @0.4ms thru auto feature.      # I spoke with Latasha at [facility] [employer]. This is an alert device for premature battery depletion as noted 6/2021. Her calculation is that LO will occur within 90d or less. As pt is dependent, rec's for Gen change are for soon, but not necessarily immediately. Pt's remote is actively monitored and will report when LO reached at which time Gen change should take place ASAP. Recs are for Gen change before LO reached as pt is dependent.  NS        "

## 2022-03-02 NOTE — TELEPHONE ENCOUNTER
Per the [employer] 's calculations today based on 3/1/22 remaining battery info and current consumption, Pt is already nearing LO with 90 days or less estimated. Due to pt dependency, her recommendation was to go ahead and schedule pt while not emergent because once it hits LO it becomes emergent. Sorry I did not make that clear.  NS

## 2022-03-02 NOTE — DISCHARGE INSTRUCTIONS
[facility]  [address] [place] [address]    Coronary Angiogram (Radial/ Approach) After Care    Refer to this sheet in the next few weeks. These instructions provide you with information on caring for yourself after your procedure. Your caregiver may also give you more specific instructions. Your treatment has been planned according to current medical practices, but problems sometimes occur. Call your caregiver if you have any problems or questions after your procedure.    Home Care Instructions:  · You may shower the day after the procedure. Remove the bandage (dressing) and gently wash the site with plain soap and water. Gently pat the site dry. You may apply a band aid daily for 2 days if desired.    · Do not apply powder or lotion to the site.  · Do not submerge the affected site in water for 3 to 5 days or until the site is completely healed.   · Do not lift, push or pull anything over 5 pounds for 5 days after your procedure or as directed by your physician.  As a reference, a gallon of milk weighs 8 pounds.   · Inspect the site at least twice daily. You may notice some bruising at the site and it may be tender for 1 to 2 weeks.     · Increase your fluid intake for the next 2 days.    · Keep arm elevated for 24 hours. For the remainder of the day, keep your arm in “Pledge of Allegiance” position when up and about.     · You may drive 24 hours after the procedure unless otherwise instructed by your caregiver.  · Do not operate machinery or power tools for 24 hours.  · A responsible adult should be with you for the first 24 hours after you arrive home. Do not make any important legal decisions or sign legal papers for 24 hours.  Do not drink alcohol for 24 hours.    · Metformin or any medications containing Metformin should not be taken for 48 hours after your procedure.      Call Your Doctor if:   · You have unusual pain at the radial/ulnar (wrist) site.  · You have redness, warmth,  swelling, or pain at the radial/ulnar (wrist) site.  · You have drainage (other than a small amount of blood on the dressing).  · `You have chills or a fever > 101.  · Your arm becomes pale or dark, cool, tingly, or numb.  · You develop chest pain, shortness of breath, feel faint or pass out.    · You have heavy bleeding from the site, hold pressure on the site for 20 minutes.  If the bleeding stops, apply a fresh bandage and call your cardiologist.  However, if you        continue to have bleeding, call 911 and continue to apply pressure to the site.   · You have any symptoms of a stroke.  Remember BE FAST  · B-balance. Sudden trouble walking or loss of balance.  · E-eyes.  Sudden changes in how you see or a sudden onset of a very bad headache.   · F-face. Sudden weakness or loss of feeling of the face or facial droop on one side.   · A-arms Sudden weakness or numbness in one arm.  One arm drifts down if they are both held out in front of you. This happens suddenly and usually on one side of the body.   · S-speech.  Sudden trouble speaking, slurred speech or trouble understanding what are saying.   · T-time  Time to call emergency services.  Write down the symptoms and the time they started.

## 2022-03-02 NOTE — PROGRESS NOTES
I am sending this to you, Dr. Vanegas is on vacation.  You did see him in the office last and ordered the cath.  Just FYI.

## 2022-03-11 ENCOUNTER — PATIENT MESSAGE (OUTPATIENT)
Dept: FAMILY MEDICINE CLINIC | Facility: CLINIC | Age: 62
End: 2022-03-11

## 2022-03-14 ENCOUNTER — OFFICE VISIT (OUTPATIENT)
Dept: CARDIOLOGY | Facility: CLINIC | Age: 62
End: 2022-03-14

## 2022-03-14 VITALS
BODY MASS INDEX: 39.4 KG/M2 | SYSTOLIC BLOOD PRESSURE: 150 MMHG | HEART RATE: 61 BPM | WEIGHT: 307 LBS | HEIGHT: 74 IN | DIASTOLIC BLOOD PRESSURE: 82 MMHG

## 2022-03-14 DIAGNOSIS — R06.02 SHORTNESS OF BREATH: ICD-10-CM

## 2022-03-14 DIAGNOSIS — Z87.891 FORMER SMOKER: ICD-10-CM

## 2022-03-14 DIAGNOSIS — I49.5 SSS (SICK SINUS SYNDROME): Primary | ICD-10-CM

## 2022-03-14 DIAGNOSIS — R91.1 INCIDENTAL LUNG NODULE, > 3MM AND < 8MM: ICD-10-CM

## 2022-03-14 DIAGNOSIS — I10 ESSENTIAL HYPERTENSION: ICD-10-CM

## 2022-03-14 DIAGNOSIS — G47.33 OSA (OBSTRUCTIVE SLEEP APNEA): ICD-10-CM

## 2022-03-14 DIAGNOSIS — I48.0 PAF (PAROXYSMAL ATRIAL FIBRILLATION): ICD-10-CM

## 2022-03-14 DIAGNOSIS — J98.4 SCARRING OF LUNG: ICD-10-CM

## 2022-03-14 DIAGNOSIS — R05.9 COUGH: ICD-10-CM

## 2022-03-14 PROCEDURE — 93000 ELECTROCARDIOGRAM COMPLETE: CPT | Performed by: NURSE PRACTITIONER

## 2022-03-14 PROCEDURE — 99214 OFFICE O/P EST MOD 30 MIN: CPT | Performed by: NURSE PRACTITIONER

## 2022-03-14 NOTE — PROGRESS NOTES
Date of Office Visit: 22  Encounter Provider: DENNIS Panda  Place of Service: [facility] CARDIOLOGY  Patient Name: Shashi Engel  :1960    Chief Complaint   Patient presents with   • Essential hypertension   • PAF (paroxysmal atrial fibrillation)   • Hyperlipidemia   • Sick Sinus Syndrome    • Sleep Apnea   • Follow-up   :     HPI: Shashi Engel is a 61 y.o. male  with hypertension, hyperlipidemia, paroxysmal atrial fibrillation, sick sinus syndrome status post permanent pacemaker, left ventricular hypertrophy, nonsustained ventricular tachycardia  He is followed by Dr. Vanegas. I will visit with him in follow up today.   In 2015, he had bilateral pneumoni.  He had a permanent pacemaker implanted in 2016 for sick sinus syndrome and atrial fibrillation.  Echocardiogram at that time showed normal left ventricular systolic function, mild to moderate concentric hypertrophy and mild mitral regurgitation with severely dilated right atrium.  Nonischemic stress echo in 2017 which showed mildly reduced right ventricular systolic function and severe concentric hypertrophy.  RVSP was 46 mmHg.  He had a repeat stress echo in 2022 which showed ejection fraction of 60% with hypokinetic apical septal segment.  This was felt to be equivocal.  Stress ECG was felt to be significant with frequent PVCs, nonsustained ventricular tachycardia and couplets.  He continued to complain of shortness of breath with exertion.  He had a right and left heart catheterization which showed no significant coronary artery disease with luminal irregularities in the right and left coronary arteries.  The left main was mildly atretic.  There was normal cardiac output with only mild pulmonary hypertension with a pulmonary artery mean pressure of 27 mmHg.  There was normal left ventricular filling pressure and alternative etiologies for patient's dyspnea were to be  "evaluated.    He presents for reevaluation.  He states his breathing has gotten some better since he was using Mucinex.  He still complains of a cough with productive yellow phlegm.  He denies fever or chills.  He is wearing CPAP routinely.  He denies swelling at this time and has no complaint of chest pain.        No Known Allergies        Family and social history reviewed.     ROS  All other systems were reviewed and are negative          Objective:     Vitals:    03/14/22 1138   BP: 150/82   BP Location: Left arm   Patient Position: Sitting   Pulse: 61   Weight: (!) 139 kg (307 lb)   Height: 188 cm (74\")     Body mass index is 39.42 kg/m².    PHYSICAL EXAM:  Cardiovascular:      Irregularly irregular rhythm.      Comments: Right radial site dry clean intact and distal pulses intact.          ECG 12 Lead    Date/Time: 3/14/2022 1:14 PM  Performed by: Selina Vogt APRN  Authorized by: Selina Vogt APRN   Comparison: compared with previous ECG   Similar to previous ECG  Rhythm: paced              Current Outpatient Medications   Medication Sig Dispense Refill   • amLODIPine (NORVASC) 5 MG tablet TAKE 1 TABLET BY MOUTH EVERY DAY 15 tablet 0   • atorvastatin (LIPITOR) 40 MG tablet Take 1 tablet by mouth Daily. 90 tablet 1   • chlorthalidone (HYGROTON) 25 MG tablet Take 1 tablet by mouth Daily. 303 tablet 0   • cholecalciferol (VITAMIN D3) 25 MCG (1000 UT) tablet Take 1,000 Units by mouth Daily.     • [facility] Lancets Original [facility] Use as directed and appropo lancet for his monitor 100 each 11   • glucose blood test strip He needs [employer] brand TrueTrack with lancets strips. Test daily. 100 each 12   • hydrOXYzine (ATARAX) 25 MG tablet Take 1 tablet by mouth 3 (Three) Times a Day As Needed for Anxiety. 30 tablet 1   • metFORMIN (GLUCOPHAGE) 1000 MG tablet TAKE 1 TABLET BY MOUTH TWICE A DAY WITH MEALS 30 tablet 0   • metoprolol tartrate (LOPRESSOR) 25 MG tablet TAKE 1 TABLET BY MOUTH TWICE A  tablet 3   • quinapril " "(ACCUPRIL) 40 MG tablet TAKE 2 TABLETS BY MOUTH DAILY 180 tablet 1   • vitamin B-12 (CYANOCOBALAMIN) 1000 MCG tablet Take 1,000 mcg by mouth Daily.     • Xarelto 20 MG tablet TAKE 1 TABLET BY MOUTH EVERY DAY WITH DINNER 90 tablet 3   • Multiple Vitamins-Minerals (MULTIVITAMIN ADULT PO) Take 1 tablet by mouth daily.     • tadalafil (CIALIS) 20 MG tablet Take  by mouth Daily As Needed.       No current facility-administered medications for this visit.     Assessment:       Diagnosis Plan   1. SSS (sick sinus syndrome) ([facility])     2. PAF (paroxysmal atrial fibrillation) ([facility])     3. VENESSA (obstructive sleep apnea)     4. Essential hypertension     5. Shortness of breath  CT Chest Without Contrast   6. Cough  CT Chest Without Contrast        Orders Placed This Encounter   Procedures   • CT Chest Without Contrast     Standing Status:   Future     Standing Expiration Date:   3/14/2023     Order Specific Question:   Release to patient     Answer:   Immediate         Plan:       1.  61-year-old gentleman with paroxysmal atrial fibrillation-he is anticoagulated with rivaroxaban.  Continue current dose beta-blocker  2.  Sick sinus syndrome status post permanent pacemaker  3.  Diabetes mellitus on therapy  4.  Hyperlipidemia on atorvastatin 40 mg  5.  Obesity-BMI 39.42  6.  Obstructive sleep apnea on BiPAP.  He wears that \"most of the time\"  7.  Premature ventricular contraction and nonsustained ventricular tachycardia  8.  Luminal irregularity of right and left coronary arteries with mildly atretic left main   9.  Dyspnea on exertion-he has some symptom improvement with Mucinex.  He is to return for CT of the chest without contrast for further pulmonary evaluation.  If that is unremarkable consider outpatient pulmonary function test considering former cigar smoking              It has been a pleasure to participate in this patient's care.      Thank you,  DENNIS Panda      **I used Dragon to dictate this note:**  "

## 2022-03-14 NOTE — TELEPHONE ENCOUNTER
Patient was seen in cardiology office to follow up since right and left heart cath. I advised him to continue OTC mucinex. He states it is helping but I have ordered a CT chest without contrast for further evaluation.         DENNIS Panda  [facility]   [address] [place] [address] [address]  [place] [address]  Ph: [phone]  Fax: [phone]

## 2022-03-30 ENCOUNTER — HOSPITAL ENCOUNTER (OUTPATIENT)
Dept: CT IMAGING | Facility: HOSPITAL | Age: 62
Discharge: HOME | End: 2022-03-30
Admitting: NURSE PRACTITIONER

## 2022-03-30 DIAGNOSIS — R06.02 SHORTNESS OF BREATH: ICD-10-CM

## 2022-03-30 DIAGNOSIS — R05.9 COUGH: ICD-10-CM

## 2022-03-30 PROCEDURE — 71250 CT THORAX DX C-: CPT

## 2022-03-31 ENCOUNTER — TELEPHONE (OUTPATIENT)
Dept: CARDIOLOGY | Facility: CLINIC | Age: 62
End: 2022-03-31

## 2022-03-31 NOTE — TELEPHONE ENCOUNTER
Left voicemail for Shashi Engel requesting callback.    Thank you,  Jodie Andres RN  Triage Nurse [facility]

## 2022-03-31 NOTE — TELEPHONE ENCOUNTER
Notified pt of results and recommendations. He verbalized understanding.    Thank you,    Milena Judd, RN  Triage MG

## 2022-03-31 NOTE — TELEPHONE ENCOUNTER
----- Message from DENNIS Panda sent at 3/30/2022  5:10 PM EDT -----  Please inform patient CT of his chest shows no acute findings such as pneumonia or fluid in his lungs.  There is evidence of scarring in the lower lobes.  There are old lung nodules which appear unchanged however there is a new left upper lung nodule that is recommended to have a repeat CT in 1 year.  Due to lung scarring  on CT chest and history of smoking I will now place pulmonary referral.

## 2022-04-13 DIAGNOSIS — E11.9 DIABETES MELLITUS TYPE 2, NONINSULIN DEPENDENT: ICD-10-CM

## 2022-04-18 ENCOUNTER — TRANSCRIBE ORDERS (OUTPATIENT)
Dept: CARDIOLOGY | Facility: CLINIC | Age: 62
End: 2022-04-18

## 2022-04-18 DIAGNOSIS — Z01.810 PRE-OPERATIVE CARDIOVASCULAR EXAMINATION: ICD-10-CM

## 2022-04-18 DIAGNOSIS — Z13.6 SCREENING FOR ISCHEMIC HEART DISEASE: ICD-10-CM

## 2022-04-18 DIAGNOSIS — Z01.818 OTHER SPECIFIED PRE-OPERATIVE EXAMINATION: Primary | ICD-10-CM

## 2022-05-06 ENCOUNTER — LAB (OUTPATIENT)
Dept: LAB | Facility: HOSPITAL | Age: 62
End: 2022-05-06

## 2022-05-06 DIAGNOSIS — Z01.810 PRE-OPERATIVE CARDIOVASCULAR EXAMINATION: ICD-10-CM

## 2022-05-06 DIAGNOSIS — Z01.818 OTHER SPECIFIED PRE-OPERATIVE EXAMINATION: ICD-10-CM

## 2022-05-06 DIAGNOSIS — Z13.6 SCREENING FOR ISCHEMIC HEART DISEASE: ICD-10-CM

## 2022-05-06 LAB
ANION GAP SERPL CALCULATED.3IONS-SCNC: 10 MMOL/L (ref 5–15)
BASOPHILS # BLD AUTO: 0.02 10*3/MM3 (ref 0–0.2)
BASOPHILS NFR BLD AUTO: 0.3 % (ref 0–1.5)
BUN SERPL-MCNC: 12 MG/DL (ref 8–23)
BUN/CREAT SERPL: 14.6 (ref 7–25)
CALCIUM SPEC-SCNC: 9.2 MG/DL (ref 8.6–10.5)
CHLORIDE SERPL-SCNC: 101 MMOL/L (ref 98–107)
CO2 SERPL-SCNC: 27 MMOL/L (ref 22–29)
CREAT SERPL-MCNC: 0.82 MG/DL (ref 0.76–1.27)
DEPRECATED RDW RBC AUTO: 45 FL (ref 37–54)
EGFRCR SERPLBLD CKD-EPI 2021: 100.6 ML/MIN/1.73
EOSINOPHIL # BLD AUTO: 0.08 10*3/MM3 (ref 0–0.4)
EOSINOPHIL NFR BLD AUTO: 1.4 % (ref 0.3–6.2)
ERYTHROCYTE [DISTWIDTH] IN BLOOD BY AUTOMATED COUNT: 14 % (ref 12.3–15.4)
GLUCOSE SERPL-MCNC: 151 MG/DL (ref 65–99)
HCT VFR BLD AUTO: 44.5 % (ref 37.5–51)
HGB BLD-MCNC: 13.8 G/DL (ref 13–17.7)
IMM GRANULOCYTES # BLD AUTO: 0.02 10*3/MM3 (ref 0–0.05)
IMM GRANULOCYTES NFR BLD AUTO: 0.3 % (ref 0–0.5)
LYMPHOCYTES # BLD AUTO: 1.48 10*3/MM3 (ref 0.7–3.1)
LYMPHOCYTES NFR BLD AUTO: 25.5 % (ref 19.6–45.3)
MCH RBC QN AUTO: 27.1 PG (ref 26.6–33)
MCHC RBC AUTO-ENTMCNC: 31 G/DL (ref 31.5–35.7)
MCV RBC AUTO: 87.3 FL (ref 79–97)
MONOCYTES # BLD AUTO: 0.52 10*3/MM3 (ref 0.1–0.9)
MONOCYTES NFR BLD AUTO: 9 % (ref 5–12)
NEUTROPHILS NFR BLD AUTO: 3.69 10*3/MM3 (ref 1.7–7)
NEUTROPHILS NFR BLD AUTO: 63.5 % (ref 42.7–76)
NRBC BLD AUTO-RTO: 0 /100 WBC (ref 0–0.2)
PLATELET # BLD AUTO: 245 10*3/MM3 (ref 140–450)
PMV BLD AUTO: 9.6 FL (ref 6–12)
POTASSIUM SERPL-SCNC: 4.3 MMOL/L (ref 3.5–5.2)
RBC # BLD AUTO: 5.1 10*6/MM3 (ref 4.14–5.8)
SODIUM SERPL-SCNC: 138 MMOL/L (ref 136–145)
WBC NRBC COR # BLD: 5.81 10*3/MM3 (ref 3.4–10.8)

## 2022-05-06 PROCEDURE — 80048 BASIC METABOLIC PNL TOTAL CA: CPT

## 2022-05-06 PROCEDURE — 85025 COMPLETE CBC W/AUTO DIFF WBC: CPT

## 2022-05-06 PROCEDURE — U0004 COV-19 TEST NON-CDC HGH THRU: HCPCS

## 2022-05-06 PROCEDURE — 36415 COLL VENOUS BLD VENIPUNCTURE: CPT

## 2022-05-07 LAB — SARS-COV-2 ORF1AB RESP QL NAA+PROBE: NOT DETECTED

## 2022-05-09 ENCOUNTER — HOSPITAL ENCOUNTER (OUTPATIENT)
Facility: HOSPITAL | Age: 62
Setting detail: HOSPITAL OUTPATIENT SURGERY
Discharge: HOME | End: 2022-05-09
Attending: INTERNAL MEDICINE | Admitting: INTERNAL MEDICINE

## 2022-05-09 VITALS
WEIGHT: 305 LBS | RESPIRATION RATE: 16 BRPM | OXYGEN SATURATION: 98 % | BODY MASS INDEX: 37.92 KG/M2 | HEART RATE: 62 BPM | SYSTOLIC BLOOD PRESSURE: 162 MMHG | TEMPERATURE: 97.1 F | DIASTOLIC BLOOD PRESSURE: 104 MMHG | HEIGHT: 75 IN

## 2022-05-09 DIAGNOSIS — G89.18 POST-OP PAIN: Primary | ICD-10-CM

## 2022-05-09 DIAGNOSIS — I49.5 SSS (SICK SINUS SYNDROME): ICD-10-CM

## 2022-05-09 LAB — GLUCOSE BLDC GLUCOMTR-MCNC: 112 MG/DL (ref 70–130)

## 2022-05-09 PROCEDURE — 25010000002 MIDAZOLAM PER 1 MG: Performed by: INTERNAL MEDICINE

## 2022-05-09 PROCEDURE — 33227 REMOVE&REPLACE PM GEN SINGL: CPT | Performed by: INTERNAL MEDICINE

## 2022-05-09 PROCEDURE — 0 LIDOCAINE 1 % SOLUTION: Performed by: INTERNAL MEDICINE

## 2022-05-09 PROCEDURE — 82962 GLUCOSE BLOOD TEST: CPT

## 2022-05-09 PROCEDURE — 25010000002 FENTANYL CITRATE (PF) 50 MCG/ML SOLUTION: Performed by: INTERNAL MEDICINE

## 2022-05-09 PROCEDURE — 99152 MOD SED SAME PHYS/QHP 5/>YRS: CPT | Performed by: INTERNAL MEDICINE

## 2022-05-09 PROCEDURE — 99153 MOD SED SAME PHYS/QHP EA: CPT | Performed by: INTERNAL MEDICINE

## 2022-05-09 PROCEDURE — C1786 PMKR, SINGLE, RATE-RESP: HCPCS | Performed by: INTERNAL MEDICINE

## 2022-05-09 PROCEDURE — 25010000002 CEFAZOLIN IN DEXTROSE 2-4 GM/100ML-% SOLUTION: Performed by: INTERNAL MEDICINE

## 2022-05-09 DEVICE — PACEMAKER
Type: IMPLANTABLE DEVICE | Status: FUNCTIONAL
Brand: ACCOLADE™ MRI SR

## 2022-05-09 RX ORDER — SODIUM CHLORIDE 9 MG/ML
75 INJECTION, SOLUTION INTRAVENOUS CONTINUOUS
Status: DISCONTINUED | OUTPATIENT
Start: 2022-05-09 | End: 2022-05-09 | Stop reason: HOSPADM

## 2022-05-09 RX ORDER — SODIUM CHLORIDE 0.9 % (FLUSH) 0.9 %
10 SYRINGE (ML) INJECTION EVERY 12 HOURS SCHEDULED
Status: DISCONTINUED | OUTPATIENT
Start: 2022-05-09 | End: 2022-05-09 | Stop reason: HOSPADM

## 2022-05-09 RX ORDER — OXYCODONE HYDROCHLORIDE AND ACETAMINOPHEN 5; 325 MG/1; MG/1
1-2 TABLET ORAL EVERY 4 HOURS PRN
Qty: 10 TABLET | Refills: 0 | Status: SHIPPED | OUTPATIENT
Start: 2022-05-09 | End: 2022-06-09

## 2022-05-09 RX ORDER — MIDAZOLAM HYDROCHLORIDE 1 MG/ML
INJECTION INTRAMUSCULAR; INTRAVENOUS AS NEEDED
Status: DISCONTINUED | OUTPATIENT
Start: 2022-05-09 | End: 2022-05-09 | Stop reason: HOSPADM

## 2022-05-09 RX ORDER — SODIUM CHLORIDE 0.9 % (FLUSH) 0.9 %
10 SYRINGE (ML) INJECTION AS NEEDED
Status: DISCONTINUED | OUTPATIENT
Start: 2022-05-09 | End: 2022-05-09 | Stop reason: HOSPADM

## 2022-05-09 RX ORDER — LIDOCAINE HYDROCHLORIDE 10 MG/ML
0.1 INJECTION, SOLUTION EPIDURAL; INFILTRATION; INTRACAUDAL; PERINEURAL ONCE AS NEEDED
Status: DISCONTINUED | OUTPATIENT
Start: 2022-05-09 | End: 2022-05-09 | Stop reason: HOSPADM

## 2022-05-09 RX ORDER — LIDOCAINE HYDROCHLORIDE 10 MG/ML
INJECTION, SOLUTION INFILTRATION; PERINEURAL AS NEEDED
Status: DISCONTINUED | OUTPATIENT
Start: 2022-05-09 | End: 2022-05-09 | Stop reason: HOSPADM

## 2022-05-09 RX ORDER — CEFAZOLIN SODIUM 2 G/100ML
INJECTION, SOLUTION INTRAVENOUS CONTINUOUS PRN
Status: COMPLETED | OUTPATIENT
Start: 2022-05-09 | End: 2022-05-09

## 2022-05-09 RX ORDER — FENTANYL CITRATE 50 UG/ML
INJECTION, SOLUTION INTRAMUSCULAR; INTRAVENOUS AS NEEDED
Status: DISCONTINUED | OUTPATIENT
Start: 2022-05-09 | End: 2022-05-09 | Stop reason: HOSPADM

## 2022-05-09 RX ADMIN — SODIUM CHLORIDE 75 ML/HR: 9 INJECTION, SOLUTION INTRAVENOUS at 07:11

## 2022-05-09 NOTE — DISCHARGE INSTRUCTIONS
"[facility]   [phone]    Post Pacemaker / Defibrillator Implant Instructions      1.  The dressing may be removed the next day.    2. If steri-strips were used, they should not be removed. Allow them to \"fall off\".      3. You may shower after the dressing is removed. Do not allow shower water to hit directly on incision.    4. No lotion/powder/ointment/cream on incision until it is healed.    5. Gently wash incision daily with soap and water and pat dry.    6. You may reapply a dressing if there is drainage, otherwise leave your incision open to air. If you reapply a dressing, please notify the pacemaker clinic.    7. No heavy lifting, pulling, or pushing.    8. Do not raise the affected arm over your head for a minimum of 1 month.    9. The pacemaker clinic will contact you (usually within 1 business day) to schedule a pacemaker/incision check. The check is usually done 7-10 days post-implant. If you have not heard from the pacemaker clinic within 3 days, please call the office.    10. Please call the office if you experience any of the following:   bleeding or drainage from your incision   swelling, redness, or opening of your incision   fever or chills   pain not relieved with medication   chest pain or difficulty breathing   lightheadness    11. For defibrillator patients only: If you receive a shock from your device, please call the office. If you receive 2 or more shocks within a 24 hour period OR if you receive 1 shock and feel poorly, you should be evaluated in the emergency room. Please DO NOT DRIVE if you have received a shock until your device has been checked.   "

## 2022-05-09 NOTE — H&P
Hospital history and physical      Patient Name: Shashi Engel  Age/Sex: 61 y.o. male  : 1960  MRN: [id number]    Date of Admission: 2022  Date of Encounter Visit: 22  Encounter Provider: Juan Chacon MD        Referring Provider: Juan Chacon MD  Patient Care Team:  Keesha Kirkpatrick MD as PCP - General (Family Medicine)  Keesha Kirkpatrick MD as PCP - Family Medicine  VanegasJnenie gonzales MD as Consulting Physician (Cardiology)    Subjective:   Admitted for: Pacemaker replacement       History of Present Illness:  Shashi Engel is a 61 y.o. male with sick sinus syndrome.  The patient has permanent pacemaker in place that has now reached [facility].  He is here for replacement        Past Medical History:  Past Medical History:   Diagnosis Date   • Abnormal electrocardiogram    • Anxiety    • Cardiomyopathy, hypertrophic, primary familial (HCC)    • Erectile dysfunction    • Health care maintenance    • Hyperlipidemia    • Hypertension    • Mild concentric left ventricular hypertrophy (LVH)    • Mild mitral regurgitation    • Mild tricuspid regurgitation    • Near syncope    • Noncompliance    • Nonsustained ventricular tachycardia (HCC) 2018   • Obesity    • VENESSA (obstructive sleep apnea)     uses CPAP faithfully   • Osteoarthritis    • PAF (paroxysmal atrial fibrillation) ([facility])    • Pneumonia 2016   • Type 2 diabetes mellitus ([facility])        Past Surgical History:   Procedure Laterality Date   • CARDIAC CATHETERIZATION N/A 3/2/2022    Procedure: RIGHT HEART CATH;  Surgeon: Anjel Beasley MD;  Location:  [facility];  Service: Cardiovascular;  Laterality: N/A;   • CARDIAC CATHETERIZATION N/A 3/2/2022    Procedure: Coronary angiography;  Surgeon: Anjel Beasley MD;  Location:  [facility];  Service: Cardiovascular;  Laterality: N/A;   • CARDIAC ELECTROPHYSIOLOGY PROCEDURE Left 2016    Procedure: Pacemaker DC new  [place];   Surgeon: Juan Chacon MD;  Location: [facility] INVASIVE LOCATION;  Service:    • INSERT / REPLACE / REMOVE PACEMAKER     • KNEE ARTHROSCOPY         Home Medications:   Medications Prior to Admission   Medication Sig Dispense Refill Last Dose   • amLODIPine (NORVASC) 5 MG tablet TAKE 1 TABLET BY MOUTH EVERY DAY 15 tablet 0 5/8/2022 at Unknown time   • atorvastatin (LIPITOR) 40 MG tablet Take 1 tablet by mouth Daily. 90 tablet 1 5/8/2022 at Unknown time   • chlorthalidone (HYGROTON) 25 MG tablet Take 1 tablet by mouth Daily. 303 tablet 0 5/8/2022 at Unknown time   • cholecalciferol (VITAMIN D3) 25 MCG (1000 UT) tablet Take 1,000 Units by mouth Daily.   Past Week at Unknown time   • metFORMIN (GLUCOPHAGE) 1000 MG tablet TAKE 1 TABLET BY MOUTH TWICE A DAY WITH MEALS 30 tablet 0 5/8/2022 at Unknown time   • metoprolol tartrate (LOPRESSOR) 25 MG tablet TAKE 1 TABLET BY MOUTH TWICE A  tablet 3 5/8/2022 at Unknown time   • quinapril (ACCUPRIL) 40 MG tablet TAKE 2 TABLETS BY MOUTH DAILY 180 tablet 1 5/8/2022 at Unknown time   • vitamin B-12 (CYANOCOBALAMIN) 1000 MCG tablet Take 1,000 mcg by mouth Daily.   Past Week at Unknown time   • [employer] Lancets Original [facility] Use as directed and appropo lancet for his monitor 100 each 11    • glucose blood test strip He needs [employer] brand TrueTrack with lancets strips. Test daily. 100 each 12    • rivaroxaban (Xarelto) 20 MG tablet Take 1 tablet by mouth Daily With Dinner. 90 tablet 3 5/6/2022   • tadalafil (CIALIS) 20 MG tablet Take  by mouth Daily As Needed.          Allergies:  No Known Allergies    Past Social History:  Social History     Socioeconomic History   • Marital status:    Tobacco Use   • Smoking status: Former Smoker     Types: Cigars   • Smokeless tobacco: Never Used   • Tobacco comment: NO caffeine use    Substance and Sexual Activity   • Alcohol use: No   • Drug use: Not Currently     Types: Marijuana     Comment: out of cigars in the remote past   •  Sexual activity: Not Currently     Partners: Female        Past Family History:  Family History   Problem Relation Age of Onset   • Depression Mother    • Hypertension Mother    • Heart disease Mother    • Kidney disease Other    • Sleep apnea Other    • Atrial fibrillation Sister    • Hypertension Sister    • Heart disease Sister    • Hypertension Brother    • Hypertension Sister    • Heart disease Sister    • Diabetes Neg Hx        Review of Systems: All systems reviewed. Pertinent positives identified in HPI. All other systems are negative.     REVIEW OF SYSTEMS:   CONSTITUTIONAL: No weight loss, fever, chills, weakness or fatigue.   HEENT: Eyes: No visual loss, blurred vision, double vision or yellow sclerae. Ears, Nose, Throat: No hearing loss, sneezing, congestion, runny nose or sore throat.   SKIN: No rash or itching.     RESPIRATORY: No shortness of breath, hemoptysis, cough or sputum.   GASTROINTESTINAL: No anorexia, nausea, vomiting or diarrhea. No abdominal pain, bright red blood per rectum or melena.  GENITOURINARY: No burning on urination, hematuria or increased frequency.  NEUROLOGICAL: No headache, dizziness, syncope, paralysis, ataxia, numbness or tingling in the extremities. No change in bowel or bladder control.   MUSCULOSKELETAL: No muscle, back pain, joint pain or stiffness.   HEMATOLOGIC: No anemia, bleeding or bruising.   LYMPHATICS: No enlarged nodes. No history of splenectomy.   PSYCHIATRIC: No history of depression, anxiety, hallucinations.   ENDOCRINOLOGIC: No reports of sweating, cold or heat intolerance. No polyuria or polydipsia.       Objective:     Objective:     No intake or output data in the 24 hours ending 05/09/22 0701  Body mass index is 38.12 kg/m².      05/09/22  0647   Weight: (!) 138 kg (305 lb)           Physical Exam:   Vitals reviewed.   Constitutional:       Appearance: Healthy appearance. Well-developed and not in distress.   Pulmonary:      Effort: Pulmonary effort is  normal.      Breath sounds: Normal breath sounds.   Cardiovascular:      Normal rate. Regular rhythm. Normal S1. Normal S2.      No gallop.   Skin:     General: Skin is warm and dry.      Findings: No erythema.   Neurological:      Mental Status: Alert and oriented to person, place, and time.           Lab Review:     Results from last 7 days   Lab Units 05/06/22  0943   SODIUM mmol/L 138   POTASSIUM mmol/L 4.3   CHLORIDE mmol/L 101   CO2 mmol/L 27.0   BUN mg/dL 12   CREATININE mg/dL 0.82   GLUCOSE mg/dL 151*   CALCIUM mg/dL 9.2           Results from last 7 days   Lab Units 05/06/22  0943   WBC 10*3/mm3 5.81   HEMOGLOBIN g/dL 13.8   HEMATOCRIT % 44.5   PLATELETS 10*3/mm3 245                                   Imaging:      Imaging Results (Most Recent)     None          EKG:         Assessment:   Assessment/Plan         SSS (sick sinus syndrome) (HCC)            Plan:   Pacemaker replacement    Thank you for allowing me to participate in the care of Shashi Engel. Feel free to contact me directly with any further questions or concerns.    Juan Chacon MD  [facility]  05/09/22  07:01 EDT

## 2022-05-16 ENCOUNTER — CLINICAL SUPPORT NO REQUIREMENTS (OUTPATIENT)
Dept: CARDIOLOGY | Facility: CLINIC | Age: 62
End: 2022-05-16

## 2022-05-16 DIAGNOSIS — I44.2 AV BLOCK, COMPLETE: Primary | ICD-10-CM

## 2022-05-16 PROCEDURE — 93279 PRGRMG DEV EVAL PM/LDLS PM: CPT | Performed by: INTERNAL MEDICINE

## 2022-06-09 ENCOUNTER — OFFICE VISIT (OUTPATIENT)
Dept: FAMILY MEDICINE CLINIC | Facility: CLINIC | Age: 62
End: 2022-06-09

## 2022-06-09 VITALS
HEART RATE: 59 BPM | OXYGEN SATURATION: 96 % | WEIGHT: 315 LBS | BODY MASS INDEX: 39.17 KG/M2 | HEIGHT: 75 IN | SYSTOLIC BLOOD PRESSURE: 142 MMHG | DIASTOLIC BLOOD PRESSURE: 80 MMHG

## 2022-06-09 DIAGNOSIS — R04.2 HEMOPTYSIS: ICD-10-CM

## 2022-06-09 DIAGNOSIS — E78.2 MIXED HYPERLIPIDEMIA: ICD-10-CM

## 2022-06-09 DIAGNOSIS — E11.9 DIABETES MELLITUS TYPE 2, NONINSULIN DEPENDENT: Primary | ICD-10-CM

## 2022-06-09 DIAGNOSIS — I10 BENIGN ESSENTIAL HTN: ICD-10-CM

## 2022-06-09 DIAGNOSIS — R53.83 FATIGUE, UNSPECIFIED TYPE: ICD-10-CM

## 2022-06-09 DIAGNOSIS — R10.13 DYSPEPSIA: ICD-10-CM

## 2022-06-09 PROCEDURE — 99214 OFFICE O/P EST MOD 30 MIN: CPT | Performed by: NURSE PRACTITIONER

## 2022-06-09 RX ORDER — FAMOTIDINE 20 MG/1
20 TABLET, FILM COATED ORAL 2 TIMES DAILY
Qty: 60 TABLET | Refills: 3 | Status: SHIPPED | OUTPATIENT
Start: 2022-06-09 | End: 2022-07-15

## 2022-06-09 NOTE — PROGRESS NOTES
"Subjective   Shashi Engel is a 61 y.o. male.     History of Present Illness   Patient presents with c/o fatigue, ongoing. He reports that 4 days ago he woke up to go to [employer] and was \"spitting a lot\".  He states that at the time he spit up blood. Patient reports that he feels like he has GERD. He states that his symptoms are worse in the AM. Patient reports nausea at times. Patient is diabetic, he reports that he checks BS at home and they run 100-120. Recent cardiac catherization. Patient's CT from 3/14/2022 showed lung nodules and cardiomyopathy. Patient has taken tums and maalox    The following portions of the patient's history were reviewed and updated as appropriate: allergies, current medications, past family history, past medical history, past social history, past surgical history and problem list.    Review of Systems   Constitutional: Negative for appetite change, chills, fatigue and fever.   HENT: Negative for ear pain, rhinorrhea, sneezing and sore throat.    Respiratory: Positive for shortness of breath (with exertion). Negative for cough, choking, chest tightness and wheezing.    Cardiovascular: Negative for chest pain, palpitations and leg swelling.   Gastrointestinal: Positive for nausea and GERD. Negative for abdominal distention, abdominal pain, anal bleeding, blood in stool, constipation, diarrhea, rectal pain, vomiting and indigestion.   Genitourinary: Negative.  Negative for flank pain.   Musculoskeletal: Negative for neck pain.   Skin: Negative for rash.   Neurological: Negative for dizziness and headache.       Objective   Physical Exam  Vitals and nursing note reviewed.   Constitutional:       Appearance: Normal appearance. He is well-developed. He is obese.   HENT:      Head: Normocephalic and atraumatic.   Eyes:      Conjunctiva/sclera: Conjunctivae normal.      Pupils: Pupils are equal, round, and reactive to light.   Neck:      Thyroid: No thyromegaly.   Cardiovascular:      Rate and " Rhythm: Normal rate and regular rhythm.      Heart sounds: Normal heart sounds. No murmur heard.  Pulmonary:      Effort: Pulmonary effort is normal.      Breath sounds: Normal breath sounds.   Abdominal:      General: Bowel sounds are normal. There is no distension.      Palpations: Abdomen is soft. There is no mass.      Tenderness: There is no abdominal tenderness. There is no guarding or rebound.      Hernia: No hernia is present.   Musculoskeletal:      Cervical back: Normal range of motion and neck supple.      Right lower le+ Pitting Edema present.      Left lower le+ Pitting Edema present.   Lymphadenopathy:      Cervical: No cervical adenopathy.   Skin:     General: Skin is warm and dry.   Neurological:      Mental Status: He is alert and oriented to person, place, and time.   Psychiatric:         Attention and Perception: Attention and perception normal.         Mood and Affect: Mood and affect normal.         Speech: Speech normal.         Behavior: Behavior normal. Behavior is cooperative.         Thought Content: Thought content normal.         Cognition and Memory: Cognition normal.         Judgment: Judgment normal.         Vitals:    22 1014   BP: 142/80   Pulse: 59   SpO2: 96%     Body mass index is 39.5 kg/m².    Procedures    Assessment & Plan   Problems Addressed this Visit        Cardiac and Vasculature    HLD (hyperlipidemia)    Relevant Orders    Lipid Panel With / Chol / HDL Ratio       Endocrine and Metabolic    Diabetes mellitus type 2, noninsulin dependent (HCC) - Primary    Relevant Orders    Hemoglobin A1c    Microalbumin / Creatinine Urine Ratio - Urine, Clean Catch      Other Visit Diagnoses     Benign essential HTN        Relevant Orders    Comprehensive Metabolic Panel    Dyspepsia        Relevant Medications    famotidine (Pepcid) 20 MG tablet    Other Relevant Orders    CBC & Differential    Ambulatory Referral to Gastroenterology    Hemoptysis        Relevant Orders     CBC & Differential    Ambulatory Referral to Gastroenterology    Fatigue, unspecified type          Diagnoses       Codes Comments    Diabetes mellitus type 2, noninsulin dependent (HCC)    -  Primary ICD-10-CM: E11.9  ICD-9-CM: 250.00     Benign essential HTN     ICD-10-CM: I10  ICD-9-CM: 401.1     Mixed hyperlipidemia     ICD-10-CM: E78.2  ICD-9-CM: 272.2     Dyspepsia     ICD-10-CM: R10.13  ICD-9-CM: 536.8     Hemoptysis     ICD-10-CM: R04.2  ICD-9-CM: 786.30     Fatigue, unspecified type     ICD-10-CM: R53.83  ICD-9-CM: 780.79         CBC  Referral to GI  Start famotdine 20 mg 1p.o. BID  CMP  Lipid panel  A1C  Microalbumin  DASH diet  Prop feet when sitting  Encouraged exercise       Return in about 6 months (around 12/9/2022) for Annual, Labs.  Answers for HPI/ROS submitted by the patient on 6/9/2022  What is the primary reason for your visit?: Shortness of Breath

## 2022-06-09 NOTE — PATIENT INSTRUCTIONS
Return in about 6 months (around 12/9/2022) for Annual, Labs.  Call with any questions or concerns.   I will call you with your labs.

## 2022-06-10 LAB
ALBUMIN SERPL-MCNC: 3.9 G/DL (ref 3.8–4.9)
ALBUMIN/CREAT UR: 13 MG/G CREAT (ref 0–29)
ALBUMIN/GLOB SERPL: 1.4 {RATIO} (ref 1.2–2.2)
ALP SERPL-CCNC: 105 IU/L (ref 44–121)
ALT SERPL-CCNC: 34 IU/L (ref 0–44)
AST SERPL-CCNC: 31 IU/L (ref 0–40)
BASOPHILS # BLD AUTO: 0 X10E3/UL (ref 0–0.2)
BASOPHILS NFR BLD AUTO: 0 %
BILIRUB SERPL-MCNC: 1.4 MG/DL (ref 0–1.2)
BUN SERPL-MCNC: 14 MG/DL (ref 8–27)
BUN/CREAT SERPL: 17 (ref 10–24)
CALCIUM SERPL-MCNC: 9.2 MG/DL (ref 8.6–10.2)
CHLORIDE SERPL-SCNC: 101 MMOL/L (ref 96–106)
CHOLEST SERPL-MCNC: 146 MG/DL (ref 100–199)
CHOLEST/HDLC SERPL: 3.2 RATIO (ref 0–5)
CO2 SERPL-SCNC: 26 MMOL/L (ref 20–29)
CREAT SERPL-MCNC: 0.81 MG/DL (ref 0.76–1.27)
CREAT UR-MCNC: 133 MG/DL
EGFRCR SERPLBLD CKD-EPI 2021: 101 ML/MIN/1.73
EOSINOPHIL # BLD AUTO: 0.2 X10E3/UL (ref 0–0.4)
EOSINOPHIL NFR BLD AUTO: 3 %
ERYTHROCYTE [DISTWIDTH] IN BLOOD BY AUTOMATED COUNT: 13.7 % (ref 11.6–15.4)
GLOBULIN SER CALC-MCNC: 2.8 G/DL (ref 1.5–4.5)
GLUCOSE SERPL-MCNC: 137 MG/DL (ref 65–99)
HBA1C MFR BLD: 7.1 % (ref 4.8–5.6)
HCT VFR BLD AUTO: 42.7 % (ref 37.5–51)
HDLC SERPL-MCNC: 45 MG/DL
HGB BLD-MCNC: 13.5 G/DL (ref 13–17.7)
IMM GRANULOCYTES # BLD AUTO: 0 X10E3/UL (ref 0–0.1)
IMM GRANULOCYTES NFR BLD AUTO: 0 %
LDLC SERPL CALC-MCNC: 88 MG/DL (ref 0–99)
LYMPHOCYTES # BLD AUTO: 1.4 X10E3/UL (ref 0.7–3.1)
LYMPHOCYTES NFR BLD AUTO: 23 %
MCH RBC QN AUTO: 27.1 PG (ref 26.6–33)
MCHC RBC AUTO-ENTMCNC: 31.6 G/DL (ref 31.5–35.7)
MCV RBC AUTO: 86 FL (ref 79–97)
MICROALBUMIN UR-MCNC: 17.6 UG/ML
MONOCYTES # BLD AUTO: 0.6 X10E3/UL (ref 0.1–0.9)
MONOCYTES NFR BLD AUTO: 11 %
NEUTROPHILS # BLD AUTO: 3.7 X10E3/UL (ref 1.4–7)
NEUTROPHILS NFR BLD AUTO: 63 %
PLATELET # BLD AUTO: 281 X10E3/UL (ref 150–450)
POTASSIUM SERPL-SCNC: 4.1 MMOL/L (ref 3.5–5.2)
PROT SERPL-MCNC: 6.7 G/DL (ref 6–8.5)
RBC # BLD AUTO: 4.99 X10E6/UL (ref 4.14–5.8)
SODIUM SERPL-SCNC: 140 MMOL/L (ref 134–144)
TRIGL SERPL-MCNC: 67 MG/DL (ref 0–149)
VLDLC SERPL CALC-MCNC: 13 MG/DL (ref 5–40)
WBC # BLD AUTO: 5.8 X10E3/UL (ref 3.4–10.8)

## 2022-06-29 ENCOUNTER — TRANSCRIBE ORDERS (OUTPATIENT)
Dept: PULMONOLOGY | Facility: HOSPITAL | Age: 62
End: 2022-06-29

## 2022-06-29 DIAGNOSIS — I27.20 PULMONARY HYPERTENSION: Primary | ICD-10-CM

## 2022-07-12 ENCOUNTER — TELEPHONE (OUTPATIENT)
Dept: CARDIOLOGY | Facility: CLINIC | Age: 62
End: 2022-07-12

## 2022-07-12 NOTE — TELEPHONE ENCOUNTER
Patient with VVIR pacer, remote transmission reviewed today documented 3 NSVT events from 6/30 -7/5/22.  Events 9-21 beats long with rates 166-192 BPM. Patient with known history of NSVT on previous remotes.     Patient scheduled to see Dr. Vanegas on 7/13/22.    EGM from 7/5/22 with 21 beats NSVT:

## 2022-07-13 ENCOUNTER — OFFICE VISIT (OUTPATIENT)
Dept: CARDIOLOGY | Facility: CLINIC | Age: 62
End: 2022-07-13

## 2022-07-13 VITALS
HEIGHT: 75 IN | DIASTOLIC BLOOD PRESSURE: 90 MMHG | SYSTOLIC BLOOD PRESSURE: 150 MMHG | HEART RATE: 61 BPM | BODY MASS INDEX: 39.17 KG/M2 | WEIGHT: 315 LBS

## 2022-07-13 DIAGNOSIS — Z95.0 S/P PLACEMENT OF CARDIAC PACEMAKER: ICD-10-CM

## 2022-07-13 DIAGNOSIS — I48.0 PAF (PAROXYSMAL ATRIAL FIBRILLATION): ICD-10-CM

## 2022-07-13 DIAGNOSIS — I10 ESSENTIAL HYPERTENSION: ICD-10-CM

## 2022-07-13 DIAGNOSIS — I11.9 HYPERTENSIVE LEFT VENTRICULAR HYPERTROPHY, WITHOUT HEART FAILURE: ICD-10-CM

## 2022-07-13 DIAGNOSIS — I47.29 NONSUSTAINED VENTRICULAR TACHYCARDIA: Primary | ICD-10-CM

## 2022-07-13 PROCEDURE — 99214 OFFICE O/P EST MOD 30 MIN: CPT | Performed by: INTERNAL MEDICINE

## 2022-07-13 PROCEDURE — 93000 ELECTROCARDIOGRAM COMPLETE: CPT | Performed by: INTERNAL MEDICINE

## 2022-07-13 NOTE — PROGRESS NOTES
Date of Office Visit: 2022  Encounter Provider: Jennie Vanegas MD  Place of Service: [facility]  Patient Name: Shashi Engel  :1960      Patient ID:  Shashi Engel is a 61 y.o. male is here for  followup for VT, SVT, pacer.         History of Present Illness    He has a history of recurrent syncope, SVT, VT, PAF, pacemaker, VENESSA on CPAP.     I first saw him in 2013 when he came in to the Chest Pain Unit. He had had five days of short-windedness at that time and had risk factors for cardiac issues including diabetes mellitus, hypertension, and hyperlipidemia. He underwent an echocardiogram on 2013 which revealed severe concentric left ventricular hypertrophy, an ejection fraction of 66%, mild-to-moderate mitral insufficiency, mild-to-moderate tricuspid insufficiency, mildelevation of right ventricular systolic pressure at 40 mmHg. He then wore a 24-hour Holter monitor which showed atrial fibrillation with average heart rate of 70 beats per minute with brief episodes of tachycardia and bradycardia. He had had a stress nuclear perfusion study performed in 2012 which showed no evidence of ischemia. Ejection fraction was mildly reduced due to atrial fibrillation.       He does have obstructive sleep apnea and uses his CPAP faithfully.        His CHADS-VASc score is 2 giving him a 2.2% risk of strokes per year. He is on Xarelto.    He did go to [place] and was evaluated in the dysautonomic clinic 17.  There testing showed grossly intact autonomic function.  They thought that possibly his atrial fibrillation was driving his symptoms of fatigue and near syncope.  Device interrogation today shows short bursts of ventricular ventricular tachycardia, 13 beats as well as premature ventricular complexes noted 25% at time.      He has stress echocardiogram done 17 for dyspnea and decreased exercise tolerance.  This showed ejection fraction  50% with severe concentric left hypertrophy, severe left atrial enlargement, RVSP 46 mmHg mildly reduced right ventricular systolic function but there is no evidence of ischemia on the stress echocardiogram portion.  He also had a 12 day monitor done 12/2017 which showed atrial fibrillation with bursts of tachycardia but there were no significant pulses are bradycardia.  He had normal serum protein electrophoresis done December 2017.  This is done due to severe left hypertrophy.     He's had recurrent episodes of near syncope which we believe are probably due to his blood pressure dropping as he has documented this at times.  In addition all been may also be related to bursts of atrial fibrillation with rapid ventricular response.  They also could be due to low blood sugar.     Labs in 6/9/2022 showed glucose 137, otherwise normal CMP, hemoglobin A1c 7.1, total cholesterol 146, HDL 45, LDL 88, triglycerides 67, normal CBC.  Interrogation of his pacemaker showed 3 nonsustained runs of ventricular tachycardia ranging from a heart rate of 166 192 bpm.  They would last anywhere from 9 beats to 21 beats.  They 20 beat episode of nonsustained VT happen on 7/5/2022.  He had a generator change on 5/9/2022.    He has noticed lower extremity edema and an increase in his blood pressure over the last couple weeks.  He has no orthopnea or PND.  He has intermittent exertional dyspnea.  He has no chest pain or pressure.  He also has intermittent fatigue.  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  He is seeing Dr. Dias for his dyspnea as well and he put him on inhaler which she thinks does help.  He does not feel his heart racing or skipping.    Past Medical History:   Diagnosis Date   • Abnormal electrocardiogram    • Anxiety    • Cardiomyopathy, hypertrophic, primary familial (HCC)    • Erectile dysfunction    • Health care maintenance    • Hyperlipidemia     • Hypertension    • Mild concentric left ventricular hypertrophy (LVH)    • Mild mitral regurgitation    • Mild tricuspid regurgitation    • Near syncope    • Noncompliance    • Nonsustained ventricular tachycardia (HCC) 09/05/2018   • Obesity    • VENESSA (obstructive sleep apnea)     uses CPAP faithfully   • Osteoarthritis    • PAF (paroxysmal atrial fibrillation) (HCC)    • Pneumonia 01/01/2016   • Type 2 diabetes mellitus ([facility])          Past Surgical History:   Procedure Laterality Date   • CARDIAC CATHETERIZATION N/A 03/02/2022    Procedure: RIGHT HEART CATH;  Surgeon: Anjel Beasley MD;  Location:  [facility];  Service: Cardiovascular;  Laterality: N/A;   • CARDIAC CATHETERIZATION N/A 03/02/2022    Procedure: Coronary angiography;  Surgeon: Anjel Beasley MD;  Location:  [facility];  Service: Cardiovascular;  Laterality: N/A;   • CARDIAC ELECTROPHYSIOLOGY PROCEDURE Left 06/06/2016    Procedure: Pacemaker DC new  [place];  Surgeon: Juan Chacon MD;  Location:  [facility];  Service:    • CARDIAC ELECTROPHYSIOLOGY PROCEDURE N/A 05/09/2022    Procedure: PPM generator change - dual- [place];  Surgeon: Juan Chacon MD;  Location:  [facility];  Service: Cardiology;  Laterality: N/A;   • INSERT / REPLACE / REMOVE PACEMAKER     • KNEE ARTHROSCOPY Right        Current Outpatient Medications on File Prior to Visit   Medication Sig Dispense Refill   • amLODIPine (NORVASC) 5 MG tablet TAKE 1 TABLET BY MOUTH EVERY DAY 15 tablet 0   • atorvastatin (LIPITOR) 40 MG tablet Take 1 tablet by mouth Daily. 90 tablet 1   • chlorthalidone (HYGROTON) 25 MG tablet Take 1 tablet by mouth Daily. 303 tablet 0   • cholecalciferol (VITAMIN D3) 25 MCG (1000 UT) tablet Take 1,000 Units by mouth Daily.     • [employer] Lancets Original [facility] Use as directed and appropo lancet for his monitor 100 each 11   • famotidine (Pepcid) 20 MG tablet Take 1 tablet by mouth  "2 (Two) Times a Day. 60 tablet 3   • glucose blood test strip He needs [employer] brand TrueTrack with lancets strips. Test daily. 100 each 12   • metFORMIN (GLUCOPHAGE) 1000 MG tablet TAKE 1 TABLET BY MOUTH TWICE A DAY WITH MEALS 30 tablet 0   • metoprolol tartrate (LOPRESSOR) 25 MG tablet TAKE 1 TABLET BY MOUTH TWICE A  tablet 3   • quinapril (ACCUPRIL) 40 MG tablet TAKE 2 TABLETS BY MOUTH DAILY 180 tablet 1   • rivaroxaban (Xarelto) 20 MG tablet Take 1 tablet by mouth Daily With Dinner. 90 tablet 3   • vitamin B-12 (CYANOCOBALAMIN) 1000 MCG tablet Take 1,000 mcg by mouth Daily.     • tadalafil (CIALIS) 20 MG tablet Take  by mouth Daily As Needed.       No current facility-administered medications on file prior to visit.       Social History     Socioeconomic History   • Marital status:    Tobacco Use   • Smoking status: Former Smoker     Types: Cigars   • Smokeless tobacco: Never Used   • Tobacco comment: NO caffeine use    Substance and Sexual Activity   • Alcohol use: No   • Drug use: Not Currently     Types: Marijuana     Comment: out of cigars in the remote past   • Sexual activity: Yes     Partners: Female           ROS    Procedures    ECG 12 Lead    Date/Time: 7/13/2022 12:21 PM  Performed by: Jennie Vanegas MD  Authorized by: Jennie Vanegas MD   Comparison: compared with previous ECG   Similar to previous ECG  Pacing: ventricular paced rhythm  Clinical impression: abnormal EKG                Objective:      Vitals:    07/13/22 1151   BP: 150/90   Pulse: 61   Weight: (!) 145 kg (319 lb)   Height: 190.5 cm (75\")     Body mass index is 39.87 kg/m².    Vitals reviewed.   Constitutional:       General: Not in acute distress.     Appearance: Well-developed. Not diaphoretic.   Eyes:      General: No scleral icterus.     Conjunctiva/sclera: Conjunctivae normal.   HENT:      Head: Normocephalic and atraumatic.   Neck:      Thyroid: No thyromegaly.      Vascular: No carotid bruit or JVD.      " Lymphadenopathy: No cervical adenopathy.   Pulmonary:      Effort: Pulmonary effort is normal. No respiratory distress.      Breath sounds: Normal breath sounds. No wheezing. No rhonchi. No rales.   Chest:      Chest wall: Not tender to palpatation.   Cardiovascular:      Normal rate. Regular rhythm.      Murmurs: There is no murmur.      No gallop.   Pulses:     Intact distal pulses.   Edema:     Peripheral edema present.     Pretibial: bilateral 2+ edema of the pretibial area.     Ankle: bilateral 2+ edema of the ankle.  Abdominal:      General: Bowel sounds are normal. There is no distension or abdominal bruit.      Palpations: Abdomen is soft. There is no abdominal mass.      Tenderness: There is no abdominal tenderness.   Musculoskeletal:         General: No deformity.      Extremities: No clubbing present.     Cervical back: Neck supple. Skin:     General: Skin is warm and dry. There is no cyanosis.      Coloration: Skin is not pale.      Findings: No rash.   Neurological:      Mental Status: Alert and oriented to person, place, and time.      Cranial Nerves: No cranial nerve deficit.   Psychiatric:         Judgment: Judgment normal.         Lab Review:       Assessment:      Diagnosis Plan   1. Nonsustained ventricular tachycardia (HCC)  Adult Transthoracic Echo Complete W/ Cont if Necessary Per Protocol   2. Hypertensive left ventricular hypertrophy, without heart failure  Adult Transthoracic Echo Complete W/ Cont if Necessary Per Protocol   3. Essential hypertension  Adult Transthoracic Echo Complete W/ Cont if Necessary Per Protocol   4. S/P placement of cardiac pacemaker     5. PAF (paroxysmal atrial fibrillation) (HCC)       1. H/o Fatigue and lightheadedness, resolved, likely due to labile BP, PVCs and PAF and low blood sugar.   2. Atrial fibrillation, rate controlled. On xarelto. Occasional gets fast.   3. Hypertension. BP high over the last couple weeks of lower extremity edema.  He still has  intermittent low blood pressures.  We will try a week increase chlorthalidone.  4. Hyperlipidemia. Controlled on atorvastatin.   5. History of left ventricular hypertrophy on echocardiogram.   6. Diabetes mellitus type 2.  Overall under fair control.  7. Obstructive sleep apnea on CPAP.  Is trying to figure out if he needs a new machine based on a recall on CPAP machines.  8. Obesity.   9. SSS, s/p pacer.   10. Nonsustained VT and frequent PVCs.   11.  Elevated RVSP on echo  12.  Dyspnea, started on inhaler by Dr. Dias.     Plan:       Increase chlorthalidone to 50 mg in the morning for the next 7 days.  He will update me about his edema and blood pressure next week, set up echocardiogram for September and see Yvette in 3 months.

## 2022-07-15 ENCOUNTER — OFFICE VISIT (OUTPATIENT)
Dept: GASTROENTEROLOGY | Facility: CLINIC | Age: 62
End: 2022-07-15

## 2022-07-15 VITALS
SYSTOLIC BLOOD PRESSURE: 142 MMHG | WEIGHT: 315 LBS | DIASTOLIC BLOOD PRESSURE: 87 MMHG | TEMPERATURE: 97.3 F | BODY MASS INDEX: 40.43 KG/M2 | HEIGHT: 74 IN

## 2022-07-15 DIAGNOSIS — R10.13 DYSPEPSIA: Primary | ICD-10-CM

## 2022-07-15 PROCEDURE — 99204 OFFICE O/P NEW MOD 45 MIN: CPT | Performed by: INTERNAL MEDICINE

## 2022-07-15 RX ORDER — PANTOPRAZOLE SODIUM 40 MG/1
40 TABLET, DELAYED RELEASE ORAL DAILY
Qty: 30 TABLET | Refills: 5 | Status: SHIPPED | OUTPATIENT
Start: 2022-07-15

## 2022-07-15 NOTE — PROGRESS NOTES
Chief Complaint   Patient presents with   • Nausea   • Diarrhea     Subjective   HPI  Shashi Engel is a 61 y.o. male who presents today for new patient evaluation.  He complains of upset stomach.  Symptoms present for the last few months.  Sensation of nausea he will occasionally have dry heaves will sometimes cough up phlegm like material and eventually has some improvement in his symptoms.  No fredy emesis.  Recently started on low-dose famotidine with minimal improvement in his symptoms.  Does have a significant cardiac history including PAF on anticoagulation sick sinus syndrome left ventricular hypertrophy he recently saw Dr. Vanegas his cardiologist and is scheduled for an echocardiogram in September.      Objective   Vitals:    07/15/22 1055   BP: 142/87   Temp: 97.3 °F (36.3 °C)     Physical Exam  Vitals reviewed.   Constitutional:       Appearance: He is well-developed.   HENT:      Head: Normocephalic and atraumatic.   Abdominal:      General: Bowel sounds are normal. There is no distension.      Palpations: Abdomen is soft. There is no mass.      Tenderness: There is no abdominal tenderness.      Hernia: No hernia is present.   Skin:     General: Skin is warm and dry.   Neurological:      Mental Status: He is alert and oriented to person, place, and time.   Psychiatric:         Behavior: Behavior normal.         Thought Content: Thought content normal.         Judgment: Judgment normal.              Assessment & Plan   Assessment:     1. Dyspepsia      Plan:   Will change Pepcid to 40 mg of Protonix.  Ideally I would like to arrange for EGD will need to reach out to his cardiology team to get preop risk assessment.  They would prefer we wait until his echocardiogram has been performed in September then we will order an upper GI series in the interim.  He will need to hold anticoagulation for 2 days prior to EGD.            Anjel Carney M.D.  [facility]  [address]  - [address] [place] [address]  Office: [phone]

## 2022-07-18 ENCOUNTER — TELEPHONE (OUTPATIENT)
Dept: GASTROENTEROLOGY | Facility: CLINIC | Age: 62
End: 2022-07-18

## 2022-07-18 NOTE — TELEPHONE ENCOUNTER
----- Message from Anjel Carney MD sent at 7/18/2022  7:19 AM EDT -----  Pt has been given ok for endoscopy from cardiology, please schedule      ----- Message -----  From: Jennie Vanegas MD  Sent: 7/15/2022  12:52 PM EDT  To: Anjel Carney MD    You can proceed    rm  ----- Message -----  From: Anjel Carney MD  Sent: 7/15/2022  11:11 AM EDT  To: MD Eric Timmons,    Would like to perform EGD on Mr Engel for some dyspeptic symptoms.  I see he has 2D echo ordered for September.  Do you think I need to wait until echo comes back or can we go ahead and scheduled from cardiac standpoint?  Thanks, Anjel

## 2022-07-25 PROBLEM — R10.13 DYSPEPSIA: Status: ACTIVE | Noted: 2022-07-25

## 2022-07-27 ENCOUNTER — TELEPHONE (OUTPATIENT)
Dept: FAMILY MEDICINE CLINIC | Facility: CLINIC | Age: 62
End: 2022-07-27

## 2022-07-27 NOTE — TELEPHONE ENCOUNTER
Caller: Shashi Engel    Relationship: Self    Best call back number: [phone]    What medication are you requesting: SOMETHING FOR COVID    What are your current symptoms: RUNNY NOSE, CAN'T EAT, CAN'T HOLD ANYTHING DOWN, FEVER, COUGH    How long have you been experiencing symptoms: ABOUT 3 DAYS    Have you had these symptoms before:    [] Yes  [x] No    Have you been treated for these symptoms before:   [] Yes  [x] No    If a prescription is needed, what is your preferred pharmacy and phone number: [facility] #[id number] - [place], [place] - [address] - [phone]  - [phone] FX     Additional notes: TESTED POSITIVE FOR COVID TODAY.

## 2022-07-28 NOTE — TELEPHONE ENCOUNTER
He cannot take Paxlovid, he's on too many medicines that interact.  Can you try to get him set up for an infusion?

## 2022-07-28 NOTE — TELEPHONE ENCOUNTER
Spoke to patient he is feeling better today and does not want the infusion will continue to take OTC meds

## 2022-07-30 ENCOUNTER — HOSPITAL ENCOUNTER (EMERGENCY)
Facility: HOSPITAL | Age: 62
Discharge: HOME | End: 2022-07-30
Attending: EMERGENCY MEDICINE | Admitting: EMERGENCY MEDICINE

## 2022-07-30 ENCOUNTER — APPOINTMENT (OUTPATIENT)
Dept: GENERAL RADIOLOGY | Facility: HOSPITAL | Age: 62
End: 2022-07-30

## 2022-07-30 VITALS
HEART RATE: 60 BPM | TEMPERATURE: 96.1 F | OXYGEN SATURATION: 96 % | SYSTOLIC BLOOD PRESSURE: 161 MMHG | RESPIRATION RATE: 15 BRPM | DIASTOLIC BLOOD PRESSURE: 90 MMHG

## 2022-07-30 DIAGNOSIS — Z86.79 HISTORY OF ATRIAL FIBRILLATION: ICD-10-CM

## 2022-07-30 DIAGNOSIS — Z86.79 HISTORY OF HYPERTENSION: ICD-10-CM

## 2022-07-30 DIAGNOSIS — E86.0 MILD DEHYDRATION: ICD-10-CM

## 2022-07-30 DIAGNOSIS — Z86.39 HISTORY OF DIABETES MELLITUS: ICD-10-CM

## 2022-07-30 DIAGNOSIS — R53.81 MALAISE: ICD-10-CM

## 2022-07-30 DIAGNOSIS — R19.7 DIARRHEA, UNSPECIFIED TYPE: ICD-10-CM

## 2022-07-30 DIAGNOSIS — R53.83 FATIGUE, UNSPECIFIED TYPE: Primary | ICD-10-CM

## 2022-07-30 LAB
ALBUMIN SERPL-MCNC: 3.7 G/DL (ref 3.5–5.2)
ALBUMIN/GLOB SERPL: 1.2 G/DL
ALP SERPL-CCNC: 88 U/L (ref 39–117)
ALT SERPL W P-5'-P-CCNC: 24 U/L (ref 1–41)
ANION GAP SERPL CALCULATED.3IONS-SCNC: 12.2 MMOL/L (ref 5–15)
AST SERPL-CCNC: 32 U/L (ref 1–40)
BACTERIA UR QL AUTO: NORMAL /HPF
BASOPHILS # BLD AUTO: 0.02 10*3/MM3 (ref 0–0.2)
BASOPHILS NFR BLD AUTO: 0.6 % (ref 0–1.5)
BILIRUB SERPL-MCNC: 1.2 MG/DL (ref 0–1.2)
BILIRUB UR QL STRIP: NEGATIVE
BUN SERPL-MCNC: 14 MG/DL (ref 8–23)
BUN/CREAT SERPL: 19.4 (ref 7–25)
CALCIUM SPEC-SCNC: 8.6 MG/DL (ref 8.6–10.5)
CHLORIDE SERPL-SCNC: 98 MMOL/L (ref 98–107)
CLARITY UR: CLEAR
CO2 SERPL-SCNC: 26.8 MMOL/L (ref 22–29)
COLOR UR: ABNORMAL
CREAT SERPL-MCNC: 0.72 MG/DL (ref 0.76–1.27)
D-LACTATE SERPL-SCNC: 1.3 MMOL/L (ref 0.5–2)
DEPRECATED RDW RBC AUTO: 40.3 FL (ref 37–54)
EGFRCR SERPLBLD CKD-EPI 2021: 104.6 ML/MIN/1.73
EOSINOPHIL # BLD AUTO: 0.08 10*3/MM3 (ref 0–0.4)
EOSINOPHIL NFR BLD AUTO: 2.2 % (ref 0.3–6.2)
ERYTHROCYTE [DISTWIDTH] IN BLOOD BY AUTOMATED COUNT: 13.7 % (ref 12.3–15.4)
GLOBULIN UR ELPH-MCNC: 3.1 GM/DL
GLUCOSE SERPL-MCNC: 109 MG/DL (ref 65–99)
GLUCOSE UR STRIP-MCNC: NEGATIVE MG/DL
HCT VFR BLD AUTO: 42.6 % (ref 37.5–51)
HGB BLD-MCNC: 13.9 G/DL (ref 13–17.7)
HGB UR QL STRIP.AUTO: ABNORMAL
HYALINE CASTS UR QL AUTO: NORMAL /LPF
IMM GRANULOCYTES # BLD AUTO: 0.01 10*3/MM3 (ref 0–0.05)
IMM GRANULOCYTES NFR BLD AUTO: 0.3 % (ref 0–0.5)
KETONES UR QL STRIP: ABNORMAL
LEUKOCYTE ESTERASE UR QL STRIP.AUTO: NEGATIVE
LIPASE SERPL-CCNC: 49 U/L (ref 13–60)
LYMPHOCYTES # BLD AUTO: 1.31 10*3/MM3 (ref 0.7–3.1)
LYMPHOCYTES NFR BLD AUTO: 36.5 % (ref 19.6–45.3)
MAGNESIUM SERPL-MCNC: 1.7 MG/DL (ref 1.6–2.4)
MCH RBC QN AUTO: 26.7 PG (ref 26.6–33)
MCHC RBC AUTO-ENTMCNC: 32.6 G/DL (ref 31.5–35.7)
MCV RBC AUTO: 81.9 FL (ref 79–97)
MONOCYTES # BLD AUTO: 0.53 10*3/MM3 (ref 0.1–0.9)
MONOCYTES NFR BLD AUTO: 14.8 % (ref 5–12)
NEUTROPHILS NFR BLD AUTO: 1.64 10*3/MM3 (ref 1.7–7)
NEUTROPHILS NFR BLD AUTO: 45.6 % (ref 42.7–76)
NITRITE UR QL STRIP: NEGATIVE
NRBC BLD AUTO-RTO: 0 /100 WBC (ref 0–0.2)
NT-PROBNP SERPL-MCNC: 267 PG/ML (ref 0–900)
PH UR STRIP.AUTO: 5.5 [PH] (ref 5–8)
PLATELET # BLD AUTO: 218 10*3/MM3 (ref 140–450)
PMV BLD AUTO: 10 FL (ref 6–12)
POTASSIUM SERPL-SCNC: 3.3 MMOL/L (ref 3.5–5.2)
PROCALCITONIN SERPL-MCNC: 0.11 NG/ML (ref 0–0.25)
PROT SERPL-MCNC: 6.8 G/DL (ref 6–8.5)
PROT UR QL STRIP: ABNORMAL
RBC # BLD AUTO: 5.2 10*6/MM3 (ref 4.14–5.8)
RBC # UR STRIP: NORMAL /HPF
REF LAB TEST METHOD: NORMAL
SODIUM SERPL-SCNC: 137 MMOL/L (ref 136–145)
SP GR UR STRIP: 1.03 (ref 1–1.03)
SQUAMOUS #/AREA URNS HPF: NORMAL /HPF
TROPONIN T SERPL-MCNC: 0.02 NG/ML (ref 0–0.03)
UROBILINOGEN UR QL STRIP: ABNORMAL
WBC # UR STRIP: NORMAL /HPF
WBC NRBC COR # BLD: 3.59 10*3/MM3 (ref 3.4–10.8)

## 2022-07-30 PROCEDURE — 83735 ASSAY OF MAGNESIUM: CPT | Performed by: EMERGENCY MEDICINE

## 2022-07-30 PROCEDURE — 99283 EMERGENCY DEPT VISIT LOW MDM: CPT

## 2022-07-30 PROCEDURE — 84484 ASSAY OF TROPONIN QUANT: CPT | Performed by: EMERGENCY MEDICINE

## 2022-07-30 PROCEDURE — 84145 PROCALCITONIN (PCT): CPT | Performed by: EMERGENCY MEDICINE

## 2022-07-30 PROCEDURE — 81001 URINALYSIS AUTO W/SCOPE: CPT | Performed by: EMERGENCY MEDICINE

## 2022-07-30 PROCEDURE — 71045 X-RAY EXAM CHEST 1 VIEW: CPT

## 2022-07-30 PROCEDURE — 80053 COMPREHEN METABOLIC PANEL: CPT | Performed by: EMERGENCY MEDICINE

## 2022-07-30 PROCEDURE — 83880 ASSAY OF NATRIURETIC PEPTIDE: CPT | Performed by: EMERGENCY MEDICINE

## 2022-07-30 PROCEDURE — 83605 ASSAY OF LACTIC ACID: CPT | Performed by: EMERGENCY MEDICINE

## 2022-07-30 PROCEDURE — 85025 COMPLETE CBC W/AUTO DIFF WBC: CPT | Performed by: EMERGENCY MEDICINE

## 2022-07-30 PROCEDURE — 83690 ASSAY OF LIPASE: CPT | Performed by: EMERGENCY MEDICINE

## 2022-07-30 RX ADMIN — SODIUM CHLORIDE 1000 ML: 9 INJECTION, SOLUTION INTRAVENOUS at 09:36

## 2022-08-13 ENCOUNTER — LAB (OUTPATIENT)
Dept: LAB | Facility: HOSPITAL | Age: 62
End: 2022-08-13

## 2022-08-13 DIAGNOSIS — R10.13 DYSPEPSIA: ICD-10-CM

## 2022-08-13 LAB — SARS-COV-2 ORF1AB RESP QL NAA+PROBE: DETECTED

## 2022-08-13 PROCEDURE — C9803 HOPD COVID-19 SPEC COLLECT: HCPCS

## 2022-08-13 PROCEDURE — U0004 COV-19 TEST NON-CDC HGH THRU: HCPCS

## 2022-08-15 ENCOUNTER — TELEPHONE (OUTPATIENT)
Dept: GASTROENTEROLOGY | Facility: CLINIC | Age: 62
End: 2022-08-15

## 2022-08-15 NOTE — TELEPHONE ENCOUNTER
Patient called. Advised he tested positive for covid.   When questioned if he has had covid recently, he states he thinks so but did not have any symptoms.   He states he only symptom now is extreme tiredness.   Update to Dr. Carney.

## 2022-08-19 DIAGNOSIS — I10 BENIGN ESSENTIAL HTN: ICD-10-CM

## 2022-08-19 DIAGNOSIS — E11.9 DIABETES MELLITUS TYPE 2, NONINSULIN DEPENDENT: ICD-10-CM

## 2022-08-19 RX ORDER — QUINAPRIL 40 MG/1
80 TABLET ORAL DAILY
Qty: 180 TABLET | Refills: 1 | Status: SHIPPED | OUTPATIENT
Start: 2022-08-19 | End: 2023-02-13

## 2022-08-26 ENCOUNTER — TELEPHONE (OUTPATIENT)
Dept: GASTROENTEROLOGY | Facility: CLINIC | Age: 62
End: 2022-08-26

## 2022-08-30 PROCEDURE — 93296 REM INTERROG EVL PM/IDS: CPT | Performed by: INTERNAL MEDICINE

## 2022-08-30 PROCEDURE — 93294 REM INTERROG EVL PM/LDLS PM: CPT | Performed by: INTERNAL MEDICINE

## 2022-09-02 ENCOUNTER — TELEPHONE (OUTPATIENT)
Dept: GASTROENTEROLOGY | Facility: CLINIC | Age: 62
End: 2022-09-02

## 2022-09-02 NOTE — TELEPHONE ENCOUNTER
Caller: Shashi Engel    Relationship to patient: Self    Best call back number: [phone]    Chief complaint: PT WANTS TO SCHEDULE EGD    Type of visit: EGD    Requested date: FIRST AVAILABLE    If rescheduling, when is the original appointment: 8/16

## 2022-09-04 DIAGNOSIS — R10.13 DYSPEPSIA: ICD-10-CM

## 2022-09-06 RX ORDER — FAMOTIDINE 20 MG/1
TABLET, FILM COATED ORAL
Qty: 180 TABLET | Refills: 1 | Status: SHIPPED | OUTPATIENT
Start: 2022-09-06 | End: 2022-09-28

## 2022-09-06 NOTE — TELEPHONE ENCOUNTER
Would you like to refill script? No longer on medication list. Was discontinued on 7/15/2022. Thank you.

## 2022-09-26 ENCOUNTER — TELEPHONE (OUTPATIENT)
Dept: CARDIOLOGY | Facility: CLINIC | Age: 62
End: 2022-09-26

## 2022-09-26 ENCOUNTER — APPOINTMENT (OUTPATIENT)
Dept: CARDIOLOGY | Facility: HOSPITAL | Age: 62
End: 2022-09-26

## 2022-09-26 NOTE — TELEPHONE ENCOUNTER
Returned call to Shashi Engel and he is agreeable to this plan.  Patient is scheduled on 9/28 for an echo at 830 am and an appointment with Yvette Ibarra at 930 am.    Please let me know if there is anything else you would like me to do for this patient.    Thank you,  Jodie Andres RN  Triage Nurse [facility]

## 2022-09-26 NOTE — TELEPHONE ENCOUNTER
Patient called today because he said for the last 3 days he has had increased SOA with exertion. He said the littlest acitvity exhaustes him and make him SOA. He denies any swelling or weight gain. BPs have been in the 130s/80s HR 60s. He is scheduled for an echo on 10/5 and scheduled to see Yvette on 10/19. Below are his medications, let me know how you would like to proceed.    Amlodipine 5mg daily  Chlorthalidone 25mg daily  metoprolol 25mg BID  Xarelto 20mg daily    Dixie Leong RN  Triage MG

## 2022-09-26 NOTE — TELEPHONE ENCOUNTER
Left VM for patient letting him know about recommendations.     Scheduling,     Please move up patient's appointment and echo to this week per Dr. Vanegas.

## 2022-09-28 ENCOUNTER — HOSPITAL ENCOUNTER (OUTPATIENT)
Dept: CARDIOLOGY | Facility: HOSPITAL | Age: 62
Discharge: HOME | End: 2022-09-28

## 2022-09-28 ENCOUNTER — LAB (OUTPATIENT)
Dept: LAB | Facility: HOSPITAL | Age: 62
End: 2022-09-28

## 2022-09-28 ENCOUNTER — OFFICE VISIT (OUTPATIENT)
Dept: CARDIOLOGY | Facility: CLINIC | Age: 62
End: 2022-09-28

## 2022-09-28 VITALS
DIASTOLIC BLOOD PRESSURE: 76 MMHG | OXYGEN SATURATION: 97 % | SYSTOLIC BLOOD PRESSURE: 136 MMHG | BODY MASS INDEX: 38.76 KG/M2 | WEIGHT: 302 LBS | HEIGHT: 74 IN | HEART RATE: 77 BPM

## 2022-09-28 VITALS
BODY MASS INDEX: 40.43 KG/M2 | WEIGHT: 315 LBS | HEART RATE: 80 BPM | SYSTOLIC BLOOD PRESSURE: 160 MMHG | HEIGHT: 74 IN | DIASTOLIC BLOOD PRESSURE: 82 MMHG

## 2022-09-28 DIAGNOSIS — I10 ESSENTIAL HYPERTENSION: ICD-10-CM

## 2022-09-28 DIAGNOSIS — M79.89 LEG SWELLING: ICD-10-CM

## 2022-09-28 DIAGNOSIS — I48.0 PAF (PAROXYSMAL ATRIAL FIBRILLATION): ICD-10-CM

## 2022-09-28 DIAGNOSIS — I36.1 TRICUSPID VALVE REGURGITATION, NONRHEUMATIC: ICD-10-CM

## 2022-09-28 DIAGNOSIS — E78.2 MIXED HYPERLIPIDEMIA: ICD-10-CM

## 2022-09-28 DIAGNOSIS — G47.33 OSA (OBSTRUCTIVE SLEEP APNEA): ICD-10-CM

## 2022-09-28 DIAGNOSIS — I11.9 HYPERTENSIVE LEFT VENTRICULAR HYPERTROPHY, WITHOUT HEART FAILURE: ICD-10-CM

## 2022-09-28 DIAGNOSIS — I27.20 PULMONARY HYPERTENSION: Primary | ICD-10-CM

## 2022-09-28 DIAGNOSIS — I34.0 NONRHEUMATIC MITRAL VALVE REGURGITATION: ICD-10-CM

## 2022-09-28 DIAGNOSIS — I47.29 NONSUSTAINED VENTRICULAR TACHYCARDIA: ICD-10-CM

## 2022-09-28 DIAGNOSIS — R06.09 DYSPNEA ON EXERTION: ICD-10-CM

## 2022-09-28 DIAGNOSIS — E11.9 DIABETES MELLITUS TYPE 2, NONINSULIN DEPENDENT: ICD-10-CM

## 2022-09-28 DIAGNOSIS — I49.5 SSS (SICK SINUS SYNDROME): ICD-10-CM

## 2022-09-28 DIAGNOSIS — I27.20 PULMONARY HYPERTENSION: ICD-10-CM

## 2022-09-28 LAB
ALBUMIN SERPL-MCNC: 3.8 G/DL (ref 3.5–5.2)
ALBUMIN/GLOB SERPL: 1.3 G/DL
ALP SERPL-CCNC: 99 U/L (ref 39–117)
ALT SERPL W P-5'-P-CCNC: 32 U/L (ref 1–41)
ANION GAP SERPL CALCULATED.3IONS-SCNC: 11.9 MMOL/L (ref 5–15)
AORTIC ARCH: 3 CM
ASCENDING AORTA: 3.2 CM
AST SERPL-CCNC: 26 U/L (ref 1–40)
BASOPHILS # BLD AUTO: 0.01 10*3/MM3 (ref 0–0.2)
BASOPHILS NFR BLD AUTO: 0.2 % (ref 0–1.5)
BH CV ECHO MEAS - ACS: 2.39 CM
BH CV ECHO MEAS - AI P1/2T: 490 MSEC
BH CV ECHO MEAS - AO MAX PG: 9 MMHG
BH CV ECHO MEAS - AO MEAN PG: 4.6 MMHG
BH CV ECHO MEAS - AO ROOT DIAM: 3.4 CM
BH CV ECHO MEAS - AO V2 MAX: 150.4 CM/SEC
BH CV ECHO MEAS - AO V2 VTI: 30.6 CM
BH CV ECHO MEAS - AVA(I,D): 3.4 CM2
BH CV ECHO MEAS - EDV(CUBED): 189 ML
BH CV ECHO MEAS - EDV(MOD-SP2): 207 ML
BH CV ECHO MEAS - EDV(MOD-SP4): 153 ML
BH CV ECHO MEAS - EF(MOD-BP): 50.9 %
BH CV ECHO MEAS - EF(MOD-SP2): 52.7 %
BH CV ECHO MEAS - EF(MOD-SP4): 52.3 %
BH CV ECHO MEAS - ESV(CUBED): 68.5 ML
BH CV ECHO MEAS - ESV(MOD-SP2): 98 ML
BH CV ECHO MEAS - ESV(MOD-SP4): 73 ML
BH CV ECHO MEAS - FS: 28.7 %
BH CV ECHO MEAS - IVS/LVPW: 1.22 CM
BH CV ECHO MEAS - IVSD: 1.74 CM
BH CV ECHO MEAS - LAT PEAK E' VEL: 12.2 CM/SEC
BH CV ECHO MEAS - LV DIASTOLIC VOL/BSA (35-75): 58 CM2
BH CV ECHO MEAS - LV MASS(C)D: 432 GRAMS
BH CV ECHO MEAS - LV MAX PG: 7.7 MMHG
BH CV ECHO MEAS - LV MEAN PG: 3.6 MMHG
BH CV ECHO MEAS - LV SYSTOLIC VOL/BSA (12-30): 27.7 CM2
BH CV ECHO MEAS - LV V1 MAX: 138.3 CM/SEC
BH CV ECHO MEAS - LV V1 VTI: 28.8 CM
BH CV ECHO MEAS - LVIDD: 5.7 CM
BH CV ECHO MEAS - LVIDS: 4.1 CM
BH CV ECHO MEAS - LVOT AREA: 3.6 CM2
BH CV ECHO MEAS - LVOT DIAM: 2.15 CM
BH CV ECHO MEAS - LVPWD: 1.43 CM
BH CV ECHO MEAS - MED PEAK E' VEL: 6.1 CM/SEC
BH CV ECHO MEAS - MR MAX PG: 97.2 MMHG
BH CV ECHO MEAS - MR MAX VEL: 493 CM/SEC
BH CV ECHO MEAS - MV DEC SLOPE: 739.6 CM/SEC2
BH CV ECHO MEAS - MV DEC TIME: 0.19 MSEC
BH CV ECHO MEAS - MV E MAX VEL: 132 CM/SEC
BH CV ECHO MEAS - MV MAX PG: 10.7 MMHG
BH CV ECHO MEAS - MV MEAN PG: 2.3 MMHG
BH CV ECHO MEAS - MV P1/2T: 65.5 MSEC
BH CV ECHO MEAS - MV V2 VTI: 38.4 CM
BH CV ECHO MEAS - MVA(P1/2T): 3.4 CM2
BH CV ECHO MEAS - MVA(VTI): 2.7 CM2
BH CV ECHO MEAS - PA ACC TIME: 0.12 SEC
BH CV ECHO MEAS - PA PR(ACCEL): 26.7 MMHG
BH CV ECHO MEAS - PA V2 MAX: 116.7 CM/SEC
BH CV ECHO MEAS - PI END-D VEL: 180 CM/SEC
BH CV ECHO MEAS - PULM DIAS VEL: 113.3 CM/SEC
BH CV ECHO MEAS - PULM S/D: 0.94
BH CV ECHO MEAS - PULM SYS VEL: 107 CM/SEC
BH CV ECHO MEAS - QP/QS: 0.79
BH CV ECHO MEAS - RAP SYSTOLE: 15 MMHG
BH CV ECHO MEAS - RV MAX PG: 1.95 MMHG
BH CV ECHO MEAS - RV V1 MAX: 69.8 CM/SEC
BH CV ECHO MEAS - RV V1 VTI: 15.5 CM
BH CV ECHO MEAS - RVOT DIAM: 2.6 CM
BH CV ECHO MEAS - RVSP: 65 MMHG
BH CV ECHO MEAS - SI(MOD-SP2): 41.3 ML/M2
BH CV ECHO MEAS - SI(MOD-SP4): 30.3 ML/M2
BH CV ECHO MEAS - SUP REN AO DIAM: 2.5 CM
BH CV ECHO MEAS - SV(LVOT): 104.7 ML
BH CV ECHO MEAS - SV(MOD-SP2): 109 ML
BH CV ECHO MEAS - SV(MOD-SP4): 80 ML
BH CV ECHO MEAS - SV(RVOT): 82.4 ML
BH CV ECHO MEAS - TAPSE (>1.6): 1.02 CM
BH CV ECHO MEAS - TR MAX PG: 39.5 MMHG
BH CV ECHO MEAS - TR MAX VEL: 314.1 CM/SEC
BH CV ECHO MEASUREMENTS AVERAGE E/E' RATIO: 14.43
BH CV XLRA - RV BASE: 5 CM
BH CV XLRA - RV LENGTH: 7.7 CM
BH CV XLRA - RV MID: 4.5 CM
BH CV XLRA - TDI S': 13 CM/SEC
BILIRUB SERPL-MCNC: 2.2 MG/DL (ref 0–1.2)
BUN SERPL-MCNC: 14 MG/DL (ref 8–23)
BUN/CREAT SERPL: 16.5 (ref 7–25)
CALCIUM SPEC-SCNC: 9.4 MG/DL (ref 8.6–10.5)
CHLORIDE SERPL-SCNC: 101 MMOL/L (ref 98–107)
CO2 SERPL-SCNC: 26.1 MMOL/L (ref 22–29)
CREAT SERPL-MCNC: 0.85 MG/DL (ref 0.76–1.27)
DEPRECATED RDW RBC AUTO: 43 FL (ref 37–54)
EGFRCR SERPLBLD CKD-EPI 2021: 99.5 ML/MIN/1.73
EOSINOPHIL # BLD AUTO: 0.03 10*3/MM3 (ref 0–0.4)
EOSINOPHIL NFR BLD AUTO: 0.5 % (ref 0.3–6.2)
ERYTHROCYTE [DISTWIDTH] IN BLOOD BY AUTOMATED COUNT: 14.2 % (ref 12.3–15.4)
ERYTHROCYTE [SEDIMENTATION RATE] IN BLOOD: 31 MM/HR (ref 0–20)
GLOBULIN UR ELPH-MCNC: 3 GM/DL
GLUCOSE SERPL-MCNC: 113 MG/DL (ref 65–99)
HCT VFR BLD AUTO: 42.8 % (ref 37.5–51)
HGB BLD-MCNC: 13.5 G/DL (ref 13–17.7)
IMM GRANULOCYTES # BLD AUTO: 0.02 10*3/MM3 (ref 0–0.05)
IMM GRANULOCYTES NFR BLD AUTO: 0.4 % (ref 0–0.5)
LEFT ATRIUM VOLUME INDEX: 77.7 ML/M2
LYMPHOCYTES # BLD AUTO: 1.26 10*3/MM3 (ref 0.7–3.1)
LYMPHOCYTES NFR BLD AUTO: 22.2 % (ref 19.6–45.3)
MAGNESIUM SERPL-MCNC: 1.8 MG/DL (ref 1.6–2.4)
MAXIMAL PREDICTED HEART RATE: 160 BPM
MCH RBC QN AUTO: 26.7 PG (ref 26.6–33)
MCHC RBC AUTO-ENTMCNC: 31.5 G/DL (ref 31.5–35.7)
MCV RBC AUTO: 84.6 FL (ref 79–97)
MONOCYTES # BLD AUTO: 0.62 10*3/MM3 (ref 0.1–0.9)
MONOCYTES NFR BLD AUTO: 10.9 % (ref 5–12)
NEUTROPHILS NFR BLD AUTO: 3.74 10*3/MM3 (ref 1.7–7)
NEUTROPHILS NFR BLD AUTO: 65.8 % (ref 42.7–76)
NRBC BLD AUTO-RTO: 0 /100 WBC (ref 0–0.2)
NT-PROBNP SERPL-MCNC: 471 PG/ML (ref 0–900)
PLATELET # BLD AUTO: 230 10*3/MM3 (ref 140–450)
PMV BLD AUTO: 9.8 FL (ref 6–12)
POTASSIUM SERPL-SCNC: 3.5 MMOL/L (ref 3.5–5.2)
PROT SERPL-MCNC: 6.8 G/DL (ref 6–8.5)
RBC # BLD AUTO: 5.06 10*6/MM3 (ref 4.14–5.8)
SINUS: 3.4 CM
SODIUM SERPL-SCNC: 139 MMOL/L (ref 136–145)
STJ: 3.2 CM
STRESS TARGET HR: 136 BPM
TROPONIN T SERPL-MCNC: 0.02 NG/ML (ref 0–0.03)
TSH SERPL DL<=0.05 MIU/L-ACNC: 1.77 UIU/ML (ref 0.27–4.2)
WBC NRBC COR # BLD: 5.68 10*3/MM3 (ref 3.4–10.8)

## 2022-09-28 PROCEDURE — 86335 IMMUNFIX E-PHORSIS/URINE/CSF: CPT

## 2022-09-28 PROCEDURE — 83735 ASSAY OF MAGNESIUM: CPT

## 2022-09-28 PROCEDURE — 80053 COMPREHEN METABOLIC PANEL: CPT

## 2022-09-28 PROCEDURE — 85652 RBC SED RATE AUTOMATED: CPT

## 2022-09-28 PROCEDURE — 85025 COMPLETE CBC W/AUTO DIFF WBC: CPT

## 2022-09-28 PROCEDURE — 93306 TTE W/DOPPLER COMPLETE: CPT

## 2022-09-28 PROCEDURE — 86334 IMMUNOFIX E-PHORESIS SERUM: CPT

## 2022-09-28 PROCEDURE — 99214 OFFICE O/P EST MOD 30 MIN: CPT | Performed by: NURSE PRACTITIONER

## 2022-09-28 PROCEDURE — 84165 PROTEIN E-PHORESIS SERUM: CPT

## 2022-09-28 PROCEDURE — 93000 ELECTROCARDIOGRAM COMPLETE: CPT | Performed by: NURSE PRACTITIONER

## 2022-09-28 PROCEDURE — 93306 TTE W/DOPPLER COMPLETE: CPT | Performed by: INTERNAL MEDICINE

## 2022-09-28 PROCEDURE — 82784 ASSAY IGA/IGD/IGG/IGM EACH: CPT

## 2022-09-28 PROCEDURE — 84484 ASSAY OF TROPONIN QUANT: CPT

## 2022-09-28 PROCEDURE — 84443 ASSAY THYROID STIM HORMONE: CPT

## 2022-09-28 PROCEDURE — 83880 ASSAY OF NATRIURETIC PEPTIDE: CPT

## 2022-09-28 PROCEDURE — 36415 COLL VENOUS BLD VENIPUNCTURE: CPT

## 2022-09-28 RX ORDER — POTASSIUM CHLORIDE 750 MG/1
10 TABLET, FILM COATED, EXTENDED RELEASE ORAL DAILY
Qty: 30 TABLET | Refills: 11 | Status: SHIPPED | OUTPATIENT
Start: 2022-09-28

## 2022-09-28 RX ORDER — FUROSEMIDE 40 MG/1
40 TABLET ORAL DAILY
Qty: 30 TABLET | Refills: 11 | Status: SHIPPED | OUTPATIENT
Start: 2022-09-28 | End: 2022-12-07

## 2022-09-28 NOTE — PROGRESS NOTES
[facility] CARDIOLOGY  [address]  [facility] [address]  [facility] [address]  Phone: [phone]      Patient Name: Shashi Engel  :1960  Age: 61 y.o.  Primary Cardiologist: Jennie Vanegas MD  Encounter Provider:  DENNIS Gallegos      Chief Complaint     Chief Complaint: Follow-up  Fatigue and dyspnea with exertion    SUBJECTIVE     History of Present Illness:  Shashi Engel is a 61 y.o. black male whose medical history includes type 2 diabetes, hypertension, hyperlipidemia and VENESSA/CPAP (he follows with Dr. Dias).  He is followed in our office by Dr. Vanegas for current syncope, SVT, VT, PAF and mildly reduced RV systolic function.     22 Follow-up:  He is here for 3-month follow-up and I am seeing him for the first time today.  He had an echocardiogram prior to appointment which was reviewed by Dr. Vanegas showing severe pulmonary hypertension, moderate to severe mitral valve regurgitation, and severe tricuspid regurgitation.  She discussed that with him.  He reports that he has been having more fatigue and dyspnea with exertion.  His wife reports that he spends a lot of time in bed due to fatigue.  His weight is down about 14 pounds and he reports decreased appetite.  He spitting what more clear phlegm.  Feels that he does not have much leg swelling.  He denies chest pain or leg swelling.  He has no orthopnea; he is compliant with CPAP.  He had COVID in 2022 but did not require hospitalization.    2022 device interrogation showed normal device function set at VVI with battery life of 11 years.  There was one alerts of NSVT lasting 13 beats.  Patient has been known to have these.    Below is a summary of pertinent cardiology findings:  • 2012 stress nuclear perfusion study showed no evidence of ischemia.  • Paroxysmal atrial fibrillation: May 2013 [facility] evaluation for dyspnea.  Echocardiogram showed severe concentric LV hypertrophy,  EF 66%, mild to moderate mitral insufficiency, mild to moderate tricuspid insufficiency mild elevation of RV systolic pressure.  • May 2013 24-hour Holter monitor showed A. fib with average heart rate 70 bpm.  [id number] score is 2.  • December 2017 stress echocardiogram for dyspnea and decreased exercise tolerance showed EF 50%, severe concentric LV hypertrophy, severe left atrial enlargement, RVSP 46 mmHg, mildly reduced RV systolic function, but no evidence of ischemia.  • December 2017 12-day heart monitor showed atrial fibrillation with bursts of tachycardia.  • Recurrent syncope: 2017 evaluated at [facility]; testing showed grossly intact autonomic function.  His symptoms of fatigue and syncope were attributed to atrial fibrillation.  Is also been documented to occur with drops in blood pressure and bursts of atrial fibrillation with RVR.  • December 2017 he had normal serum protein electrophoresis.  • Pacemaker: May 2022 generator change.   • June 2022 device interrogation showed 3 nonsustained runs of VT lasting from 9-21 beats.  • Severe pulmonary hypertension/severe tricuspid regurgitation/mitral regurgitation: Echocardiogram September 2022 shows EF 51%, severe tricuspid regurgitation, moderate to severe mitral regurgitation with eccentric jet noted, severe biatrial enlargement, moderately dilated RV cavity, severe pulmonary hypertension with RVSP 65 mmHg; findings consistent with infiltrative cardiomyopathy.    Past Medical History:   Diagnosis Date   • Abnormal electrocardiogram    • Anxiety    • Cardiomyopathy, hypertrophic, primary familial (HCC)    • Erectile dysfunction    • Health care maintenance    • Hyperlipidemia    • Hypertension    • Mild concentric left ventricular hypertrophy (LVH)    • Mild mitral regurgitation    • Mild tricuspid regurgitation    • Near syncope    • Noncompliance    • Nonsustained ventricular tachycardia (HCC) 09/05/2018   • Obesity    • VENESSA  (obstructive sleep apnea)    • Osteoarthritis    • PAF (paroxysmal atrial fibrillation) (HCC)    • Pneumonia 01/01/2016   • Type 2 diabetes mellitus (HCC)        Past Surgical History:  • Right knee arthroscopy    Social History     Socioeconomic History   • Marital status:    Tobacco Use   • Smoking status: Former Smoker     Types: Cigars   • Smokeless tobacco: Never Used   • Tobacco comment: NO caffeine use    Substance and Sexual Activity   • Alcohol use: No   • Drug use: Not Currently     Types: Marijuana     Comment: out of cigars in the remote past   • Sexual activity: Yes     Partners: Female         Review of Systems     Review of Systems   Constitutional: Positive for decreased appetite, malaise/fatigue and weight loss.   Cardiovascular: Positive for dyspnea on exertion and leg swelling. Negative for chest pain, claudication, cyanosis, irregular heartbeat, near-syncope, orthopnea, palpitations, paroxysmal nocturnal dyspnea and syncope.       Medications     Allergies as of 09/28/2022   • (No Known Allergies)       Current Outpatient Medications on File Prior to Visit   Medication Sig   • amLODIPine (NORVASC) 5 MG tablet TAKE 1 TABLET BY MOUTH EVERY DAY   • atorvastatin (LIPITOR) 40 MG tablet Take 1 tablet by mouth Daily.   • chlorthalidone (HYGROTON) 25 MG tablet Take 1 tablet by mouth Daily.   • cholecalciferol (VITAMIN D3) 25 MCG (1000 UT) tablet Take 1,000 Units by mouth Daily.   • [facility] Lancets Original [facility] Use as directed and appropo lancet for his monitor   • glucose blood test strip He needs [employer] brand TrueTrack with lancets strips. Test daily.   • metFORMIN (GLUCOPHAGE) 1000 MG tablet TAKE 1 TABLET BY MOUTH TWICE A DAY WITH MEALS   • metoprolol tartrate (LOPRESSOR) 25 MG tablet TAKE 1 TABLET BY MOUTH TWICE A DAY   • pantoprazole (PROTONIX) 40 MG EC tablet Take 1 tablet by mouth Daily.   • quinapril (ACCUPRIL) 40 MG tablet TAKE 2 TABLETS BY MOUTH DAILY   • rivaroxaban (Xarelto) 20 MG tablet Take  "1 tablet by mouth Daily With Dinner.   • vitamin B-12 (CYANOCOBALAMIN) 1000 MCG tablet Take 1,000 mcg by mouth Daily.   • tadalafil (CIALIS) 20 MG tablet Take  by mouth Daily As Needed.   • [DISCONTINUED] famotidine (PEPCID) 20 MG tablet TAKE 1 TABLET BY MOUTH TWICE A DAY     No current facility-administered medications on file prior to visit.          OBJECTIVE     Vital Signs:   /76   Pulse 77   Ht 188 cm (74\")   Wt (!) 137 kg (302 lb)   SpO2 97%   BMI 38.77 kg/m²       Weight:  Wt Readings from Last 3 Encounters:   22 (!) 137 kg (302 lb)   22 (!) 143 kg (316 lb)   07/15/22 (!) 144 kg (316 lb 12.8 oz)     Body mass index is 38.77 kg/m².      Physical Exam     Physical Exam  Constitutional:       General: He is not in acute distress.  HENT:      Head: Normocephalic and atraumatic.      Mouth/Throat:      Mouth: Mucous membranes are moist.   Eyes:      General: No scleral icterus.     Extraocular Movements: Extraocular movements intact.      Conjunctiva/sclera: Conjunctivae normal.      Pupils: Pupils are equal, round, and reactive to light.   Cardiovascular:      Rate and Rhythm: Normal rate and regular rhythm.      Pulses: Normal pulses.      Heart sounds: S1 normal and S2 normal. Murmur heard.   Pulmonary:      Effort: No respiratory distress.      Breath sounds: Normal breath sounds. No wheezing, rhonchi or rales.   Abdominal:      General: Bowel sounds are normal. There is no distension.      Palpations: Abdomen is soft.      Tenderness: There is no abdominal tenderness.   Musculoskeletal:         General: Normal range of motion.      Cervical back: Normal range of motion and neck supple.      Right lower le+ Pitting Edema present.      Left lower le+ Pitting Edema present.   Skin:     General: Skin is warm and dry.      Coloration: Skin is not jaundiced.   Neurological:      Mental Status: He is alert and oriented to person, place, and time.   Psychiatric:         Mood and " Affect: Mood normal.         Reviewed Data     Result Review :  The following data was reviewed by DENNIS Gallegos on 09/28/22:  • Labs 06/09/2022:  cr 0.8, K 4.1, otherwise unremarkable CMP, Hgb 13.5, Plt 281, Chol 146, HDL 45, LDL 88, Trig 67, hemoglobin A1c 7.1      ECG 12 Lead    Date/Time: 9/28/2022 4:47 PM  Performed by: Tamara Ibarra APRN  Authorized by: Tamara Ibarra APRN   Comparison: compared with previous ECG from 7/13/2022  Similar to previous ECG  Rhythm: paced  Rate: normal  BPM: 60  Pacing: atrial sensed rhythm and ventricular paced rhythm  Clinical impression: abnormal EKG            Assessment and Plan        Assessment and Plan     Assessment:  1. Pulmonary hypertension ([facility])    2. Tricuspid valve regurgitation, nonrheumatic    3. Nonrheumatic mitral valve regurgitation    4. PAF (paroxysmal atrial fibrillation) ([facility])    5. SSS (sick sinus syndrome) ([facility])    6. Nonsustained ventricular tachycardia ([facility])    7. Hypertensive left ventricular hypertrophy, without heart failure    8. Essential hypertension    9. Mixed hyperlipidemia    10. Diabetes mellitus type 2, noninsulin dependent ([facility])    11. VENESSA (obstructive sleep apnea)    12. Dyspnea on exertion    13. Leg swelling         1. Tricuspid regurgitation: New finding of severe tricuspid valve regurgitation noted on echocardiogram from September 2022.  He also has severe pulmonary hypertension with RVSP 62 mmHg.  He reports fatigue and dyspnea with exertion but no lightheadedness or near syncope.  2. Mitral valve regurgitation: New finding of moderate mitral valve regurgitation noted on echocardiogram from September 2022.  Symptoms as noted above.  3. Pulmonary hypertension: This is graded as severe on echocardiogram from September 2022.  He also has biatrial enlargement.  Lungs are clear on exam but he has peripheral volume excess.  4. Paroxysmal atrial fibrillation: His [id number] score is 4; he is  anticoagulated with Xarelto.  Last Holter monitor in December 2017 showed mostly rate controlled atrial fibrillation though with bursts of tachycardia.  He has had episodes of recurrent syncope felt to be related to bursts of A. fib with RVR.  No rate control issues identified on device check in July 2022.  5. Sick sinus syndrome: He has pacemaker for bouts of recurrent syncope/sick sinus syndrome; originally placed in June 2016.  He had a generator change in May 2022.  Normal device function noted on July 2022 interrogation.  6. Nonsustained ventricular tachycardia: 3 nonsustained runs of VT lasting from 9-21 beats noted on device interrogation June 2022.  July 2022 device interrogation showed 1 episode of NSVT lasting 13 beats.  7. Hypertensive LV hypertrophy: Stress echocardiogram December 2017 showed severe concentric LV hypertrophy with EF 50%.  Findings on echocardiogram from September 2022 are consistent with infiltrative cardiomyopathy.  8. Mildly reduced RV systolic function: Noted on echocardiogram from May 2013 and December 2017.  9. Hypertension: His medical therapy includes chlorthalidone, metoprolol tartrate, Accupril, and amlodipine.  Controlled.  10. Hyperlipidemia: Lipids in June 2020 were at goal; he is treated with 40 mg atorvastatin daily.  11. Type 2 diabetes: Hemoglobin A1c was at goal in June 2022.  He is treated with metformin.  12. Obstructive sleep apnea: He is compliant with CPAP.  13. Dyspnea with exertion: He said more dyspnea with exertion and fatigue over the last few months.  14. Leg swelling: He feels leg swelling is stable on chlorthalidone and he has lost weight.    Plan:  1. Dr. Vanegas and I discussed case with patient and wife today.  2. They are agreeable for RAKESH to be scheduled next week to evaluate mitral and tricuspid valve function.  3. A PYP study will also be ordered to evaluate for infiltrative cardiomyopathy.  4. We will check labs today to include routine labs, serum  protein electrophoresis, serum immunofixation, and urine protein electrophoresis and immunofixation.  5. I will stop his chlorthalidone and start 40 mg Lasix daily.  6. I will see him back in 1 week.      Follow Up:  Return in about 1 week (around 10/5/2022) for Follow-up with DENNIS Boyd.  Orders Placed This Encounter   Procedures   • Immunofixation electrophoresis   • Protein Elec + Interp, Serum   • Protein Electrophoresis, 24 Hr Urine - Urine, Clean Catch   • Comprehensive Metabolic Panel   • proBNP   • Magnesium   • TSH   • Troponin   • Sedimentation Rate   • Immunofixation, Urine - Urine, Clean Catch   • PYP Imaging for Cardiac Amyloidosis   • ECG 12 Lead   • Adult Transesophageal Echo (RAKESH) W/ Cont if Necessary Per Protocol   • CBC & Differential      New Medications Ordered This Visit   Medications   • furosemide (LASIX) 40 MG tablet     Sig: Take 1 tablet by mouth Daily.     Dispense:  30 tablet     Refill:  11         Thank you the opportunity to participate in this patient's care.    DENNIS Mireles    This office note has been dictated.

## 2022-09-29 ENCOUNTER — TELEPHONE (OUTPATIENT)
Dept: GASTROENTEROLOGY | Facility: CLINIC | Age: 62
End: 2022-09-29

## 2022-09-29 DIAGNOSIS — R10.13 DYSPEPSIA: Primary | ICD-10-CM

## 2022-09-29 LAB
ALBUMIN SERPL ELPH-MCNC: 3.4 G/DL (ref 2.9–4.4)
ALBUMIN/GLOB SERPL: 1 {RATIO} (ref 0.7–1.7)
ALPHA1 GLOB SERPL ELPH-MCNC: 0.3 G/DL (ref 0–0.4)
ALPHA2 GLOB SERPL ELPH-MCNC: 0.7 G/DL (ref 0.4–1)
B-GLOBULIN SERPL ELPH-MCNC: 1.2 G/DL (ref 0.7–1.3)
GAMMA GLOB SERPL ELPH-MCNC: 1.2 G/DL (ref 0.4–1.8)
GLOBULIN SER-MCNC: 3.5 G/DL (ref 2.2–3.9)
IGA SERPL-MCNC: 269 MG/DL (ref 90–386)
IGG SERPL-MCNC: 1218 MG/DL (ref 603–1613)
IGM SERPL-MCNC: 47 MG/DL (ref 20–172)
INTERPRETATION SERPL IEP-IMP: NORMAL
LABORATORY COMMENT REPORT: NORMAL
M PROTEIN SERPL ELPH-MCNC: NORMAL G/DL
PROT SERPL-MCNC: 6.9 G/DL (ref 6–8.5)

## 2022-09-29 NOTE — TELEPHONE ENCOUNTER
Update sent to referral schedulers to cancel patient's EGD.   Order placed for upper gi series. Awaiting MD signature.

## 2022-09-29 NOTE — TELEPHONE ENCOUNTER
----- Message from Anjel Carney MD sent at 9/29/2022  7:28 AM EDT -----  Thank you for the update. I think we should delay his EGD until he has completed his cardiac workup, I do not think it is an urgent procedure for him.  In the interim I will go ahead and order an upper GI series.   Thank you    Pia/Maura/Jen - please see above can we update Mr Engel and order upper GI series.      ----- Message -----  From: Tamara Ibarra, DENNIS  Sent: 9/28/2022   5:02 PM EDT  To: MD Dr. Rommel Torres, I'm a nurse practitioner with Dr. Vanegas at [facility] [place]. I'm sending you a copy of office note from today. Mr. Engel is scheduled for EGD on 10/19/22 with you for dysphagia. Echocardiogram today showed new findings of severe tricuspid valve regurgitation, severe pulmonary hypertension, and moderate mitral valve regurgitation. Evaluation is ongoing to evaluate for possible infiltrative cardiomyopathy. Dr. Vanegas wanted me to inform you of Mr. Engel's condition in the event that procedures may need to be delayed. At this time, he looks pretty good. Thanks!  Yvette

## 2022-09-29 NOTE — TELEPHONE ENCOUNTER
Hub staff attempted to follow warm transfer process and was unsuccessful     Caller: Shashi Engel    Relationship to patient: Self    Best call back number: [phone]    Patient is needing: PT WOULD LIKE CALL BACK WITH EGD PREP INSTRUCTIONS

## 2022-09-29 NOTE — TELEPHONE ENCOUNTER
Returned patient's phone call.   Advised he may have solid foods until the day before his surgery.   On the day of surgery, no solid food, may have clear liquids until 2 hours prior to his procedure.   Advised he will need to hold his Xarelto 48 hour prior to his procedure also.     He verb understanding.

## 2022-09-30 ENCOUNTER — LAB (OUTPATIENT)
Dept: LAB | Facility: HOSPITAL | Age: 62
End: 2022-09-30

## 2022-09-30 DIAGNOSIS — M79.89 LEG SWELLING: ICD-10-CM

## 2022-09-30 DIAGNOSIS — I48.0 PAF (PAROXYSMAL ATRIAL FIBRILLATION): ICD-10-CM

## 2022-09-30 DIAGNOSIS — G47.33 OSA (OBSTRUCTIVE SLEEP APNEA): ICD-10-CM

## 2022-09-30 DIAGNOSIS — I27.20 PULMONARY HYPERTENSION: ICD-10-CM

## 2022-09-30 DIAGNOSIS — I11.9 HYPERTENSIVE LEFT VENTRICULAR HYPERTROPHY, WITHOUT HEART FAILURE: ICD-10-CM

## 2022-09-30 DIAGNOSIS — I34.0 NONRHEUMATIC MITRAL VALVE REGURGITATION: ICD-10-CM

## 2022-09-30 DIAGNOSIS — I36.1 TRICUSPID VALVE REGURGITATION, NONRHEUMATIC: ICD-10-CM

## 2022-09-30 DIAGNOSIS — R06.09 DYSPNEA ON EXERTION: ICD-10-CM

## 2022-09-30 LAB — INTERPRETATION UR IFE-IMP: NORMAL

## 2022-09-30 PROCEDURE — 84166 PROTEIN E-PHORESIS/URINE/CSF: CPT

## 2022-09-30 PROCEDURE — 84156 ASSAY OF PROTEIN URINE: CPT

## 2022-10-04 LAB
ALBUMIN 24H MFR UR ELPH: 55.6 %
ALPHA1 GLOB 24H MFR UR ELPH: 7.7 %
ALPHA2 GLOB 24H MFR UR ELPH: 10.3 %
B-GLOBULIN 24H MFR UR ELPH: 14.6 %
GAMMA GLOB 24H MFR UR ELPH: 11.8 %
LABORATORY COMMENT REPORT: ABNORMAL
M PROTEIN 24H MFR UR ELPH: ABNORMAL %
PROT 24H UR-MRATE: 340 MG/24 HR (ref 30–150)
PROT UR-MCNC: 13.6 MG/DL

## 2022-10-05 ENCOUNTER — ANESTHESIA EVENT (OUTPATIENT)
Dept: POSTOP/PACU | Facility: HOSPITAL | Age: 62
End: 2022-10-05

## 2022-10-05 ENCOUNTER — HOSPITAL ENCOUNTER (OUTPATIENT)
Dept: POSTOP/PACU | Facility: HOSPITAL | Age: 62
Discharge: HOME | End: 2022-10-05

## 2022-10-05 ENCOUNTER — TELEPHONE (OUTPATIENT)
Dept: CARDIOLOGY | Facility: CLINIC | Age: 62
End: 2022-10-05

## 2022-10-05 ENCOUNTER — ANESTHESIA (OUTPATIENT)
Dept: POSTOP/PACU | Facility: HOSPITAL | Age: 62
End: 2022-10-05

## 2022-10-05 VITALS
OXYGEN SATURATION: 100 % | TEMPERATURE: 98.5 F | BODY MASS INDEX: 38.76 KG/M2 | HEIGHT: 74 IN | SYSTOLIC BLOOD PRESSURE: 127 MMHG | DIASTOLIC BLOOD PRESSURE: 84 MMHG | HEART RATE: 59 BPM | RESPIRATION RATE: 16 BRPM | WEIGHT: 302 LBS

## 2022-10-05 VITALS — HEART RATE: 60 BPM | OXYGEN SATURATION: 100 % | SYSTOLIC BLOOD PRESSURE: 109 MMHG | DIASTOLIC BLOOD PRESSURE: 62 MMHG

## 2022-10-05 DIAGNOSIS — I11.9 HYPERTENSIVE LEFT VENTRICULAR HYPERTROPHY, WITHOUT HEART FAILURE: ICD-10-CM

## 2022-10-05 DIAGNOSIS — R06.09 DYSPNEA ON EXERTION: ICD-10-CM

## 2022-10-05 DIAGNOSIS — M79.89 LEG SWELLING: ICD-10-CM

## 2022-10-05 DIAGNOSIS — I36.1 TRICUSPID VALVE REGURGITATION, NONRHEUMATIC: ICD-10-CM

## 2022-10-05 DIAGNOSIS — I27.20 PULMONARY HYPERTENSION: ICD-10-CM

## 2022-10-05 DIAGNOSIS — I48.0 PAF (PAROXYSMAL ATRIAL FIBRILLATION): ICD-10-CM

## 2022-10-05 DIAGNOSIS — G47.33 OSA (OBSTRUCTIVE SLEEP APNEA): ICD-10-CM

## 2022-10-05 DIAGNOSIS — I34.0 NONRHEUMATIC MITRAL VALVE REGURGITATION: ICD-10-CM

## 2022-10-05 LAB
BH CV ECHO MEAS - MR MAX PG: 75.5 MMHG
BH CV ECHO MEAS - MR MAX VEL: 434.4 CM/SEC
BH CV ECHO MEAS - MV MAX PG: 9.8 MMHG
BH CV ECHO MEAS - MV MEAN PG: 3.9 MMHG
BH CV ECHO MEAS - MV V2 VTI: 37.7 CM
BH CV ECHO MEAS - RVSP: 44 MMHG
BH CV ECHO MEAS - TR MAX PG: 37.5 MMHG
BH CV ECHO MEAS - TR MAX VEL: 306 CM/SEC
LV EF 2D ECHO EST: 50 %

## 2022-10-05 PROCEDURE — 93325 DOPPLER ECHO COLOR FLOW MAPG: CPT | Performed by: INTERNAL MEDICINE

## 2022-10-05 PROCEDURE — 93312 ECHO TRANSESOPHAGEAL: CPT | Performed by: INTERNAL MEDICINE

## 2022-10-05 PROCEDURE — 25010000002 PROPOFOL 10 MG/ML EMULSION: Performed by: NURSE ANESTHETIST, CERTIFIED REGISTERED

## 2022-10-05 PROCEDURE — 93320 DOPPLER ECHO COMPLETE: CPT

## 2022-10-05 PROCEDURE — 93325 DOPPLER ECHO COLOR FLOW MAPG: CPT

## 2022-10-05 PROCEDURE — 93312 ECHO TRANSESOPHAGEAL: CPT

## 2022-10-05 PROCEDURE — 0 LIDOCAINE 1 % SOLUTION: Performed by: NURSE ANESTHETIST, CERTIFIED REGISTERED

## 2022-10-05 PROCEDURE — S0260 H&P FOR SURGERY: HCPCS | Performed by: INTERNAL MEDICINE

## 2022-10-05 PROCEDURE — 93320 DOPPLER ECHO COMPLETE: CPT | Performed by: INTERNAL MEDICINE

## 2022-10-05 RX ORDER — SODIUM CHLORIDE 0.9 % (FLUSH) 0.9 %
10 SYRINGE (ML) INJECTION AS NEEDED
Status: CANCELLED | OUTPATIENT
Start: 2022-10-05

## 2022-10-05 RX ORDER — HYDRALAZINE HYDROCHLORIDE 20 MG/ML
5 INJECTION INTRAMUSCULAR; INTRAVENOUS
Status: DISCONTINUED | OUTPATIENT
Start: 2022-10-05 | End: 2022-10-06 | Stop reason: HOSPADM

## 2022-10-05 RX ORDER — EPHEDRINE SULFATE 50 MG/ML
5 INJECTION, SOLUTION INTRAVENOUS ONCE AS NEEDED
Status: DISCONTINUED | OUTPATIENT
Start: 2022-10-05 | End: 2022-10-06 | Stop reason: HOSPADM

## 2022-10-05 RX ORDER — DIPHENHYDRAMINE HCL 25 MG
25 CAPSULE ORAL
Status: DISCONTINUED | OUTPATIENT
Start: 2022-10-05 | End: 2022-10-06 | Stop reason: HOSPADM

## 2022-10-05 RX ORDER — PROPOFOL 10 MG/ML
VIAL (ML) INTRAVENOUS CONTINUOUS PRN
Status: DISCONTINUED | OUTPATIENT
Start: 2022-10-05 | End: 2022-10-05 | Stop reason: SURG

## 2022-10-05 RX ORDER — NALOXONE HCL 0.4 MG/ML
0.2 VIAL (ML) INJECTION AS NEEDED
Status: DISCONTINUED | OUTPATIENT
Start: 2022-10-05 | End: 2022-10-06 | Stop reason: HOSPADM

## 2022-10-05 RX ORDER — HYDROMORPHONE HYDROCHLORIDE 1 MG/ML
0.5 INJECTION, SOLUTION INTRAMUSCULAR; INTRAVENOUS; SUBCUTANEOUS
Status: DISCONTINUED | OUTPATIENT
Start: 2022-10-05 | End: 2022-10-06 | Stop reason: HOSPADM

## 2022-10-05 RX ORDER — SODIUM CHLORIDE, SODIUM LACTATE, POTASSIUM CHLORIDE, CALCIUM CHLORIDE 600; 310; 30; 20 MG/100ML; MG/100ML; MG/100ML; MG/100ML
9 INJECTION, SOLUTION INTRAVENOUS CONTINUOUS
Status: CANCELLED | OUTPATIENT
Start: 2022-10-05

## 2022-10-05 RX ORDER — LABETALOL HYDROCHLORIDE 5 MG/ML
5 INJECTION, SOLUTION INTRAVENOUS
Status: DISCONTINUED | OUTPATIENT
Start: 2022-10-05 | End: 2022-10-06 | Stop reason: HOSPADM

## 2022-10-05 RX ORDER — PROMETHAZINE HYDROCHLORIDE 25 MG/1
25 SUPPOSITORY RECTAL ONCE AS NEEDED
Status: DISCONTINUED | OUTPATIENT
Start: 2022-10-05 | End: 2022-10-06 | Stop reason: HOSPADM

## 2022-10-05 RX ORDER — FAMOTIDINE 10 MG/ML
20 INJECTION, SOLUTION INTRAVENOUS ONCE
Status: CANCELLED | OUTPATIENT
Start: 2022-10-05 | End: 2022-10-05

## 2022-10-05 RX ORDER — PROPOFOL 10 MG/ML
VIAL (ML) INTRAVENOUS AS NEEDED
Status: DISCONTINUED | OUTPATIENT
Start: 2022-10-05 | End: 2022-10-05 | Stop reason: SURG

## 2022-10-05 RX ORDER — OXYCODONE AND ACETAMINOPHEN 7.5; 325 MG/1; MG/1
1 TABLET ORAL EVERY 4 HOURS PRN
Status: DISCONTINUED | OUTPATIENT
Start: 2022-10-05 | End: 2022-10-06 | Stop reason: HOSPADM

## 2022-10-05 RX ORDER — HYDROCODONE BITARTRATE AND ACETAMINOPHEN 7.5; 325 MG/1; MG/1
1 TABLET ORAL ONCE AS NEEDED
Status: DISCONTINUED | OUTPATIENT
Start: 2022-10-05 | End: 2022-10-06 | Stop reason: HOSPADM

## 2022-10-05 RX ORDER — MIDAZOLAM HYDROCHLORIDE 1 MG/ML
1 INJECTION INTRAMUSCULAR; INTRAVENOUS
Status: CANCELLED | OUTPATIENT
Start: 2022-10-05

## 2022-10-05 RX ORDER — IBUPROFEN 600 MG/1
600 TABLET ORAL ONCE AS NEEDED
Status: DISCONTINUED | OUTPATIENT
Start: 2022-10-05 | End: 2022-10-06 | Stop reason: HOSPADM

## 2022-10-05 RX ORDER — FLUMAZENIL 0.1 MG/ML
0.2 INJECTION INTRAVENOUS AS NEEDED
Status: DISCONTINUED | OUTPATIENT
Start: 2022-10-05 | End: 2022-10-06 | Stop reason: HOSPADM

## 2022-10-05 RX ORDER — FENTANYL CITRATE 50 UG/ML
50 INJECTION, SOLUTION INTRAMUSCULAR; INTRAVENOUS
Status: DISCONTINUED | OUTPATIENT
Start: 2022-10-05 | End: 2022-10-06 | Stop reason: HOSPADM

## 2022-10-05 RX ORDER — ONDANSETRON 2 MG/ML
4 INJECTION INTRAMUSCULAR; INTRAVENOUS ONCE AS NEEDED
Status: DISCONTINUED | OUTPATIENT
Start: 2022-10-05 | End: 2022-10-06 | Stop reason: HOSPADM

## 2022-10-05 RX ORDER — PROMETHAZINE HYDROCHLORIDE 25 MG/1
25 TABLET ORAL ONCE AS NEEDED
Status: DISCONTINUED | OUTPATIENT
Start: 2022-10-05 | End: 2022-10-06 | Stop reason: HOSPADM

## 2022-10-05 RX ORDER — SODIUM CHLORIDE 0.9 % (FLUSH) 0.9 %
10 SYRINGE (ML) INJECTION EVERY 12 HOURS SCHEDULED
Status: CANCELLED | OUTPATIENT
Start: 2022-10-05

## 2022-10-05 RX ORDER — DIPHENHYDRAMINE HYDROCHLORIDE 50 MG/ML
12.5 INJECTION INTRAMUSCULAR; INTRAVENOUS
Status: DISCONTINUED | OUTPATIENT
Start: 2022-10-05 | End: 2022-10-06 | Stop reason: HOSPADM

## 2022-10-05 RX ORDER — LIDOCAINE HYDROCHLORIDE 10 MG/ML
INJECTION, SOLUTION INFILTRATION; PERINEURAL AS NEEDED
Status: DISCONTINUED | OUTPATIENT
Start: 2022-10-05 | End: 2022-10-05 | Stop reason: SURG

## 2022-10-05 RX ORDER — SODIUM CHLORIDE, SODIUM LACTATE, POTASSIUM CHLORIDE, CALCIUM CHLORIDE 600; 310; 30; 20 MG/100ML; MG/100ML; MG/100ML; MG/100ML
INJECTION, SOLUTION INTRAVENOUS CONTINUOUS PRN
Status: DISCONTINUED | OUTPATIENT
Start: 2022-10-05 | End: 2022-10-05 | Stop reason: SURG

## 2022-10-05 RX ADMIN — PROPOFOL 100 MG: 10 INJECTION, EMULSION INTRAVENOUS at 07:30

## 2022-10-05 RX ADMIN — Medication 300 MG/HR: at 07:30

## 2022-10-05 RX ADMIN — LIDOCAINE HYDROCHLORIDE 100 ML: 10 INJECTION, SOLUTION INFILTRATION; PERINEURAL at 07:30

## 2022-10-05 RX ADMIN — SODIUM CHLORIDE, POTASSIUM CHLORIDE, SODIUM LACTATE AND CALCIUM CHLORIDE: 600; 310; 30; 20 INJECTION, SOLUTION INTRAVENOUS at 07:29

## 2022-10-05 NOTE — TELEPHONE ENCOUNTER
Can you move Mr. Engel's appointment with me until next week? He had RAKESH today and Dr. Vanegas felt he was doing better.    Also, he is supposed to be getting a cardiac PYP study. Has this been scheduled?    Thanks!  Yvette Ibarra, APRN

## 2022-10-05 NOTE — H&P
Date of Hospital Visit: [unfilled]ENC@  Encounter Provider: Jennie Vanegas MD  Place of Service: [facility] CARDIOLOGY  Patient Name: Shashi Engel  :1960  Referral Provider: Tamara Ibarra*    Chief complaint    RAKESH for MR and TR    History of Present Illness    He has a history of recurrent syncope, SVT, VT, PAF, pacemaker, VENESSA on CPAP.     I first saw him in 2013 when he came in to the Chest Pain Unit. He had had five days of short-windedness at that time and had risk factors for cardiac issues including diabetes mellitus, hypertension, and hyperlipidemia. He underwent an echocardiogram on 2013 which revealed severe concentric left ventricular hypertrophy, an ejection fraction of 66%, mild-to-moderate mitral insufficiency, mild-to-moderate tricuspid insufficiency, mildelevation of right ventricular systolic pressure at 40 mmHg. He then wore a 24-hour Holter monitor which showed atrial fibrillation with average heart rate of 70 beats per minute with brief episodes of tachycardia and bradycardia. He had had a stress nuclear perfusion study performed in 2012 which showed no evidence of ischemia. Ejection fraction was mildly reduced due to atrial fibrillation.       He does have obstructive sleep apnea and uses his CPAP faithfully.        His CHADS-VASc score is 2 giving him a 2.2% risk of strokes per year. He is on Xarelto.     He did go to [place] and was evaluated in the dysautonomic clinic 17.  There testing showed grossly intact autonomic function.  They thought that possibly his atrial fibrillation was driving his symptoms of fatigue and near syncope.  Device interrogation today shows short bursts of ventricular ventricular tachycardia, 13 beats as well as premature ventricular complexes noted 25% at time.      He has stress echocardiogram done 17 for dyspnea and decreased exercise tolerance.  This showed ejection fraction 50% with  severe concentric left hypertrophy, severe left atrial enlargement, RVSP 46 mmHg mildly reduced right ventricular systolic function but there is no evidence of ischemia on the stress echocardiogram portion.  He also had a 12 day monitor done 12/2017 which showed atrial fibrillation with bursts of tachycardia but there were no significant pulses are bradycardia.  He had normal serum protein electrophoresis done December 2017.  This is done due to severe left hypertrophy.     He's had recurrent episodes of near syncope which we believe are probably due to his blood pressure dropping as he has documented this at times.  In addition all been may also be related to bursts of atrial fibrillation with rapid ventricular response.  They also could be due to low blood sugar.     Interrogation of his pacemaker showed 3 nonsustained runs of ventricular tachycardia ranging from a heart rate of 166 192 bpm.  They would last anywhere from 9 beats to 21 beats.  They 20 beat episode of nonsustained VT happen on 7/5/2022.  He had a generator change on 5/9/2022.     He is seeing Dr. Dias for his dyspnea as well and he put him on inhaler which he thinks does help.      Echo done 9/28/2022 shows ejection fraction 51% with RVSP 65 mmHg, moderate right ventricular dilation, severe biatrial enlargement, moderate-severe mitral insufficiency and severe tricuspid insufficiency.    He has no chest pain, he has chronic dyspnea, he has no difficulty swallowing, does not feel his heart skipping this morning but occasionally does.    Past Medical History:   Diagnosis Date   • Abnormal electrocardiogram    • Anxiety    • Cardiomyopathy, hypertrophic, primary familial (HCC)    • Erectile dysfunction    • Health care maintenance    • Hyperlipidemia    • Hypertension    • Mild concentric left ventricular hypertrophy (LVH)    • Mild mitral regurgitation    • Mild tricuspid regurgitation    • Near syncope    • Noncompliance    • Nonsustained  ventricular tachycardia ([facility]) 09/05/2018   • Obesity    • VENESSA (obstructive sleep apnea)     uses CPAP faithfully   • Osteoarthritis    • PAF (paroxysmal atrial fibrillation) ([facility])    • Pneumonia 01/01/2016   • Type 2 diabetes mellitus ([facility])        Past Surgical History:   Procedure Laterality Date   • CARDIAC CATHETERIZATION N/A 03/02/2022    Procedure: RIGHT HEART CATH;  Surgeon: Anjel Beasley MD;  Location: [facility] CATH INVASIVE LOCATION;  Service: Cardiovascular;  Laterality: N/A;   • CARDIAC CATHETERIZATION N/A 03/02/2022    Procedure: Coronary angiography;  Surgeon: Anjel Beasley MD;  Location: [facility] CATH INVASIVE LOCATION;  Service: Cardiovascular;  Laterality: N/A;   • CARDIAC ELECTROPHYSIOLOGY PROCEDURE Left 06/06/2016    Procedure: Pacemaker DC new  [place];  Surgeon: Juan Chacon MD;  Location: [facility] CATH INVASIVE LOCATION;  Service:    • CARDIAC ELECTROPHYSIOLOGY PROCEDURE N/A 05/09/2022    Procedure: PPM generator change - dual- [place];  Surgeon: Juan Chacon MD;  Location: [facility] CATH INVASIVE LOCATION;  Service: Cardiology;  Laterality: N/A;   • INSERT / REPLACE / REMOVE PACEMAKER     • KNEE ARTHROSCOPY Right        (Not in a hospital admission)      Current Meds  Scheduled Meds:  Continuous Infusions:No current facility-administered medications for this encounter.    PRN Meds:.    Allergies as of 10/05/2022   • (No Known Allergies)       Social History     Socioeconomic History   • Marital status:    Tobacco Use   • Smoking status: Former Smoker     Types: Cigars   • Smokeless tobacco: Never Used   • Tobacco comment: NO caffeine use    Substance and Sexual Activity   • Alcohol use: No   • Drug use: Not Currently     Types: Marijuana     Comment: out of cigars in the remote past   • Sexual activity: Yes     Partners: Female       Family History   Problem Relation Age of Onset   • Depression Mother    • Hypertension Mother    • Heart disease Mother    • Kidney disease  Other    • Sleep apnea Other    • Atrial fibrillation Sister    • Hypertension Sister    • Heart disease Sister    • Hypertension Brother    • Hypertension Sister    • Heart disease Sister    • Diabetes Neg Hx        REVIEW OF SYSTEMS:   12 point ROS was performed and is negative except as outlined in HPI          Objective:   Temp:  [98.5 °F (36.9 °C)] 98.5 °F (36.9 °C)  Heart Rate:  [59] 59  Resp:  [18] 18  BP: (132)/(81) 132/81  There is no height or weight on file to calculate BMI.    Vitals:    10/05/22 0645   BP: 132/81   Pulse: 59   Resp: 18   Temp: 98.5 °F (36.9 °C)   SpO2: 97%       General Appearance:    Alert, cooperative, in no acute distress   Head:    Normocephalic, without obvious abnormality, atraumatic   Eyes:            Lids and lashes normal, conjunctivae and sclerae normal, no   icterus, no pallor, corneas clear   Ears:    Ears appear intact with no abnormalities noted   Throat:   No oral lesions, no thrush, oral mucosa moist   Neck:   No adenopathy, supple, trachea midline, no thyromegaly, no   carotid bruit, no JVD   Back:     No kyphosis present, no scoliosis present, no skin lesions, erythema or scars, no tenderness to percussion or palpation, range of motion normal   Lungs:     Clear to auscultation,respirations regular, even and unlabored    Heart:    Regular rhythm and normal rate, normal S1 and S2, 3/6 HSM no gallop, no rub, no click   Chest Wall:    No abnormalities observed   Abdomen:     Normal bowel sounds, no masses, no organomegaly, soft, nontender, nondistended, no guarding, no rebound  tenderness   Extremities:   Moves all extremities well, no edema, no cyanosis, no redness   Pulses:   Pulses palpable and equal bilaterally. Normal radial, carotid, femoral, dorsalis pedis and posterior tibial pulses bilaterally. Normal abdominal aorta   Skin:  Psychiatric:   No bleeding, bruising or rash    Alert and oriented x 3, normal mood and affect                 Lab Review:      Results from  last 7 days   Lab Units 09/28/22  1054   SODIUM mmol/L 139   POTASSIUM mmol/L 3.5   CHLORIDE mmol/L 101   CO2 mmol/L 26.1   BUN mg/dL 14   CREATININE mg/dL 0.85   CALCIUM mg/dL 9.4   BILIRUBIN mg/dL 2.2*   ALK PHOS U/L 99   ALT (SGPT) U/L 32   AST (SGOT) U/L 26   GLUCOSE mg/dL 113*     Results from last 7 days   Lab Units 09/28/22  1054   TROPONIN T ng/mL 0.022     @LABRCNTbnp@  Results from last 7 days   Lab Units 09/28/22  1054   WBC 10*3/mm3 5.68   HEMOGLOBIN g/dL 13.5   HEMATOCRIT % 42.8   PLATELETS 10*3/mm3 230         Results from last 7 days   Lab Units 09/28/22  1054   MAGNESIUM mg/dL 1.8     @LABRCNTIP(chol,trig,hdl,ldl)    I personally viewed and interpreted the patient's EKG/Telemetry data  )  Patient Active Problem List   Diagnosis   • PAF (paroxysmal atrial fibrillation) ([facility])   • Essential hypertension   • ED (erectile dysfunction) of organic origin   • HLD (hyperlipidemia)   • Hypertrophic polyarthritis   • Diabetes mellitus type 2, noninsulin dependent ([facility])   • LVH (left ventricular hypertrophy) due to hypertensive disease   • SSS (sick sinus syndrome) ([facility])   • Sleep related hypoxia   • Class 2 severe obesity due to excess calories with serious comorbidity and body mass index (BMI) of 38.0 to 38.9 in adult ([facility])   • VENESSA (obstructive sleep apnea)   • Nonsustained ventricular tachycardia   • S/P placement of cardiac pacemaker   • Shortness of breath   • Abnormal stress echo   • Dyspepsia     Assessment and Plan:    1. H/o Fatigue and lightheadedness, resolved, likely due to labile BP, PVCs and PAF and low blood sugar.   2. Atrial fibrillation, rate controlled. On xarelto. Occasional gets fast.   3. Hypertension. BP high over the last couple weeks of lower extremity edema.  He still has intermittent low blood pressures.  We will try a week increase chlorthalidone.  4. Hyperlipidemia. Controlled on atorvastatin.   5. Pulmonary hypertension.   6. Diabetes mellitus type 2.  Overall under fair  control.  7. Obstructive sleep apnea on CPAP.  Is trying to figure out if he needs a new machine based on a recall on CPAP machines.  8. Obesity.   9. SSS, s/p pacer.   10. Nonsustained VT and frequent PVCs.   11.  MR/TR  12.  Dyspnea, started on inhaler by Dr. Dias.    RAKESH today. Patient amenable to proceed - understanding risks and benefits.     Jennie Vanegas MD  10/05/22  07:05 EDT.  Time spent in reviewing chart, discussion and examination:

## 2022-10-05 NOTE — ANESTHESIA POSTPROCEDURE EVALUATION
Patient: Shashi Engel    Procedure Summary     Date: 10/05/22 Room / Location: [facility] PACU    Anesthesia Start: 0727 Anesthesia Stop: 0756    Procedure: ADULT TRANSESOPHAGEAL ECHO (RAKESH) W/ CONT IF NECESSARY PER PROTOCOL Diagnosis:       PAF (paroxysmal atrial fibrillation) (HCC)      Hypertensive left ventricular hypertrophy, without heart failure      VENESSA (obstructive sleep apnea)      Nonrheumatic mitral valve regurgitation      Tricuspid valve regurgitation, nonrheumatic      Pulmonary hypertension (HCC)      Dyspnea on exertion      Leg swelling      (Valvular Disease)      (Arrhythmia)      (Mitral Valve Assessment)      (AFib)    Scheduled Providers: Jennie Vanegas MD Provider: Karely Kendall MD    Anesthesia Type: MAC ASA Status: 4          Anesthesia Type: MAC    Vitals  Vitals Value Taken Time   /87 10/05/22 0826   Temp     Pulse 60 10/05/22 0827   Resp 16 10/05/22 0825   SpO2 97 % 10/05/22 0828   Vitals shown include unvalidated device data.        Post Anesthesia Care and Evaluation    Patient location during evaluation: bedside  Patient participation: complete - patient participated  Level of consciousness: responsive to light touch, responsive to verbal stimuli and sleepy but conscious  Pain score: 0  Pain management: adequate    Airway patency: patent  Anesthetic complications: No anesthetic complications  PONV Status: none  Cardiovascular status: acceptable and hemodynamically stable  Respiratory status: acceptable  Hydration status: acceptable

## 2022-10-05 NOTE — ANESTHESIA PREPROCEDURE EVALUATION
Anesthesia Evaluation     no history of anesthetic complications:               Airway   Mallampati: III  TM distance: >3 FB  Neck ROM: full  Large neck circumference  Comment: Large tongue  Dental      Comment: Small chip upper front tooth    Pulmonary    (+) pneumonia , a smoker, shortness of breath, sleep apnea,   (-) asthma, recent URI  Cardiovascular     (+) pacemaker, hypertension, valvular problems/murmurs TI and MR, dysrhythmias (SSS , V Tach) Atrial Fib, CHF (cardiomyopathy) , GALLAGHER, hyperlipidemia,       Neuro/Psych  (+) syncope,    (-) TIA, CVA  GI/Hepatic/Renal/Endo    (+) morbid obesity,  diabetes mellitus,     Musculoskeletal     Abdominal    Substance History      OB/GYN          Other                        Anesthesia Plan    ASA 4     MAC     intravenous induction     Anesthetic plan, risks, benefits, and alternatives have been provided, discussed and informed consent has been obtained with: patient.        CODE STATUS:

## 2022-10-07 ENCOUNTER — APPOINTMENT (OUTPATIENT)
Dept: CT IMAGING | Facility: HOSPITAL | Age: 62
End: 2022-10-07

## 2022-10-07 ENCOUNTER — HOSPITAL ENCOUNTER (EMERGENCY)
Facility: HOSPITAL | Age: 62
Discharge: HOME | End: 2022-10-07
Attending: EMERGENCY MEDICINE | Admitting: EMERGENCY MEDICINE

## 2022-10-07 VITALS
HEART RATE: 60 BPM | BODY MASS INDEX: 37.6 KG/M2 | HEIGHT: 74 IN | WEIGHT: 293 LBS | OXYGEN SATURATION: 97 % | DIASTOLIC BLOOD PRESSURE: 84 MMHG | SYSTOLIC BLOOD PRESSURE: 140 MMHG | TEMPERATURE: 97.4 F | RESPIRATION RATE: 16 BRPM

## 2022-10-07 DIAGNOSIS — R51.9 INTERMITTENT HEADACHE: Primary | ICD-10-CM

## 2022-10-07 LAB
ALBUMIN SERPL-MCNC: 3.9 G/DL (ref 3.5–5.2)
ALBUMIN/GLOB SERPL: 1.3 G/DL
ALP SERPL-CCNC: 98 U/L (ref 39–117)
ALT SERPL W P-5'-P-CCNC: 25 U/L (ref 1–41)
ANION GAP SERPL CALCULATED.3IONS-SCNC: 9.7 MMOL/L (ref 5–15)
AST SERPL-CCNC: 31 U/L (ref 1–40)
BASOPHILS # BLD AUTO: 0.02 10*3/MM3 (ref 0–0.2)
BASOPHILS NFR BLD AUTO: 0.4 % (ref 0–1.5)
BILIRUB SERPL-MCNC: 1.1 MG/DL (ref 0–1.2)
BUN SERPL-MCNC: 13 MG/DL (ref 8–23)
BUN/CREAT SERPL: 14.8 (ref 7–25)
CALCIUM SPEC-SCNC: 8.8 MG/DL (ref 8.6–10.5)
CHLORIDE SERPL-SCNC: 104 MMOL/L (ref 98–107)
CO2 SERPL-SCNC: 26.3 MMOL/L (ref 22–29)
CREAT SERPL-MCNC: 0.88 MG/DL (ref 0.76–1.27)
DEPRECATED RDW RBC AUTO: 43.2 FL (ref 37–54)
EGFRCR SERPLBLD CKD-EPI 2021: 98.4 ML/MIN/1.73
EOSINOPHIL # BLD AUTO: 0.08 10*3/MM3 (ref 0–0.4)
EOSINOPHIL NFR BLD AUTO: 1.5 % (ref 0.3–6.2)
ERYTHROCYTE [DISTWIDTH] IN BLOOD BY AUTOMATED COUNT: 14.2 % (ref 12.3–15.4)
GLOBULIN UR ELPH-MCNC: 2.9 GM/DL
GLUCOSE SERPL-MCNC: 137 MG/DL (ref 65–99)
HCT VFR BLD AUTO: 42.5 % (ref 37.5–51)
HGB BLD-MCNC: 13.4 G/DL (ref 13–17.7)
IMM GRANULOCYTES # BLD AUTO: 0.02 10*3/MM3 (ref 0–0.05)
IMM GRANULOCYTES NFR BLD AUTO: 0.4 % (ref 0–0.5)
INR PPP: 1.63 (ref 0.9–1.1)
LYMPHOCYTES # BLD AUTO: 1.5 10*3/MM3 (ref 0.7–3.1)
LYMPHOCYTES NFR BLD AUTO: 27.7 % (ref 19.6–45.3)
MAGNESIUM SERPL-MCNC: 2 MG/DL (ref 1.6–2.4)
MCH RBC QN AUTO: 26.9 PG (ref 26.6–33)
MCHC RBC AUTO-ENTMCNC: 31.5 G/DL (ref 31.5–35.7)
MCV RBC AUTO: 85.2 FL (ref 79–97)
MONOCYTES # BLD AUTO: 0.55 10*3/MM3 (ref 0.1–0.9)
MONOCYTES NFR BLD AUTO: 10.1 % (ref 5–12)
NEUTROPHILS NFR BLD AUTO: 3.25 10*3/MM3 (ref 1.7–7)
NEUTROPHILS NFR BLD AUTO: 59.9 % (ref 42.7–76)
NRBC BLD AUTO-RTO: 0 /100 WBC (ref 0–0.2)
PLATELET # BLD AUTO: 245 10*3/MM3 (ref 140–450)
PMV BLD AUTO: 9.7 FL (ref 6–12)
POTASSIUM SERPL-SCNC: 3.7 MMOL/L (ref 3.5–5.2)
PROT SERPL-MCNC: 6.8 G/DL (ref 6–8.5)
PROTHROMBIN TIME: 19.5 SECONDS (ref 11.7–14.2)
QT INTERVAL: 452 MS
RBC # BLD AUTO: 4.99 10*6/MM3 (ref 4.14–5.8)
SODIUM SERPL-SCNC: 140 MMOL/L (ref 136–145)
TROPONIN T SERPL-MCNC: 0.01 NG/ML (ref 0–0.03)
WBC NRBC COR # BLD: 5.42 10*3/MM3 (ref 3.4–10.8)

## 2022-10-07 PROCEDURE — 70450 CT HEAD/BRAIN W/O DYE: CPT

## 2022-10-07 PROCEDURE — 84484 ASSAY OF TROPONIN QUANT: CPT | Performed by: EMERGENCY MEDICINE

## 2022-10-07 PROCEDURE — 85025 COMPLETE CBC W/AUTO DIFF WBC: CPT | Performed by: EMERGENCY MEDICINE

## 2022-10-07 PROCEDURE — 85610 PROTHROMBIN TIME: CPT | Performed by: EMERGENCY MEDICINE

## 2022-10-07 PROCEDURE — 83735 ASSAY OF MAGNESIUM: CPT | Performed by: EMERGENCY MEDICINE

## 2022-10-07 PROCEDURE — 80053 COMPREHEN METABOLIC PANEL: CPT | Performed by: EMERGENCY MEDICINE

## 2022-10-07 PROCEDURE — 93010 ELECTROCARDIOGRAM REPORT: CPT | Performed by: INTERNAL MEDICINE

## 2022-10-07 PROCEDURE — 93005 ELECTROCARDIOGRAM TRACING: CPT | Performed by: EMERGENCY MEDICINE

## 2022-10-07 PROCEDURE — 99284 EMERGENCY DEPT VISIT MOD MDM: CPT

## 2022-10-07 RX ORDER — SODIUM CHLORIDE 0.9 % (FLUSH) 0.9 %
10 SYRINGE (ML) INJECTION AS NEEDED
Status: DISCONTINUED | OUTPATIENT
Start: 2022-10-07 | End: 2022-10-07 | Stop reason: HOSPADM

## 2022-10-07 NOTE — ED PROVIDER NOTES
EMERGENCY DEPARTMENT ENCOUNTER    Room Number:  38/38  Date of encounter:  10/7/2022  PCP: Keesha Kirkpatrick MD  Historian: Patient      HPI:  Chief Complaint: Dizziness      Context: Shashi Engel is a 61 y.o. male who presents to the ED c/o intermittent sharp headache on the top of his head that lasted 20 to 30 seconds at a time.  The patient has a history of diabetes, hypertension, hypercholesterolemia and paroxysmal atrial fibrillation on Xarelto.  The patient states that with this he has felt weak and has needed to sit down.  Patient states he has had weakness recently has been seen by Dr. Bah from cardiology and had a RAKESH last week.  Currently the patient denies headache, neck pain, chest pain, shortness of breath or focal neurodeficit.  Patient denies trouble with walking, speech, thought process, swallowing or focal one-sided weakness or numbness.      PAST MEDICAL HISTORY  Active Ambulatory Problems     Diagnosis Date Noted   • PAF (paroxysmal atrial fibrillation) ([facility]) 11/02/2015   • Essential hypertension 11/02/2015   • ED (erectile dysfunction) of organic origin 11/02/2015   • HLD (hyperlipidemia) 11/02/2015   • Hypertrophic polyarthritis 11/02/2015   • Diabetes mellitus type 2, noninsulin dependent ([facility]) 11/02/2015   • LVH (left ventricular hypertrophy) due to hypertensive disease 08/04/2016   • SSS (sick sinus syndrome) ([facility]) 11/10/2016   • Sleep related hypoxia 12/04/2017   • Class 2 severe obesity due to excess calories with serious comorbidity and body mass index (BMI) of 38.0 to 38.9 in adult ([facility])    • VENESSA (obstructive sleep apnea)    • Nonsustained ventricular tachycardia 09/05/2018   • S/P placement of cardiac pacemaker 07/08/2021   • Shortness of breath 02/24/2022   • Abnormal stress echo 02/24/2022   • Dyspepsia 07/25/2022     Resolved Ambulatory Problems     Diagnosis Date Noted   • Cardiomyopathy, hypertrophic, primary familial ([facility]) 11/02/2015   • Dizzy spells 05/05/2016   • Near  syncope 08/04/2016   • Orthostasis 06/21/2017   • Obesity (BMI 30-39.9) 12/04/2017   • Hypersomnia with sleep apnea 12/04/2017   • Severe VENESSA on CPAP 01/29/2018   • Obesity, morbid, BMI 40.0-49.9 ([facility]) 02/26/2018     Past Medical History:   Diagnosis Date   • Abnormal electrocardiogram    • Anxiety    • Erectile dysfunction    • Health care maintenance    • Hyperlipidemia    • Hypertension    • Mild concentric left ventricular hypertrophy (LVH)    • Mild mitral regurgitation    • Mild tricuspid regurgitation    • Noncompliance    • Obesity    • Osteoarthritis    • Pneumonia 01/01/2016   • Type 2 diabetes mellitus (HCC)          PAST SURGICAL HISTORY  Past Surgical History:   Procedure Laterality Date   • CARDIAC CATHETERIZATION N/A 03/02/2022    Procedure: RIGHT HEART CATH;  Surgeon: Anjel Beasley MD;  Location: [facility] CATH INVASIVE LOCATION;  Service: Cardiovascular;  Laterality: N/A;   • CARDIAC CATHETERIZATION N/A 03/02/2022    Procedure: Coronary angiography;  Surgeon: Anjel Beasley MD;  Location: [facility] CATH INVASIVE LOCATION;  Service: Cardiovascular;  Laterality: N/A;   • CARDIAC ELECTROPHYSIOLOGY PROCEDURE Left 06/06/2016    Procedure: Pacemaker DC new  [place];  Surgeon: Juan Chacon MD;  Location: [facility] CATH INVASIVE LOCATION;  Service:    • CARDIAC ELECTROPHYSIOLOGY PROCEDURE N/A 05/09/2022    Procedure: PPM generator change - dual- [place];  Surgeon: Juan Chacon MD;  Location: [facility] CATH INVASIVE LOCATION;  Service: Cardiology;  Laterality: N/A;   • INSERT / REPLACE / REMOVE PACEMAKER     • KNEE ARTHROSCOPY Right          FAMILY HISTORY  Family History   Problem Relation Age of Onset   • Depression Mother    • Hypertension Mother    • Heart disease Mother    • Kidney disease Other    • Sleep apnea Other    • Atrial fibrillation Sister    • Hypertension Sister    • Heart disease Sister    • Hypertension Brother    • Hypertension Sister    • Heart disease Sister    • Diabetes Neg  Hx          SOCIAL HISTORY  Social History     Socioeconomic History   • Marital status:    Tobacco Use   • Smoking status: Former Smoker     Types: Cigars   • Smokeless tobacco: Never Used   • Tobacco comment: NO caffeine use    Substance and Sexual Activity   • Alcohol use: No   • Drug use: Not Currently     Types: Marijuana     Comment: out of cigars in the remote past   • Sexual activity: Yes     Partners: Female         ALLERGIES  Patient has no known allergies.        REVIEW OF SYSTEMS  Review of Systems       All systems reviewed and negative except for those discussed in HPI.     PHYSICAL EXAM    I have reviewed the triage vital signs and nursing notes.    ED Triage Vitals [10/07/22 1301]   Temp Heart Rate Resp BP SpO2   97.4 °F (36.3 °C) 65 16 -- 96 %      Temp src Heart Rate Source Patient Position BP Location FiO2 (%)   Tympanic Monitor -- -- --       GENERAL: 61-year-old well developed, well nourished in no acute distress  HENT: NCAT, neck supple, trachea midline  EYES: no scleral icterus, PERRL, normal conjunctivae  CV: regular rhythm, regular rate, 3/6 murmur  RESPIRATORY: unlabored effort, CTAB  ABDOMEN: soft, nontender, nondistended, bowel sounds present  MUSCULOSKELETAL: no gross deformity, no pedal edema, no calf tenderness  NEURO: alert,  sensory and motor function of extremities intact, speech clear, mental status normal  SKIN: warm, dry, no rash  PSYCH:  Appropriate mood and affect      PPE  Pt does not present with symptoms for COVID19; however, I was wearing a mask and goggles throughout all patient interaction.    Vital signs and nursing notes reviewed.      LAB RESULTS  Recent Results (from the past 24 hour(s))   ECG 12 Lead    Collection Time: 10/07/22  1:45 PM   Result Value Ref Range    QT Interval 452 ms   Comprehensive Metabolic Panel    Collection Time: 10/07/22  1:48 PM    Specimen: Blood   Result Value Ref Range    Glucose 137 (H) 65 - 99 mg/dL    BUN 13 8 - 23 mg/dL     Creatinine 0.88 0.76 - 1.27 mg/dL    Sodium 140 136 - 145 mmol/L    Potassium 3.7 3.5 - 5.2 mmol/L    Chloride 104 98 - 107 mmol/L    CO2 26.3 22.0 - 29.0 mmol/L    Calcium 8.8 8.6 - 10.5 mg/dL    Total Protein 6.8 6.0 - 8.5 g/dL    Albumin 3.90 3.50 - 5.20 g/dL    ALT (SGPT) 25 1 - 41 U/L    AST (SGOT) 31 1 - 40 U/L    Alkaline Phosphatase 98 39 - 117 U/L    Total Bilirubin 1.1 0.0 - 1.2 mg/dL    Globulin 2.9 gm/dL    A/G Ratio 1.3 g/dL    BUN/Creatinine Ratio 14.8 7.0 - 25.0    Anion Gap 9.7 5.0 - 15.0 mmol/L    eGFR 98.4 >60.0 mL/min/1.73   Protime-INR    Collection Time: 10/07/22  1:48 PM    Specimen: Blood   Result Value Ref Range    Protime 19.5 (H) 11.7 - 14.2 Seconds    INR 1.63 (H) 0.90 - 1.10   Troponin    Collection Time: 10/07/22  1:48 PM    Specimen: Blood   Result Value Ref Range    Troponin T 0.015 0.000 - 0.030 ng/mL   Magnesium    Collection Time: 10/07/22  1:48 PM    Specimen: Blood   Result Value Ref Range    Magnesium 2.0 1.6 - 2.4 mg/dL   CBC Auto Differential    Collection Time: 10/07/22  1:48 PM    Specimen: Blood   Result Value Ref Range    WBC 5.42 3.40 - 10.80 10*3/mm3    RBC 4.99 4.14 - 5.80 10*6/mm3    Hemoglobin 13.4 13.0 - 17.7 g/dL    Hematocrit 42.5 37.5 - 51.0 %    MCV 85.2 79.0 - 97.0 fL    MCH 26.9 26.6 - 33.0 pg    MCHC 31.5 31.5 - 35.7 g/dL    RDW 14.2 12.3 - 15.4 %    RDW-SD 43.2 37.0 - 54.0 fl    MPV 9.7 6.0 - 12.0 fL    Platelets 245 140 - 450 10*3/mm3    Neutrophil % 59.9 42.7 - 76.0 %    Lymphocyte % 27.7 19.6 - 45.3 %    Monocyte % 10.1 5.0 - 12.0 %    Eosinophil % 1.5 0.3 - 6.2 %    Basophil % 0.4 0.0 - 1.5 %    Immature Grans % 0.4 0.0 - 0.5 %    Neutrophils, Absolute 3.25 1.70 - 7.00 10*3/mm3    Lymphocytes, Absolute 1.50 0.70 - 3.10 10*3/mm3    Monocytes, Absolute 0.55 0.10 - 0.90 10*3/mm3    Eosinophils, Absolute 0.08 0.00 - 0.40 10*3/mm3    Basophils, Absolute 0.02 0.00 - 0.20 10*3/mm3    Immature Grans, Absolute 0.02 0.00 - 0.05 10*3/mm3    nRBC 0.0 0.0 - 0.2 /100  WBC       Ordered the above labs and independently reviewed the results.        RADIOLOGY  CT Head Without Contrast    Result Date: 10/7/2022  CT SCAN OF THE BRAIN WITHOUT CONTRAST  HISTORY: Headache and dizziness.  FINDINGS: The CT scan was performed as an emergency procedure through the brain without contrast. The ventricles are normal in size and midline. There is no evidence of intracranial hemorrhage or mass effect. The visualized sinuses and mastoid air cells are clear.      Radiation dose reduction techniques were utilized, including automated exposure control and exposure modulation based on body size.  This report was finalized on 10/7/2022 2:43 PM by Dr. Warren Leavitt M.D.        I ordered the above noted radiological studies. Independently reviewed by me and discussed with radiologist.  See dictation above for official radiology interpretation.      PROCEDURES    Procedures    EKG time: 1345  Rhythm: Atrial paced at 61  PVC  No acute ST changes  Similar to EKG from 12/7/2020        MEDICATIONS GIVEN IN ER    Medications   sodium chloride 0.9 % flush 10 mL (has no administration in time range)         PROGRESS, DATA ANALYSIS, CONSULTS, AND MEDICAL DECISION MAKING    All labs have been independently reviewed by me.  All radiology studies have been reviewed by me and discussed with radiologist dictating report.   EKG's independently reviewed by me.  Discussion below represents my analysis of pertinent findings related to patient's condition, differential diagnosis, treatment plan and final disposition.      ED Course as of 10/07/22 1545   Fri Oct 07, 2022   1330 NIHSS:  0-->Alert: keenly responsive  0-->Answers both questions correctly  0-->Performs both tasks correctly  0=normal  0=No visual loss  0=Normal symmetric movement  0-->No drift: limb holds 90 (or 45) degrees for full 10 secs  0-->No drift: limb holds 90 (or 45) degrees for full 10 secs  0-->No drift: limb holds 90 (or 45) degrees for full 10  secs  0-->No drift: limb holds 90 (or 45) degrees for full 10 secs  0=Absent  0=Normal; no sensory loss  0=No aphasia, normal  0=Normal  0=No abnormality  Total score: 0 [GP]   1330 The patient is not a team D or thrombolytic candidate with an NIHSS of 0 and currently asymptomatic. [GP]   1451 Patient's labs and head CT are unremarkable. [GP]   1517 On repeat examination the patient is feeling well.  He currently has no complaints.  At this point I believe he is stable for discharge and outpatient follow-up as scheduled next week.  The patient and family understand and agree with the plan.  I believe his headache is likely secondary to a neurogenic type of pain and could be occipital neuralgia.  They do not want to add any new medications besides Tylenol. [GP]      ED Course User Index  [GP] Oskar Paul MD           The differential diagnosis includes but is not limited to migraine, intracranial hemorrhage, stroke, temporal arteritis, tension headache, cluster headaches, or infectious.        AS OF 15:45 EDT VITALS:    BP - 140/84  HR - 60  TEMP - 97.4 °F (36.3 °C) (Tympanic)  02 SATS - 97%        DIAGNOSIS  Final diagnoses:   Intermittent headache         DISPOSITION  DISCHARGE    Patient discharged in stable condition.    Reviewed implications of results, diagnosis, meds, responsibility to follow up, warning signs and symptoms of possible worsening, potential complications and reasons to return to ER, including worsening symptoms, altered mental status or focal neurodeficit.    Patient/Family voiced understanding of above instructions.    Discussed plan for discharge, as there is no emergent indication for admission.  Pt/family is agreeable and understands need for follow up and repeat testing.  Pt is aware that discharge does not mean that nothing is wrong but it indicates no emergency is present and they must continue care with follow-up as given below or physician of their choice.     FOLLOW-UP  Kaya  Keesha OLIVEROS MD  [address]  [facility] [address]  [phone]    In 3 days  If symptoms worsen                Note Disclaimer: At [facility], we believe that sharing information builds trust and better relationships. You are receiving this note because you recently visited [facility]. It is possible you will see health information before a provider has talked with you about it. This kind of information can be easy to misunderstand. To help you fully understand what it means for your health, we urge you to discuss this note with your provider.                  EMR Dragon/Transcription disclaimer:          Part of this note may be an electronic transcription/translation of spoken language to printed text using the Dragon dictation system.         Oskar Paul MD  10/07/22 9100

## 2022-10-07 NOTE — DISCHARGE INSTRUCTIONS
Go home and rest this weekend.  Tylenol and ice for pain.  Follow-up with your doctor next week if not better or return the emergency room if worse

## 2022-10-07 NOTE — ED TRIAGE NOTES
Intermittent fatigue and dizziness this am.  He had a sharp pain on the top of his had this morning    Patient was placed in face mask during first look triage.  Patient was wearing a face mask throughout encounter.  I wore personal protective equipment throughout the encounter.  Hand hygiene was performed before and after patient encounter.

## 2022-10-19 ENCOUNTER — ANESTHESIA EVENT (OUTPATIENT)
Dept: GASTROENTEROLOGY | Facility: HOSPITAL | Age: 62
End: 2022-10-19

## 2022-10-19 ENCOUNTER — ANESTHESIA (OUTPATIENT)
Dept: GASTROENTEROLOGY | Facility: HOSPITAL | Age: 62
End: 2022-10-19

## 2022-10-19 ENCOUNTER — HOSPITAL ENCOUNTER (OUTPATIENT)
Facility: HOSPITAL | Age: 62
Setting detail: HOSPITAL OUTPATIENT SURGERY
Discharge: HOME | End: 2022-10-19
Attending: INTERNAL MEDICINE | Admitting: INTERNAL MEDICINE

## 2022-10-19 VITALS
DIASTOLIC BLOOD PRESSURE: 90 MMHG | RESPIRATION RATE: 16 BRPM | WEIGHT: 290 LBS | BODY MASS INDEX: 37.22 KG/M2 | HEIGHT: 74 IN | OXYGEN SATURATION: 97 % | SYSTOLIC BLOOD PRESSURE: 147 MMHG | HEART RATE: 60 BPM

## 2022-10-19 DIAGNOSIS — R10.13 DYSPEPSIA: ICD-10-CM

## 2022-10-19 LAB — GLUCOSE BLDC GLUCOMTR-MCNC: 91 MG/DL (ref 70–130)

## 2022-10-19 PROCEDURE — 88305 TISSUE EXAM BY PATHOLOGIST: CPT | Performed by: INTERNAL MEDICINE

## 2022-10-19 PROCEDURE — 25010000002 PROPOFOL 10 MG/ML EMULSION: Performed by: ANESTHESIOLOGY

## 2022-10-19 PROCEDURE — 25010000002 SUCCINYLCHOLINE PER 20 MG: Performed by: ANESTHESIOLOGY

## 2022-10-19 PROCEDURE — 43239 EGD BIOPSY SINGLE/MULTIPLE: CPT | Performed by: INTERNAL MEDICINE

## 2022-10-19 PROCEDURE — 82962 GLUCOSE BLOOD TEST: CPT

## 2022-10-19 RX ORDER — SUCCINYLCHOLINE CHLORIDE 20 MG/ML
INJECTION INTRAMUSCULAR; INTRAVENOUS AS NEEDED
Status: DISCONTINUED | OUTPATIENT
Start: 2022-10-19 | End: 2022-10-19 | Stop reason: SURG

## 2022-10-19 RX ORDER — LIDOCAINE HYDROCHLORIDE 10 MG/ML
0.5 INJECTION, SOLUTION INFILTRATION; PERINEURAL ONCE AS NEEDED
Status: DISCONTINUED | OUTPATIENT
Start: 2022-10-19 | End: 2022-10-19 | Stop reason: HOSPADM

## 2022-10-19 RX ORDER — SODIUM CHLORIDE 0.9 % (FLUSH) 0.9 %
10 SYRINGE (ML) INJECTION AS NEEDED
Status: DISCONTINUED | OUTPATIENT
Start: 2022-10-19 | End: 2022-10-19 | Stop reason: HOSPADM

## 2022-10-19 RX ORDER — ONDANSETRON 2 MG/ML
4 INJECTION INTRAMUSCULAR; INTRAVENOUS ONCE AS NEEDED
Status: DISCONTINUED | OUTPATIENT
Start: 2022-10-19 | End: 2022-10-19 | Stop reason: HOSPADM

## 2022-10-19 RX ORDER — PROPOFOL 10 MG/ML
VIAL (ML) INTRAVENOUS AS NEEDED
Status: DISCONTINUED | OUTPATIENT
Start: 2022-10-19 | End: 2022-10-19 | Stop reason: SURG

## 2022-10-19 RX ORDER — LIDOCAINE HYDROCHLORIDE 20 MG/ML
INJECTION, SOLUTION INFILTRATION; PERINEURAL AS NEEDED
Status: DISCONTINUED | OUTPATIENT
Start: 2022-10-19 | End: 2022-10-19 | Stop reason: SURG

## 2022-10-19 RX ORDER — SODIUM CHLORIDE, SODIUM LACTATE, POTASSIUM CHLORIDE, CALCIUM CHLORIDE 600; 310; 30; 20 MG/100ML; MG/100ML; MG/100ML; MG/100ML
1000 INJECTION, SOLUTION INTRAVENOUS CONTINUOUS
Status: DISCONTINUED | OUTPATIENT
Start: 2022-10-19 | End: 2022-10-19 | Stop reason: HOSPADM

## 2022-10-19 RX ADMIN — SODIUM CHLORIDE, POTASSIUM CHLORIDE, SODIUM LACTATE AND CALCIUM CHLORIDE: 600; 310; 30; 20 INJECTION, SOLUTION INTRAVENOUS at 13:52

## 2022-10-19 RX ADMIN — LIDOCAINE HYDROCHLORIDE 50 MG: 20 INJECTION, SOLUTION INFILTRATION; PERINEURAL at 13:54

## 2022-10-19 RX ADMIN — SUCCINYLCHOLINE CHLORIDE 40 MG: 20 INJECTION, SOLUTION INTRAMUSCULAR; INTRAVENOUS; PARENTERAL at 13:58

## 2022-10-19 RX ADMIN — PROPOFOL 40 MG: 10 INJECTION, EMULSION INTRAVENOUS at 14:02

## 2022-10-19 RX ADMIN — PROPOFOL 40 MG: 10 INJECTION, EMULSION INTRAVENOUS at 13:58

## 2022-10-19 RX ADMIN — PROPOFOL 100 MG: 10 INJECTION, EMULSION INTRAVENOUS at 13:54

## 2022-10-19 NOTE — H&P
[facility]  Pre Procedure History & Physical    Chief Complaint:   Dyspepsia    Subjective     HPI:   61 y.o. male here for EGD for evaluation of dyspepsia.      I did speak with his cardiologist Dr Vanegas she reviewed pts RAKESH and feels he is at acceptable risk for EGD.  Pt off Xarelto.      Past Medical History:   Past Medical History:   Diagnosis Date   • Abnormal electrocardiogram    • Anxiety    • Cardiomyopathy, hypertrophic, primary familial (HCC)    • Erectile dysfunction    • Health care maintenance    • Hyperlipidemia    • Hypertension    • Mild concentric left ventricular hypertrophy (LVH)    • Mild mitral regurgitation    • Mild tricuspid regurgitation    • Near syncope    • Noncompliance    • Nonsustained ventricular tachycardia 09/05/2018   • Obesity    • VENESSA (obstructive sleep apnea)     uses CPAP faithfully   • Osteoarthritis    • PAF (paroxysmal atrial fibrillation) ([facility])    • Pneumonia 01/01/2016   • Type 2 diabetes mellitus ([facility])        Past Surgical History:  Past Surgical History:   Procedure Laterality Date   • CARDIAC CATHETERIZATION N/A 03/02/2022    Procedure: RIGHT HEART CATH;  Surgeon: Anjel Beasley MD;  Location: [facility] CATH INVASIVE LOCATION;  Service: Cardiovascular;  Laterality: N/A;   • CARDIAC CATHETERIZATION N/A 03/02/2022    Procedure: Coronary angiography;  Surgeon: Anjel Beasley MD;  Location: [facility] CATH INVASIVE LOCATION;  Service: Cardiovascular;  Laterality: N/A;   • CARDIAC ELECTROPHYSIOLOGY PROCEDURE Left 06/06/2016    Procedure: Pacemaker DC new  [place];  Surgeon: Juan Chacon MD;  Location: [facility] CATH INVASIVE LOCATION;  Service:    • CARDIAC ELECTROPHYSIOLOGY PROCEDURE N/A 05/09/2022    Procedure: PPM generator change - dual- [place];  Surgeon: Juan Chacno MD;  Location: [facility] CATH INVASIVE LOCATION;  Service: Cardiology;  Laterality: N/A;   • INSERT / REPLACE / REMOVE PACEMAKER     • KNEE ARTHROSCOPY Right         Family History:  Family History   Problem Relation Age of Onset   • Depression Mother    • Hypertension Mother    • Heart disease Mother    • Kidney disease Other    • Sleep apnea Other    • Atrial fibrillation Sister    • Hypertension Sister    • Heart disease Sister    • Hypertension Brother    • Hypertension Sister    • Heart disease Sister    • Diabetes Neg Hx        Social History:   reports that he has quit smoking. His smoking use included cigars. He has never used smokeless tobacco. He reports that he does not currently use drugs after having used the following drugs: Marijuana. He reports that he does not drink alcohol.    Medications:   Medications Prior to Admission   Medication Sig Dispense Refill Last Dose   • amLODIPine (NORVASC) 5 MG tablet TAKE 1 TABLET BY MOUTH EVERY DAY 15 tablet 0 10/19/2022   • atorvastatin (LIPITOR) 40 MG tablet Take 1 tablet by mouth Daily. 90 tablet 1 10/18/2022   • chlorthalidone (HYGROTON) 25 MG tablet Take 1 tablet by mouth Daily. 303 tablet 0 10/18/2022   • cholecalciferol (VITAMIN D3) 25 MCG (1000 UT) tablet Take 1,000 Units by mouth Daily.   10/18/2022   • [facility] Lancets Original [facility] Use as directed and appropo lancet for his monitor 100 each 11 10/18/2022   • furosemide (LASIX) 40 MG tablet Take 1 tablet by mouth Daily. 30 tablet 11 10/18/2022   • glucose blood test strip He needs [employer] brand TrueTrack with lancets strips. Test daily. 100 each 12 10/18/2022   • metFORMIN (GLUCOPHAGE) 1000 MG tablet TAKE 1 TABLET BY MOUTH TWICE A DAY WITH MEALS 30 tablet 0 10/18/2022   • metoprolol tartrate (LOPRESSOR) 25 MG tablet TAKE 1 TABLET BY MOUTH TWICE A  tablet 3 10/19/2022   • pantoprazole (PROTONIX) 40 MG EC tablet Take 1 tablet by mouth Daily. 30 tablet 5 10/18/2022   • potassium chloride 10 MEQ CR tablet Take 1 tablet by mouth Daily. 30 tablet 11 10/18/2022   • quinapril (ACCUPRIL) 40 MG tablet TAKE 2 TABLETS BY MOUTH DAILY 180 tablet 1 10/19/2022   • vitamin B-12  "(CYANOCOBALAMIN) 1000 MCG tablet Take 1,000 mcg by mouth Daily.   10/18/2022   • rivaroxaban (Xarelto) 20 MG tablet Take 1 tablet by mouth Daily With Dinner. 90 tablet 3 10/16/2022   • tadalafil (CIALIS) 20 MG tablet Take  by mouth Daily As Needed.          Allergies:  Patient has no known allergies.    ROS:    Pertinent items are noted in HPI     Objective     Blood pressure 145/93, pulse 60, resp. rate 16, height 188 cm (74\"), weight 132 kg (290 lb), SpO2 99 %.    Physical Exam   Constitutional: Pt is oriented to person, place, and time and well-developed, well-nourished, and in no distress.   Mouth/Throat: Oropharynx is clear and moist.   Neck: Normal range of motion.   Cardiovascular: Normal rate, regular rhythm and normal heart sounds.    Pulmonary/Chest: Effort normal and breath sounds normal.   Abdominal: Soft. Nontender  Skin: Skin is warm and dry.   Psychiatric: Mood, memory, affect and judgment normal.     Assessment & Plan     Diagnosis:  Dyspepsia    Anticipated Surgical Procedure:  EGD    The risks, benefits, and alternatives of this procedure have been discussed with the patient or the responsible party- the patient understands and agrees to proceed.                                                          "

## 2022-10-19 NOTE — ANESTHESIA POSTPROCEDURE EVALUATION
"Patient: Shashi Engel    Procedure Summary     Date: 10/19/22 Room / Location: [facility] ENDOSCOPY 10 / [facility] ENDOSCOPY    Anesthesia Start: 1347 Anesthesia Stop: 1427    Procedure: ESOPHAGOGASTRODUODENOSCOPY WITH BX (Esophagus) Diagnosis:       Dyspepsia      (Dyspepsia [R10.13])    Surgeons: Anjel Carney MD Provider: Maribell Leary MD    Anesthesia Type: MAC ASA Status: 4          Anesthesia Type: MAC    Vitals  Vitals Value Taken Time   /90 10/19/22 1443   Temp     Pulse 60 10/19/22 1443   Resp 16 10/19/22 1443   SpO2 97 % 10/19/22 1443           Post Anesthesia Care and Evaluation    Patient location during evaluation: PHASE II  Patient participation: complete - patient participated  Level of consciousness: awake  Pain management: adequate    Airway patency: patent  Anesthetic complications: No anesthetic complications    Cardiovascular status: acceptable  Respiratory status: acceptable  Hydration status: acceptable    Comments: /90 (BP Location: Left arm, Patient Position: Lying)   Pulse 60   Resp 16   Ht 188 cm (74\")   Wt 132 kg (290 lb)   SpO2 97%   BMI 37.23 kg/m²       "

## 2022-10-20 LAB
LAB AP CASE REPORT: NORMAL
PATH REPORT.FINAL DX SPEC: NORMAL
PATH REPORT.GROSS SPEC: NORMAL

## 2022-10-25 DIAGNOSIS — E78.2 MIXED HYPERLIPIDEMIA: ICD-10-CM

## 2022-10-25 RX ORDER — ATORVASTATIN CALCIUM 40 MG/1
TABLET, FILM COATED ORAL
Qty: 90 TABLET | Refills: 3 | Status: SHIPPED | OUTPATIENT
Start: 2022-10-25 | End: 2023-02-21

## 2022-10-25 NOTE — TELEPHONE ENCOUNTER
Next OV-nothing scheduled  Last OV-09/28/22-MM    Is this refill ok?  Also, do you want the patient to follow up?    Jessenia

## 2022-11-04 ENCOUNTER — HOSPITAL ENCOUNTER (OUTPATIENT)
Dept: CARDIOLOGY | Facility: HOSPITAL | Age: 62
Discharge: HOME | End: 2022-11-04

## 2022-11-04 ENCOUNTER — HOSPITAL ENCOUNTER (OUTPATIENT)
Dept: GENERAL RADIOLOGY | Facility: HOSPITAL | Age: 62
Discharge: HOME | End: 2022-11-04

## 2022-11-04 VITALS — HEIGHT: 74 IN | WEIGHT: 300 LBS | BODY MASS INDEX: 38.5 KG/M2

## 2022-11-04 DIAGNOSIS — I48.0 PAF (PAROXYSMAL ATRIAL FIBRILLATION): ICD-10-CM

## 2022-11-04 DIAGNOSIS — I11.9 HYPERTENSIVE LEFT VENTRICULAR HYPERTROPHY, WITHOUT HEART FAILURE: ICD-10-CM

## 2022-11-04 DIAGNOSIS — I27.20 PULMONARY HYPERTENSION: ICD-10-CM

## 2022-11-04 DIAGNOSIS — I36.1 TRICUSPID VALVE REGURGITATION, NONRHEUMATIC: ICD-10-CM

## 2022-11-04 DIAGNOSIS — I34.0 NONRHEUMATIC MITRAL VALVE REGURGITATION: ICD-10-CM

## 2022-11-04 DIAGNOSIS — M79.89 LEG SWELLING: ICD-10-CM

## 2022-11-04 DIAGNOSIS — G47.33 OSA (OBSTRUCTIVE SLEEP APNEA): ICD-10-CM

## 2022-11-04 DIAGNOSIS — R10.13 DYSPEPSIA: ICD-10-CM

## 2022-11-04 DIAGNOSIS — R06.09 DYSPNEA ON EXERTION: ICD-10-CM

## 2022-11-04 LAB
MAXIMAL PREDICTED HEART RATE: 160 BPM
STRESS TARGET HR: 136 BPM

## 2022-11-04 PROCEDURE — A9538 TC99M PYROPHOSPHATE: HCPCS | Performed by: NURSE PRACTITIONER

## 2022-11-04 PROCEDURE — 78803 RP LOCLZJ TUM SPECT 1 AREA: CPT | Performed by: INTERNAL MEDICINE

## 2022-11-04 PROCEDURE — 74246 X-RAY XM UPR GI TRC 2CNTRST: CPT

## 2022-11-04 PROCEDURE — 78803 RP LOCLZJ TUM SPECT 1 AREA: CPT

## 2022-11-04 PROCEDURE — 0 TECHNETIUM TC99M PYROPHOSPHATE: Performed by: NURSE PRACTITIONER

## 2022-11-04 RX ADMIN — BARIUM SULFATE 135 ML: 980 POWDER, FOR SUSPENSION ORAL at 09:50

## 2022-11-04 RX ADMIN — ANTACID/ANTIFLATULENT 1 PACKET: 380; 550; 10; 10 GRANULE, EFFERVESCENT ORAL at 09:50

## 2022-11-04 RX ADMIN — TECHNETIUM TC99M PYROPHOSPHATE 1 DOSE: 12 INJECTION INTRAVENOUS at 10:10

## 2022-11-04 NOTE — PROGRESS NOTES
Can you get him scheduled to see me on a Wed or Thurs at the main office; preferably when Dr. Vanegas is in office? This is to see how he is feeling.     You can let him know that his test result was inconclusive. I will review with Dr. Vanegas when she returns to see if this needs to be repeated, or if he needs cardiac MRI. However, she was pleased with his RAKESH results and he may not require additional testing.     Thanks!  DENNIS Boyd

## 2022-11-23 ENCOUNTER — HOSPITAL ENCOUNTER (EMERGENCY)
Facility: HOSPITAL | Age: 62
Discharge: HOME | End: 2022-11-24
Attending: EMERGENCY MEDICINE | Admitting: EMERGENCY MEDICINE

## 2022-11-23 ENCOUNTER — APPOINTMENT (OUTPATIENT)
Dept: GENERAL RADIOLOGY | Facility: HOSPITAL | Age: 62
End: 2022-11-23

## 2022-11-23 DIAGNOSIS — Z86.39 HISTORY OF DIABETES MELLITUS: ICD-10-CM

## 2022-11-23 DIAGNOSIS — J10.1 INFLUENZA A: Primary | ICD-10-CM

## 2022-11-23 DIAGNOSIS — Z86.79 HISTORY OF CARDIOMYOPATHY: ICD-10-CM

## 2022-11-23 LAB
ALBUMIN SERPL-MCNC: 3.7 G/DL (ref 3.5–5.2)
ALBUMIN/GLOB SERPL: 1.2 G/DL
ALP SERPL-CCNC: 110 U/L (ref 39–117)
ALT SERPL W P-5'-P-CCNC: 25 U/L (ref 1–41)
ANION GAP SERPL CALCULATED.3IONS-SCNC: 11 MMOL/L (ref 5–15)
AST SERPL-CCNC: 28 U/L (ref 1–40)
BASOPHILS # BLD AUTO: 0.01 10*3/MM3 (ref 0–0.2)
BASOPHILS NFR BLD AUTO: 0.1 % (ref 0–1.5)
BILIRUB SERPL-MCNC: 2.9 MG/DL (ref 0–1.2)
BUN SERPL-MCNC: 9 MG/DL (ref 8–23)
BUN/CREAT SERPL: 10.5 (ref 7–25)
CALCIUM SPEC-SCNC: 8.7 MG/DL (ref 8.6–10.5)
CHLORIDE SERPL-SCNC: 100 MMOL/L (ref 98–107)
CO2 SERPL-SCNC: 26 MMOL/L (ref 22–29)
CREAT SERPL-MCNC: 0.86 MG/DL (ref 0.76–1.27)
D-LACTATE SERPL-SCNC: 2 MMOL/L (ref 0.5–2)
DEPRECATED RDW RBC AUTO: 45.5 FL (ref 37–54)
EGFRCR SERPLBLD CKD-EPI 2021: 99.1 ML/MIN/1.73
EOSINOPHIL # BLD AUTO: 0 10*3/MM3 (ref 0–0.4)
EOSINOPHIL NFR BLD AUTO: 0 % (ref 0.3–6.2)
ERYTHROCYTE [DISTWIDTH] IN BLOOD BY AUTOMATED COUNT: 14.2 % (ref 12.3–15.4)
FLUAV RNA RESP QL NAA+PROBE: DETECTED
FLUBV RNA RESP QL NAA+PROBE: NOT DETECTED
GLOBULIN UR ELPH-MCNC: 3 GM/DL
GLUCOSE SERPL-MCNC: 123 MG/DL (ref 65–99)
HCT VFR BLD AUTO: 40.6 % (ref 37.5–51)
HGB BLD-MCNC: 12.7 G/DL (ref 13–17.7)
IMM GRANULOCYTES # BLD AUTO: 0.05 10*3/MM3 (ref 0–0.05)
IMM GRANULOCYTES NFR BLD AUTO: 0.7 % (ref 0–0.5)
LYMPHOCYTES # BLD AUTO: 0.31 10*3/MM3 (ref 0.7–3.1)
LYMPHOCYTES NFR BLD AUTO: 4.2 % (ref 19.6–45.3)
MCH RBC QN AUTO: 27.5 PG (ref 26.6–33)
MCHC RBC AUTO-ENTMCNC: 31.3 G/DL (ref 31.5–35.7)
MCV RBC AUTO: 88.1 FL (ref 79–97)
MONOCYTES # BLD AUTO: 0.61 10*3/MM3 (ref 0.1–0.9)
MONOCYTES NFR BLD AUTO: 8.3 % (ref 5–12)
NEUTROPHILS NFR BLD AUTO: 6.33 10*3/MM3 (ref 1.7–7)
NEUTROPHILS NFR BLD AUTO: 86.7 % (ref 42.7–76)
NRBC BLD AUTO-RTO: 0 /100 WBC (ref 0–0.2)
PLATELET # BLD AUTO: 179 10*3/MM3 (ref 140–450)
PMV BLD AUTO: 9.9 FL (ref 6–12)
POTASSIUM SERPL-SCNC: 3.4 MMOL/L (ref 3.5–5.2)
PROCALCITONIN SERPL-MCNC: 0.16 NG/ML (ref 0–0.25)
PROT SERPL-MCNC: 6.7 G/DL (ref 6–8.5)
RBC # BLD AUTO: 4.61 10*6/MM3 (ref 4.14–5.8)
RSV RNA NPH QL NAA+NON-PROBE: NOT DETECTED
SARS-COV-2 RNA RESP QL NAA+PROBE: NOT DETECTED
SODIUM SERPL-SCNC: 137 MMOL/L (ref 136–145)
WBC NRBC COR # BLD: 7.31 10*3/MM3 (ref 3.4–10.8)

## 2022-11-23 PROCEDURE — 85025 COMPLETE CBC W/AUTO DIFF WBC: CPT | Performed by: EMERGENCY MEDICINE

## 2022-11-23 PROCEDURE — 80053 COMPREHEN METABOLIC PANEL: CPT | Performed by: EMERGENCY MEDICINE

## 2022-11-23 PROCEDURE — 87637 SARSCOV2&INF A&B&RSV AMP PRB: CPT | Performed by: EMERGENCY MEDICINE

## 2022-11-23 PROCEDURE — 99284 EMERGENCY DEPT VISIT MOD MDM: CPT

## 2022-11-23 PROCEDURE — 36415 COLL VENOUS BLD VENIPUNCTURE: CPT

## 2022-11-23 PROCEDURE — 83605 ASSAY OF LACTIC ACID: CPT | Performed by: EMERGENCY MEDICINE

## 2022-11-23 PROCEDURE — 71045 X-RAY EXAM CHEST 1 VIEW: CPT

## 2022-11-23 PROCEDURE — 84145 PROCALCITONIN (PCT): CPT | Performed by: EMERGENCY MEDICINE

## 2022-11-23 RX ORDER — ACETAMINOPHEN 500 MG
1000 TABLET ORAL ONCE
Status: COMPLETED | OUTPATIENT
Start: 2022-11-23 | End: 2022-11-23

## 2022-11-23 RX ADMIN — ACETAMINOPHEN 1000 MG: 500 TABLET ORAL at 21:50

## 2022-11-24 VITALS
SYSTOLIC BLOOD PRESSURE: 126 MMHG | DIASTOLIC BLOOD PRESSURE: 69 MMHG | RESPIRATION RATE: 18 BRPM | HEART RATE: 66 BPM | TEMPERATURE: 100.7 F | OXYGEN SATURATION: 97 %

## 2022-11-24 RX ORDER — OSELTAMIVIR PHOSPHATE 75 MG/1
75 CAPSULE ORAL 2 TIMES DAILY
Qty: 10 CAPSULE | Refills: 0 | Status: SHIPPED | OUTPATIENT
Start: 2022-11-24 | End: 2022-11-29

## 2022-11-24 RX ORDER — DEXTROMETHORPHAN HYDROBROMIDE AND PROMETHAZINE HYDROCHLORIDE 15; 6.25 MG/5ML; MG/5ML
5 SYRUP ORAL 4 TIMES DAILY PRN
Qty: 240 ML | Refills: 0 | Status: SHIPPED | OUTPATIENT
Start: 2022-11-24 | End: 2022-12-01

## 2022-11-29 ENCOUNTER — REMOTE PATIENT MONITORING (OUTPATIENT)
Dept: CARDIOLOGY | Facility: CLINIC | Age: 62
End: 2022-11-29
Payer: MEDICARE

## 2022-11-30 ENCOUNTER — OFFICE VISIT (OUTPATIENT)
Dept: CARDIOLOGY | Facility: CLINIC | Age: 62
End: 2022-11-30

## 2022-11-30 VITALS
HEART RATE: 65 BPM | BODY MASS INDEX: 37.99 KG/M2 | SYSTOLIC BLOOD PRESSURE: 130 MMHG | DIASTOLIC BLOOD PRESSURE: 70 MMHG | OXYGEN SATURATION: 97 % | HEIGHT: 74 IN | WEIGHT: 296 LBS

## 2022-11-30 DIAGNOSIS — I48.0 PAF (PAROXYSMAL ATRIAL FIBRILLATION): ICD-10-CM

## 2022-11-30 DIAGNOSIS — I27.20 PULMONARY HYPERTENSION: ICD-10-CM

## 2022-11-30 DIAGNOSIS — R80.9 PROTEINURIA, UNSPECIFIED TYPE: ICD-10-CM

## 2022-11-30 DIAGNOSIS — I36.1 NONRHEUMATIC TRICUSPID VALVE REGURGITATION: Primary | ICD-10-CM

## 2022-11-30 DIAGNOSIS — I34.0 NONRHEUMATIC MITRAL VALVE REGURGITATION: ICD-10-CM

## 2022-11-30 DIAGNOSIS — I11.9 HYPERTENSIVE LEFT VENTRICULAR HYPERTROPHY, WITHOUT HEART FAILURE: ICD-10-CM

## 2022-11-30 DIAGNOSIS — I10 ESSENTIAL HYPERTENSION: ICD-10-CM

## 2022-11-30 DIAGNOSIS — I49.5 SSS (SICK SINUS SYNDROME): ICD-10-CM

## 2022-11-30 DIAGNOSIS — E78.2 MIXED HYPERLIPIDEMIA: ICD-10-CM

## 2022-11-30 DIAGNOSIS — E11.9 DIABETES MELLITUS TYPE 2, NONINSULIN DEPENDENT: ICD-10-CM

## 2022-11-30 DIAGNOSIS — I47.29 NONSUSTAINED VENTRICULAR TACHYCARDIA: ICD-10-CM

## 2022-11-30 DIAGNOSIS — G47.33 OSA (OBSTRUCTIVE SLEEP APNEA): ICD-10-CM

## 2022-11-30 PROBLEM — R80.8 OTHER PROTEINURIA: Status: ACTIVE | Noted: 2022-11-30

## 2022-11-30 PROCEDURE — 93000 ELECTROCARDIOGRAM COMPLETE: CPT | Performed by: NURSE PRACTITIONER

## 2022-11-30 PROCEDURE — 99214 OFFICE O/P EST MOD 30 MIN: CPT | Performed by: NURSE PRACTITIONER

## 2022-11-30 RX ORDER — HYDROXYZINE HYDROCHLORIDE 10 MG/1
10 TABLET, FILM COATED ORAL 2 TIMES DAILY PRN
Qty: 30 TABLET | Refills: 11 | Status: SHIPPED | OUTPATIENT
Start: 2022-11-30

## 2022-11-30 NOTE — PROGRESS NOTES
[facility] CARDIOLOGY  [address]  [facility] [address]  [place] [address]  Phone: [phone]      Patient Name: Shashi Engel  :1960  Age: 61 y.o.  Primary Cardiologist: Jennie Vanegas MD  Encounter Provider:  DENNIS Gallegos      Chief Complaint     Chief Complaint: No chief complaint on file.  Fatigue and dyspnea with exertion    SUBJECTIVE     History of Present Illness:  Shashi Engel is a 61 y.o. black male whose medical history includes type 2 diabetes, hypertension, hyperlipidemia and VENESSA/CPAP (he follows with Dr. Dias).  He is followed in our office by Dr. Vanegas for current syncope, SVT, VT, PAF and mildly reduced RV systolic function.     22 Follow-up:  He is here for 6-week follow-up.  At last visit, echocardiogram showed severe pulmonary hypertension, moderate to severe mitral valve regurgitation, and severe tricuspid regurgitation.  Dr. Vanegas performed a RAKESH which she felt his valves looked better and graded as moderate mitral valve regurgitation and mild to moderate tricuspid valve regurgitation.  His RVSP was also better at 44 mmHg.  Since that time he tested positive for the flu on 2022 and was treated with Tamiflu.  He felt terrible for about 5 days and had decreased appetite but feels he is gotten over the flu at this time.  Initially after starting Lasix he felt his swelling was better, but now he feels that he has more leg swelling than before.  He also is waking up at night with increased mucus and shortness of breath.  His wife feels that this makes him anxious and difficult for him to sleep.  His blood pressure at home is usually 130s/80s.  He denies chest pain, lightheadedness, palpitations, or syncope.  He is taking his medications as prescribed.    2022 device interrogation showed normal device function set at VVI with battery life of 11 years.  There was one alerts of NSVT lasting 13 beats.  Patient  has been known to have these.    Below is a summary of pertinent cardiology findings:  • February 2012 stress nuclear perfusion study showed no evidence of ischemia.  • Paroxysmal atrial fibrillation: May 2013 [facility] evaluation for dyspnea.  Echocardiogram showed severe concentric LV hypertrophy, EF 66%, mild to moderate mitral insufficiency, mild to moderate tricuspid insufficiency mild elevation of RV systolic pressure.  • May 2013 24-hour Holter monitor showed A. fib with average heart rate 70 bpm.  [id number] score is 2.  • December 2017 stress echocardiogram for dyspnea and decreased exercise tolerance showed EF 50%, severe concentric LV hypertrophy, severe left atrial enlargement, RVSP 46 mmHg, mildly reduced RV systolic function, but no evidence of ischemia.  • December 2017 12-day heart monitor showed atrial fibrillation with bursts of tachycardia.  • Recurrent syncope: 2017 evaluated at [facility]; testing showed grossly intact autonomic function.  His symptoms of fatigue and syncope were attributed to atrial fibrillation.  Is also been documented to occur with drops in blood pressure and bursts of atrial fibrillation with RVR.  • December 2017 he had normal serum protein electrophoresis.  • Pacemaker: May 2022 generator change.   • June 2022 device interrogation showed 3 nonsustained runs of VT lasting from 9-21 beats.  • He had COVID in August 2022 but did not require hospitalization.  • Severe pulmonary hypertension/severe tricuspid regurgitation/mitral regurgitation: Echocardiogram September 2022 shows EF 51%, severe tricuspid regurgitation, moderate to severe mitral regurgitation with eccentric jet noted, severe biatrial enlargement, moderately dilated RV cavity, severe pulmonary hypertension with RVSP 65 mmHg; findings consistent with infiltrative cardiomyopathy.  • October 2022 RAKESH shows EF 50%, moderate concentric LV hypertrophy, hypokinesis of the apical inferior and apical  septal walls.  Mildly reduced RV systolic function, moderate mitral valve regurgitation, mild to moderate tricuspid valve regurgitation, RVSP 44 mmHg, negative saline test results, severely dilated right atrial cavity, and severely increased volume of the left atrium.  His valves and pulmonary hypertension were better compared to September 2022 2D echocardiogram.  • November 2022 cardiac PYP study showed equivocal results that could represent AL amyloid or early ATTR cardiac amyloid.  September 2022 serum JOSE ALFREDO showed no monoclonality, 24-hour urine showed no M spike, but 24-hour urine showed mildly increased total protein at 340 g per 24 hours.  He was referred to nephrology.    Past Medical History:   Diagnosis Date   • Abnormal electrocardiogram    • Anxiety    • Cardiomyopathy, hypertrophic, primary familial (HCC)    • Erectile dysfunction    • Health care maintenance    • Hyperlipidemia    • Hypertension    • Mild concentric left ventricular hypertrophy (LVH)    • Mild mitral regurgitation    • Mild tricuspid regurgitation    • Near syncope    • Noncompliance    • Nonsustained ventricular tachycardia 09/05/2018   • Obesity    • VENESSA (obstructive sleep apnea)    • Osteoarthritis    • PAF (paroxysmal atrial fibrillation) ([facility])    • Pneumonia 01/01/2016   • Type 2 diabetes mellitus ([facility])        Past Surgical History:  • Right knee arthroscopy    Social History     Socioeconomic History   • Marital status:    Tobacco Use   • Smoking status: Former     Types: Cigars   • Smokeless tobacco: Never   • Tobacco comments:     NO caffeine use    Substance and Sexual Activity   • Alcohol use: No   • Drug use: Not Currently     Types: Marijuana     Comment: out of cigars in the remote past   • Sexual activity: Yes     Partners: Female         Review of Systems     Review of Systems   Constitutional: Positive for decreased appetite and malaise/fatigue. Negative for weight loss.   Cardiovascular: Positive for dyspnea on  "exertion, leg swelling and orthopnea. Negative for chest pain, claudication, cyanosis, irregular heartbeat, near-syncope, palpitations, paroxysmal nocturnal dyspnea and syncope.       Medications     Allergies as of 2022   • (No Known Allergies)       Current Outpatient Medications on File Prior to Visit   Medication Sig   • amLODIPine (NORVASC) 5 MG tablet TAKE 1 TABLET BY MOUTH EVERY DAY   • atorvastatin (LIPITOR) 40 MG tablet TAKE 1 TABLET BY MOUTH EVERY DAY   • cholecalciferol (VITAMIN D3) 25 MCG (1000 UT) tablet Take 1,000 Units by mouth Daily.   • [employer] Lancets Original [facility] Use as directed and appropo lancet for his monitor   • furosemide (LASIX) 40 MG tablet Take 1 tablet by mouth Daily.   • glucose blood test strip He needs [employer] brand TrueTrack with lancets strips. Test daily.   • metFORMIN (GLUCOPHAGE) 1000 MG tablet TAKE 1 TABLET BY MOUTH TWICE A DAY WITH MEALS   • metoprolol tartrate (LOPRESSOR) 25 MG tablet TAKE 1 TABLET BY MOUTH TWICE A DAY   • pantoprazole (PROTONIX) 40 MG EC tablet Take 1 tablet by mouth Daily.   • potassium chloride 10 MEQ CR tablet Take 1 tablet by mouth Daily.   • promethazine-dextromethorphan (PROMETHAZINE-DM) 6.25-15 MG/5ML syrup Take 5 mL by mouth 4 (Four) Times a Day As Needed for Cough for up to 7 days.   • quinapril (ACCUPRIL) 40 MG tablet TAKE 2 TABLETS BY MOUTH DAILY   • rivaroxaban (Xarelto) 20 MG tablet Take 1 tablet by mouth Daily With Dinner.   • vitamin B-12 (CYANOCOBALAMIN) 1000 MCG tablet Take 1,000 mcg by mouth Daily.   • [DISCONTINUED] chlorthalidone (HYGROTON) 25 MG tablet Take 1 tablet by mouth Daily.   • [] oseltamivir (Tamiflu) 75 MG capsule Take 1 capsule by mouth 2 (Two) Times a Day for 5 days.   • tadalafil (CIALIS) 20 MG tablet Take  by mouth Daily As Needed.     No current facility-administered medications on file prior to visit.          OBJECTIVE     Vital Signs:   /70   Pulse 65   Ht 188 cm (74\")   Wt 134 kg (296 lb)   SpO2 97%   " BMI 38.00 kg/m²       Weight:  Wt Readings from Last 3 Encounters:   22 134 kg (296 lb)   22 136 kg (300 lb)   10/19/22 132 kg (290 lb)     Body mass index is 38 kg/m².      Physical Exam     Physical Exam  Constitutional:       General: He is not in acute distress.  HENT:      Head: Normocephalic and atraumatic.      Mouth/Throat:      Mouth: Mucous membranes are moist.   Eyes:      General: No scleral icterus.     Extraocular Movements: Extraocular movements intact.      Conjunctiva/sclera: Conjunctivae normal.      Pupils: Pupils are equal, round, and reactive to light.   Cardiovascular:      Rate and Rhythm: Normal rate and regular rhythm.      Pulses: Normal pulses.      Heart sounds: S1 normal and S2 normal. Murmur heard.   Pulmonary:      Effort: No respiratory distress.      Breath sounds: Decreased breath sounds present. No wheezing, rhonchi or rales.   Abdominal:      General: Bowel sounds are normal. There is no distension.      Palpations: Abdomen is soft.      Tenderness: There is no abdominal tenderness.   Musculoskeletal:         General: Normal range of motion.      Cervical back: Normal range of motion and neck supple.      Right lower le+ Pitting Edema present.      Left lower le+ Pitting Edema present.   Skin:     General: Skin is warm and dry.      Coloration: Skin is not jaundiced.   Neurological:      Mental Status: He is alert and oriented to person, place, and time.   Psychiatric:         Mood and Affect: Mood normal.         Reviewed Data     Result Review :  The following data was reviewed by DENNIS Gallegos on 22:  • Labs 2022:  cr 0.8, K 4.1, otherwise unremarkable CMP, Hgb 13.5, Plt 281, Chol 146, HDL 45, LDL 88, Trig 67, hemoglobin A1c 7.1  • Labs 2022: TSH 1.770, serum JOSE ALFREDO shows no monoclonality, 24-hour urine JOSE ALFREDO shows no monoclonality, 24-hour urine protein 340 mg/day  • Labs 2022: cr 0.9, K3.4, T bili 2.9, otherwise  unremarkable CMP Hgb 12.7,       ECG 12 Lead    Date/Time: 11/30/2022 12:52 PM  Performed by: Tamara Ibarra APRN  Authorized by: Tamara Ibarra APRN   Comparison: compared with previous ECG from 10/7/2022  Comparison to previous ECG:  PVC noted on this ekg  Rhythm: atrial fibrillation and paced  Ectopy: unifocal PVCs  Rate: normal  BPM: 79    Clinical impression: abnormal EKG            Assessment and Plan        Assessment and Plan     Assessment:  1. Nonrheumatic tricuspid valve regurgitation    2. Nonrheumatic mitral valve regurgitation    3. Pulmonary hypertension ([facility])    4. PAF (paroxysmal atrial fibrillation) ([facility])    5. SSS (sick sinus syndrome) ([facility])    6. Nonsustained ventricular tachycardia    7. Hypertensive left ventricular hypertrophy, without heart failure    8. Essential hypertension    9. Mixed hyperlipidemia    10. Diabetes mellitus type 2, noninsulin dependent ([facility])    11. VENESSA (obstructive sleep apnea)    12. Proteinuria, unspecified type         1. Tricuspid regurgitation: New finding of severe tricuspid valve regurgitation noted on echocardiogram from September 2022.  He also has severe pulmonary hypertension with RVSP 62 mmHg.  Tricuspid valve regurgitation was mild to moderate on RAKESH in October 2022.  2. Mitral valve regurgitation: New finding of moderate mitral valve regurgitation noted on echocardiogram from September 2022.  Was also moderate on October 2022 RAKESH.  3. Pulmonary hypertension: This is graded as severe on echocardiogram from September 2022.  This was graded as moderate on October 2022 RAKESH.  He is now on 40 mg Lasix daily.  4. Paroxysmal atrial fibrillation: His [id number] score is 4; he is anticoagulated with Xarelto.  Last Holter monitor in December 2017 showed mostly rate controlled atrial fibrillation though with bursts of tachycardia.  He has had episodes of recurrent syncope felt to be related to bursts of A. fib with RVR.  No rate  control issues identified on device check in July 2022.  His heart rate is controlled today.  5. Sick sinus syndrome: He has pacemaker for bouts of recurrent syncope/sick sinus syndrome; originally placed in June 2016.  He had a generator change in May 2022.  Normal device function noted on July 2022 interrogation.  Normal pacing function per ED CG today.  6. Nonsustained ventricular tachycardia: 3 nonsustained runs of VT lasting from 9-21 beats noted on device interrogation June 2022.  July 2022 device interrogation showed 1 episode of NSVT lasting 13 beats.  No recent device interrogation to review.  7. Hypertensive LV hypertrophy: Stress echocardiogram December 2017 showed severe concentric LV hypertrophy with EF 50%.  Findings on echocardiogram from September 2022 are consistent with infiltrative cardiomyopathy.  November 2022 PYP study was equivocal for amlodipine but September 2022 serum and 24-hour urine JOSE ALFREDO were negative for monoclonality.  8. Mildly reduced RV systolic function: Noted on echocardiogram from May 2013 and December 2017.  This was also again noted on October 2022 RAKESH.  9. Hypertension: His medical therapy includes metoprolol tartrate, Accupril, and amlodipine.  This remains controlled off chlorthalidone and with the addition of Lasix.  10. Hyperlipidemia: Lipids in June 2020 were at goal; he is treated with 40 mg atorvastatin daily.  We will follow.  11. Type 2 diabetes: Hemoglobin A1c was at goal in June 2022.  He is treated with metformin.  12. Obstructive sleep apnea: He is compliant with CPAP.  He is describing excess mucus and orthopnea.  13. Dyspnea with exertion: He said more dyspnea with exertion and fatigue over the last few months.  Initially improved with the addition of Lasix but worse of late.  14. Leg swelling: Initially improved with the addition of Lasix but worse of late.  15. Proteinuria: September 20 20 to 24-hour urine protein showed 140 mg/day.  He is on 40 mg Accupril  daily.    Plan:  Mr. Arreola is a patient of Dr. Vanegas's paroxysmal atrial fibrillation, sick sinus syndrome with pacemaker implant, recurrent syncope, mitral valve regurgitation, tricuspid valve regurgitation, and pulmonary hypertension.  His 2D echo in September 2022 showed worsening mitral and tricuspid valve regurgitation with severe pulmonary hypertension; valve regurgitation and pulmonary hypertension were better on RAKESH in October 2022.  He also had normal serum and 24-hour urine JOSE ALFREDO studies in October 2022; he was referred to nephrology for mildly elevated 24-hour urine protein.  He had PYP studies in November 2022 which were equivocal for amyloidosis.  It is difficult to tell if his current symptoms are related to his recent flu illness or excess volume.  For now I will have him increase his Lasix to 80 mg daily and his potassium to 20 mg daily.  I will see him back in clinic next week and we will check labs at that time.  I will also refer him to nephrology for evaluation of proteinuria.      Follow Up:  Return for Double book MM to see on 12/7/22 at 1:30.  Orders Placed This Encounter   Procedures   • Ambulatory Referral to Nephrology   • ECG 12 Lead      New Medications Ordered This Visit   Medications   • hydrOXYzine (ATARAX) 10 MG tablet     Sig: Take 1 tablet by mouth 2 (Two) Times a Day As Needed for Itching or Anxiety.     Dispense:  30 tablet     Refill:  11         Thank you the opportunity to participate in this patient's care.    DENNIS Mireles    This office note has been dictated.

## 2022-12-07 ENCOUNTER — OFFICE VISIT (OUTPATIENT)
Dept: CARDIOLOGY | Facility: CLINIC | Age: 62
End: 2022-12-07

## 2022-12-07 ENCOUNTER — HOSPITAL ENCOUNTER (OUTPATIENT)
Dept: GENERAL RADIOLOGY | Facility: HOSPITAL | Age: 62
Discharge: HOME | End: 2022-12-07
Admitting: NURSE PRACTITIONER

## 2022-12-07 ENCOUNTER — TELEPHONE (OUTPATIENT)
Dept: CARDIOLOGY | Facility: CLINIC | Age: 62
End: 2022-12-07

## 2022-12-07 VITALS
HEART RATE: 67 BPM | OXYGEN SATURATION: 98 % | BODY MASS INDEX: 39.01 KG/M2 | SYSTOLIC BLOOD PRESSURE: 142 MMHG | WEIGHT: 304 LBS | DIASTOLIC BLOOD PRESSURE: 74 MMHG | HEIGHT: 74 IN

## 2022-12-07 DIAGNOSIS — I49.5 SSS (SICK SINUS SYNDROME): ICD-10-CM

## 2022-12-07 DIAGNOSIS — M79.89 LEG SWELLING: ICD-10-CM

## 2022-12-07 DIAGNOSIS — R80.8 OTHER PROTEINURIA: ICD-10-CM

## 2022-12-07 DIAGNOSIS — I48.0 PAF (PAROXYSMAL ATRIAL FIBRILLATION): ICD-10-CM

## 2022-12-07 DIAGNOSIS — R06.09 DYSPNEA ON EXERTION: ICD-10-CM

## 2022-12-07 DIAGNOSIS — I10 ESSENTIAL HYPERTENSION: ICD-10-CM

## 2022-12-07 DIAGNOSIS — G47.33 OSA (OBSTRUCTIVE SLEEP APNEA): ICD-10-CM

## 2022-12-07 DIAGNOSIS — I27.20 PULMONARY HYPERTENSION: ICD-10-CM

## 2022-12-07 DIAGNOSIS — I34.0 NONRHEUMATIC MITRAL VALVE REGURGITATION: ICD-10-CM

## 2022-12-07 DIAGNOSIS — E78.2 MIXED HYPERLIPIDEMIA: ICD-10-CM

## 2022-12-07 DIAGNOSIS — I47.29 NONSUSTAINED VENTRICULAR TACHYCARDIA: ICD-10-CM

## 2022-12-07 DIAGNOSIS — E11.9 DIABETES MELLITUS TYPE 2, NONINSULIN DEPENDENT: ICD-10-CM

## 2022-12-07 DIAGNOSIS — I36.1 NONRHEUMATIC TRICUSPID VALVE REGURGITATION: Primary | ICD-10-CM

## 2022-12-07 PROCEDURE — 71046 X-RAY EXAM CHEST 2 VIEWS: CPT

## 2022-12-07 PROCEDURE — 99214 OFFICE O/P EST MOD 30 MIN: CPT | Performed by: NURSE PRACTITIONER

## 2022-12-07 RX ORDER — BENZONATATE 100 MG/1
100 CAPSULE ORAL
Qty: 30 CAPSULE | Refills: 0 | Status: SHIPPED | OUTPATIENT
Start: 2022-12-07 | End: 2022-12-07

## 2022-12-07 RX ORDER — BENZONATATE 100 MG/1
100 CAPSULE ORAL
Qty: 30 CAPSULE | Refills: 0 | Status: SHIPPED | OUTPATIENT
Start: 2022-12-07 | End: 2022-12-09

## 2022-12-07 RX ORDER — BUMETANIDE 2 MG/1
2 TABLET ORAL 2 TIMES DAILY
Qty: 60 TABLET | Refills: 11 | Status: SHIPPED | OUTPATIENT
Start: 2022-12-07 | End: 2022-12-07

## 2022-12-07 RX ORDER — BUMETANIDE 2 MG/1
2 TABLET ORAL 2 TIMES DAILY
Qty: 60 TABLET | Refills: 11 | Status: SHIPPED | OUTPATIENT
Start: 2022-12-07

## 2022-12-07 NOTE — PROGRESS NOTES
[facility] CARDIOLOGY  [address]  [facility] [address]  [facility] [address]  Phone: [phone]      Patient Name: Shashi Engel  :1960  Age: 61 y.o.  Primary Cardiologist: Jennie Vanegas MD  Encounter Provider:  DENNIS Gallegos      Chief Complaint     Chief Complaint: Follow-up  Fatigue and dyspnea with exertion    SUBJECTIVE     History of Present Illness:  Shashi Engel is a 61 y.o. black male whose medical history includes type 2 diabetes, hypertension, hyperlipidemia and VENESSA/CPAP (he follows with Dr. Dias).  He is followed in our office by Dr. Vanegas for current syncope, SVT, VT, PAF and mildly reduced RV systolic function.     22 Follow-up:  He is here for one week follow-up. At last visit, he reported increased swelling and some orthopnea. His lasix was increased to 80 mg daily with minimal increase in urine output. He still has increased congestion and fatigue. There has been no change in leg swelling. He denies chest pain, palpitations, or syncope. He is taking medications as prescribed.    2022 device interrogation showed normal device function set at VVI with battery life of 11 years.  There was one alerts of NSVT lasting 13 beats.  Patient has been known to have these.    Below is a summary of pertinent cardiology findings:  • 2012 stress nuclear perfusion study showed no evidence of ischemia.  • Paroxysmal atrial fibrillation: May 2013 [facility] evaluation for dyspnea.  Echocardiogram showed severe concentric LV hypertrophy, EF 66%, mild to moderate mitral insufficiency, mild to moderate tricuspid insufficiency mild elevation of RV systolic pressure.  • May 2013 24-hour Holter monitor showed A. fib with average heart rate 70 bpm.  [id number] score is 2.  • 2017 stress echocardiogram for dyspnea and decreased exercise tolerance showed EF 50%, severe concentric LV hypertrophy, severe left atrial enlargement, RVSP 46 mmHg,  mildly reduced RV systolic function, but no evidence of ischemia.  • December 2017 12-day heart monitor showed atrial fibrillation with bursts of tachycardia.  • Recurrent syncope: 2017 evaluated at [place] [facility]; testing showed grossly intact autonomic function.  His symptoms of fatigue and syncope were attributed to atrial fibrillation.  Is also been documented to occur with drops in blood pressure and bursts of atrial fibrillation with RVR.  • December 2017 he had normal serum protein electrophoresis.  • Pacemaker: May 2022 generator change.   • June 2022 device interrogation showed 3 nonsustained runs of VT lasting from 9-21 beats.  • He had COVID in August 2022 but did not require hospitalization.  • Severe pulmonary hypertension/severe tricuspid regurgitation/mitral regurgitation: Echocardiogram September 2022 shows EF 51%, severe tricuspid regurgitation, moderate to severe mitral regurgitation with eccentric jet noted, severe biatrial enlargement, moderately dilated RV cavity, severe pulmonary hypertension with RVSP 65 mmHg; findings consistent with infiltrative cardiomyopathy.  • October 2022 RAKESH shows EF 50%, moderate concentric LV hypertrophy, hypokinesis of the apical inferior and apical septal walls.  Mildly reduced RV systolic function, moderate mitral valve regurgitation, mild to moderate tricuspid valve regurgitation, RVSP 44 mmHg, negative saline test results, severely dilated right atrial cavity, and severely increased volume of the left atrium.  His valves and pulmonary hypertension were better compared to September 2022 2D echocardiogram.  • November 2022 cardiac PYP study showed equivocal results that could represent AL amyloid or early ATTR cardiac amyloid.  September 2022 serum JOSE ALFREDO showed no monoclonality, 24-hour urine showed no M spike, but 24-hour urine showed mildly increased total protein at 340 g per 24 hours.  He was referred to nephrology.    Past Medical History:    Diagnosis Date   • Abnormal electrocardiogram    • Anxiety    • Cardiomyopathy, hypertrophic, primary familial (HCC)    • Erectile dysfunction    • Health care maintenance    • Hyperlipidemia    • Hypertension    • Mild concentric left ventricular hypertrophy (LVH)    • Mild mitral regurgitation    • Mild tricuspid regurgitation    • Near syncope    • Noncompliance    • Nonsustained ventricular tachycardia 09/05/2018   • Obesity    • VENESSA (obstructive sleep apnea)    • Osteoarthritis    • PAF (paroxysmal atrial fibrillation) (HCC)    • Pneumonia 01/01/2016   • Type 2 diabetes mellitus (HCC)        Past Surgical History:  • Right knee arthroscopy    Social History     Socioeconomic History   • Marital status:    Tobacco Use   • Smoking status: Former     Types: Cigars   • Smokeless tobacco: Never   • Tobacco comments:     NO caffeine use    Substance and Sexual Activity   • Alcohol use: No   • Drug use: Not Currently     Types: Marijuana     Comment: out of cigars in the remote past   • Sexual activity: Yes     Partners: Female         Review of Systems     Review of Systems   Constitutional: Positive for decreased appetite and malaise/fatigue. Negative for weight loss.   Cardiovascular: Positive for dyspnea on exertion, leg swelling and orthopnea. Negative for chest pain, claudication, cyanosis, irregular heartbeat, near-syncope, palpitations, paroxysmal nocturnal dyspnea and syncope.       Medications     Allergies as of 12/07/2022   • (No Known Allergies)       Current Outpatient Medications on File Prior to Visit   Medication Sig   • atorvastatin (LIPITOR) 40 MG tablet TAKE 1 TABLET BY MOUTH EVERY DAY   • cholecalciferol (VITAMIN D3) 25 MCG (1000 UT) tablet Take 1,000 Units by mouth Daily.   • [employer] Lancets Original [facility] Use as directed and appropo lancet for his monitor   • glucose blood test strip He needs [employer] brand TrueTrack with lancets strips. Test daily.   • hydrOXYzine (ATARAX) 10 MG tablet Take 1  "tablet by mouth 2 (Two) Times a Day As Needed for Itching or Anxiety.   • metoprolol tartrate (LOPRESSOR) 25 MG tablet TAKE 1 TABLET BY MOUTH TWICE A DAY   • pantoprazole (PROTONIX) 40 MG EC tablet Take 1 tablet by mouth Daily.   • potassium chloride 10 MEQ CR tablet Take 1 tablet by mouth Daily.   • quinapril (ACCUPRIL) 40 MG tablet TAKE 2 TABLETS BY MOUTH DAILY   • rivaroxaban (Xarelto) 20 MG tablet Take 1 tablet by mouth Daily With Dinner.   • vitamin B-12 (CYANOCOBALAMIN) 1000 MCG tablet Take 1,000 mcg by mouth Daily.   • tadalafil (CIALIS) 20 MG tablet Take  by mouth Daily As Needed.     No current facility-administered medications on file prior to visit.          OBJECTIVE     Vital Signs:   /74   Pulse 67   Ht 188 cm (74\")   Wt (!) 138 kg (304 lb)   SpO2 98%   BMI 39.03 kg/m²       Weight:  Wt Readings from Last 3 Encounters:   12/09/22 125 kg (276 lb)   12/07/22 (!) 138 kg (304 lb)   11/30/22 134 kg (296 lb)     Body mass index is 39.03 kg/m².      Physical Exam     Physical Exam  Constitutional:       General: He is not in acute distress.  HENT:      Head: Normocephalic and atraumatic.      Mouth/Throat:      Mouth: Mucous membranes are moist.   Eyes:      General: No scleral icterus.     Extraocular Movements: Extraocular movements intact.      Conjunctiva/sclera: Conjunctivae normal.      Pupils: Pupils are equal, round, and reactive to light.   Cardiovascular:      Rate and Rhythm: Normal rate and regular rhythm.      Pulses: Normal pulses.      Heart sounds: S1 normal and S2 normal. Murmur heard.   Pulmonary:      Effort: No respiratory distress.      Breath sounds: Decreased breath sounds present. No wheezing, rhonchi or rales.   Abdominal:      General: Bowel sounds are normal. There is no distension.      Palpations: Abdomen is soft.      Tenderness: There is no abdominal tenderness.   Musculoskeletal:         General: Normal range of motion.      Cervical back: Normal range of motion and " neck supple.      Right lower le+ Pitting Edema present.      Left lower le+ Pitting Edema present.   Skin:     General: Skin is warm and dry.      Coloration: Skin is not jaundiced.   Neurological:      Mental Status: He is alert and oriented to person, place, and time.   Psychiatric:         Mood and Affect: Mood normal.         Reviewed Data     Result Review :  The following data was reviewed by DENNIS Gallegos on 22:  • Labs 2022:  cr 0.8, K 4.1, otherwise unremarkable CMP, Hgb 13.5, Plt 281, Chol 146, HDL 45, LDL 88, Trig 67, hemoglobin A1c 7.1  • Labs 2022: TSH 1.770, serum JOSE ALFREDO shows no monoclonality, 24-hour urine JOSE ALFREDO shows no monoclonality, 24-hour urine protein 340 mg/day  • Labs 2022: cr 0.9, K3.4, T bili 2.9, otherwise unremarkable CMP Hgb 12.7,     EKG: declined today.    Assessment and Plan        Assessment and Plan     Assessment:  1. Nonrheumatic tricuspid valve regurgitation    2. Nonrheumatic mitral valve regurgitation    3. Pulmonary hypertension ([facility])    4. PAF (paroxysmal atrial fibrillation) ([facility])    5. SSS (sick sinus syndrome) ([facility])    6. Nonsustained ventricular tachycardia    7. Essential hypertension    8. Mixed hyperlipidemia    9. Diabetes mellitus type 2, noninsulin dependent ([facility])    10. VENESSA (obstructive sleep apnea)    11. Dyspnea on exertion    12. Leg swelling    13. Other proteinuria         1. Tricuspid regurgitation: New finding of severe tricuspid valve regurgitation noted on echocardiogram from 2022.  He also has severe pulmonary hypertension with RVSP 62 mmHg.  Tricuspid valve regurgitation was mild to moderate on RAKESH in 2022. No loud murmur today.   2. Mitral valve regurgitation: New finding of moderate mitral valve regurgitation noted on echocardiogram from 2022.  Was also moderate on 2022 RAKESH. No loud murmur today.  3. Pulmonary hypertension: This is graded as severe on  echocardiogram from September 2022.  This was graded as moderate on October 2022 RAKESH.  He is now on 80 mg Lasix daily with minimal improvement in his symptoms.   4. Paroxysmal atrial fibrillation: His [id number] score is 4; he is anticoagulated with Xarelto.  Last Holter monitor in December 2017 showed mostly rate controlled atrial fibrillation though with bursts of tachycardia.  He has had episodes of recurrent syncope felt to be related to bursts of A. fib with RVR.  No rate control issues identified on device check in July 2022.  His heart rate is controlled today; no bleeding.   5. Sick sinus syndrome: He has pacemaker for bouts of recurrent syncope/sick sinus syndrome; originally placed in June 2016.  He had a generator change in May 2022.  Normal device function noted on July 2022 interrogation.  Normal pacing function per EKG today.   6. Nonsustained ventricular tachycardia: 3 nonsustained runs of VT lasting from 9-21 beats noted on device interrogation June 2022.  July 2022 device interrogation showed 1 episode of NSVT lasting 13 beats.  No recent device interrogation to review; will follow.   7. Hypertensive LV hypertrophy: Stress echocardiogram December 2017 showed severe concentric LV hypertrophy with EF 50%.  Findings on echocardiogram from September 2022 are consistent with infiltrative cardiomyopathy.  November 2022 PYP study was equivocal for amlodipine but September 2022 serum and 24-hour urine JOSE ALFREDO were negative for monoclonality.  8. Mildly reduced RV systolic function: Noted on echocardiogram from May 2013 and December 2017.  This was also again noted on October 2022 RAKESH.  9. Hypertension: His medical therapy includes metoprolol tartrate, Accupril, and amlodipine.  Controlled.   10. Hyperlipidemia: Lipids in June 2020 were at goal; he is treated with 40 mg atorvastatin daily. No change.   11. Type 2 diabetes: Hemoglobin A1c was at goal in June 2022.  He is treated with metformin. No recent labs.    12. Obstructive sleep apnea: He is compliant with CPAP.  He is describing excess mucus and orthopnea; no improvement with increased lasix.   13. Dyspnea with exertion: He said more dyspnea with exertion and fatigue over the last few months.  Initially improved with the addition of Lasix but worse of late; no improvement with increased dose of lasix.   14. Leg swelling: Initially improved with the addition of Lasix but worse of late. No change.   15. Proteinuria: September 20 20 to 24-hour urine protein showed 140 mg/day.  He is on 40 mg Accupril daily.    Plan:  Mr. Arreola is a patient of Dr. Vanegas's paroxysmal atrial fibrillation, sick sinus syndrome with pacemaker implant, recurrent syncope, mitral valve regurgitation, tricuspid valve regurgitation, and pulmonary hypertension.  His 2D echo in September 2022 showed worsening mitral and tricuspid valve regurgitation with severe pulmonary hypertension; valve regurgitation and pulmonary hypertension were better on RAKESH in October 2022.  He also had normal serum and 24-hour urine JOSE ALFREDO studies in October 2022; he was referred to nephrology for mildly elevated 24-hour urine protein.  He had PYP studies in November 2022 which were equivocal for amyloidosis.  It is difficult to tell if his current symptoms are related to his recent flu illness or excess volume. I will check chest x-ray, stop lasix, and start 2 mg bumetanide twice daily. I will see him back in 2 weeks; schedule 3 month follow up with Dr. Vanegas.       Follow Up:  Return in about 3 months (around 3/7/2023) for 2 weeks with Yvette; 3 months with  Dr. Vanegas.  Orders Placed This Encounter   Procedures   • XR Chest 2 View      New Medications Ordered This Visit   Medications   • bumetanide (BUMEX) 2 MG tablet     Sig: Take 1 tablet by mouth 2 (Two) Times a Day.     Dispense:  60 tablet     Refill:  11         Thank you the opportunity to participate in this patient's care.    Yvette Birmingham,  APRN    This office note has been dictated.

## 2022-12-08 PROBLEM — R94.39 ABNORMAL STRESS ECHO: Status: RESOLVED | Noted: 2022-02-24 | Resolved: 2022-12-08

## 2022-12-08 PROBLEM — R10.13 DYSPEPSIA: Status: RESOLVED | Noted: 2022-07-25 | Resolved: 2022-12-08

## 2022-12-08 PROBLEM — R06.02 SHORTNESS OF BREATH: Status: RESOLVED | Noted: 2022-02-24 | Resolved: 2022-12-08

## 2022-12-08 NOTE — PROGRESS NOTES
"Subsequent Medicare Wellness Visit     Shashi Engel is a 61 y.o. male who presents for a Subsequent Medicare Wellness Visit.    Compared to one year ago, the patient feels his physical health is the same and his mental health is the same.  Recent Hospitalizations:  He was not admitted to the hospital during the last year.     Current Medical Providers:  Patient Care Team:  Keesha Kirkpatrick MD as PCP - General (Family Medicine)  Keesha Kirkpatrick MD as PCP - Family Medicine  Jennie Vanegas MD as Consulting Physician (Cardiology)  No opioid medication identified on active medication list. I have reviewed chart for other potential  high risk medication/s and harmful drug interactions in the elderly.    Aspirin is not on active medication list.  Aspirin use is contraindicated for this patient due to: current use of Xarelto.  .  Advance Care Planning   Advance Directive is not on file.  ACP discussion was held with the patient during this visit. Patient does not have an advance directive, information provided.     Vitals:    12/09/22 0915   BP: 138/82   Pulse: 61   Resp: 22   SpO2: 98%   Weight: 125 kg (276 lb)   Height: 188 cm (74\")      Estimated body mass index is 35.44 kg/m² as calculated from the following:    Height as of this encounter: 188 cm (74\").    Weight as of this encounter: 125 kg (276 lb).  Class 2 Severe Obesity (BMI >=35 and <=39.9). Obesity-related health conditions include the following: obstructive sleep apnea, hypertension, diabetes mellitus, dyslipidemias, peripheral vascular disease and CAD. Obesity is improving with lifestyle modifications. BMI is is above average; BMI management plan is completed. We discussed portion control and increasing exercise.     Does the patient have evidence of cognitive impairment? No  HEALTH RISK ASSESSMENT  Smoking Status:  Social History     Tobacco Use   Smoking Status Former   • Types: Cigars   Smokeless Tobacco Never   Tobacco Comments    NO caffeine " use      Alcohol Consumption:  Social History     Substance and Sexual Activity   Alcohol Use No     Fall Risk Screen:  SADIEADI Fall Risk Assessment was completed, and patient is at LOW risk for falls.Assessment completed on:12/9/2022  Depression Screening:  PHQ-2/PHQ-9 Depression Screening 12/9/2022   Retired PHQ-9 Total Score -   Retired Total Score -   Little Interest or Pleasure in Doing Things 0-->not at all   Feeling Down, Depressed or Hopeless 0-->not at all   PHQ-9: Brief Depression Severity Measure Score 0     Health Habits and Functional and Cognitive Screening:  Functional & Cognitive Status 12/9/2022   Do you have difficulty preparing food and eating? No   Do you have difficulty bathing yourself, getting dressed or grooming yourself? No   Do you have difficulty using the toilet? No   Do you have difficulty moving around from place to place? No   Do you have trouble with steps or getting out of a bed or a chair? No   Current Diet Well Balanced Diet   Dental Exam Up to date   Eye Exam Up to date   Exercise (times per week) 1 times per week   Current Exercises Include Walking   Do you need help using the phone?  No   Are you deaf or do you have serious difficulty hearing?  No   Do you need help with transportation? No   Do you need help shopping? No   Do you need help preparing meals?  No   Do you need help with housework?  No   Do you need help with laundry? No   Do you need help taking your medications? No   Do you need help managing money? No   Do you ever drive or ride in a car without wearing a seat belt? No   Have you felt unusual stress, anger or loneliness in the last month? No   Who do you live with? Spouse   If you need help, do you have trouble finding someone available to you? No   Have you been bothered in the last four weeks by sexual problems? No   Do you have difficulty concentrating, remembering or making decisions? No       Health Habits  Current Diet: Well Balanced Diet  Dental Exam: Up to  date  Eye Exam: Up to date  Exercise (times per week): 1 times per week  Current Exercises Include: Walking  Age-appropriate Screening Schedule:  Refer to the list below for future screening recommendations based on patient's age, sex and/or medical conditions. Orders for these recommended tests are listed in the plan section. The patient has been provided with a written plan.  Health Maintenance   Topic Date Due   • ZOSTER VACCINE (1 of 2) Never done   • DIABETIC EYE EXAM  03/24/2022   • HEMOGLOBIN A1C  12/09/2022   • INFLUENZA VACCINE  03/31/2023 (Originally 8/1/2022)   • LIPID PANEL  06/09/2023   • URINE MICROALBUMIN  06/09/2023   • DIABETIC FOOT EXAM  12/09/2023   • TDAP/TD VACCINES (3 - Td or Tdap) 02/24/2027        CMS Preventative Services Quick Reference  Risk Factors Identified During Encounter  Vision Screening Recommended    The above risks/problems have been discussed with the patient.  Follow up actions/plans if indicated are seen below in the Assessment/Plan Section.  Pertinent information has been shared with the patient in the After Visit Summary.  Patient Instructions        Follow Up: Medicare visit in one year  An After Visit Summary and PPPS were given/sent to the patient.    Patient has multiple medical problems which are significant and separately identifiable that require additional work above and beyond the Medicare Wellness Visit. These are not trivial or insignificant and are billed with a 25-modifier  Problem Visit:  Shashi is a 61 y.o. also being seen today for Annual Exam    Subjective   History of the Present Illness   Patient's wife is concerned about his mental status but I was unable to elicit any concern from him and he denied there was a problem of any type. Brief MSE was negative by my MA (remembered three objects, oriented x 5).   Social History  He  reports that he has quit smoking. His smoking use included cigars. He has never used smokeless tobacco. He reports that he does  not currently use drugs after having used the following drugs: Marijuana. He reports that he does not drink alcohol.  Objective   Vital Signs        BP Readings from Last 1 Encounters:   12/09/22 138/82     Wt Readings from Last 3 Encounters:   12/09/22 125 kg (276 lb)   12/07/22 (!) 138 kg (304 lb)   11/30/22 134 kg (296 lb)   Body mass index is 35.44 kg/m².     Physical Exam      Assessment and Plan       Problem List Items Addressed This Visit        Cardiac and Vasculature    Essential hypertension    Overview     [facility] 12/9/2022  BP is controlled well. He will recheck in six months. He will continue present meds. Prescription is sent today. .           Relevant Medications    amLODIPine (NORVASC) 5 MG tablet       Endocrine and Metabolic    Diabetes mellitus type 2, noninsulin dependent (HCC) - Primary    Overview     [facility] 12/9/2022  He is getting good values when he tests.         Relevant Medications    metFORMIN (GLUCOPHAGE) 1000 MG tablet    Other Relevant Orders    Hemoglobin A1c   Other Visit Diagnoses     Acute URI        still coughing. Will send in something for nighttime use.     Relevant Medications    promethazine-codeine (PHENERGAN with CODEINE) 6.25-10 MG/5ML syrup    Altered mental status, unspecified altered mental status type        Wife will need to monitor.                Patient was given instructions and counseling regarding his condition or for health maintenance advice. Please see specific information pulled into the AVS if appropriate.

## 2022-12-09 ENCOUNTER — OFFICE VISIT (OUTPATIENT)
Dept: FAMILY MEDICINE CLINIC | Facility: CLINIC | Age: 62
End: 2022-12-09

## 2022-12-09 ENCOUNTER — TELEPHONE (OUTPATIENT)
Dept: FAMILY MEDICINE CLINIC | Facility: CLINIC | Age: 62
End: 2022-12-09

## 2022-12-09 VITALS
SYSTOLIC BLOOD PRESSURE: 138 MMHG | RESPIRATION RATE: 22 BRPM | HEIGHT: 74 IN | DIASTOLIC BLOOD PRESSURE: 82 MMHG | WEIGHT: 276 LBS | HEART RATE: 61 BPM | OXYGEN SATURATION: 98 % | BODY MASS INDEX: 35.42 KG/M2

## 2022-12-09 DIAGNOSIS — R41.82 ALTERED MENTAL STATUS, UNSPECIFIED ALTERED MENTAL STATUS TYPE: ICD-10-CM

## 2022-12-09 DIAGNOSIS — Z00.00 MEDICARE ANNUAL WELLNESS VISIT, SUBSEQUENT: ICD-10-CM

## 2022-12-09 DIAGNOSIS — J06.9 ACUTE URI: ICD-10-CM

## 2022-12-09 DIAGNOSIS — I10 ESSENTIAL HYPERTENSION: ICD-10-CM

## 2022-12-09 DIAGNOSIS — E11.9 DIABETES MELLITUS TYPE 2, NONINSULIN DEPENDENT: Primary | ICD-10-CM

## 2022-12-09 PROCEDURE — G0439 PPPS, SUBSEQ VISIT: HCPCS | Performed by: FAMILY MEDICINE

## 2022-12-09 PROCEDURE — 99213 OFFICE O/P EST LOW 20 MIN: CPT | Performed by: FAMILY MEDICINE

## 2022-12-09 PROCEDURE — 1160F RVW MEDS BY RX/DR IN RCRD: CPT | Performed by: FAMILY MEDICINE

## 2022-12-09 PROCEDURE — 1170F FXNL STATUS ASSESSED: CPT | Performed by: FAMILY MEDICINE

## 2022-12-09 RX ORDER — PROMETHAZINE HYDROCHLORIDE AND CODEINE PHOSPHATE 6.25; 1 MG/5ML; MG/5ML
5 SYRUP ORAL NIGHTLY PRN
Qty: 180 ML | Refills: 0 | Status: SHIPPED | OUTPATIENT
Start: 2022-12-09

## 2022-12-09 RX ORDER — AMLODIPINE BESYLATE 5 MG/1
5 TABLET ORAL DAILY
Qty: 90 TABLET | Refills: 1 | Status: SHIPPED | OUTPATIENT
Start: 2022-12-09

## 2022-12-09 NOTE — TELEPHONE ENCOUNTER
At checkout, patient requested something for a cough, asking to have it called to [facility] [phone]

## 2022-12-09 NOTE — PATIENT INSTRUCTIONS
Advance Care Planning and Advance Directives     You make decisions on a daily basis - decisions about where you want to live, your career, your home, your life. Perhaps one of the most important decisions you face is your choice for future medical care. Take time to talk with your family and your healthcare team and start planning today.  Advance Care Planning is a process that can help you:  Understand possible future healthcare decisions in light of your own experiences  Reflect on those decision in light of your goals and values  Discuss your decisions with those closest to you and the healthcare professionals that care for you  Make a plan by creating a document that reflects your wishes    Surrogate Decision Maker  In the event of a medical emergency, which has left you unable to communicate or to make your own decisions, you would need someone to make decisions for you.  It is important to discuss your preferences for medical treatment with this person while you are in good health.     Qualities of a surrogate decision maker:  Willing to take on this role and responsibility  Knows what you want for future medical care  Willing to follow your wishes even if they don't agree with them  Able to make difficult medical decisions under stressful circumstances    Advance Directives  These are legal documents you can create that will guide your healthcare team and decision maker(s) when needed. These documents can be stored in the electronic medical record.    Living Will - a legal document to guide your care if you have a terminal condition or a serious illness and are unable to communicate. States vary by statute in document names/types, but most forms may include one or more of the following:        -  Directions regarding life-prolonging treatments        -  Directions regarding artificially provided nutrition/hydration        -  Choosing a healthcare decision maker        -  Direction regarding organ/tissue  donation    Durable Power of  for Healthcare - this document names an -in-fact to make medical decisions for you, but it may also allow this person to make personal and financial decisions for you. Please seek the advice of an  if you need this type of document.    **Advance Directives are not required and no one may discriminate against you if you do not sign one.    Medical Orders  Many states allow specific forms/orders signed by your physician to record your wishes for medical treatment in your current state of health. This form, signed in personal communication with your physician, addresses resuscitation and other medical interventions that you may or may not want.      For more information or to schedule a time with a [facility] Advance Care Planning Facilitator contact: [email or web address] or call [phone] and someone will contact you directly.

## 2022-12-10 LAB — HBA1C MFR BLD: 6.9 % (ref 4.8–5.6)

## 2022-12-13 ENCOUNTER — TELEPHONE (OUTPATIENT)
Dept: FAMILY MEDICINE CLINIC | Facility: CLINIC | Age: 62
End: 2022-12-13

## 2022-12-13 NOTE — TELEPHONE ENCOUNTER
Caller: ToroShashi salas    Relationship: Self    Best call back number: [phone]    What medications are you currently taking:   Current Outpatient Medications on File Prior to Visit   Medication Sig Dispense Refill   • amLODIPine (NORVASC) 5 MG tablet Take 1 tablet by mouth Daily. 90 tablet 1   • atorvastatin (LIPITOR) 40 MG tablet TAKE 1 TABLET BY MOUTH EVERY DAY 90 tablet 3   • bumetanide (BUMEX) 2 MG tablet Take 1 tablet by mouth 2 (Two) Times a Day. 60 tablet 11   • cholecalciferol (VITAMIN D3) 25 MCG (1000 UT) tablet Take 1,000 Units by mouth Daily.     • [facility] Lancets Original [facility] Use as directed and appropo lancet for his monitor 100 each 11   • glucose blood test strip He needs [employer] brand TrueTrack with lancets strips. Test daily. 100 each 12   • hydrOXYzine (ATARAX) 10 MG tablet Take 1 tablet by mouth 2 (Two) Times a Day As Needed for Itching or Anxiety. 30 tablet 11   • metFORMIN (GLUCOPHAGE) 1000 MG tablet Take 1 tablet by mouth 2 (Two) Times a Day With Meals. Indications: Type 2 Diabetes, NOTED 90 tablet 1   • metoprolol tartrate (LOPRESSOR) 25 MG tablet TAKE 1 TABLET BY MOUTH TWICE A  tablet 3   • pantoprazole (PROTONIX) 40 MG EC tablet Take 1 tablet by mouth Daily. 30 tablet 5   • potassium chloride 10 MEQ CR tablet Take 1 tablet by mouth Daily. 30 tablet 11   • promethazine-codeine (PHENERGAN with CODEINE) 6.25-10 MG/5ML syrup Take 5 mL by mouth At Night As Needed for Cough. 180 mL 0   • quinapril (ACCUPRIL) 40 MG tablet TAKE 2 TABLETS BY MOUTH DAILY 180 tablet 1   • rivaroxaban (Xarelto) 20 MG tablet Take 1 tablet by mouth Daily With Dinner. 90 tablet 3   • tadalafil (CIALIS) 20 MG tablet Take  by mouth Daily As Needed.     • vitamin B-12 (CYANOCOBALAMIN) 1000 MCG tablet Take 1,000 mcg by mouth Daily.       No current facility-administered medications on file prior to visit.            Which medication are you concerned about: PROMETHAZINE-CODEINE    What are your concerns: PATIENT STATES  THAT THE PHARMACY HAS THIS MEDICATION, BUT THE PHARMACY NEEDS A PRIOR AUTHORIZATION FROM DR. MASSEY.

## 2022-12-19 NOTE — TELEPHONE ENCOUNTER
Called and spoke with patient.  He states his swelling is a lot better on bumex.  He still has minimal swelling but it has greatly improved.  He reports his breathing is a little better.    He is aware of his appt on Wednesday.  I told him I would call him back if you had any changes or recommendations.  Otherwise, we will see him at his appt.    MM: let me know if you need anything else regarding this pt.    Eladia Emery RN  [facility] Triage  12/19/22 09:29 EST    
How is his leg swelling and breathing on bumex?    Thanks!  Yvette Ibarra APRN    
I spoke with Shashi Engel and updated pt on results from provider.  Pt verbalized understanding and has no further questions at this time.    Thank you,    Diana Maciel, RN  Triage [facility]  12/07/22 15:47 EST    
Please let him know that his chest x-ray looks fine; no pneumonia or fluid.     I'll call him on Friday to see how he is doing.      
(4) no impairment

## 2022-12-21 ENCOUNTER — OFFICE VISIT (OUTPATIENT)
Dept: CARDIOLOGY | Facility: CLINIC | Age: 62
End: 2022-12-21

## 2022-12-21 VITALS
DIASTOLIC BLOOD PRESSURE: 78 MMHG | SYSTOLIC BLOOD PRESSURE: 144 MMHG | WEIGHT: 299 LBS | HEART RATE: 57 BPM | HEIGHT: 76 IN | RESPIRATION RATE: 18 BRPM | BODY MASS INDEX: 36.41 KG/M2 | OXYGEN SATURATION: 96 %

## 2022-12-21 DIAGNOSIS — I48.0 PAF (PAROXYSMAL ATRIAL FIBRILLATION): ICD-10-CM

## 2022-12-21 DIAGNOSIS — R06.09 DYSPNEA ON EXERTION: ICD-10-CM

## 2022-12-21 DIAGNOSIS — I27.20 PULMONARY HYPERTENSION: ICD-10-CM

## 2022-12-21 DIAGNOSIS — I49.5 SSS (SICK SINUS SYNDROME): ICD-10-CM

## 2022-12-21 DIAGNOSIS — I10 ESSENTIAL HYPERTENSION: ICD-10-CM

## 2022-12-21 DIAGNOSIS — I11.9 HYPERTENSIVE LEFT VENTRICULAR HYPERTROPHY, WITHOUT HEART FAILURE: ICD-10-CM

## 2022-12-21 DIAGNOSIS — I34.0 NONRHEUMATIC MITRAL VALVE REGURGITATION: ICD-10-CM

## 2022-12-21 DIAGNOSIS — M79.89 LEG SWELLING: ICD-10-CM

## 2022-12-21 DIAGNOSIS — I36.1 NONRHEUMATIC TRICUSPID VALVE REGURGITATION: Primary | ICD-10-CM

## 2022-12-21 PROCEDURE — 93000 ELECTROCARDIOGRAM COMPLETE: CPT | Performed by: NURSE PRACTITIONER

## 2022-12-21 PROCEDURE — 99214 OFFICE O/P EST MOD 30 MIN: CPT | Performed by: NURSE PRACTITIONER

## 2022-12-21 NOTE — PROGRESS NOTES
[facility] CARDIOLOGY  [address]  [facility] [address]  [facility] [address]  Phone: [phone]      Patient Name: Shashi Engel  :1960  Age: 62 y.o.  Primary Cardiologist: Jennie Vanegas MD  Encounter Provider:  DENNIS Gallegos      Chief Complaint     Chief Complaint: Heart Problem  Fatigue and dyspnea with exertion    SUBJECTIVE     History of Present Illness:  Shashi Engel is a 62 y.o. black male whose medical history includes type 2 diabetes, hypertension, hyperlipidemia and VENESSA/CPAP (he follows with Dr. Dias).  He is followed in our office by Dr. Vanegas for current syncope, SVT, VT, PAF and mildly reduced RV systolic function.     22 Follow-up:  He is here for one week follow-up. At last visit, he reported increased swelling and some orthopnea but increase in Lasix; he was switched to bumetanide.  He is taking 2 mg twice daily and his leg swelling and breathing are much improved.  His orthopnea is also improved.  He denies chest pain, palpitations, or lightheadedness.  He is taking his medications as prescribed.    2022 device interrogation showed normal device function set at VVI with battery life of 11 years.  There was one alerts of NSVT lasting 13 beats.  Patient has been known to have these.    Below is a summary of pertinent cardiology findings:  • 2012 stress nuclear perfusion study showed no evidence of ischemia.  • Paroxysmal atrial fibrillation: May 2013 [facility] evaluation for dyspnea.  Echocardiogram showed severe concentric LV hypertrophy, EF 66%, mild to moderate mitral insufficiency, mild to moderate tricuspid insufficiency mild elevation of RV systolic pressure.  • May 2013 24-hour Holter monitor showed A. fib with average heart rate 70 bpm.  [id number] score is 2.  • 2017 stress echocardiogram for dyspnea and decreased exercise tolerance showed EF 50%, severe concentric LV hypertrophy, severe left atrial  enlargement, RVSP 46 mmHg, mildly reduced RV systolic function, but no evidence of ischemia.  • December 2017 12-day heart monitor showed atrial fibrillation with bursts of tachycardia.  • Recurrent syncope: 2017 evaluated at [facility]; testing showed grossly intact autonomic function.  His symptoms of fatigue and syncope were attributed to atrial fibrillation.  Is also been documented to occur with drops in blood pressure and bursts of atrial fibrillation with RVR.  • December 2017 he had normal serum protein electrophoresis.  • Pacemaker: May 2022 generator change.   • June 2022 device interrogation showed 3 nonsustained runs of VT lasting from 9-21 beats.  • He had COVID in August 2022 but did not require hospitalization.  • Severe pulmonary hypertension/severe tricuspid regurgitation/mitral regurgitation: Echocardiogram September 2022 shows EF 51%, severe tricuspid regurgitation, moderate to severe mitral regurgitation with eccentric jet noted, severe biatrial enlargement, moderately dilated RV cavity, severe pulmonary hypertension with RVSP 65 mmHg; findings consistent with infiltrative cardiomyopathy.  • October 2022 RAKESH shows EF 50%, moderate concentric LV hypertrophy, hypokinesis of the apical inferior and apical septal walls.  Mildly reduced RV systolic function, moderate mitral valve regurgitation, mild to moderate tricuspid valve regurgitation, RVSP 44 mmHg, negative saline test results, severely dilated right atrial cavity, and severely increased volume of the left atrium.  His valves and pulmonary hypertension were better compared to September 2022 2D echocardiogram.  • November 2022 cardiac PYP study showed equivocal results that could represent AL amyloid or early ATTR cardiac amyloid.  September 2022 serum JOSE ALFREDO showed no monoclonality, 24-hour urine showed no M spike, but 24-hour urine showed mildly increased total protein at 340 g per 24 hours.  He was referred to  nephrology.    Past Medical History:   Diagnosis Date   • Abnormal electrocardiogram    • Anxiety    • Cardiomyopathy, hypertrophic, primary familial (HCC)    • Erectile dysfunction    • Health care maintenance    • Hyperlipidemia    • Hypertension    • Mild concentric left ventricular hypertrophy (LVH)    • Mild mitral regurgitation    • Mild tricuspid regurgitation    • Near syncope    • Noncompliance    • Nonsustained ventricular tachycardia 09/05/2018   • Obesity    • VENESSA (obstructive sleep apnea)    • Osteoarthritis    • PAF (paroxysmal atrial fibrillation) (HCC)    • Pneumonia 01/01/2016   • Type 2 diabetes mellitus (HCC)        Past Surgical History:  • Right knee arthroscopy    Social History     Socioeconomic History   • Marital status:    Tobacco Use   • Smoking status: Former     Types: Cigars   • Smokeless tobacco: Never   • Tobacco comments:     NO caffeine use    Substance and Sexual Activity   • Alcohol use: No   • Drug use: Not Currently     Types: Marijuana     Comment: out of cigars in the remote past   • Sexual activity: Yes     Partners: Female         Review of Systems     Review of Systems   Constitutional: Negative for malaise/fatigue and weight loss.   Cardiovascular: Positive for dyspnea on exertion. Negative for chest pain, claudication, cyanosis, irregular heartbeat, leg swelling, near-syncope, orthopnea, palpitations, paroxysmal nocturnal dyspnea and syncope.       Medications     Allergies as of 12/21/2022   • (No Known Allergies)       Current Outpatient Medications on File Prior to Visit   Medication Sig   • amLODIPine (NORVASC) 5 MG tablet Take 1 tablet by mouth Daily.   • atorvastatin (LIPITOR) 40 MG tablet TAKE 1 TABLET BY MOUTH EVERY DAY   • bumetanide (BUMEX) 2 MG tablet Take 1 tablet by mouth 2 (Two) Times a Day.   • cholecalciferol (VITAMIN D3) 25 MCG (1000 UT) tablet Take 1,000 Units by mouth Daily.   • [facility] Lancets Original [facility] Use as directed and appropo lancet for his  "monitor   • glucose blood test strip He needs [employer] brand TrueTrack with lancets strips. Test daily.   • hydrOXYzine (ATARAX) 10 MG tablet Take 1 tablet by mouth 2 (Two) Times a Day As Needed for Itching or Anxiety.   • metFORMIN (GLUCOPHAGE) 1000 MG tablet Take 1 tablet by mouth 2 (Two) Times a Day With Meals. Indications: Type 2 Diabetes, NOTED   • metoprolol tartrate (LOPRESSOR) 25 MG tablet TAKE 1 TABLET BY MOUTH TWICE A DAY   • pantoprazole (PROTONIX) 40 MG EC tablet Take 1 tablet by mouth Daily.   • potassium chloride 10 MEQ CR tablet Take 1 tablet by mouth Daily.   • promethazine-codeine (PHENERGAN with CODEINE) 6.25-10 MG/5ML syrup Take 5 mL by mouth At Night As Needed for Cough.   • quinapril (ACCUPRIL) 40 MG tablet TAKE 2 TABLETS BY MOUTH DAILY   • rivaroxaban (Xarelto) 20 MG tablet Take 1 tablet by mouth Daily With Dinner.   • vitamin B-12 (CYANOCOBALAMIN) 1000 MCG tablet Take 1,000 mcg by mouth Daily.   • tadalafil (CIALIS) 20 MG tablet Take  by mouth Daily As Needed.     No current facility-administered medications on file prior to visit.          OBJECTIVE     Vital Signs:   /78   Pulse 57   Resp 18   Ht 193 cm (76\")   Wt 136 kg (299 lb)   SpO2 96%   BMI 36.40 kg/m²       Weight:  Wt Readings from Last 3 Encounters:   12/21/22 136 kg (299 lb)   12/09/22 125 kg (276 lb)   12/07/22 (!) 138 kg (304 lb)     Body mass index is 36.4 kg/m².      Physical Exam     Physical Exam  Constitutional:       General: He is not in acute distress.  HENT:      Head: Normocephalic and atraumatic.      Mouth/Throat:      Mouth: Mucous membranes are moist.   Eyes:      General: No scleral icterus.     Extraocular Movements: Extraocular movements intact.      Conjunctiva/sclera: Conjunctivae normal.      Pupils: Pupils are equal, round, and reactive to light.   Cardiovascular:      Rate and Rhythm: Normal rate and regular rhythm.      Pulses: Normal pulses.      Heart sounds: S1 normal and S2 normal. Murmur heard. "   Pulmonary:      Effort: No respiratory distress.      Breath sounds: Decreased breath sounds present. No wheezing, rhonchi or rales.   Abdominal:      General: Bowel sounds are normal. There is no distension.      Palpations: Abdomen is soft.      Tenderness: There is no abdominal tenderness.   Musculoskeletal:         General: Normal range of motion.      Cervical back: Normal range of motion and neck supple.      Right lower le+ Pitting Edema present.      Left lower le+ Pitting Edema present.   Skin:     General: Skin is warm and dry.      Coloration: Skin is not jaundiced.   Neurological:      Mental Status: He is alert and oriented to person, place, and time.   Psychiatric:         Mood and Affect: Mood normal.           Reviewed Data     Result Review :  The following data was reviewed by DENNIS Gallegos on 22:  • Labs 2022:  cr 0.8, K 4.1, otherwise unremarkable CMP, Hgb 13.5, Plt 281, Chol 146, HDL 45, LDL 88, Trig 67, hemoglobin A1c 7.1  • Labs 2022: TSH 1.770, serum JOSE ALFREDO shows no monoclonality, 24-hour urine JOSE ALFREDO shows no monoclonality, 24-hour urine protein 340 mg/day  • Labs 2022: cr 0.9, K3.4, T bili 2.9, otherwise unremarkable CMP Hgb 12.7,       ECG 12 Lead    Date/Time: 2022 1:23 PM  Performed by: Tamara Ibarra APRN  Authorized by: Tamara Ibarra APRN   Comparison: compared with previous ECG from 2022  Similar to previous ECG  Rhythm: atrial fibrillation and paced  Rate: normal  BPM: 64  Pacing: ventricular pacing          Assessment and Plan        Assessment and Plan     Assessment:  1. Nonrheumatic tricuspid valve regurgitation    2. Nonrheumatic mitral valve regurgitation    3. Pulmonary hypertension (HCC)    4. PAF (paroxysmal atrial fibrillation) ([facility])    5. SSS (sick sinus syndrome) ([facility])    6. Hypertensive left ventricular hypertrophy, without heart failure    7. Essential hypertension    8. Dyspnea  on exertion    9. Leg swelling         1. Tricuspid regurgitation: New finding of severe tricuspid valve regurgitation noted on echocardiogram from September 2022.  He also has severe pulmonary hypertension with RVSP 62 mmHg.  Tricuspid valve regurgitation was mild to moderate on RAKESH in October 2022.  Compensated today.  2. Mitral valve regurgitation: New finding of moderate mitral valve regurgitation noted on echocardiogram from September 2022.  Was also moderate on October 2022 RAKESH.  Better compensated today.  3. Pulmonary hypertension: This is graded as severe on echocardiogram from September 2022.  This was graded as moderate on October 2022 RAKESH.  He is now on 80 mg Lasix daily with minimal improvement in his symptoms, symptoms improved on 2 mg Bumex twice daily.  4. Paroxysmal atrial fibrillation: His [id number] score is 4; he is anticoagulated with Xarelto.  Last Holter monitor in December 2017 showed mostly rate controlled atrial fibrillation though with bursts of tachycardia.  He has had episodes of recurrent syncope felt to be related to bursts of A. fib with RVR.  No rate control issues identified on device check in July 2022.  His heart rate controlled today; no bleeding.  He has a paced rhythm.  5. Sick sinus syndrome: He has pacemaker for bouts of recurrent syncope/sick sinus syndrome; originally placed in June 2016.  He had a generator change in May 2022.  Normal device function noted on July 2022 interrogation.  Normal pacing function per EKG today; last device check in July 2022.    6. Nonsustained ventricular tachycardia: 3 nonsustained runs of VT lasting from 9-21 beats noted on device interrogation June 2022.  July 2022 device interrogation showed 1 episode of NSVT lasting 13 beats.  No recent device interrogation to review; this needs to be scheduled.  7. Hypertensive LV hypertrophy: Stress echocardiogram December 2017 showed severe concentric LV hypertrophy with EF 50%.  Findings on  echocardiogram from September 2022 are consistent with infiltrative cardiomyopathy.  November 2022 PYP study was equivocal for amlodipine but September 2022 serum and 24-hour urine JOSE ALFREDO were negative for monoclonality.  8. Mildly reduced RV systolic function: Noted on echocardiogram from May 2013 and December 2017.  This was also again noted on October 2022 RAKESH.  9. Hypertension: His medical therapy includes metoprolol tartrate, Accupril, and amlodipine.  Controlled on bumetanide.  10. Hyperlipidemia: Lipids in June 2020 were at goal; he is treated with 40 mg atorvastatin daily. No change.   11. Type 2 diabetes: Hemoglobin A1c was at goal in June 2022.  He is treated with metformin. No recent labs.   12. Obstructive sleep apnea: He is compliant with CPAP.  He is describing excess mucus and orthopnea; no improvement with increased lasix.   13. Dyspnea with exertion: He said more dyspnea with exertion and fatigue over the last few months.  Initially improved with the addition of Lasix but worse of late; no improvement with increased dose of lasix.  Better on 2 mg bumetanide twice daily.  14. Leg swelling: Initially improved with the addition of Lasix but worse of late.  Better on 2 mg bumetanide twice daily.  15. Proteinuria: September 20 20 to 24-hour urine protein showed 140 mg/day.  He is on 40 mg Accupril daily.    Plan:  Mr. Arreola is a patient of Dr. Vanegas's paroxysmal atrial fibrillation, sick sinus syndrome with pacemaker implant, recurrent syncope, mitral valve regurgitation, tricuspid valve regurgitation, and pulmonary hypertension.  His 2D echo in September 2022 showed worsening mitral and tricuspid valve regurgitation with severe pulmonary hypertension; valve regurgitation and pulmonary hypertension were better on RAKESH in October 2022.  He also had normal serum and 24-hour urine JOSE ALFREDO studies in October 2022; he was referred to nephrology for mildly elevated 24-hour urine protein.  He had PYP studies in  November 2022 which were equivocal for amyloidosis.  He is recovering from the flu and his symptoms are improved on 2 mg bumetanide twice daily.  I will make no medication changes today and he will keep his March 2022 appointment with Dr. Vanegas.  I will also schedule device check at that time.      Follow Up:  No follow-ups on file.  Orders Placed This Encounter   Procedures   • ECG 12 Lead   • SCANNED EKG      No orders of the defined types were placed in this encounter.        Thank you the opportunity to participate in this patient's care.    DENNIS Mireles    This office note has been dictated.

## 2023-01-01 DIAGNOSIS — E11.9 DIABETES MELLITUS TYPE 2, NONINSULIN DEPENDENT: ICD-10-CM

## 2023-02-10 DIAGNOSIS — I10 BENIGN ESSENTIAL HTN: ICD-10-CM

## 2023-02-10 DIAGNOSIS — E11.9 DIABETES MELLITUS TYPE 2, NONINSULIN DEPENDENT: ICD-10-CM

## 2023-02-13 ENCOUNTER — TRANSCRIBE ORDERS (OUTPATIENT)
Dept: SLEEP MEDICINE | Facility: HOSPITAL | Age: 63
End: 2023-02-13
Payer: MEDICARE

## 2023-02-13 DIAGNOSIS — G47.33 OSA (OBSTRUCTIVE SLEEP APNEA): Primary | ICD-10-CM

## 2023-02-13 RX ORDER — QUINAPRIL 40 MG/1
80 TABLET ORAL DAILY
Qty: 180 TABLET | Refills: 1 | Status: SHIPPED | OUTPATIENT
Start: 2023-02-13

## 2023-02-18 DIAGNOSIS — E78.2 MIXED HYPERLIPIDEMIA: ICD-10-CM

## 2023-02-21 RX ORDER — ATORVASTATIN CALCIUM 40 MG/1
TABLET, FILM COATED ORAL
Qty: 90 TABLET | Refills: 3 | Status: SHIPPED | OUTPATIENT
Start: 2023-02-21

## 2023-02-23 ENCOUNTER — HOSPITAL ENCOUNTER (OUTPATIENT)
Dept: SLEEP MEDICINE | Facility: HOSPITAL | Age: 63
Discharge: HOME | End: 2023-02-23
Admitting: INTERNAL MEDICINE
Payer: MEDICARE

## 2023-02-23 DIAGNOSIS — G47.33 OSA (OBSTRUCTIVE SLEEP APNEA): ICD-10-CM

## 2023-02-23 PROCEDURE — 95811 POLYSOM 6/>YRS CPAP 4/> PARM: CPT

## 2023-02-28 ENCOUNTER — TRANSCRIBE ORDERS (OUTPATIENT)
Dept: ADMINISTRATIVE | Facility: HOSPITAL | Age: 63
End: 2023-02-28
Payer: MEDICARE

## 2023-02-28 ENCOUNTER — REMOTE PATIENT MONITORING (OUTPATIENT)
Dept: CARDIOLOGY | Facility: CLINIC | Age: 63
End: 2023-02-28
Payer: MEDICARE

## 2023-02-28 DIAGNOSIS — R91.1 PULMONARY NODULE: Primary | ICD-10-CM

## 2023-03-15 ENCOUNTER — PATIENT MESSAGE (OUTPATIENT)
Dept: CARDIOLOGY | Facility: CLINIC | Age: 63
End: 2023-03-15
Payer: MEDICARE

## 2023-03-29 ENCOUNTER — OFFICE VISIT (OUTPATIENT)
Dept: CARDIOLOGY | Facility: CLINIC | Age: 63
End: 2023-03-29
Payer: MEDICARE

## 2023-03-29 ENCOUNTER — CLINICAL SUPPORT NO REQUIREMENTS (OUTPATIENT)
Dept: CARDIOLOGY | Facility: CLINIC | Age: 63
End: 2023-03-29
Payer: MEDICARE

## 2023-03-29 VITALS
DIASTOLIC BLOOD PRESSURE: 86 MMHG | WEIGHT: 302 LBS | BODY MASS INDEX: 36.77 KG/M2 | HEART RATE: 67 BPM | HEIGHT: 76 IN | SYSTOLIC BLOOD PRESSURE: 130 MMHG

## 2023-03-29 DIAGNOSIS — I49.5 SSS (SICK SINUS SYNDROME): Primary | ICD-10-CM

## 2023-03-29 DIAGNOSIS — E78.2 MIXED HYPERLIPIDEMIA: ICD-10-CM

## 2023-03-29 DIAGNOSIS — Z95.0 S/P PLACEMENT OF CARDIAC PACEMAKER: ICD-10-CM

## 2023-03-29 DIAGNOSIS — G47.33 OSA (OBSTRUCTIVE SLEEP APNEA): ICD-10-CM

## 2023-03-29 DIAGNOSIS — I36.1 NONRHEUMATIC TRICUSPID VALVE REGURGITATION: ICD-10-CM

## 2023-03-29 DIAGNOSIS — E11.9 DIABETES MELLITUS TYPE 2, NONINSULIN DEPENDENT: ICD-10-CM

## 2023-03-29 DIAGNOSIS — I49.5 SSS (SICK SINUS SYNDROME): ICD-10-CM

## 2023-03-29 DIAGNOSIS — I48.0 PAF (PAROXYSMAL ATRIAL FIBRILLATION): ICD-10-CM

## 2023-03-29 DIAGNOSIS — I34.0 NONRHEUMATIC MITRAL VALVE REGURGITATION: Primary | ICD-10-CM

## 2023-03-29 DIAGNOSIS — I10 ESSENTIAL HYPERTENSION: ICD-10-CM

## 2023-03-29 NOTE — PROGRESS NOTES
Date of Office Visit: 2023  Encounter Provider: Jennie Vanegas MD  Place of Service: [facility] CARDIOLOGY  Patient Name: Shashi Engel  :1960      Patient ID:  Shashi Engel is a 62 y.o. male is here for  followup for SVT, syncope.         History of Present Illness    He has a history of recurrent syncope, SVT, VT, PAF, pacemaker, VENESSA on CPAP.     I first saw him in 2013 when he came in to the Chest Pain Unit. He had had five days of short-windedness at that time and had risk factors for cardiac issues including diabetes mellitus, hypertension, and hyperlipidemia. He underwent an echocardiogram on 2013 which revealed severe concentric left ventricular hypertrophy, an ejection fraction of 66%, mild-to-moderate mitral insufficiency, mild-to-moderate tricuspid insufficiency, mildelevation of right ventricular systolic pressure at 40 mmHg. He then wore a 24-hour Holter monitor which showed atrial fibrillation with average heart rate of 70 beats per minute with brief episodes of tachycardia and bradycardia. He had had a stress nuclear perfusion study performed in 2012 which showed no evidence of ischemia. Ejection fraction was mildly reduced due to atrial fibrillation.       His CHADS-VASc score is 2 giving him a 2.2% risk of strokes per year. He is on Xarelto.     He did go to [place] and was evaluated in the dysautonomic clinic 17.  There testing showed grossly intact autonomic function.  They thought that possibly his atrial fibrillation was driving his symptoms of fatigue and near syncope.  Device interrogation today shows short bursts of ventricular ventricular tachycardia, 13 beats as well as premature ventricular complexes noted 25% at time.      He has stress echocardiogram done 17 for dyspnea and decreased exercise tolerance.  This showed ejection fraction 50% with severe concentric left hypertrophy, severe left atrial  enlargement, RVSP 46 mmHg mildly reduced right ventricular systolic function but there is no evidence of ischemia on the stress echocardiogram portion.  He also had a 12 day monitor done 12/2017 which showed atrial fibrillation with bursts of tachycardia but there were no significant pulses are bradycardia.  He had normal serum protein electrophoresis done December 2017.  This is done due to severe left hypertrophy.     He's had recurrent episodes of near syncope which we believe are probably due to his blood pressure dropping as he has documented this at times.  In addition all been may also be related to bursts of atrial fibrillation with rapid ventricular response.  They also could be due to low blood sugar.     He has stress echocardiogram done 1/19/2022 showing moderate left atrial enlargement, moderate concentric left ventricle hypertrophy, ejection fraction 62%, post exercise ejection fraction of 60%, mild right ventricular dilation, severe right atrial enlargement, moderate elevation of RVSP, moderate concentric left ventricular perjury.  After exercise, there was apical septal hypokinesis.  He had a cardiac catheterization done 3/2/2022 which showed mild pulmonary hypertension, normal LVEDP, luminal irregularities throughout the coronary arteries but no significant coronary artery disease, normal cardiac output and cardiac index.  His mean pulmonary pressure was 27 mmHg.  He had a equivocal PYP study done 11/4/20202.      He had a generator change on 5/9/2022.     Labs 11/23/2022 showed glucose 123, potassium 3.4, otherwise normal CMP and CBC.  He had normal TSH 9/28/2022.  Lipids done 6/9/2022 showed total cholesterol 146, HDL 45, LDL 88, VLDL 13, triglyceride 67.  He sees Dr. Dias from pulmonary for COPD and Dr. Dias did recently have an titration of his CPAP done.       He had dyspnea and was changed to Bumex and diuresed better with this.  Is having a little short windedness right now.  He still  has some GI issues and will see his gastroenterologist.  He had a repeat sleep study with Dr. Dias and did require change in his sleep apnea mask.  He does not feels heart racing or skipping but his device interrogation today does show that he has short bursts of A-fib with RVR lasting less than 30 seconds.  His battery life is good, no changes were made to his device and he RV paces 77% of time.  He has had no syncope but occasionally gets lightheadedness.  He has no lower extremity edema.  Has had no vomiting, fevers or cough.  He is not always taking his Bumex twice a day because it keeps him running to the bathroom.    Past Medical History:   Diagnosis Date   • Abnormal electrocardiogram    • Anxiety    • Cardiomyopathy, hypertrophic, primary familial (HCC)    • Erectile dysfunction    • Health care maintenance    • Hyperlipidemia    • Hypertension    • Mild concentric left ventricular hypertrophy (LVH)    • Mild mitral regurgitation    • Mild tricuspid regurgitation    • Near syncope    • Noncompliance    • Nonsustained ventricular tachycardia 09/05/2018   • Obesity    • VENESSA (obstructive sleep apnea)     uses CPAP faithfully   • Osteoarthritis    • PAF (paroxysmal atrial fibrillation) ([facility])    • Pneumonia 01/01/2016   • Type 2 diabetes mellitus ([facility])          Past Surgical History:   Procedure Laterality Date   • CARDIAC CATHETERIZATION N/A 03/02/2022    Procedure: RIGHT HEART CATH;  Surgeon: Anjel Beasley MD;  Location: [facility] CATH INVASIVE LOCATION;  Service: Cardiovascular;  Laterality: N/A;   • CARDIAC CATHETERIZATION N/A 03/02/2022    Procedure: Coronary angiography;  Surgeon: Anjel Beasley MD;  Location: [facility] CATH INVASIVE LOCATION;  Service: Cardiovascular;  Laterality: N/A;   • CARDIAC ELECTROPHYSIOLOGY PROCEDURE Left 06/06/2016    Procedure: Pacemaker DC new  BOSTON;  Surgeon: Juan Chacon MD;  Location: [facility] CATH INVASIVE LOCATION;  Service:    • CARDIAC ELECTROPHYSIOLOGY  PROCEDURE N/A 05/09/2022    Procedure: PPM generator change - dual- BOSTON;  Surgeon: Juan Chacon MD;  Location: [facility] CATH INVASIVE LOCATION;  Service: Cardiology;  Laterality: N/A;   • ENDOSCOPY N/A 10/19/2022    Procedure: ESOPHAGOGASTRODUODENOSCOPY WITH BX;  Surgeon: Anjel Carney MD;  Location: [facility] ENDOSCOPY;  Service: Gastroenterology;  Laterality: N/A;  PREOP/ DYSPEPSIA  POSTOP/ HIATAL HERNIA, DUODENITIS, GASTRITIS   • INSERT / REPLACE / REMOVE PACEMAKER     • KNEE ARTHROSCOPY Right        Current Outpatient Medications on File Prior to Visit   Medication Sig Dispense Refill   • amLODIPine (NORVASC) 5 MG tablet Take 1 tablet by mouth Daily. 90 tablet 1   • atorvastatin (LIPITOR) 40 MG tablet TAKE 1 TABLET BY MOUTH EVERY DAY 90 tablet 3   • bumetanide (BUMEX) 2 MG tablet Take 1 tablet by mouth 2 (Two) Times a Day. 60 tablet 11   • cholecalciferol (VITAMIN D3) 25 MCG (1000 UT) tablet Take 1 tablet by mouth Daily.     • [facility] Lancets Original [facility] Use as directed and appropo lancet for his monitor 100 each 11   • glucose blood test strip He needs [employer] brand TrueTrack with lancets strips. Test daily. 100 each 12   • hydrOXYzine (ATARAX) 10 MG tablet Take 1 tablet by mouth 2 (Two) Times a Day As Needed for Itching or Anxiety. 30 tablet 11   • metFORMIN (GLUCOPHAGE) 1000 MG tablet Take 1 tablet by mouth 2 (Two) Times a Day With Meals. Indications: Type 2 Diabetes, NOTED 90 tablet 1   • metoprolol tartrate (LOPRESSOR) 25 MG tablet TAKE 1 TABLET BY MOUTH TWICE A  tablet 3   • pantoprazole (PROTONIX) 40 MG EC tablet Take 1 tablet by mouth Daily. 30 tablet 5   • potassium chloride 10 MEQ CR tablet Take 1 tablet by mouth Daily. 30 tablet 11   • promethazine-codeine (PHENERGAN with CODEINE) 6.25-10 MG/5ML syrup Take 5 mL by mouth At Night As Needed for Cough. 180 mL 0   • quinapril (ACCUPRIL) 40 MG tablet TAKE 2 TABLETS BY MOUTH DAILY 180 tablet 1   • rivaroxaban (Xarelto) 20 MG tablet Take 1  "tablet by mouth Daily With Dinner. 90 tablet 3   • vitamin B-12 (CYANOCOBALAMIN) 1000 MCG tablet Take 1 tablet by mouth Daily.     • tadalafil (CIALIS) 20 MG tablet Take  by mouth Daily As Needed.       No current facility-administered medications on file prior to visit.       Social History     Socioeconomic History   • Marital status:    Tobacco Use   • Smoking status: Former     Types: Cigars     Passive exposure: Past   • Smokeless tobacco: Never   • Tobacco comments:     NO caffeine use    Substance and Sexual Activity   • Alcohol use: No   • Drug use: Not Currently     Types: Marijuana     Comment: out of cigars in the remote past   • Sexual activity: Yes     Partners: Female           ROS    Procedures    ECG 12 Lead    Date/Time: 3/29/2023 1:30 PM  Performed by: Jennie Vanegas MD  Authorized by: Jennie Vanegas MD   Comparison: compared with previous ECG   Similar to previous ECG  Rhythm: atrial fibrillation  Pacing: ventricular paced rhythm  Clinical impression: abnormal EKG                Objective:      Vitals:    03/29/23 1321   BP: 130/86   Pulse: 67   Weight: (!) 137 kg (302 lb)   Height: 193 cm (76\")     Body mass index is 36.76 kg/m².    Vitals reviewed.   Constitutional:       General: Not in acute distress.     Appearance: Well-developed. Obese. Not diaphoretic.   Eyes:      General: No scleral icterus.     Conjunctiva/sclera: Conjunctivae normal.   HENT:      Head: Normocephalic and atraumatic.   Neck:      Thyroid: No thyromegaly.      Vascular: No carotid bruit or JVD.      Lymphadenopathy: No cervical adenopathy.   Pulmonary:      Effort: Pulmonary effort is normal. No respiratory distress.      Breath sounds: Normal breath sounds. No wheezing. No rhonchi. No rales.   Chest:      Chest wall: Not tender to palpatation.   Cardiovascular:      Normal rate. Irregularly irregular rhythm.      Murmurs: There is no murmur.      No gallop.   Pulses:     Intact distal pulses. "   Edema:     Peripheral edema absent.   Abdominal:      General: Bowel sounds are normal. There is no distension or abdominal bruit.      Palpations: Abdomen is soft. There is no abdominal mass.      Tenderness: There is no abdominal tenderness.   Musculoskeletal:         General: No deformity.      Extremities: No clubbing present.     Cervical back: Neck supple. Skin:     General: Skin is warm and dry. There is no cyanosis.      Coloration: Skin is not pale.      Findings: No rash.   Neurological:      Mental Status: Alert and oriented to person, place, and time.      Cranial Nerves: No cranial nerve deficit.   Psychiatric:         Judgment: Judgment normal.         Lab Review:       Assessment:      Diagnosis Plan   1. Nonrheumatic mitral valve regurgitation        2. Nonrheumatic tricuspid valve regurgitation        3. S/P placement of cardiac pacemaker        4. Essential hypertension        5. Mixed hyperlipidemia        6. SSS (sick sinus syndrome) (HCC)        7. Diabetes mellitus type 2, noninsulin dependent (HCC)        8. VENESSA (obstructive sleep apnea)          1. Recurrent fatigue and lightheadedness, resolved, likely due to labile BP, PVCs and PAF and low blood sugar.   2. Atrial fibrillation, rate controlled. On xarelto. Occasional gets fast.   3. Hypertension.  Controlled.  4. Hyperlipidemia. Controlled on atorvastatin.   5. Diabetes mellitus type 2.  Overall under fair control.  6. Obstructive sleep apnea on CPAP.  He follows Dr. Dias.  7. Obesity.   8. SSS, s/p pacer.   9. Nonsustained VT and frequent PVCs.   10. Elevated RVSP on echo  11. COPD, followed by Dr. Dias.     Plan:       See Yvette in 6 months, no changes currently.  Await CT of his chest that he is to have next with Dr. Dias, will look for pulmonary edema in that.  In the meantime, advised him to take the Bumex 2 mg twice daily, no other testing at this time.

## 2023-04-04 ENCOUNTER — HOSPITAL ENCOUNTER (OUTPATIENT)
Dept: CT IMAGING | Facility: HOSPITAL | Age: 63
Discharge: HOME | End: 2023-04-04
Admitting: INTERNAL MEDICINE
Payer: MEDICARE

## 2023-04-04 DIAGNOSIS — R91.1 PULMONARY NODULE: ICD-10-CM

## 2023-04-04 PROCEDURE — 71250 CT THORAX DX C-: CPT

## 2023-04-11 ENCOUNTER — TRANSCRIBE ORDERS (OUTPATIENT)
Dept: ADMINISTRATIVE | Facility: HOSPITAL | Age: 63
End: 2023-04-11
Payer: MEDICARE

## 2023-04-11 DIAGNOSIS — N63.0 BREAST MASS IN MALE: Primary | ICD-10-CM

## 2023-04-11 DIAGNOSIS — R91.1 PULMONARY NODULE: ICD-10-CM

## 2023-04-11 DIAGNOSIS — N63.20 MASS OF LEFT BREAST, UNSPECIFIED QUADRANT: Primary | ICD-10-CM

## 2023-04-12 ENCOUNTER — TELEPHONE (OUTPATIENT)
Dept: FAMILY MEDICINE CLINIC | Facility: CLINIC | Age: 63
End: 2023-04-12
Payer: MEDICARE

## 2023-05-03 ENCOUNTER — TELEPHONE (OUTPATIENT)
Dept: CARDIOLOGY | Facility: CLINIC | Age: 63
End: 2023-05-03

## 2023-05-03 NOTE — TELEPHONE ENCOUNTER
Just wanted to send as an FYI  Pt with [employer] VVIR pacer and remote transmission alerting for NST event on 4/27/23.    Pt  82% and dependent per hx  Also has hx of AF and on xarelto.    1 NST  labeled event 4/27/23 lasting 28 beats, possible AF RVR, pt does have hx of AF and on AC but single lead and difficult to determine. Total event time 16 seconds avg V rate 175 PBM. These are known to pt, but this was longer than previous. I spoke with him and he does not recall any symptoms.    Recently seen on 3/29/23 for yearly device check and visit with Dr. Vanegas.    Next f/u with OSCAR Ibarra 10/11/23.

## 2023-05-04 NOTE — TELEPHONE ENCOUNTER
Called and spoke with Mr. Engel and explained change in medication. He verbalized understanding to take metoprolol 25 mg in AM and metoprolol 50 mg in PM.

## 2023-05-05 ENCOUNTER — HOSPITAL ENCOUNTER (OUTPATIENT)
Dept: ULTRASOUND IMAGING | Facility: HOSPITAL | Age: 63
End: 2023-05-05
Payer: MEDICARE

## 2023-05-15 RX ORDER — POTASSIUM CHLORIDE 750 MG/1
TABLET, FILM COATED, EXTENDED RELEASE ORAL
Qty: 90 TABLET | Refills: 3 | Status: SHIPPED | OUTPATIENT
Start: 2023-05-15

## 2023-05-15 NOTE — TELEPHONE ENCOUNTER
Last OV 3/29/23.  Next OV 10/11/23.  Labs 11/23/22.  Does not meet protocol.  [facility]

## 2023-05-17 DIAGNOSIS — I10 ESSENTIAL HYPERTENSION: ICD-10-CM

## 2023-05-17 DIAGNOSIS — E11.9 DIABETES MELLITUS TYPE 2, NONINSULIN DEPENDENT: ICD-10-CM

## 2023-05-18 RX ORDER — HYDROXYZINE HYDROCHLORIDE 10 MG/1
10 TABLET, FILM COATED ORAL 2 TIMES DAILY PRN
Qty: 180 TABLET | Refills: 3 | Status: SHIPPED | OUTPATIENT
Start: 2023-05-18

## 2023-05-18 RX ORDER — RIVAROXABAN 20 MG/1
TABLET, FILM COATED ORAL
Qty: 90 TABLET | Refills: 2 | Status: SHIPPED | OUTPATIENT
Start: 2023-05-18

## 2023-05-19 RX ORDER — AMLODIPINE BESYLATE 5 MG/1
TABLET ORAL
Qty: 30 TABLET | Refills: 0 | Status: SHIPPED | OUTPATIENT
Start: 2023-05-19

## 2023-05-31 NOTE — TELEPHONE ENCOUNTER
FYI:     Pt has had another NSVT event occur on 5/26/23 lasting for 20 beats with average v.rate of 161 bpm.

## 2023-06-01 ENCOUNTER — HOSPITAL ENCOUNTER (OUTPATIENT)
Dept: MAMMOGRAPHY | Facility: HOSPITAL | Age: 63
Discharge: HOME | End: 2023-06-01
Payer: MEDICARE

## 2023-06-01 ENCOUNTER — HOSPITAL ENCOUNTER (OUTPATIENT)
Dept: ULTRASOUND IMAGING | Facility: HOSPITAL | Age: 63
Discharge: HOME | End: 2023-06-01
Payer: MEDICARE

## 2023-06-01 DIAGNOSIS — N63.20 MASS OF LEFT BREAST, UNSPECIFIED QUADRANT: ICD-10-CM

## 2023-06-01 DIAGNOSIS — N63.0 BREAST MASS IN MALE: ICD-10-CM

## 2023-06-01 PROCEDURE — G0279 TOMOSYNTHESIS, MAMMO: HCPCS

## 2023-06-01 PROCEDURE — 77066 DX MAMMO INCL CAD BI: CPT

## 2023-06-01 PROCEDURE — 76642 ULTRASOUND BREAST LIMITED: CPT

## 2023-06-20 ENCOUNTER — TELEPHONE (OUTPATIENT)
Dept: FAMILY MEDICINE CLINIC | Facility: CLINIC | Age: 63
End: 2023-06-20

## 2023-06-20 NOTE — TELEPHONE ENCOUNTER
Caller: Shashi Engel    Relationship: Self    Best call back number: [phone]    What orders are you requesting (i.e. lab or imaging): COLOGMAHNAZ    In what timeframe would the patient need to come in: ASAP/HE RECEIVED LETTER IN THE MAIL THAT HE IS DUE     PLEASE CALL.

## 2023-08-03 ENCOUNTER — TELEPHONE (OUTPATIENT)
Dept: SURGERY | Facility: CLINIC | Age: 63
End: 2023-08-03
Payer: MEDICARE

## 2023-08-07 ENCOUNTER — TELEPHONE (OUTPATIENT)
Dept: CARDIOLOGY | Facility: CLINIC | Age: 63
End: 2023-08-07
Payer: MEDICARE

## 2023-08-07 NOTE — TELEPHONE ENCOUNTER
Patient with VVIR pacer, remote transmission reviewed today documented 1 NST event on 8/1/23, EGM shows 31 beats of what appears to be NSVT rate 185 BPM, 19 seconds long. Patient with history of  NSVT on remotes, per past NSVT events patient asymptomatic. I  LVM for patient to call if he recalls the event or had any symptoms or questions regarding his transmission. Patient has f/u with ALYSSA Ibarra on 10/11/23.    NSVT event on 8/1/23:

## 2023-08-08 DIAGNOSIS — I10 BENIGN ESSENTIAL HTN: ICD-10-CM

## 2023-08-08 RX ORDER — METOPROLOL TARTRATE 50 MG/1
50 TABLET, FILM COATED ORAL 2 TIMES DAILY
Qty: 180 TABLET | Refills: 3 | Status: SHIPPED | OUTPATIENT
Start: 2023-08-08

## 2023-08-08 NOTE — TELEPHONE ENCOUNTER
I spoke with him and sent in new prescription for 50 mg metoprolol BID.     Thanks!  DENNIS Boyd

## 2023-08-09 RX ORDER — TADALAFIL 20 MG/1
20 TABLET ORAL DAILY PRN
Qty: 10 TABLET | Refills: 1 | Status: SHIPPED | OUTPATIENT
Start: 2023-08-09

## 2023-08-18 NOTE — TELEPHONE ENCOUNTER
Please call to check on him on Monday. I left him a message on Friday afternoon.     I just want to confirm that he's taking 50 mg metoprolol BID. Also, how's his BP and HR; how does he feel?    Thanks!  DENNIS Boyd

## 2023-08-18 NOTE — TELEPHONE ENCOUNTER
FYI:     Pt had another NSVT event occur on 8/17/23 that lasted for 20 beats (16 seconds) with an average v.rate of 171 bpm.                 Appt with DENNIS Avelar scheduled for 10/11/23

## 2023-08-20 ENCOUNTER — HOSPITAL ENCOUNTER (OUTPATIENT)
Facility: HOSPITAL | Age: 63
Setting detail: OBSERVATION
LOS: 1 days | Discharge: HOME | End: 2023-08-26
Attending: EMERGENCY MEDICINE | Admitting: INTERNAL MEDICINE
Payer: MEDICARE

## 2023-08-20 ENCOUNTER — APPOINTMENT (OUTPATIENT)
Dept: CT IMAGING | Facility: HOSPITAL | Age: 63
End: 2023-08-20
Payer: MEDICARE

## 2023-08-20 ENCOUNTER — APPOINTMENT (OUTPATIENT)
Dept: GENERAL RADIOLOGY | Facility: HOSPITAL | Age: 63
End: 2023-08-20
Payer: MEDICARE

## 2023-08-20 DIAGNOSIS — R77.8 ELEVATED TROPONIN: ICD-10-CM

## 2023-08-20 DIAGNOSIS — R06.09 DYSPNEA ON EXERTION: Primary | ICD-10-CM

## 2023-08-20 DIAGNOSIS — I50.43 ACUTE ON CHRONIC COMBINED SYSTOLIC (CONGESTIVE) AND DIASTOLIC (CONGESTIVE) HEART FAILURE: ICD-10-CM

## 2023-08-20 DIAGNOSIS — I50.43 ACUTE ON CHRONIC HEART FAILURE WITH REDUCED EJECTION FRACTION AND DIASTOLIC DYSFUNCTION: ICD-10-CM

## 2023-08-20 DIAGNOSIS — R74.8 ELEVATED LIVER ENZYMES: ICD-10-CM

## 2023-08-20 DIAGNOSIS — R53.1 GENERALIZED WEAKNESS: ICD-10-CM

## 2023-08-20 LAB
ALBUMIN SERPL-MCNC: 3.8 G/DL (ref 3.5–5.2)
ALBUMIN/GLOB SERPL: 1.2 G/DL
ALP SERPL-CCNC: 122 U/L (ref 39–117)
ALT SERPL W P-5'-P-CCNC: 48 U/L (ref 1–41)
ANION GAP SERPL CALCULATED.3IONS-SCNC: 10.8 MMOL/L (ref 5–15)
AST SERPL-CCNC: 47 U/L (ref 1–40)
BACTERIA UR QL AUTO: NORMAL /HPF
BASOPHILS # BLD AUTO: 0.01 10*3/MM3 (ref 0–0.2)
BASOPHILS NFR BLD AUTO: 0.2 % (ref 0–1.5)
BILIRUB SERPL-MCNC: 1.3 MG/DL (ref 0–1.2)
BILIRUB UR QL STRIP: NEGATIVE
BUN SERPL-MCNC: 17 MG/DL (ref 8–23)
BUN/CREAT SERPL: 20.2 (ref 7–25)
CALCIUM SPEC-SCNC: 9.5 MG/DL (ref 8.6–10.5)
CHLORIDE SERPL-SCNC: 103 MMOL/L (ref 98–107)
CHOLEST SERPL-MCNC: 159 MG/DL (ref 0–200)
CLARITY UR: CLEAR
CO2 SERPL-SCNC: 27.2 MMOL/L (ref 22–29)
COLOR UR: YELLOW
CREAT SERPL-MCNC: 0.84 MG/DL (ref 0.76–1.27)
DEPRECATED RDW RBC AUTO: 43.2 FL (ref 37–54)
EGFRCR SERPLBLD CKD-EPI 2021: 99.2 ML/MIN/1.73
EOSINOPHIL # BLD AUTO: 0.09 10*3/MM3 (ref 0–0.4)
EOSINOPHIL NFR BLD AUTO: 1.7 % (ref 0.3–6.2)
ERYTHROCYTE [DISTWIDTH] IN BLOOD BY AUTOMATED COUNT: 14.1 % (ref 12.3–15.4)
GEN 5 2HR TROPONIN T REFLEX: 39 NG/L
GLOBULIN UR ELPH-MCNC: 3.1 GM/DL
GLUCOSE BLDC GLUCOMTR-MCNC: 132 MG/DL (ref 70–130)
GLUCOSE BLDC GLUCOMTR-MCNC: 91 MG/DL (ref 70–130)
GLUCOSE SERPL-MCNC: 143 MG/DL (ref 65–99)
GLUCOSE UR STRIP-MCNC: NEGATIVE MG/DL
HBA1C MFR BLD: 6.8 % (ref 4.8–5.6)
HCT VFR BLD AUTO: 41.8 % (ref 37.5–51)
HDLC SERPL-MCNC: 53 MG/DL (ref 40–60)
HGB BLD-MCNC: 13.6 G/DL (ref 13–17.7)
HGB UR QL STRIP.AUTO: NEGATIVE
HYALINE CASTS UR QL AUTO: NORMAL /LPF
IMM GRANULOCYTES # BLD AUTO: 0.02 10*3/MM3 (ref 0–0.05)
IMM GRANULOCYTES NFR BLD AUTO: 0.4 % (ref 0–0.5)
KETONES UR QL STRIP: NEGATIVE
LDLC SERPL CALC-MCNC: 94 MG/DL (ref 0–100)
LDLC/HDLC SERPL: 1.77 {RATIO}
LEUKOCYTE ESTERASE UR QL STRIP.AUTO: NEGATIVE
LYMPHOCYTES # BLD AUTO: 1.26 10*3/MM3 (ref 0.7–3.1)
LYMPHOCYTES NFR BLD AUTO: 23.3 % (ref 19.6–45.3)
MAGNESIUM SERPL-MCNC: 2.1 MG/DL (ref 1.6–2.4)
MCH RBC QN AUTO: 27.7 PG (ref 26.6–33)
MCHC RBC AUTO-ENTMCNC: 32.5 G/DL (ref 31.5–35.7)
MCV RBC AUTO: 85.1 FL (ref 79–97)
MONOCYTES # BLD AUTO: 0.54 10*3/MM3 (ref 0.1–0.9)
MONOCYTES NFR BLD AUTO: 10 % (ref 5–12)
NEUTROPHILS NFR BLD AUTO: 3.49 10*3/MM3 (ref 1.7–7)
NEUTROPHILS NFR BLD AUTO: 64.4 % (ref 42.7–76)
NITRITE UR QL STRIP: NEGATIVE
NRBC BLD AUTO-RTO: 0 /100 WBC (ref 0–0.2)
NT-PROBNP SERPL-MCNC: 464 PG/ML (ref 0–900)
PH UR STRIP.AUTO: 6 [PH] (ref 5–8)
PLATELET # BLD AUTO: 226 10*3/MM3 (ref 140–450)
PMV BLD AUTO: 10.1 FL (ref 6–12)
POTASSIUM SERPL-SCNC: 3.7 MMOL/L (ref 3.5–5.2)
PROT SERPL-MCNC: 6.9 G/DL (ref 6–8.5)
PROT UR QL STRIP: ABNORMAL
QT INTERVAL: 523 MS
QT INTERVAL: 527 MS
QT INTERVAL: 531 MS
RBC # BLD AUTO: 4.91 10*6/MM3 (ref 4.14–5.8)
RBC # UR STRIP: NORMAL /HPF
REF LAB TEST METHOD: NORMAL
SODIUM SERPL-SCNC: 141 MMOL/L (ref 136–145)
SP GR UR STRIP: >=1.03 (ref 1–1.03)
SQUAMOUS #/AREA URNS HPF: NORMAL /HPF
TRIGL SERPL-MCNC: 60 MG/DL (ref 0–150)
TROPONIN T DELTA: -5 NG/L
TROPONIN T SERPL HS-MCNC: 44 NG/L
UROBILINOGEN UR QL STRIP: ABNORMAL
VLDLC SERPL-MCNC: 12 MG/DL (ref 5–40)
WBC # UR STRIP: NORMAL /HPF
WBC NRBC COR # BLD: 5.41 10*3/MM3 (ref 3.4–10.8)

## 2023-08-20 PROCEDURE — G0378 HOSPITAL OBSERVATION PER HR: HCPCS

## 2023-08-20 PROCEDURE — 93010 ELECTROCARDIOGRAM REPORT: CPT | Performed by: INTERNAL MEDICINE

## 2023-08-20 PROCEDURE — 80053 COMPREHEN METABOLIC PANEL: CPT | Performed by: NURSE PRACTITIONER

## 2023-08-20 PROCEDURE — 83735 ASSAY OF MAGNESIUM: CPT | Performed by: NURSE PRACTITIONER

## 2023-08-20 PROCEDURE — 93005 ELECTROCARDIOGRAM TRACING: CPT

## 2023-08-20 PROCEDURE — 83036 HEMOGLOBIN GLYCOSYLATED A1C: CPT | Performed by: NURSE PRACTITIONER

## 2023-08-20 PROCEDURE — 93005 ELECTROCARDIOGRAM TRACING: CPT | Performed by: NURSE PRACTITIONER

## 2023-08-20 PROCEDURE — 94640 AIRWAY INHALATION TREATMENT: CPT

## 2023-08-20 PROCEDURE — 80061 LIPID PANEL: CPT | Performed by: NURSE PRACTITIONER

## 2023-08-20 PROCEDURE — 81001 URINALYSIS AUTO W/SCOPE: CPT | Performed by: NURSE PRACTITIONER

## 2023-08-20 PROCEDURE — 83880 ASSAY OF NATRIURETIC PEPTIDE: CPT | Performed by: NURSE PRACTITIONER

## 2023-08-20 PROCEDURE — 82948 REAGENT STRIP/BLOOD GLUCOSE: CPT

## 2023-08-20 PROCEDURE — 25510000001 IOPAMIDOL PER 1 ML: Performed by: EMERGENCY MEDICINE

## 2023-08-20 PROCEDURE — 99285 EMERGENCY DEPT VISIT HI MDM: CPT

## 2023-08-20 PROCEDURE — 94799 UNLISTED PULMONARY SVC/PX: CPT

## 2023-08-20 PROCEDURE — 71045 X-RAY EXAM CHEST 1 VIEW: CPT

## 2023-08-20 PROCEDURE — 94664 DEMO&/EVAL PT USE INHALER: CPT

## 2023-08-20 PROCEDURE — 93005 ELECTROCARDIOGRAM TRACING: CPT | Performed by: EMERGENCY MEDICINE

## 2023-08-20 PROCEDURE — 71275 CT ANGIOGRAPHY CHEST: CPT

## 2023-08-20 PROCEDURE — 84484 ASSAY OF TROPONIN QUANT: CPT | Performed by: NURSE PRACTITIONER

## 2023-08-20 PROCEDURE — 85025 COMPLETE CBC W/AUTO DIFF WBC: CPT | Performed by: NURSE PRACTITIONER

## 2023-08-20 PROCEDURE — 36415 COLL VENOUS BLD VENIPUNCTURE: CPT

## 2023-08-20 PROCEDURE — 94761 N-INVAS EAR/PLS OXIMETRY MLT: CPT

## 2023-08-20 RX ORDER — IBUPROFEN 600 MG/1
1 TABLET ORAL
Status: DISCONTINUED | OUTPATIENT
Start: 2023-08-20 | End: 2023-08-26 | Stop reason: HOSPADM

## 2023-08-20 RX ORDER — BISACODYL 5 MG/1
5 TABLET, DELAYED RELEASE ORAL DAILY PRN
Status: DISCONTINUED | OUTPATIENT
Start: 2023-08-20 | End: 2023-08-26 | Stop reason: HOSPADM

## 2023-08-20 RX ORDER — POTASSIUM CHLORIDE 750 MG/1
10 TABLET, FILM COATED, EXTENDED RELEASE ORAL DAILY
Status: DISCONTINUED | OUTPATIENT
Start: 2023-08-20 | End: 2023-08-22

## 2023-08-20 RX ORDER — SODIUM CHLORIDE 0.9 % (FLUSH) 0.9 %
10 SYRINGE (ML) INJECTION EVERY 12 HOURS SCHEDULED
Status: DISCONTINUED | OUTPATIENT
Start: 2023-08-20 | End: 2023-08-26 | Stop reason: HOSPADM

## 2023-08-20 RX ORDER — ATORVASTATIN CALCIUM 20 MG/1
40 TABLET, FILM COATED ORAL DAILY
Status: DISCONTINUED | OUTPATIENT
Start: 2023-08-20 | End: 2023-08-26 | Stop reason: HOSPADM

## 2023-08-20 RX ORDER — IPRATROPIUM BROMIDE AND ALBUTEROL SULFATE 2.5; .5 MG/3ML; MG/3ML
3 SOLUTION RESPIRATORY (INHALATION)
Status: DISCONTINUED | OUTPATIENT
Start: 2023-08-20 | End: 2023-08-23

## 2023-08-20 RX ORDER — NITROGLYCERIN 0.4 MG/1
0.4 TABLET SUBLINGUAL
Status: DISCONTINUED | OUTPATIENT
Start: 2023-08-20 | End: 2023-08-26 | Stop reason: HOSPADM

## 2023-08-20 RX ORDER — SODIUM CHLORIDE 0.9 % (FLUSH) 0.9 %
10 SYRINGE (ML) INJECTION AS NEEDED
Status: DISCONTINUED | OUTPATIENT
Start: 2023-08-20 | End: 2023-08-26 | Stop reason: HOSPADM

## 2023-08-20 RX ORDER — AMOXICILLIN 250 MG
2 CAPSULE ORAL 2 TIMES DAILY
Status: DISCONTINUED | OUTPATIENT
Start: 2023-08-20 | End: 2023-08-26 | Stop reason: HOSPADM

## 2023-08-20 RX ORDER — INSULIN LISPRO 100 [IU]/ML
2-9 INJECTION, SOLUTION INTRAVENOUS; SUBCUTANEOUS
Status: DISCONTINUED | OUTPATIENT
Start: 2023-08-20 | End: 2023-08-26 | Stop reason: HOSPADM

## 2023-08-20 RX ORDER — HYDROXYZINE HYDROCHLORIDE 10 MG/1
10 TABLET, FILM COATED ORAL 2 TIMES DAILY PRN
Status: DISCONTINUED | OUTPATIENT
Start: 2023-08-20 | End: 2023-08-26 | Stop reason: HOSPADM

## 2023-08-20 RX ORDER — NICOTINE POLACRILEX 4 MG
15 LOZENGE BUCCAL
Status: DISCONTINUED | OUTPATIENT
Start: 2023-08-20 | End: 2023-08-26 | Stop reason: HOSPADM

## 2023-08-20 RX ORDER — LISINOPRIL 40 MG/1
40 TABLET ORAL EVERY 12 HOURS SCHEDULED
Status: DISCONTINUED | OUTPATIENT
Start: 2023-08-20 | End: 2023-08-26 | Stop reason: HOSPADM

## 2023-08-20 RX ORDER — AMLODIPINE BESYLATE 5 MG/1
5 TABLET ORAL DAILY
Status: DISCONTINUED | OUTPATIENT
Start: 2023-08-20 | End: 2023-08-23

## 2023-08-20 RX ORDER — BISACODYL 10 MG
10 SUPPOSITORY, RECTAL RECTAL DAILY PRN
Status: DISCONTINUED | OUTPATIENT
Start: 2023-08-20 | End: 2023-08-26 | Stop reason: HOSPADM

## 2023-08-20 RX ORDER — ASPIRIN 81 MG/1
81 TABLET ORAL DAILY
Status: DISCONTINUED | OUTPATIENT
Start: 2023-08-21 | End: 2023-08-25

## 2023-08-20 RX ORDER — ASPIRIN 81 MG/1
324 TABLET, CHEWABLE ORAL ONCE
Status: COMPLETED | OUTPATIENT
Start: 2023-08-20 | End: 2023-08-20

## 2023-08-20 RX ORDER — DEXTROSE MONOHYDRATE 25 G/50ML
25 INJECTION, SOLUTION INTRAVENOUS
Status: DISCONTINUED | OUTPATIENT
Start: 2023-08-20 | End: 2023-08-26 | Stop reason: HOSPADM

## 2023-08-20 RX ORDER — POLYETHYLENE GLYCOL 3350 17 G/17G
17 POWDER, FOR SOLUTION ORAL DAILY PRN
Status: DISCONTINUED | OUTPATIENT
Start: 2023-08-20 | End: 2023-08-26 | Stop reason: HOSPADM

## 2023-08-20 RX ORDER — ENOXAPARIN SODIUM 150 MG/ML
1 INJECTION SUBCUTANEOUS ONCE
Status: DISCONTINUED | OUTPATIENT
Start: 2023-08-20 | End: 2023-08-24 | Stop reason: SDUPTHER

## 2023-08-20 RX ORDER — SODIUM CHLORIDE 9 MG/ML
40 INJECTION, SOLUTION INTRAVENOUS AS NEEDED
Status: DISCONTINUED | OUTPATIENT
Start: 2023-08-20 | End: 2023-08-26 | Stop reason: HOSPADM

## 2023-08-20 RX ORDER — BUMETANIDE 2 MG/1
2 TABLET ORAL 2 TIMES DAILY
Status: DISCONTINUED | OUTPATIENT
Start: 2023-08-20 | End: 2023-08-21

## 2023-08-20 RX ADMIN — ATORVASTATIN CALCIUM 40 MG: 20 TABLET, FILM COATED ORAL at 17:12

## 2023-08-20 RX ADMIN — ASPIRIN 324 MG: 81 TABLET, CHEWABLE ORAL at 14:07

## 2023-08-20 RX ADMIN — POTASSIUM CHLORIDE 10 MEQ: 750 TABLET, EXTENDED RELEASE ORAL at 17:12

## 2023-08-20 RX ADMIN — IPRATROPIUM BROMIDE AND ALBUTEROL SULFATE 3 ML: .5; 2.5 SOLUTION RESPIRATORY (INHALATION) at 20:03

## 2023-08-20 RX ADMIN — LISINOPRIL 40 MG: 20 TABLET ORAL at 20:43

## 2023-08-20 RX ADMIN — IOPAMIDOL 95 ML: 755 INJECTION, SOLUTION INTRAVENOUS at 10:13

## 2023-08-20 RX ADMIN — AMLODIPINE BESYLATE 5 MG: 5 TABLET ORAL at 17:12

## 2023-08-20 RX ADMIN — IPRATROPIUM BROMIDE AND ALBUTEROL SULFATE 3 ML: .5; 2.5 SOLUTION RESPIRATORY (INHALATION) at 16:27

## 2023-08-20 NOTE — ED NOTES
Nursing report ED to floor  Shashi Engel  62 y.o.  male    HPI :   Chief Complaint   Patient presents with    Fatigue       Admitting doctor:   Byron Jain MD    Admitting diagnosis:   The primary encounter diagnosis was Dyspnea on exertion. Diagnoses of Generalized weakness, Elevated troponin, and Elevated liver enzymes were also pertinent to this visit.    Code status:   Current Code Status       Date Active Code Status Order ID Comments User Context       Prior            Allergies:   Patient has no known allergies.    Isolation:   No active isolations    Intake and Output  No intake or output data in the 24 hours ending 08/20/23 1246    Weight:       08/20/23  0840   Weight: 136 kg (300 lb)       Most recent vitals:   Vitals:    08/20/23 0921 08/20/23 0951 08/20/23 1021 08/20/23 1029   BP: 135/92 134/86 136/89    Pulse: 62 60 61 60   Resp:       Temp:       SpO2: 97% 97% 98% 96%   Weight:       Height:           Active LDAs/IV Access:   Lines, Drains & Airways       Active LDAs       Name Placement date Placement time Site Days    Peripheral IV 08/20/23 0846 Anterior;Right Forearm 08/20/23  0846  Forearm  less than 1                    Labs (abnormal labs have a star):   Labs Reviewed   COMPREHENSIVE METABOLIC PANEL - Abnormal; Notable for the following components:       Result Value    Glucose 143 (*)     ALT (SGPT) 48 (*)     AST (SGOT) 47 (*)     Alkaline Phosphatase 122 (*)     Total Bilirubin 1.3 (*)     All other components within normal limits    Narrative:     GFR Normal >60  Chronic Kidney Disease <60  Kidney Failure <15     URINALYSIS W/ MICROSCOPIC IF INDICATED (NO CULTURE) - Abnormal; Notable for the following components:    Protein, UA 30 mg/dL (1+) (*)     All other components within normal limits   TROPONIN - Abnormal; Notable for the following components:    HS Troponin T 44 (*)     All other components within normal limits    Narrative:     High Sensitive Troponin T Reference  Range:  <10.0 ng/L- Negative Female for AMI  <15.0 ng/L- Negative Male for AMI  >=10 - Abnormal Female indicating possible myocardial injury.  >=15 - Abnormal Male indicating possible myocardial injury.   Clinicians would have to utilize clinical acumen, EKG, Troponin, and serial changes to determine if it is an Acute Myocardial Infarction or myocardial injury due to an underlying chronic condition.        HIGH SENSITIVITIY TROPONIN T 2HR - Abnormal; Notable for the following components:    HS Troponin T 39 (*)     Troponin T Delta -5 (*)     All other components within normal limits    Narrative:     High Sensitive Troponin T Reference Range:  <10.0 ng/L- Negative Female for AMI  <15.0 ng/L- Negative Male for AMI  >=10 - Abnormal Female indicating possible myocardial injury.  >=15 - Abnormal Male indicating possible myocardial injury.   Clinicians would have to utilize clinical acumen, EKG, Troponin, and serial changes to determine if it is an Acute Myocardial Infarction or myocardial injury due to an underlying chronic condition.        BNP (IN-HOUSE) - Normal    Narrative:     Among patients with dyspnea, NT-proBNP is highly sensitive for the detection of acute congestive heart failure. In addition NT-proBNP of <300 pg/ml effectively rules out acute congestive heart failure with 99% negative predictive value.     MAGNESIUM - Normal   CBC WITH AUTO DIFFERENTIAL - Normal   LIPID PANEL    Narrative:     Cholesterol Reference Ranges  (U.S. Department of Health and Human Services ATP III Classifications)    Desirable          <200 mg/dL  Borderline High    200-239 mg/dL  High Risk          >240 mg/dL      Triglyceride Reference Ranges  (U.S. Department of Health and Human Services ATP III Classifications)    Normal           <150 mg/dL  Borderline High  150-199 mg/dL  High             200-499 mg/dL  Very High        >500 mg/dL    HDL Reference Ranges  (U.S. Department of Health and Human Services ATP III  Classifications)    Low     <40 mg/dl (major risk factor for CHD)  High    >60 mg/dl ('negative' risk factor for CHD)        LDL Reference Ranges  (U.S. Department of Health and Human Services ATP III Classifications)    Optimal          <100 mg/dL  Near Optimal     100-129 mg/dL  Borderline High  130-159 mg/dL  High             160-189 mg/dL  Very High        >189 mg/dL   URINALYSIS, MICROSCOPIC ONLY   CBC AND DIFFERENTIAL    Narrative:     The following orders were created for panel order CBC & Differential.  Procedure                               Abnormality         Status                     ---------                               -----------         ------                     CBC Auto Differential[[id number]]        Normal              Final result                 Please view results for these tests on the individual orders.       EKG:   ECG 12 Lead Other; pacemaker   Preliminary Result   HEART RATE= 60  bpm   RR Interval= 1000  ms   IA Interval=   ms   P Horizontal Axis=   deg   P Front Axis=   deg   QRSD Interval= 209  ms   QT Interval= 531  ms   QRS Axis= -80  deg   T Wave Axis= 103  deg   - ABNORMAL ECG -   Junctional rhythm   LVH with IVCD, LAD and secondary repol abnrm   Prolonged QT interval   Electronically Signed By:    Date and Time of Study: 2023-08-20 08:40:53      ECG 12 Lead Chest Pain    (Results Pending)   ECG 12 Lead Chest Pain    (Results Pending)   ECG 12 Lead Chest Pain    (Results Pending)       Meds given in ED:   Medications   sodium chloride 0.9 % flush 10 mL (has no administration in time range)   nitroglycerin (NITROSTAT) SL tablet 0.4 mg (has no administration in time range)   sodium chloride 0.9 % flush 10 mL (has no administration in time range)   sodium chloride 0.9 % flush 10 mL (has no administration in time range)   sodium chloride 0.9 % infusion 40 mL (has no administration in time range)   aspirin chewable tablet 324 mg (has no administration in time range)     And   aspirin  EC tablet 81 mg (has no administration in time range)   sennosides-docusate (PERICOLACE) 8.6-50 MG per tablet 2 tablet (has no administration in time range)     And   polyethylene glycol (MIRALAX) packet 17 g (has no administration in time range)     And   bisacodyl (DULCOLAX) EC tablet 5 mg (has no administration in time range)     And   bisacodyl (DULCOLAX) suppository 10 mg (has no administration in time range)   iopamidol (ISOVUE-370) 76 % injection 100 mL (95 mL Intravenous Given by Other 8/20/23 1013)       Imaging results:  No radiology results for the last day    Ambulatory status:   - assist x 1 d/t weakness    Social issues:   Social History     Socioeconomic History    Marital status:    Tobacco Use    Smoking status: Former     Types: Cigars     Passive exposure: Past    Smokeless tobacco: Never    Tobacco comments:     NO caffeine use    Substance and Sexual Activity    Alcohol use: No    Drug use: Not Currently     Types: Marijuana     Comment: out of cigars in the remote past    Sexual activity: Yes     Partners: Female       NIH Stroke Scale:       Savi Arriaza RN  08/20/23 12:46 EDT

## 2023-08-20 NOTE — ED NOTES
Patient to ER via car from home for fatigue x 3-4 days  Patient reports he got a call regarding strange activity on his pacemake  Patient does reports SOA that comes and goes but states it isnt new

## 2023-08-20 NOTE — PLAN OF CARE
Goal Outcome Evaluation: Patient stable, agreed to duoneb breathing treatment.  Ventricular paced on monitor.  Awaiting other medical interventions, including medication, until after speaking to a doctor.

## 2023-08-20 NOTE — NURSING NOTE
Discussed ordered medications, including lovenox and duonebs with patient and wife.  Both agreed that they would prefer to wait on any intervention or medication until they speak to a doctor.  DENNIS Larose notified.

## 2023-08-20 NOTE — H&P
[facility]   HISTORY AND PHYSICAL    Patient Name: Shashi Engel  : 1960  MRN: [id number]  Primary Care Physician:  Keesha Kirkpatrick MD  Date of admission: 2023    Subjective   Subjective     Chief Complaint:   Chief Complaint   Patient presents with    Fatigue         HPI:    Shashi Engel is a pleasant afebrile ambulatory 62 y.o. black male with a past medical history of hyperlipidemia, paroxysmal atrial fibrillation on Xarelto, sleep apnea on CPAP, sick sinus syndrome status post [employer] pacemaker device, and type 2 diabetes    He presents to the emergency department at [facility] today with complaint of exertional dyspnea.  He has been admitted to the ED observation unit for further testing and evaluation.    Patient states he is noted shortness of breath with minimal exertion over the last week.  I have appreciated at least 1+ leg swelling but patient states this appears to be normal for him.  He states that he is having some orthopnea.  He denies any chest pain.  He reports compliance with his CPAP.    States that his last cardiologist appointment this month his metoprolol was increased from 50 mg twice daily from 25BID.     He denies any fevers, chills or recent travel.    Review of Systems   All systems were reviewed and negative except for: Exertional dyspnea    Personal History     Past Medical History:   Diagnosis Date    Abnormal electrocardiogram     Anxiety     Cardiomyopathy, hypertrophic, primary familial     Erectile dysfunction     Health care maintenance     Hyperlipidemia     Hypertension     Mild concentric left ventricular hypertrophy (LVH)     Mild mitral regurgitation     Mild tricuspid regurgitation     Near syncope     Noncompliance     Nonsustained ventricular tachycardia 2018    Obesity     VENESSA (obstructive sleep apnea)     uses CPAP faithfully    Osteoarthritis     PAF (paroxysmal atrial fibrillation)     Pneumonia 2016    Type  2 diabetes mellitus        Past Surgical History:   Procedure Laterality Date    CARDIAC CATHETERIZATION N/A 03/02/2022    Procedure: RIGHT HEART CATH;  Surgeon: Anjel Beasley MD;  Location: [facility] CATH INVASIVE LOC[facility];  Service: Cardiovascular;  Laterality: N/A;    CARDIAC CATHETERIZATION N/A 03/02/2022    Procedure: Coronary angiography;  Surgeon: Anjel Beasley MD;  Location:  [facility] CATH INVASIVE LOCATION;  Service: Cardiovascular;  Laterality: N/A;    CARDIAC ELECTROPHYSIOLOGY PROCEDURE Left 06/06/2016    Procedure: Pacemaker DC new  [place]STON;  Surgeon: Juan Chacon MD;  Location:  [facility] CATH INVASIVE LOCATION;  Service:     CARDIAC ELECTROPHYSIOLOGY PROCEDURE N/A 05/09/2022    Procedure: PPM generator change - dual- BOSTON;  Surgeon: Juan Chacon MD;  Location:  [facility] CATH INVASIVE LOCATION;  Service: Cardiology;  Laterality: N/A;    ENDOSCOPY N/A 10/19/2022    Procedure: ESOPHAGOGASTRODUODENOSCOPY WITH BX;  Surgeon: Anjel Carney MD;  Location: [facility] ENDOSCOPY;  Service: Gastroenterology;  Laterality: N/A;  PREOP/ DYSPEPSIA  POSTOP/ HIATAL HERNIA, DUODENITIS, GASTRITIS    ENDOSCOPY      INSERT / REPLACE / REMOVE PACEMAKER      KNEE ARTHROSCOPY Right        Family History: family history includes Atrial fibrillation in his sister; Depression in his mother; Heart disease in his mother, sister, and sister; Hypertension in his brother, mother, sister, and sister; Kidney disease in an other family member; Sleep apnea in an other family member. Otherwise pertinent FHx was reviewed and not pertinent to current issue.    Social History:  reports that he has quit smoking. His smoking use included cigars. He has been exposed to tobacco smoke. He has never used smokeless tobacco. He reports that he does not currently use drugs after having used the following drugs: Marijuana. He reports that he does not drink alcohol.    Home Medications:  [employer] Lancets Original, amLODIPine, atorvastatin,  bumetanide, cholecalciferol, glucose blood, hydrOXYzine, metFORMIN, metoprolol tartrate, pantoprazole, potassium chloride, promethazine-codeine, quinapril, rivaroxaban, tadalafil, and vitamin B-12    Allergies:  No Known Allergies    Objective   Objective     Vitals:   Temp:  [97.5 °F (36.4 °C)] 97.5 °F (36.4 °C)  Heart Rate:  [55-64] 60  Resp:  [18] 18  BP: (134-136)/(86-98) 136/89  Physical Exam    Constitutional: Awake, alert   Eyes: PERRLA, sclerae anicteric, no conjunctival injection   HENT: NCAT, mucous membranes moist   Neck: Supple, no thyromegaly, no lymphadenopathy, trachea midline   Respiratory: Clear to auscultation bilaterally, nonlabored respirations    Cardiovascular: RRR, no murmurs, rubs, or gallops, palpable pedal pulses bilaterally   Gastrointestinal: Positive bowel sounds, soft, nontender, nondistended   Musculoskeletal: 1-2+ pitting bilateral lower extremity edema, no clubbing or cyanosis to extremities   Psychiatric: Appropriate affect, cooperative   Neurologic: Oriented x 3, strength symmetric in all extremities, Cranial Nerves grossly intact to confrontation, speech clear   Skin: No rashes     Result Review    Result Review:  I have personally reviewed the results from the time of this admission to 8/20/2023 11:49 EDT and agree with these findings:  [x]  Laboratory list / accordion  []  Microbiology  [x]  Radiology  [x]  EKG/Telemetry   []  Cardiology/Vascular   []  Pathology  []  Old records  []  Other:  Most notable findings include: CTA chest negative for pulmonary embolism, pulmonary nodules noted, sensitivity troponin 44, 39      Assessment & Plan   Assessment / Plan     Brief Patient Summary:  Shashi Engel is a 62 y.o. male who is being evaluated for exertional dyspnea    Active Hospital Problems:  Active Hospital Problems    Diagnosis     **Exertional dyspnea      Plan:     Exertional dyspnea  Consult cardiology  High-sensitivity troponin 44, 39, trend  Cardiac diet, n.p.o. after  midnight  Lipid panel unremarkable  Echocardiogram pending  EKG shows paced rhythm    Type 2 diabetes  Hemoglobin A1c pending  Moderate insulin sliding scale protocol initiated  Hold metformin for 72 hours following administration of IV contrast    Hypertension  Vitals every 4 hours  Continue home medications    Paroxysmal atrial fibrillation  Hold Eliquis for cardiology evaluation for now  Lovenox 1 mg/kg x 1 dose  Telemetry    DVT prophylaxis:  No DVT prophylaxis order currently exists.    CODE STATUS:    Level Of Support Discussed With: Patient  Code Status (Patient has no pulse and is not breathing): CPR (Attempt to Resuscitate)  Medical Interventions (Patient has pulse or is breathing): Full Support    Admission Status:  I believe this patient meets observation status.    Electronically signed by DENNIS Maxwell, 08/20/23, 11:49 AM EDT.        76 minutes has been spent by [facility] providers in the care of this patient while under observation status      I have worn appropriate PPE during this patient encounter, sanitized my hands both with entering and exiting patient's room.

## 2023-08-20 NOTE — ED PROVIDER NOTES
MD ATTESTATION NOTE    The SHIRIN and I have discussed this patient's history, physical exam, and treatment plan.  I have reviewed the documentation and personally had a face to face interaction with the patient. I affirm the documentation and agree with the treatment and plan.  The attached note describes my personal findings.      I provided a substantive portion of the care of the patient.  I personally performed the physical exam in its entirety, and below are my findings.      Brief HPI: 62-year-old male who presents emergency room for dyspnea on exertion for the past 4 to 5 days.  He states he has also had a cough and fatigue.  Patient states that 2 days ago he felt a funny heartbeat and the cardiologist called him and increased his metoprolol.  Patient states he recently had a biopsy that he is supposed to follow-up with about this week.  The patient reports a minimal cough but no fevers or chills.  He denies chest pain.      General : 62-year-old patient is awake alert and oriented  HEENT: NCAT  CV: Heart is regular rate and rhythm  Respiratory: Mild rales in bases  Abd: Soft and nontender  Ext: No acute abnormalities: Trace pedal edema bilaterally  Skin: No rash  Neuro: Awake with a nonfocal neuro exam  Psych: Normal mood and affect      Plan: We will check EKG, labs, chest x-ray and a CTA chest for further evaluation.  We will have his pacemaker interrogated.    His pacemaker interrogation does show an episode of tachycardia to 171 2 days ago.  His EKG is paced.  His initial troponin is 44.  His LFTs are mildly elevated.  The patient CTA chest shows no PE.  We are awaiting his repeat troponin.  We will admit him to the hospital for cardiology consultation and further evaluation and care.     Oskar Paul MD  08/20/23 [id number]

## 2023-08-20 NOTE — ED PROVIDER NOTES
EMERGENCY DEPARTMENT ENCOUNTER    Room Number:  08/08  Date of encounter:  8/20/2023  PCP: Keesha Kirkpatrick MD  Patient Care Team:  Keesha Kirkpatrick MD as PCP - General (Family Medicine)  Keesha Kirkpatrick MD as PCP - Family Medicine  Jennie Vanegas MD as Consulting Physician (Cardiology)   Independent Historians: Patient and family          HPI:  Chief Complaint: Shortness of breath and fatigue  A complete HPI/ROS/PMH/PSH/SH/FH are unobtainable due to: Nothing    Chronic or social conditions impacting patient care (social determinants of health): Nothing    Context: Shashi Engel is a 62 y.o. male with a history of HTN, PAF, cardiomyopathy, hyperlipidemia, DM type II, anticoagulated on Xarelto who arrives to the ED via private vehicle.  Patient presents with c/o shortness of breath with exertion for the past 4 to 5 days.   Patient also complains of fatigue, cough also for the past 4 to 5 days.  Patient states that he received a call he thinks Thursday or Friday stating that his pacemaker had some strange activity, however is not sure what that was.  Patient denies fever, chills, nausea, vomiting, chest pain, weight gain, leg swelling, palpitations.  Patient states that nothing makes the symptoms better and exertion worsens symptoms.      Review of prior external notes (non-ED): Medical records reviewed in [facility], telephone encounter from cardiology on 8/18/2023 shows the patient had a NSVT event on 8/17/2023 that lasted for 20 beats (16 seconds) with an average V rate of 171 bpm.  He had had a previous episode 8/7/2023, Dr. Bah increased his metoprolol to 50 mg twice daily.    Review of prior external test results outside of this encounter: Transesophageal echo performed 10/5/2022-right atrial cavity severely dilated, left atrial volume severely increased.  LVEF 50%, low normal LV systolic function.  Mild pulmonary hypertension.  Moderate mitral valve regurgitation.    PAST MEDICAL HISTORY  Active  Ambulatory Problems     Diagnosis Date Noted    PAF (paroxysmal atrial fibrillation) 11/02/2015    Essential hypertension 11/02/2015    ED (erectile dysfunction) of organic origin 11/02/2015    HLD (hyperlipidemia) 11/02/2015    Hypertrophic polyarthritis 11/02/2015    Diabetes mellitus type 2, noninsulin dependent 11/02/2015    LVH (left ventricular hypertrophy) due to hypertensive disease 08/04/2016    SSS (sick sinus syndrome) 11/10/2016    Sleep related hypoxia 12/04/2017    Class 2 severe obesity due to excess calories with serious comorbidity and body mass index (BMI) of 38.0 to 38.9 in adult     VENESSA (obstructive sleep apnea)     Nonsustained ventricular tachycardia 09/05/2018    S/P placement of cardiac pacemaker 07/08/2021    Pulmonary hypertension 11/30/2022    Other proteinuria 11/30/2022    Nonrheumatic tricuspid valve regurgitation 11/30/2022    Nonrheumatic mitral valve regurgitation 11/30/2022     Resolved Ambulatory Problems     Diagnosis Date Noted    Cardiomyopathy, hypertrophic, primary familial 11/02/2015    Dizzy spells 05/05/2016    Near syncope 08/04/2016    Orthostasis 06/21/2017    Obesity (BMI 30-39.9) 12/04/2017    Hypersomnia with sleep apnea 12/04/2017    Severe VENESSA on CPAP 01/29/2018    Obesity, morbid, BMI 40.0-49.9 02/26/2018    Shortness of breath 02/24/2022    Abnormal stress echo 02/24/2022    Dyspepsia 07/25/2022     Past Medical History:   Diagnosis Date    Abnormal electrocardiogram     Anxiety     Erectile dysfunction     Health care maintenance     Hyperlipidemia     Hypertension     Mild concentric left ventricular hypertrophy (LVH)     Mild mitral regurgitation     Mild tricuspid regurgitation     Noncompliance     Obesity     Osteoarthritis     Pneumonia 01/01/2016    Type 2 diabetes mellitus        The patient has started, but not completed, their COVID-19 vaccination series.    PAST SURGICAL HISTORY  Past Surgical History:   Procedure Laterality Date    CARDIAC  CATHETERIZATION N/A 03/02/2022    Procedure: RIGHT HEART CATH;  Surgeon: Anjel Beasley MD;  Location:  [facility] CATH INVASIVE LOCATION;  Service: Cardiovascular;  Laterality: N/A;    CARDIAC CATHETERIZATION N/A 03/02/2022    Procedure: Coronary angiography;  Surgeon: Anjel Beasley MD;  Location: [facility] CATH INVASIVE LOCATION;  Service: Cardiovascular;  Laterality: N/A;    CARDIAC ELECTROPHYSIOLOGY PROCEDURE Left 06/06/2016    Procedure: Pacemaker DC new  [place];  Surgeon: Juan Chacon MD;  Location: [facility] CATH INVASIVE LOCATION;  Service:     CARDIAC ELECTROPHYSIOLOGY PROCEDURE N/A 05/09/2022    Procedure: PPM generator change - dual- [place];  Surgeon: Juan Chacon MD;  Location: [facility] CATH INVASIVE LOCATION;  Service: Cardiology;  Laterality: N/A;    ENDOSCOPY N/A 10/19/2022    Procedure: ESOPHAGOGASTRODUODENOSCOPY WITH BX;  Surgeon: Anjel Carney MD;  Location: [facility] ENDOSCOPY;  Service: Gastroenterology;  Laterality: N/A;  PREOP/ DYSPEPSIA  POSTOP/ HIATAL HERNIA, DUODENITIS, GASTRITIS    ENDOSCOPY      INSERT / REPLACE / REMOVE PACEMAKER      KNEE ARTHROSCOPY Right          FAMILY HISTORY  Family History   Problem Relation Age of Onset    Depression Mother     Hypertension Mother     Heart disease Mother     Atrial fibrillation Sister     Hypertension Sister     Heart disease Sister     Hypertension Sister     Heart disease Sister     Hypertension Brother     Kidney disease Other     Sleep apnea Other     Diabetes Neg Hx     Breast cancer Neg Hx          SOCIAL HISTORY  Social History     Socioeconomic History    Marital status:    Tobacco Use    Smoking status: Former     Types: Cigars     Passive exposure: Past    Smokeless tobacco: Never    Tobacco comments:     NO caffeine use    Substance and Sexual Activity    Alcohol use: No    Drug use: Not Currently     Types: Marijuana     Comment: out of cigars in the remote past    Sexual activity: Yes     Partners: Female          ALLERGIES  Patient has no known allergies.        REVIEW OF SYSTEMS  Review of Systems     All systems reviewed and negative except for those discussed in HPI.       PHYSICAL EXAM    I have reviewed the triage vital signs and nursing notes.    ED Triage Vitals   Temp Heart Rate Resp BP SpO2   08/20/23 0830 08/20/23 0830 08/20/23 0830 08/20/23 0903 08/20/23 0830   97.5 °F (36.4 °C) 60 18 136/98 97 %         Physical Exam  GENERAL: Well appearing, nontoxic appearing, not distressed  HENT: normocephalic, atraumatic  EYES: no scleral icterus, PERRL  CV: regular rhythm, regular rate, no murmur  RESPIRATORY: normal effort, diminished throughout  ABDOMEN: soft, nontender  MUSCULOSKELETAL: no deformity, mild bilateral lower extremity edema  NEURO: alert, moves all extremities, follows commands, mental status normal/baseline  SKIN: warm, dry, no rash   Psych: Appropriate mood and affect  Nursing notes and vital signs reviewed          LAB RESULTS  Recent Results (from the past 24 hour(s))   ECG 12 Lead Other; pacemaker    Collection Time: 08/20/23  8:40 AM   Result Value Ref Range    QT Interval 531 ms   Comprehensive Metabolic Panel    Collection Time: 08/20/23  9:05 AM    Specimen: Blood   Result Value Ref Range    Glucose 143 (H) 65 - 99 mg/dL    BUN 17 8 - 23 mg/dL    Creatinine 0.84 0.76 - 1.27 mg/dL    Sodium 141 136 - 145 mmol/L    Potassium 3.7 3.5 - 5.2 mmol/L    Chloride 103 98 - 107 mmol/L    CO2 27.2 22.0 - 29.0 mmol/L    Calcium 9.5 8.6 - 10.5 mg/dL    Total Protein 6.9 6.0 - 8.5 g/dL    Albumin 3.8 3.5 - 5.2 g/dL    ALT (SGPT) 48 (H) 1 - 41 U/L    AST (SGOT) 47 (H) 1 - 40 U/L    Alkaline Phosphatase 122 (H) 39 - 117 U/L    Total Bilirubin 1.3 (H) 0.0 - 1.2 mg/dL    Globulin 3.1 gm/dL    A/G Ratio 1.2 g/dL    BUN/Creatinine Ratio 20.2 7.0 - 25.0    Anion Gap 10.8 5.0 - 15.0 mmol/L    eGFR 99.2 >60.0 mL/min/1.73   BNP    Collection Time: 08/20/23  9:05 AM    Specimen: Blood   Result Value Ref Range     proBNP 464.0 0.0 - 900.0 pg/mL   High Sensitivity Troponin T    Collection Time: 08/20/23  9:05 AM    Specimen: Blood   Result Value Ref Range    HS Troponin T 44 (H) <15 ng/L   Magnesium    Collection Time: 08/20/23  9:05 AM    Specimen: Blood   Result Value Ref Range    Magnesium 2.1 1.6 - 2.4 mg/dL   CBC Auto Differential    Collection Time: 08/20/23  9:05 AM    Specimen: Blood   Result Value Ref Range    WBC 5.41 3.40 - 10.80 10*3/mm3    RBC 4.91 4.14 - 5.80 10*6/mm3    Hemoglobin 13.6 13.0 - 17.7 g/dL    Hematocrit 41.8 37.5 - 51.0 %    MCV 85.1 79.0 - 97.0 fL    MCH 27.7 26.6 - 33.0 pg    MCHC 32.5 31.5 - 35.7 g/dL    RDW 14.1 12.3 - 15.4 %    RDW-SD 43.2 37.0 - 54.0 fl    MPV 10.1 6.0 - 12.0 fL    Platelets 226 140 - 450 10*3/mm3    Neutrophil % 64.4 42.7 - 76.0 %    Lymphocyte % 23.3 19.6 - 45.3 %    Monocyte % 10.0 5.0 - 12.0 %    Eosinophil % 1.7 0.3 - 6.2 %    Basophil % 0.2 0.0 - 1.5 %    Immature Grans % 0.4 0.0 - 0.5 %    Neutrophils, Absolute 3.49 1.70 - 7.00 10*3/mm3    Lymphocytes, Absolute 1.26 0.70 - 3.10 10*3/mm3    Monocytes, Absolute 0.54 0.10 - 0.90 10*3/mm3    Eosinophils, Absolute 0.09 0.00 - 0.40 10*3/mm3    Basophils, Absolute 0.01 0.00 - 0.20 10*3/mm3    Immature Grans, Absolute 0.02 0.00 - 0.05 10*3/mm3    nRBC 0.0 0.0 - 0.2 /100 WBC   High Sensitivity Troponin T 2Hr    Collection Time: 08/20/23 11:07 AM    Specimen: Blood   Result Value Ref Range    HS Troponin T 39 (H) <15 ng/L    Troponin T Delta -5 (L) >=-4 - <+4 ng/L   Lipid Panel    Collection Time: 08/20/23 11:07 AM    Specimen: Blood   Result Value Ref Range    Total Cholesterol 159 0 - 200 mg/dL    Triglycerides 60 0 - 150 mg/dL    HDL Cholesterol 53 40 - 60 mg/dL    LDL Cholesterol  94 0 - 100 mg/dL    VLDL Cholesterol 12 5 - 40 mg/dL    LDL/HDL Ratio 1.77    Urinalysis With Microscopic If Indicated (No Culture) - Urine, Clean Catch    Collection Time: 08/20/23 11:34 AM    Specimen: Urine, Clean Catch   Result Value Ref Range     Color, UA Yellow Yellow, Straw    Appearance, UA Clear Clear    pH, UA 6.0 5.0 - 8.0    Specific Gravity, UA >=1.030 1.005 - 1.030    Glucose, UA Negative Negative    Ketones, UA Negative Negative    Bilirubin, UA Negative Negative    Blood, UA Negative Negative    Protein, UA 30 mg/dL (1+) (A) Negative    Leuk Esterase, UA Negative Negative    Nitrite, UA Negative Negative    Urobilinogen, UA 1.0 E.U./dL 0.2 - 1.0 E.U./dL   Urinalysis, Microscopic Only - Urine, Clean Catch    Collection Time: 23 11:34 AM    Specimen: Urine, Clean Catch   Result Value Ref Range    RBC, UA 0-2 None Seen, 0-2 /HPF    WBC, UA 0-2 None Seen, 0-2 /HPF    Bacteria, UA None Seen None Seen /HPF    Squamous Epithelial Cells, UA 0-2 None Seen, 0-2 /HPF    Hyaline Casts, UA 0-2 None Seen /LPF    Methodology Automated Microscopy        Ordered the above labs and independently reviewed the results.        RADIOLOGY  XR Chest 1 View    Result Date: 2023  ONE-VIEW PORTABLE CHEST  HISTORY: Shortness of breath.  FINDINGS: There is moderate cardiomegaly with a pacemaker in place. There is mild vascular congestion that is minimally more prominent since the study of 2022 and suspicious for changes of borderline to mild CHF.  This report was finalized on 2023 9:25 AM by Dr. Warren Leavitt M.D.      CT Angiogram Chest    Result Date: 2023  CT ANGIOGRAPHY OF THE CHEST WITH INTRAVENOUS CONTRAST AND 3D RECONSTRUCTIONS  HISTORY: Shortness of breath. Fatigue.  The CT scan was performed as an emergency procedure with CT angiogram protocol using intravenous contrast and 3D reconstructions. This is compared to a previous noncontrast CT scan dated 2023 and demonstrates the followin. The pulmonary arteries are well-opacified and there is no evidence of pulmonary embolus. The thoracic aorta shows no evidence of aneurysm or dissection.  2. Again noted are several sub-6 mm nodules scattered in both lungs as described in detail  on 04/04/2023 and these are unchanged. There is some minimal chronic atelectasis or scarring in the lower left lung, unchanged. There is no evidence of acute pneumonia.  3. There are several slightly enlarged and somewhat ill-defined mediastinal lymph nodes, unchanged from several previous CT scans dating back to 01/01/2015. Slightly enlarged right hilar lymph node is better visualized on today's exam with intravenous contrast and is likely unchanged. There is no axillary adenopathy. There is no pericardial effusion.  4. The CT images through the upper liver, spleen, and both adrenal glands are unremarkable.   Radiation dose reduction techniques were utilized, including automated exposure control and exposure modulation based on body size.        I ordered the above noted radiological studies. Reviewed by me and discussed with radiologist.  See dictation for official radiology interpretation.      PROCEDURES    Procedures    DIFFERENTIAL DIAGNOSIS:  Differential Diagnosis for Dyspnea include but are not limited to the following:  -Asthma  - COPD exacerbation  -Pneumonia  -PE  - Acute Respiratory Distress  - Pneumothorax  - CHF  - MI  - Anemia  - Hyperventilation  - Pleural Effusion  - Sepsis      PROGRESS, DATA ANALYSIS, CONSULTS, AND MEDICAL DECISION MAKING    All labs have been independently reviewed by me.  All radiology studies have been reviewed by me and discussed with radiologist dictating the report.   EKG's independently viewed and interpreted by me.  Discussion below represents my analysis of pertinent findings related to patient's condition, differential diagnosis, treatment plan and final disposition.        ED Course as of 08/20/23 1208   Sun Aug 20, 2023   0912 Patient is a pleasant 62-year-old who presents to the ER with complaint of fatigue and shortness of breath over the past 4 to 5 days.  He also states that he was told his pacemaker had strange activity several days ago.  Plan for labs including  BMP, chest x-ray, will interrogate pacemaker. [MS]   0932 Radiology study chest x ray independently interpreted by me and my findings are cardiomegaly, mild vascular congestion.  See dictation for official radiology interpretation.   [MS]   1003 Reviewed pt's history and workup with Dr. Paul.  After a bedside evaluation, he agrees with the plan of care.     [MS]   1036  Discussed with Dr. Leavitt regarding the CTA chest results which revealed no PE, lungs clear  See dictation for official radiology interpretation.     [MS]   1117 Consult Note    Discussed care with CAROLIN Maxwell  Reviewed patient's history, exam, results and need for admission secondary to dyspnea, generalized weakness  Dr. Jain accepts the patient to be admitted to observation unit bed.     [MS]      ED Course User Index  [MS] Yvette Reis, APRN         DISPOSITION  ED Disposition       ED Disposition   Decision to Admit    Condition   --    Comment   --             ADMISSION    Discussed treatment plan and reason for admission with pt/family and admitting physician.  Pt/family voiced understanding of the plan for admission for further testing/treatment as needed.      DIAGNOSIS  Final diagnoses:   Dyspnea on exertion   Generalized weakness   Elevated troponin   Elevated liver enzymes         AS OF 12:08 EDT VITALS:    BP - 136/89  HR - 60  TEMP - 97.5 °F (36.4 °C)  O2 SATS - 96%      MEDICATIONS GIVEN IN ER    Medications   sodium chloride 0.9 % flush 10 mL (has no administration in time range)   nitroglycerin (NITROSTAT) SL tablet 0.4 mg (has no administration in time range)   sodium chloride 0.9 % flush 10 mL (has no administration in time range)   sodium chloride 0.9 % flush 10 mL (has no administration in time range)   sodium chloride 0.9 % infusion 40 mL (has no administration in time range)   aspirin chewable tablet 324 mg (has no administration in time range)     And   aspirin EC tablet 81 mg (has no administration in  time range)   sennosides-docusate (PERICOLACE) 8.6-50 MG per tablet 2 tablet (has no administration in time range)     And   polyethylene glycol (MIRALAX) packet 17 g (has no administration in time range)     And   bisacodyl (DULCOLAX) EC tablet 5 mg (has no administration in time range)     And   bisacodyl (DULCOLAX) suppository 10 mg (has no administration in time range)   iopamidol (ISOVUE-370) 76 % injection 100 mL (95 mL Intravenous Given by Other 8/20/23 1013)                Note Disclaimer: At [facility], we believe that sharing information builds trust and better relationships. You are receiving this note because you recently visited [facility]. It is possible you will see health information before a provider has talked with you about it. This kind of information can be easy to misunderstand. To help you fully understand what it means for your health, we urge you to discuss this note with your provider.         Yvette Reis, APRN  08/20/23 1209

## 2023-08-21 ENCOUNTER — APPOINTMENT (OUTPATIENT)
Dept: MRI IMAGING | Facility: HOSPITAL | Age: 63
End: 2023-08-21
Payer: MEDICARE

## 2023-08-21 ENCOUNTER — APPOINTMENT (OUTPATIENT)
Dept: CARDIOLOGY | Facility: HOSPITAL | Age: 63
End: 2023-08-21
Payer: MEDICARE

## 2023-08-21 PROBLEM — I50.43 ACUTE ON CHRONIC HEART FAILURE WITH REDUCED EJECTION FRACTION AND DIASTOLIC DYSFUNCTION: Status: ACTIVE | Noted: 2023-08-21

## 2023-08-21 LAB
ANION GAP SERPL CALCULATED.3IONS-SCNC: 11 MMOL/L (ref 5–15)
AORTIC DIMENSIONLESS INDEX: 1 (DI)
ASCENDING AORTA: 3.4 CM
BH CV ECHO LEFT VENTRICLE GLOBAL LONGITUDINAL STRAIN: -10.1 %
BH CV ECHO MEAS - ACS: 1.99 CM
BH CV ECHO MEAS - AO MAX PG: 8.9 MMHG
BH CV ECHO MEAS - AO MEAN PG: 4.6 MMHG
BH CV ECHO MEAS - AO ROOT DIAM: 3.1 CM
BH CV ECHO MEAS - AO V2 MAX: 149.3 CM/SEC
BH CV ECHO MEAS - AO V2 VTI: 24.1 CM
BH CV ECHO MEAS - AVA(I,D): 3.8 CM2
BH CV ECHO MEAS - EDV(CUBED): 195.1 ML
BH CV ECHO MEAS - EDV(MOD-SP2): 203 ML
BH CV ECHO MEAS - EDV(MOD-SP4): 226 ML
BH CV ECHO MEAS - EF(MOD-BP): 31.3 %
BH CV ECHO MEAS - EF(MOD-SP2): 34 %
BH CV ECHO MEAS - EF(MOD-SP4): 30.5 %
BH CV ECHO MEAS - ESV(CUBED): 58.4 ML
BH CV ECHO MEAS - ESV(MOD-SP2): 134 ML
BH CV ECHO MEAS - ESV(MOD-SP4): 157 ML
BH CV ECHO MEAS - FS: 33.1 %
BH CV ECHO MEAS - IVS/LVPW: 1.07 CM
BH CV ECHO MEAS - IVSD: 1.6 CM
BH CV ECHO MEAS - LAT PEAK E' VEL: 9.9 CM/SEC
BH CV ECHO MEAS - LV DIASTOLIC VOL/BSA (35-75): 88.2 CM2
BH CV ECHO MEAS - LV MASS(C)D: 424.8 GRAMS
BH CV ECHO MEAS - LV MAX PG: 7.8 MMHG
BH CV ECHO MEAS - LV MEAN PG: 4.1 MMHG
BH CV ECHO MEAS - LV SYSTOLIC VOL/BSA (12-30): 61.2 CM2
BH CV ECHO MEAS - LV V1 MAX: 140 CM/SEC
BH CV ECHO MEAS - LV V1 VTI: 23.8 CM
BH CV ECHO MEAS - LVIDD: 5.8 CM
BH CV ECHO MEAS - LVIDS: 3.9 CM
BH CV ECHO MEAS - LVOT AREA: 3.9 CM2
BH CV ECHO MEAS - LVOT DIAM: 2.23 CM
BH CV ECHO MEAS - LVPWD: 1.5 CM
BH CV ECHO MEAS - MED PEAK E' VEL: 7.8 CM/SEC
BH CV ECHO MEAS - MR MAX PG: 79.3 MMHG
BH CV ECHO MEAS - MR MAX VEL: 445.2 CM/SEC
BH CV ECHO MEAS - MV DEC SLOPE: 626.9 CM/SEC2
BH CV ECHO MEAS - MV DEC TIME: 172 MSEC
BH CV ECHO MEAS - MV E MAX VEL: 119 CM/SEC
BH CV ECHO MEAS - MV MAX PG: 6.9 MMHG
BH CV ECHO MEAS - MV MEAN PG: 2.05 MMHG
BH CV ECHO MEAS - MV P1/2T: 67.6 MSEC
BH CV ECHO MEAS - MV V2 VTI: 36.7 CM
BH CV ECHO MEAS - MVA(P1/2T): 3.3 CM2
BH CV ECHO MEAS - MVA(VTI): 2.5 CM2
BH CV ECHO MEAS - PA V2 MAX: 110.5 CM/SEC
BH CV ECHO MEAS - PULM DIAS VEL: 72.7 CM/SEC
BH CV ECHO MEAS - PULM S/D: 0.48
BH CV ECHO MEAS - PULM SYS VEL: 34.6 CM/SEC
BH CV ECHO MEAS - RAP SYSTOLE: 15 MMHG
BH CV ECHO MEAS - RVSP: 54 MMHG
BH CV ECHO MEAS - SI(MOD-SP2): 26.9 ML/M2
BH CV ECHO MEAS - SI(MOD-SP4): 26.9 ML/M2
BH CV ECHO MEAS - SV(LVOT): 92.4 ML
BH CV ECHO MEAS - SV(MOD-SP2): 69 ML
BH CV ECHO MEAS - SV(MOD-SP4): 69 ML
BH CV ECHO MEAS - TAPSE (>1.6): 1.2 CM
BH CV ECHO MEAS - TR MAX PG: 39 MMHG
BH CV ECHO MEAS - TR MAX VEL: 312.4 CM/SEC
BH CV ECHO MEASUREMENTS AVERAGE E/E' RATIO: 13.45
BH CV XLRA - RV BASE: 4.6 CM
BH CV XLRA - RV LENGTH: 9.8 CM
BH CV XLRA - RV MID: 3.8 CM
BH CV XLRA - TDI S': 8.4 CM/SEC
BUN SERPL-MCNC: 17 MG/DL (ref 8–23)
BUN/CREAT SERPL: 22.7 (ref 7–25)
CALCIUM SPEC-SCNC: 8.9 MG/DL (ref 8.6–10.5)
CHLORIDE SERPL-SCNC: 102 MMOL/L (ref 98–107)
CO2 SERPL-SCNC: 26 MMOL/L (ref 22–29)
CREAT SERPL-MCNC: 0.75 MG/DL (ref 0.76–1.27)
DEPRECATED RDW RBC AUTO: 41.7 FL (ref 37–54)
EGFRCR SERPLBLD CKD-EPI 2021: 102.7 ML/MIN/1.73
ERYTHROCYTE [DISTWIDTH] IN BLOOD BY AUTOMATED COUNT: 13.9 % (ref 12.3–15.4)
GLUCOSE BLDC GLUCOMTR-MCNC: 103 MG/DL (ref 70–130)
GLUCOSE BLDC GLUCOMTR-MCNC: 91 MG/DL (ref 70–130)
GLUCOSE SERPL-MCNC: 126 MG/DL (ref 65–99)
HCT VFR BLD AUTO: 38.4 % (ref 37.5–51)
HGB BLD-MCNC: 12.1 G/DL (ref 13–17.7)
LEFT ATRIUM VOLUME INDEX: 74.2 ML/M2
MAGNESIUM SERPL-MCNC: 2 MG/DL (ref 1.6–2.4)
MCH RBC QN AUTO: 26.4 PG (ref 26.6–33)
MCHC RBC AUTO-ENTMCNC: 31.5 G/DL (ref 31.5–35.7)
MCV RBC AUTO: 83.7 FL (ref 79–97)
PLATELET # BLD AUTO: 205 10*3/MM3 (ref 140–450)
PMV BLD AUTO: 10 FL (ref 6–12)
POTASSIUM SERPL-SCNC: 3.5 MMOL/L (ref 3.5–5.2)
RBC # BLD AUTO: 4.59 10*6/MM3 (ref 4.14–5.8)
SINUS: 3.4 CM
SODIUM SERPL-SCNC: 139 MMOL/L (ref 136–145)
STJ: 3.4 CM
TROPONIN T SERPL HS-MCNC: 47 NG/L
TROPONIN T SERPL HS-MCNC: 48 NG/L
WBC NRBC COR # BLD: 5.28 10*3/MM3 (ref 3.4–10.8)

## 2023-08-21 PROCEDURE — 96374 THER/PROPH/DIAG INJ IV PUSH: CPT

## 2023-08-21 PROCEDURE — 84484 ASSAY OF TROPONIN QUANT: CPT | Performed by: INTERNAL MEDICINE

## 2023-08-21 PROCEDURE — 93010 ELECTROCARDIOGRAM REPORT: CPT | Performed by: INTERNAL MEDICINE

## 2023-08-21 PROCEDURE — 93306 TTE W/DOPPLER COMPLETE: CPT

## 2023-08-21 PROCEDURE — 82948 REAGENT STRIP/BLOOD GLUCOSE: CPT

## 2023-08-21 PROCEDURE — 84484 ASSAY OF TROPONIN QUANT: CPT | Performed by: NURSE PRACTITIONER

## 2023-08-21 PROCEDURE — 25510000001 PERFLUTREN (DEFINITY) 8.476 MG IN SODIUM CHLORIDE (PF) 0.9 % 10 ML INJECTION: Performed by: NURSE PRACTITIONER

## 2023-08-21 PROCEDURE — 99214 OFFICE O/P EST MOD 30 MIN: CPT | Performed by: INTERNAL MEDICINE

## 2023-08-21 PROCEDURE — 96372 THER/PROPH/DIAG INJ SC/IM: CPT

## 2023-08-21 PROCEDURE — 80048 BASIC METABOLIC PNL TOTAL CA: CPT | Performed by: NURSE PRACTITIONER

## 2023-08-21 PROCEDURE — 94664 DEMO&/EVAL PT USE INHALER: CPT

## 2023-08-21 PROCEDURE — 83735 ASSAY OF MAGNESIUM: CPT | Performed by: NURSE PRACTITIONER

## 2023-08-21 PROCEDURE — 94799 UNLISTED PULMONARY SVC/PX: CPT

## 2023-08-21 PROCEDURE — 93306 TTE W/DOPPLER COMPLETE: CPT | Performed by: INTERNAL MEDICINE

## 2023-08-21 PROCEDURE — 93356 MYOCRD STRAIN IMG SPCKL TRCK: CPT | Performed by: INTERNAL MEDICINE

## 2023-08-21 PROCEDURE — 93356 MYOCRD STRAIN IMG SPCKL TRCK: CPT

## 2023-08-21 PROCEDURE — 93005 ELECTROCARDIOGRAM TRACING: CPT | Performed by: NURSE PRACTITIONER

## 2023-08-21 PROCEDURE — 85027 COMPLETE CBC AUTOMATED: CPT | Performed by: NURSE PRACTITIONER

## 2023-08-21 PROCEDURE — 96376 TX/PRO/DX INJ SAME DRUG ADON: CPT

## 2023-08-21 PROCEDURE — 25010000002 ENOXAPARIN PER 10 MG: Performed by: INTERNAL MEDICINE

## 2023-08-21 RX ORDER — METOPROLOL TARTRATE 50 MG/1
50 TABLET, FILM COATED ORAL EVERY 12 HOURS SCHEDULED
Status: DISCONTINUED | OUTPATIENT
Start: 2023-08-21 | End: 2023-08-26 | Stop reason: HOSPADM

## 2023-08-21 RX ORDER — BUMETANIDE 0.25 MG/ML
0.5 INJECTION INTRAMUSCULAR; INTRAVENOUS
Status: DISCONTINUED | OUTPATIENT
Start: 2023-08-21 | End: 2023-08-22

## 2023-08-21 RX ORDER — ENOXAPARIN SODIUM 150 MG/ML
1 INJECTION SUBCUTANEOUS EVERY 12 HOURS
Status: DISCONTINUED | OUTPATIENT
Start: 2023-08-21 | End: 2023-08-25

## 2023-08-21 RX ORDER — METOPROLOL TARTRATE 50 MG/1
50 TABLET, FILM COATED ORAL EVERY 12 HOURS SCHEDULED
Status: DISCONTINUED | OUTPATIENT
Start: 2023-08-21 | End: 2023-08-21

## 2023-08-21 RX ADMIN — PERFLUTREN 2 ML: 6.52 INJECTION, SUSPENSION INTRAVENOUS at 08:07

## 2023-08-21 RX ADMIN — ASPIRIN 81 MG: 81 TABLET, COATED ORAL at 08:27

## 2023-08-21 RX ADMIN — BUMETANIDE 0.5 MG: 0.25 INJECTION INTRAMUSCULAR; INTRAVENOUS at 10:40

## 2023-08-21 RX ADMIN — ENOXAPARIN SODIUM 135 MG: 150 INJECTION SUBCUTANEOUS at 17:20

## 2023-08-21 RX ADMIN — METOPROLOL TARTRATE 50 MG: 50 TABLET, FILM COATED ORAL at 20:51

## 2023-08-21 RX ADMIN — Medication 10 ML: at 20:53

## 2023-08-21 RX ADMIN — METOPROLOL TARTRATE 50 MG: 50 TABLET, FILM COATED ORAL at 10:40

## 2023-08-21 RX ADMIN — LISINOPRIL 40 MG: 20 TABLET ORAL at 20:52

## 2023-08-21 RX ADMIN — Medication 10 ML: at 08:28

## 2023-08-21 RX ADMIN — BUMETANIDE 0.5 MG: 0.25 INJECTION INTRAMUSCULAR; INTRAVENOUS at 17:20

## 2023-08-21 RX ADMIN — IPRATROPIUM BROMIDE AND ALBUTEROL SULFATE 3 ML: .5; 2.5 SOLUTION RESPIRATORY (INHALATION) at 10:57

## 2023-08-21 RX ADMIN — BUMETANIDE 2 MG: 2 TABLET ORAL at 08:26

## 2023-08-21 RX ADMIN — AMLODIPINE BESYLATE 5 MG: 5 TABLET ORAL at 08:26

## 2023-08-21 RX ADMIN — POTASSIUM CHLORIDE 10 MEQ: 750 TABLET, EXTENDED RELEASE ORAL at 08:26

## 2023-08-21 RX ADMIN — LISINOPRIL 40 MG: 20 TABLET ORAL at 08:27

## 2023-08-21 NOTE — TELEPHONE ENCOUNTER
Yvette,    Called and spoke with patient. He is currently admitted to the observation unit. He had an echo this morning and some other tests. I just wanted to let you know so you could review his chart.    Thank you,    Milena Judd RN  [facility] [place] [facility]  08/21/23 10:04 EDT

## 2023-08-21 NOTE — PAYOR COMM NOTE
"Sushma Engel (62 y.o. Male)      REQUESTING OBSERVATION AUTHORIZATION:  ID#  [id number]    REPLY TO UR DEPT: [phone]  [phone]    [facility]: NPI [id number]  [facility]# [id number]     Date of Birth   1960    Social Security Number       Address   [address] [place] [address]    Home Phone   [phone]    MRN   [id number]       Taoism   Roman Catholic    Marital Status                               Admission Date   8/20/23    Admission Type   Emergency    Admitting Provider   Byron Jain MD    Attending Provider   Byron Jain MD    Department, Room/Bed   [facility] OBSERVATION, 113/1       Discharge Date       Discharge Disposition       Discharge Destination                                 Attending Provider: Byron Jain MD    Allergies: No Known Allergies    Isolation: None   Infection: None   Code Status: CPR    Ht: 188 cm (74\")   Wt: 134 kg (295 lb)    Admission Cmt: None   Principal Problem: Exertional dyspnea [R06.09]                   Active Insurance as of 8/20/2023       Primary Coverage       Payor Plan Insurance Group Employer/Plan Group    [facility] MEDICARE REPLACEMENT [employer] MEDICARE REPLACEMENT        Payor Plan Address Payor Plan Phone Number Payor Plan Fax Number Effective Dates    [address] [address] [phone]  1/1/2022 - None Entered    [facility] [address]         Subscriber Name Subscriber Birth Date Member ID       SUSHMA ENGEL 1960 [id number]                     Emergency Contacts        (Rel.) Home Phone Work Phone Mobile Phone    ToroSunday (Spouse) [phone] -- [phone]                 History & Physical        Lachelle Maurice APRN at 08/20/23 1149       Attestation signed by Byron Jain MD at 08/20/23 2014    MD ATTESTATION NOTE    The SHIRIN and I have discussed this patient's history, physical exam, and treatment plan.  I have reviewed the documentation and " personally had a face to face interaction with the patient. I affirm the documentation and agree with the treatment and plan.  The attached note describes my personal findings.      I provided a substantive portion of the care of the patient.  I personally performed the physical exam in its entirety, and below are my findings.      Brief HPI: Patient is being admitted for exertional dyspnea.  He has a history of A-fib, VENESSA, and sick sinus syndrome.  He has a pacemaker.  He complains of intermittent shortness of breath is worse with exertion for the past week or so.  He feels like symptoms primarily occur after he has been doing more strenuous activities like working in the yard or cleaning the house.  Denies cough, fever, chest pain, or syncope.  Denies history of asthma, CHF, or CAD.  He is currently asymptomatic.  In the ED, CTA of the chest was negative for PE, pleural effusion, congestive failure, or pneumonia.  BNP was normal.  Initial troponin was 44.  EKG showed a paced rhythm.  His pacemaker was interrogated and showed an episode of tachycardia up to 171 two days ago.    PHYSICAL EXAM  ED Triage Vitals   Temp Heart Rate Resp BP SpO2   08/20/23 0830 08/20/23 0830 08/20/23 0830 08/20/23 0903 08/20/23 0830   97.5 °F (36.4 °C) 60 18 136/98 97 %      Temp src Heart Rate Source Patient Position BP Location FiO2 (%)   08/20/23 1341 08/20/23 1341 08/20/23 1341 08/20/23 1341 --   Oral Monitor Lying Right arm        GENERAL: Awake, alert, oriented x3.  Well-developed male.  Resting comfortably in no acute distress.  BMI 37.9  HENT: nares patent  EYES: no scleral icterus  CV: regular rhythm, normal rate  RESPIRATORY: normal effort, clear to auscultation bilaterally  ABDOMEN: soft, nontender  MUSCULOSKELETAL: Extremities are nontender.  No calf tenderness.  Trace edema in both ankles  NEURO: alert, moves all extremities, follows commands  PSYCH:  calm, cooperative  SKIN: warm, dry    Vital signs and nursing notes  reviewed.        Assessment/plan:     Exertional dyspnea: Patient is not hypoxic.  CTA of the chest was unremarkable.  BNP is normal.  Symptoms are not really suggestive of CHF.  Initial troponin was mildly elevated but repeat had a delta of -5.-->  Cardiac monitor.  Consult cardiology.  Obtain echocardiogram.  Continue home medications for type 2 diabetes and hypertension.  Eliquis will be held for now.  Patient will be given [facility]   HISTORY AND PHYSICAL    Patient Name: Shashi Engel  : 1960  MRN: [id number]  Primary Care Physician:  Keesha Kirkpatrick MD  Date of admission: 2023    Subjective   Subjective     Chief Complaint:   Chief Complaint   Patient presents with    Fatigue         HPI:    Shashi Engel is a pleasant afebrile ambulatory 62 y.o. black male with a past medical history of hyperlipidemia, paroxysmal atrial fibrillation on Xarelto, sleep apnea on CPAP, sick sinus syndrome status post [employer] pacemaker device, and type 2 diabetes    He presents to the emergency department at [facility] today with complaint of exertional dyspnea.  He has been admitted to the ED observation unit for further testing and evaluation.    Patient states he is noted shortness of breath with minimal exertion over the last week.  I have appreciated at least 1+ leg swelling but patient states this appears to be normal for him.  He states that he is having some orthopnea.  He denies any chest pain.  He reports compliance with his CPAP.    States that his last cardiologist appointment this month his metoprolol was increased from 50 mg twice daily from 25BID.     He denies any fevers, chills or recent travel.    Review of Systems   All systems were reviewed and negative except for: Exertional dyspnea    Personal History     Past Medical History:   Diagnosis Date    Abnormal electrocardiogram     Anxiety     Cardiomyopathy, hypertrophic, primary  familial     Erectile dysfunction     Health care maintenance     Hyperlipidemia     Hypertension     Mild concentric left ventricular hypertrophy (LVH)     Mild mitral regurgitation     Mild tricuspid regurgitation     Near syncope     Noncompliance     Nonsustained ventricular tachycardia 09/05/2018    Obesity     VENESSA (obstructive sleep apnea)     uses CPAP faithfully    Osteoarthritis     PAF (paroxysmal atrial fibrillation)     Pneumonia 01/01/2016    Type 2 diabetes mellitus        Past Surgical History:   Procedure Laterality Date    CARDIAC CATHETERIZATION N/A 03/02/2022    Procedure: RIGHT HEART CATH;  Surgeon: Anjel Beasley MD;  Location: [facility] CATH INVASIVE LOCATION;  Service: Cardiovascular;  Laterality: N/A;    CARDIAC CATHETERIZATION N/A 03/02/2022    Procedure: Coronary angiography;  Surgeon: Anjel Beasley MD;  Location: [facility] CATH INVASIVE LOCATION;  Service: Cardiovascular;  Laterality: N/A;    CARDIAC ELECTROPHYSIOLOGY PROCEDURE Left 06/06/2016    Procedure: Pacemaker DC new  BOSTON;  Surgeon: Juan Chacon MD;  Location: [facility] CATH INVASIVE LOCATION;  Service:     CARDIAC ELECTROPHYSIOLOGY PROCEDURE N/A 05/09/2022    Procedure: PPM generator change - dual- BOSTON;  Surgeon: Juan Chacon MD;  Location: [facility] CATH INVASIVE LOCATION;  Service: Cardiology;  Laterality: N/A;    ENDOSCOPY N/A 10/19/2022    Procedure: ESOPHAGOGASTRODUODENOSCOPY WITH BX;  Surgeon: Anjel Carney MD;  Location: [facility] ENDOSCOPY;  Service: Gastroenterology;  Laterality: N/A;  PREOP/ DYSPEPSIA  POSTOP/ HIATAL HERNIA, DUODENITIS, GASTRITIS    ENDOSCOPY      INSERT / REPLACE / REMOVE PACEMAKER      KNEE ARTHROSCOPY Right        Family History: family history includes Atrial fibrillation in his sister; Depression in his mother; Heart disease in his mother, sister, and sister; Hypertension in his brother, mother, sister, and sister; Kidney disease in an other family member; Sleep apnea in an  other family member. Otherwise pertinent FHx was reviewed and not pertinent to current issue.    Social History:  reports that he has quit smoking. His smoking use included cigars. He has been exposed to tobacco smoke. He has never used smokeless tobacco. He reports that he does not currently use drugs after having used the following drugs: Marijuana. He reports that he does not drink alcohol.    Home Medications:  [employer] Lancets Original, amLODIPine, atorvastatin, bumetanide, cholecalciferol, glucose blood, hydrOXYzine, metFORMIN, metoprolol tartrate, pantoprazole, potassium chloride, promethazine-codeine, quinapril, rivaroxaban, tadalafil, and vitamin B-12    Allergies:  No Known Allergies    Objective   Objective     Vitals:   Temp:  [97.5 °F (36.4 °C)] 97.5 °F (36.4 °C)  Heart Rate:  [55-64] 60  Resp:  [18] 18  BP: (134-136)/(86-98) 136/89  Physical Exam    Constitutional: Awake, alert   Eyes: PERRLA, sclerae anicteric, no conjunctival injection   HENT: NCAT, mucous membranes moist   Neck: Supple, no thyromegaly, no lymphadenopathy, trachea midline   Respiratory: Clear to auscultation bilaterally, nonlabored respirations    Cardiovascular: RRR, no murmurs, rubs, or gallops, palpable pedal pulses bilaterally   Gastrointestinal: Positive bowel sounds, soft, nontender, nondistended   Musculoskeletal: 1-2+ pitting bilateral lower extremity edema, no clubbing or cyanosis to extremities   Psychiatric: Appropriate affect, cooperative   Neurologic: Oriented x 3, strength symmetric in all extremities, Cranial Nerves grossly intact to confrontation, speech clear   Skin: No rashes     Result Review    Result Review:  I have personally reviewed the results from the time of this admission to 8/20/2023 11:49 EDT and agree with these findings:  [x]  Laboratory list / accordion  []  Microbiology  [x]  Radiology  [x]  EKG/Telemetry   []  Cardiology/Vascular   []  Pathology  []  Old records  []  Other:  Most notable findings  include: CTA chest negative for pulmonary embolism, pulmonary nodules noted, sensitivity troponin 44, 39      Assessment & Plan   Assessment / Plan     Brief Patient Summary:  Shashi Engel is a 62 y.o. male who is being evaluated for exertional dyspnea    Active Hospital Problems:  Active Hospital Problems    Diagnosis     **Exertional dyspnea      Plan:     Exertional dyspnea  Consult cardiology  High-sensitivity troponin 44, 39, trend  Cardiac diet, n.p.o. after midnight  Lipid panel unremarkable  Echocardiogram pending  EKG shows paced rhythm    Type 2 diabetes  Hemoglobin A1c pending  Moderate insulin sliding scale protocol initiated  Hold metformin for 72 hours following administration of IV contrast    Hypertension  Vitals every 4 hours  Continue home medications    Paroxysmal atrial fibrillation  Hold Eliquis for cardiology evaluation for now  Lovenox 1 mg/kg x 1 dose  Telemetry    DVT prophylaxis:  No DVT prophylaxis order currently exists.    CODE STATUS:    Level Of Support Discussed With: Patient  Code Status (Patient has no pulse and is not breathing): CPR (Attempt to Resuscitate)  Medical Interventions (Patient has pulse or is breathing): Full Support    Admission Status:  I believe this patient meets observation status.    Electronically signed by DENNIS Maxwell, 08/20/23, 11:49 AM EDT.        76 minutes has been spent by [facility] providers in the care of this patient while under observation status      I have worn appropriate PPE during this patient encounter, sanitized my hands both with entering and exiting patient's room.             Electronically signed by Byron Jain MD at 08/20/23 2014          Emergency Department Notes        Savi Arriaza, RN at 08/20/23 1246          Nursing report ED to floor  Shashi Engel  62 y.o.  male    HPI :   Chief Complaint   Patient presents with    Fatigue       Admitting doctor:   Byron Jain  MD    Admitting diagnosis:   The primary encounter diagnosis was Dyspnea on exertion. Diagnoses of Generalized weakness, Elevated troponin, and Elevated liver enzymes were also pertinent to this visit.    Code status:   Current Code Status       Date Active Code Status Order ID Comments User Context       Prior            Allergies:   Patient has no known allergies.    Isolation:   No active isolations    Intake and Output  No intake or output data in the 24 hours ending 08/20/23 1246    Weight:       08/20/23  0840   Weight: 136 kg (300 lb)       Most recent vitals:   Vitals:    08/20/23 0921 08/20/23 0951 08/20/23 1021 08/20/23 1029   BP: 135/92 134/86 136/89    Pulse: 62 60 61 60   Resp:       Temp:       SpO2: 97% 97% 98% 96%   Weight:       Height:           Active LDAs/IV Access:   Lines, Drains & Airways       Active LDAs       Name Placement date Placement time Site Days    Peripheral IV 08/20/23 0846 Anterior;Right Forearm 08/20/23  0846  Forearm  less than 1                    Labs (abnormal labs have a star):   Labs Reviewed   COMPREHENSIVE METABOLIC PANEL - Abnormal; Notable for the following components:       Result Value    Glucose 143 (*)     ALT (SGPT) 48 (*)     AST (SGOT) 47 (*)     Alkaline Phosphatase 122 (*)     Total Bilirubin 1.3 (*)     All other components within normal limits    Narrative:     GFR Normal >60  Chronic Kidney Disease <60  Kidney Failure <15     URINALYSIS W/ MICROSCOPIC IF INDICATED (NO CULTURE) - Abnormal; Notable for the following components:    Protein, UA 30 mg/dL (1+) (*)     All other components within normal limits   TROPONIN - Abnormal; Notable for the following components:    HS Troponin T 44 (*)     All other components within normal limits    Narrative:     High Sensitive Troponin T Reference Range:  <10.0 ng/L- Negative Female for AMI  <15.0 ng/L- Negative Male for AMI  >=10 - Abnormal Female indicating possible myocardial injury.  >=15 - Abnormal Male indicating  possible myocardial injury.   Clinicians would have to utilize clinical acumen, EKG, Troponin, and serial changes to determine if it is an Acute Myocardial Infarction or myocardial injury due to an underlying chronic condition.        HIGH SENSITIVITIY TROPONIN T 2HR - Abnormal; Notable for the following components:    HS Troponin T 39 (*)     Troponin T Delta -5 (*)     All other components within normal limits    Narrative:     High Sensitive Troponin T Reference Range:  <10.0 ng/L- Negative Female for AMI  <15.0 ng/L- Negative Male for AMI  >=10 - Abnormal Female indicating possible myocardial injury.  >=15 - Abnormal Male indicating possible myocardial injury.   Clinicians would have to utilize clinical acumen, EKG, Troponin, and serial changes to determine if it is an Acute Myocardial Infarction or myocardial injury due to an underlying chronic condition.        BNP (IN-HOUSE) - Normal    Narrative:     Among patients with dyspnea, NT-proBNP is highly sensitive for the detection of acute congestive heart failure. In addition NT-proBNP of <300 pg/ml effectively rules out acute congestive heart failure with 99% negative predictive value.     MAGNESIUM - Normal   CBC WITH AUTO DIFFERENTIAL - Normal   LIPID PANEL    Narrative:     Cholesterol Reference Ranges  (U.S. Department of Health and Human Services ATP III Classifications)    Desirable          <200 mg/dL  Borderline High    200-239 mg/dL  High Risk          >240 mg/dL      Triglyceride Reference Ranges  (U.S. Department of Health and Human Services ATP III Classifications)    Normal           <150 mg/dL  Borderline High  150-199 mg/dL  High             200-499 mg/dL  Very High        >500 mg/dL    HDL Reference Ranges  (U.S. Department of Health and Human Services ATP III Classifications)    Low     <40 mg/dl (major risk factor for CHD)  High    >60 mg/dl ('negative' risk factor for CHD)        LDL Reference Ranges  (U.S. Department of Health and Human  Services ATP III Classifications)    Optimal          <100 mg/dL  Near Optimal     100-129 mg/dL  Borderline High  130-159 mg/dL  High             160-189 mg/dL  Very High        >189 mg/dL   URINALYSIS, MICROSCOPIC ONLY   CBC AND DIFFERENTIAL    Narrative:     The following orders were created for panel order CBC & Differential.  Procedure                               Abnormality         Status                     ---------                               -----------         ------                     CBC Auto Differential[[id number]]        Normal              Final result                 Please view results for these tests on the individual orders.       EKG:   ECG 12 Lead Other; pacemaker   Preliminary Result   HEART RATE= 60  bpm   RR Interval= 1000  ms   AK Interval=   ms   P Horizontal Axis=   deg   P Front Axis=   deg   QRSD Interval= 209  ms   QT Interval= 531  ms   QRS Axis= -80  deg   T Wave Axis= 103  deg   - ABNORMAL ECG -   Junctional rhythm   LVH with IVCD, LAD and secondary repol abnrm   Prolonged QT interval   Electronically Signed By:    Date and Time of Study: 2023-08-20 08:40:53      ECG 12 Lead Chest Pain    (Results Pending)   ECG 12 Lead Chest Pain    (Results Pending)   ECG 12 Lead Chest Pain    (Results Pending)       Meds given in ED:   Medications   sodium chloride 0.9 % flush 10 mL (has no administration in time range)   nitroglycerin (NITROSTAT) SL tablet 0.4 mg (has no administration in time range)   sodium chloride 0.9 % flush 10 mL (has no administration in time range)   sodium chloride 0.9 % flush 10 mL (has no administration in time range)   sodium chloride 0.9 % infusion 40 mL (has no administration in time range)   aspirin chewable tablet 324 mg (has no administration in time range)     And   aspirin EC tablet 81 mg (has no administration in time range)   sennosides-docusate (PERICOLACE) 8.6-50 MG per tablet 2 tablet (has no administration in time range)     And   polyethylene  glycol (MIRALAX) packet 17 g (has no administration in time range)     And   bisacodyl (DULCOLAX) EC tablet 5 mg (has no administration in time range)     And   bisacodyl (DULCOLAX) suppository 10 mg (has no administration in time range)   iopamidol (ISOVUE-370) 76 % injection 100 mL (95 mL Intravenous Given by Other 8/20/23 1013)       Imaging results:  No radiology results for the last day    Ambulatory status:   - assist x 1 d/t weakness    Social issues:   Social History     Socioeconomic History    Marital status:    Tobacco Use    Smoking status: Former     Types: Cigars     Passive exposure: Past    Smokeless tobacco: Never    Tobacco comments:     NO caffeine use    Substance and Sexual Activity    Alcohol use: No    Drug use: Not Currently     Types: Marijuana     Comment: out of cigars in the remote past    Sexual activity: Yes     Partners: Female       NIH Stroke Scale:       Savi Arriaza RN  08/20/23 12:46 EDT          Electronically signed by Savi Arriaza RN at 08/20/23 1246       Oskar Paul MD at 08/20/23 0952          MD ATTESTATION NOTE    The SHIRIN and I have discussed this patient's history, physical exam, and treatment plan.  I have reviewed the documentation and personally had a face to face interaction with the patient. I affirm the documentation and agree with the treatment and plan.  The attached note describes my personal findings.      I provided a substantive portion of the care of the patient.  I personally performed the physical exam in its entirety, and below are my findings.      Brief HPI: 62-year-old male who presents emergency room for dyspnea on exertion for the past 4 to 5 days.  He states he has also had a cough and fatigue.  Patient states that 2 days ago he felt a funny heartbeat and the cardiologist called him and increased his metoprolol.  Patient states he recently had a biopsy that he is supposed to follow-up with about this week.  The patient reports  a minimal cough but no fevers or chills.  He denies chest pain.      General : 62-year-old patient is awake alert and oriented  HEENT: NCAT  CV: Heart is regular rate and rhythm  Respiratory: Mild rales in bases  Abd: Soft and nontender  Ext: No acute abnormalities: Trace pedal edema bilaterally  Skin: No rash  Neuro: Awake with a nonfocal neuro exam  Psych: Normal mood and affect      Plan: We will check EKG, labs, chest x-ray and a CTA chest for further evaluation.  We will have his pacemaker interrogated.    His pacemaker interrogation does show an episode of tachycardia to 171 2 days ago.  His EKG is paced.  His initial troponin is 44.  His LFTs are mildly elevated.  The patient CTA chest shows no PE.  We are awaiting his repeat troponin.  We will admit him to the hospital for cardiology consultation and further evaluation and care.     Oskar Paul MD  08/20/23 1140      Electronically signed by Oskar Paul MD at 08/20/23 1140       Yvette Reis APRN at 08/20/23 0901       Attestation signed by Oskar Paul MD at 08/20/23 1225        FACE TO FACE: This visit was performed by BOTH a physician and an APC. I personally evaluated and examined the patient. I performed all aspects of the MDM as documented.  Oskar Paul MD 8/20/2023 12:25 EDT                          EMERGENCY DEPARTMENT ENCOUNTER    Room Number:  08/08  Date of encounter:  8/20/2023  PCP: Keesha Kirkpatrick MD  Patient Care Team:  Keesha Kirkpatrick MD as PCP - General (Family Medicine)  Keesha Kirkpatrick MD as PCP - Family Medicine  Jennie Vanegas MD as Consulting Physician (Cardiology)   Independent Historians: Patient and family          HPI:  Chief Complaint: Shortness of breath and fatigue  A complete HPI/ROS/PMH/PSH/SH/FH are unobtainable due to: Nothing    Chronic or social conditions impacting patient care (social determinants of health): Nothing    Context: Shashi Engel is a 62 y.o. male with a history of  HTN, PAF, cardiomyopathy, hyperlipidemia, DM type II, anticoagulated on Xarelto who arrives to the ED via private vehicle.  Patient presents with c/o shortness of breath with exertion for the past 4 to 5 days.   Patient also complains of fatigue, cough also for the past 4 to 5 days.  Patient states that he received a call he thinks Thursday or Friday stating that his pacemaker had some strange activity, however is not sure what that was.  Patient denies fever, chills, nausea, vomiting, chest pain, weight gain, leg swelling, palpitations.  Patient states that nothing makes the symptoms better and exertion worsens symptoms.      Review of prior external notes (non-ED): Medical records reviewed in [facility], telephone encounter from cardiology on 8/18/2023 shows the patient had a NSVT event on 8/17/2023 that lasted for 20 beats (16 seconds) with an average V rate of 171 bpm.  He had had a previous episode 8/7/2023, Dr. Bah increased his metoprolol to 50 mg twice daily.    Review of prior external test results outside of this encounter: Transesophageal echo performed 10/5/2022-right atrial cavity severely dilated, left atrial volume severely increased.  LVEF 50%, low normal LV systolic function.  Mild pulmonary hypertension.  Moderate mitral valve regurgitation.    PAST MEDICAL HISTORY  Active Ambulatory Problems     Diagnosis Date Noted    PAF (paroxysmal atrial fibrillation) 11/02/2015    Essential hypertension 11/02/2015    ED (erectile dysfunction) of organic origin 11/02/2015    HLD (hyperlipidemia) 11/02/2015    Hypertrophic polyarthritis 11/02/2015    Diabetes mellitus type 2, noninsulin dependent 11/02/2015    LVH (left ventricular hypertrophy) due to hypertensive disease 08/04/2016    SSS (sick sinus syndrome) 11/10/2016    Sleep related hypoxia 12/04/2017    Class 2 severe obesity due to excess calories with serious comorbidity and body mass index (BMI) of 38.0 to 38.9 in adult     VENESSA (obstructive sleep  apnea)     Nonsustained ventricular tachycardia 09/05/2018    S/P placement of cardiac pacemaker 07/08/2021    Pulmonary hypertension 11/30/2022    Other proteinuria 11/30/2022    Nonrheumatic tricuspid valve regurgitation 11/30/2022    Nonrheumatic mitral valve regurgitation 11/30/2022     Resolved Ambulatory Problems     Diagnosis Date Noted    Cardiomyopathy, hypertrophic, primary familial 11/02/2015    Dizzy spells 05/05/2016    Near syncope 08/04/2016    Orthostasis 06/21/2017    Obesity (BMI 30-39.9) 12/04/2017    Hypersomnia with sleep apnea 12/04/2017    Severe VENESSA on CPAP 01/29/2018    Obesity, morbid, BMI 40.0-49.9 02/26/2018    Shortness of breath 02/24/2022    Abnormal stress echo 02/24/2022    Dyspepsia 07/25/2022     Past Medical History:   Diagnosis Date    Abnormal electrocardiogram     Anxiety     Erectile dysfunction     Health care maintenance     Hyperlipidemia     Hypertension     Mild concentric left ventricular hypertrophy (LVH)     Mild mitral regurgitation     Mild tricuspid regurgitation     Noncompliance     Obesity     Osteoarthritis     Pneumonia 01/01/2016    Type 2 diabetes mellitus        The patient has started, but not completed, their COVID-19 vaccination series.    PAST SURGICAL HISTORY  Past Surgical History:   Procedure Laterality Date    CARDIAC CATHETERIZATION N/A 03/02/2022    Procedure: RIGHT HEART CATH;  Surgeon: Anjel Beasley MD;  Location[facility] C[facility];  Service: Cardiovascular;  Laterality: N/A;    CARDIAC CATHETERIZATION N/A 03/02/2022    Procedure: Coronary angiography;  Surgeon: Anjel Beasley MD;  Location:  [facility] CATH INVASIVE LOCATION;  Service: Cardiovascular;  Laterality: N/A;    CARDIAC ELECTROPHYSIOLOGY PROCEDURE Left 06/06/2016    Procedure: Pacemaker DC new  [place];  Surgeon: Juan Chacon MD;  Location:  [facility] CATH INVASIVE LOCATION;  Service:     CARDIAC ELECTROPHYSIOLOGY PROCEDURE N/A 05/09/2022    Procedure: PPM generator  change - dual- [place];  Surgeon: Juan Chacon MD;  Location: [facility] CATH INVASIVE LOCATION;  Service: Cardiology;  Laterality: N/A;    ENDOSCOPY N/A 10/19/2022    Procedure: ESOPHAGOGASTRODUODENOSCOPY WITH BX;  Surgeon: Anjel Carney MD;  Location: [facility] ENDOSCOPY;  Service: Gastroenterology;  Laterality: N/A;  PREOP/ DYSPEPSIA  POSTOP/ HIATAL HERNIA, DUODENITIS, GASTRITIS    ENDOSCOPY      INSERT / REPLACE / REMOVE PACEMAKER      KNEE ARTHROSCOPY Right          FAMILY HISTORY  Family History   Problem Relation Age of Onset    Depression Mother     Hypertension Mother     Heart disease Mother     Atrial fibrillation Sister     Hypertension Sister     Heart disease Sister     Hypertension Sister     Heart disease Sister     Hypertension Brother     Kidney disease Other     Sleep apnea Other     Diabetes Neg Hx     Breast cancer Neg Hx          SOCIAL HISTORY  Social History     Socioeconomic History    Marital status:    Tobacco Use    Smoking status: Former     Types: Cigars     Passive exposure: Past    Smokeless tobacco: Never    Tobacco comments:     NO caffeine use    Substance and Sexual Activity    Alcohol use: No    Drug use: Not Currently     Types: Marijuana     Comment: out of cigars in the remote past    Sexual activity: Yes     Partners: Female         ALLERGIES  Patient has no known allergies.        REVIEW OF SYSTEMS  Review of Systems     All systems reviewed and negative except for those discussed in HPI.       PHYSICAL EXAM    I have reviewed the triage vital signs and nursing notes.    ED Triage Vitals   Temp Heart Rate Resp BP SpO2   08/20/23 0830 08/20/23 0830 08/20/23 0830 08/20/23 0903 08/20/23 0830   97.5 °F (36.4 °C) 60 18 136/98 97 %         Physical Exam  GENERAL: Well appearing, nontoxic appearing, not distressed  HENT: normocephalic, atraumatic  EYES: no scleral icterus, PERRL  CV: regular rhythm, regular rate, no murmur  RESPIRATORY: normal effort, diminished  throughout  ABDOMEN: soft, nontender  MUSCULOSKELETAL: no deformity, mild bilateral lower extremity edema  NEURO: alert, moves all extremities, follows commands, mental status normal/baseline  SKIN: warm, dry, no rash   Psych: Appropriate mood and affect  Nursing notes and vital signs reviewed          LAB RESULTS  Recent Results (from the past 24 hour(s))   ECG 12 Lead Other; pacemaker    Collection Time: 08/20/23  8:40 AM   Result Value Ref Range    QT Interval 531 ms   Comprehensive Metabolic Panel    Collection Time: 08/20/23  9:05 AM    Specimen: Blood   Result Value Ref Range    Glucose 143 (H) 65 - 99 mg/dL    BUN 17 8 - 23 mg/dL    Creatinine 0.84 0.76 - 1.27 mg/dL    Sodium 141 136 - 145 mmol/L    Potassium 3.7 3.5 - 5.2 mmol/L    Chloride 103 98 - 107 mmol/L    CO2 27.2 22.0 - 29.0 mmol/L    Calcium 9.5 8.6 - 10.5 mg/dL    Total Protein 6.9 6.0 - 8.5 g/dL    Albumin 3.8 3.5 - 5.2 g/dL    ALT (SGPT) 48 (H) 1 - 41 U/L    AST (SGOT) 47 (H) 1 - 40 U/L    Alkaline Phosphatase 122 (H) 39 - 117 U/L    Total Bilirubin 1.3 (H) 0.0 - 1.2 mg/dL    Globulin 3.1 gm/dL    A/G Ratio 1.2 g/dL    BUN/Creatinine Ratio 20.2 7.0 - 25.0    Anion Gap 10.8 5.0 - 15.0 mmol/L    eGFR 99.2 >60.0 mL/min/1.73   BNP    Collection Time: 08/20/23  9:05 AM    Specimen: Blood   Result Value Ref Range    proBNP 464.0 0.0 - 900.0 pg/mL   High Sensitivity Troponin T    Collection Time: 08/20/23  9:05 AM    Specimen: Blood   Result Value Ref Range    HS Troponin T 44 (H) <15 ng/L   Magnesium    Collection Time: 08/20/23  9:05 AM    Specimen: Blood   Result Value Ref Range    Magnesium 2.1 1.6 - 2.4 mg/dL   CBC Auto Differential    Collection Time: 08/20/23  9:05 AM    Specimen: Blood   Result Value Ref Range    WBC 5.41 3.40 - 10.80 10*3/mm3    RBC 4.91 4.14 - 5.80 10*6/mm3    Hemoglobin 13.6 13.0 - 17.7 g/dL    Hematocrit 41.8 37.5 - 51.0 %    MCV 85.1 79.0 - 97.0 fL    MCH 27.7 26.6 - 33.0 pg    MCHC 32.5 31.5 - 35.7 g/dL    RDW 14.1 12.3 -  15.4 %    RDW-SD 43.2 37.0 - 54.0 fl    MPV 10.1 6.0 - 12.0 fL    Platelets 226 140 - 450 10*3/mm3    Neutrophil % 64.4 42.7 - 76.0 %    Lymphocyte % 23.3 19.6 - 45.3 %    Monocyte % 10.0 5.0 - 12.0 %    Eosinophil % 1.7 0.3 - 6.2 %    Basophil % 0.2 0.0 - 1.5 %    Immature Grans % 0.4 0.0 - 0.5 %    Neutrophils, Absolute 3.49 1.70 - 7.00 10*3/mm3    Lymphocytes, Absolute 1.26 0.70 - 3.10 10*3/mm3    Monocytes, Absolute 0.54 0.10 - 0.90 10*3/mm3    Eosinophils, Absolute 0.09 0.00 - 0.40 10*3/mm3    Basophils, Absolute 0.01 0.00 - 0.20 10*3/mm3    Immature Grans, Absolute 0.02 0.00 - 0.05 10*3/mm3    nRBC 0.0 0.0 - 0.2 /100 WBC   High Sensitivity Troponin T 2Hr    Collection Time: 08/20/23 11:07 AM    Specimen: Blood   Result Value Ref Range    HS Troponin T 39 (H) <15 ng/L    Troponin T Delta -5 (L) >=-4 - <+4 ng/L   Lipid Panel    Collection Time: 08/20/23 11:07 AM    Specimen: Blood   Result Value Ref Range    Total Cholesterol 159 0 - 200 mg/dL    Triglycerides 60 0 - 150 mg/dL    HDL Cholesterol 53 40 - 60 mg/dL    LDL Cholesterol  94 0 - 100 mg/dL    VLDL Cholesterol 12 5 - 40 mg/dL    LDL/HDL Ratio 1.77    Urinalysis With Microscopic If Indicated (No Culture) - Urine, Clean Catch    Collection Time: 08/20/23 11:34 AM    Specimen: Urine, Clean Catch   Result Value Ref Range    Color, UA Yellow Yellow, Straw    Appearance, UA Clear Clear    pH, UA 6.0 5.0 - 8.0    Specific Gravity, UA >=1.030 1.005 - 1.030    Glucose, UA Negative Negative    Ketones, UA Negative Negative    Bilirubin, UA Negative Negative    Blood, UA Negative Negative    Protein, UA 30 mg/dL (1+) (A) Negative    Leuk Esterase, UA Negative Negative    Nitrite, UA Negative Negative    Urobilinogen, UA 1.0 E.U./dL 0.2 - 1.0 E.U./dL   Urinalysis, Microscopic Only - Urine, Clean Catch    Collection Time: 08/20/23 11:34 AM    Specimen: Urine, Clean Catch   Result Value Ref Range    RBC, UA 0-2 None Seen, 0-2 /HPF    WBC, UA 0-2 None Seen, 0-2 /HPF     Bacteria, UA None Seen None Seen /HPF    Squamous Epithelial Cells, UA 0-2 None Seen, 0-2 /HPF    Hyaline Casts, UA 0-2 None Seen /LPF    Methodology Automated Microscopy        Ordered the above labs and independently reviewed the results.        RADIOLOGY  XR Chest 1 View    Result Date: 2023  ONE-VIEW PORTABLE CHEST  HISTORY: Shortness of breath.  FINDINGS: There is moderate cardiomegaly with a pacemaker in place. There is mild vascular congestion that is minimally more prominent since the study of 2022 and suspicious for changes of borderline to mild CHF.  This report was finalized on 2023 9:25 AM by Dr. Warren Leavitt M.D.      CT Angiogram Chest    Result Date: 2023  CT ANGIOGRAPHY OF THE CHEST WITH INTRAVENOUS CONTRAST AND 3D RECONSTRUCTIONS  HISTORY: Shortness of breath. Fatigue.  The CT scan was performed as an emergency procedure with CT angiogram protocol using intravenous contrast and 3D reconstructions. This is compared to a previous noncontrast CT scan dated 2023 and demonstrates the followin. The pulmonary arteries are well-opacified and there is no evidence of pulmonary embolus. The thoracic aorta shows no evidence of aneurysm or dissection.  2. Again noted are several sub-6 mm nodules scattered in both lungs as described in detail on 2023 and these are unchanged. There is some minimal chronic atelectasis or scarring in the lower left lung, unchanged. There is no evidence of acute pneumonia.  3. There are several slightly enlarged and somewhat ill-defined mediastinal lymph nodes, unchanged from several previous CT scans dating back to 2015. Slightly enlarged right hilar lymph node is better visualized on today's exam with intravenous contrast and is likely unchanged. There is no axillary adenopathy. There is no pericardial effusion.  4. The CT images through the upper liver, spleen, and both adrenal glands are unremarkable.   Radiation dose reduction  techniques were utilized, including automated exposure control and exposure modulation based on body size.        I ordered the above noted radiological studies. Reviewed by me and discussed with radiologist.  See dictation for official radiology interpretation.      PROCEDURES    Procedures    DIFFERENTIAL DIAGNOSIS:  Differential Diagnosis for Dyspnea include but are not limited to the following:  -Asthma  - COPD exacerbation  -Pneumonia  -PE  - Acute Respiratory Distress  - Pneumothorax  - CHF  - MI  - Anemia  - Hyperventilation  - Pleural Effusion  - Sepsis      PROGRESS, DATA ANALYSIS, CONSULTS, AND MEDICAL DECISION MAKING    All labs have been independently reviewed by me.  All radiology studies have been reviewed by me and discussed with radiologist dictating the report.   EKG's independently viewed and interpreted by me.  Discussion below represents my analysis of pertinent findings related to patient's condition, differential diagnosis, treatment plan and final disposition.        ED Course as of 08/20/23 1208   Sun Aug 20, 2023   0912 Patient is a pleasant 62-year-old who presents to the ER with complaint of fatigue and shortness of breath over the past 4 to 5 days.  He also states that he was told his pacemaker had strange activity several days ago.  Plan for labs including BMP, chest x-ray, will interrogate pacemaker. [MS]   0932 Radiology study chest x ray independently interpreted by me and my findings are cardiomegaly, mild vascular congestion.  See dictation for official radiology interpretation.   [MS]   1003 Reviewed pt's history and workup with Dr. Paul.  After a bedside evaluation, he agrees with the plan of care.     [MS]   1036  Discussed with Dr. Leavitt regarding the CTA chest results which revealed no PE, lungs clear  See dictation for official radiology interpretation.     [MS]   1117 Consult Note    Discussed care with CAROLIN Maxwell  Reviewed patient's history, exam, results and  need for admission secondary to dyspnea, generalized weakness  Dr. Jain accepts the patient to be admitted to observation unit bed.     [MS]      ED Course User Index  [MS] CiciYvette gonsalez, APRN         DISPOSITION  ED Disposition       ED Disposition   Decision to Admit    Condition   --    Comment   --             ADMISSION    Discussed treatment plan and reason for admission with pt/family and admitting physician.  Pt/family voiced understanding of the plan for admission for further testing/treatment as needed.      DIAGNOSIS  Final diagnoses:   Dyspnea on exertion   Generalized weakness   Elevated troponin   Elevated liver enzymes         AS OF 12:08 EDT VITALS:    BP - 136/89  HR - 60  TEMP - 97.5 °F (36.4 °C)  O2 SATS - 96%      MEDICATIONS GIVEN IN ER    Medications   sodium chloride 0.9 % flush 10 mL (has no administration in time range)   nitroglycerin (NITROSTAT) SL tablet 0.4 mg (has no administration in time range)   sodium chloride 0.9 % flush 10 mL (has no administration in time range)   sodium chloride 0.9 % flush 10 mL (has no administration in time range)   sodium chloride 0.9 % infusion 40 mL (has no administration in time range)   aspirin chewable tablet 324 mg (has no administration in time range)     And   aspirin EC tablet 81 mg (has no administration in time range)   sennosides-docusate (PERICOLACE) 8.6-50 MG per tablet 2 tablet (has no administration in time range)     And   polyethylene glycol (MIRALAX) packet 17 g (has no administration in time range)     And   bisacodyl (DULCOLAX) EC tablet 5 mg (has no administration in time range)     And   bisacodyl (DULCOLAX) suppository 10 mg (has no administration in time range)   iopamidol (ISOVUE-370) 76 % injection 100 mL (95 mL Intravenous Given by Other 8/20/23 1013)                Note Disclaimer: At [facility], we believe that sharing information builds trust and better relationships. You are receiving this note because you  recently [facility]. It is possible you will see health information before a provider has talked with you about it. This kind of information can be easy to misunderstand. To help you fully understand what it means for your health, we urge you to discuss this note with your provider.         Yvette Reis, APRN  08/20/23 1208      Electronically signed by Oskar Paul MD at 08/20/23 1225       Eladia Blount, RN at 08/20/23 0878          Patient to ER via car from home for fatigue x 3-4 days  Patient reports he got a call regarding strange activity on his pacemake  Patient does reports SOA that comes and goes but states it isnt new     Electronically signed by Eladia Blount, RN at 08/20/23 0868

## 2023-08-21 NOTE — NURSING NOTE
Patient refused evening medication regimen to include his lovenox. Educated on importance of Lovenox. Vss.

## 2023-08-21 NOTE — PROGRESS NOTES
.ED OBSERVATION PROGRESS/DISCHARGE SUMMARY    Date of Admission: 8/20/2023   LOS: 0 days   PCP: Keesha Kirkpatrick MD    Subjective   Patient denies episodes of chest pain or any further shortness of breath.  Denies lightheadedness and dizziness.  Denies abdominal pain and nausea.    Hospital Outcome:   62-year-old male was seen and examined at bedside following admission to the observation unit due to exertional dyspnea.  Serial high-sensitivity troponin negative.  CBC and CMP relatively unremarkable. CTA chest shows no evidence of PE.  There are several sub-6 mm nodules scattered in both lungs that is unchanged from previous scan on 4/4/2023.      Echocardiogram obtained that shows shows a decline in systolic function with an LVEF of 31.3%, abnormal septal wall motion consistent with right ventricular pacing.  There is note of findings consistent with infiltrative cardiomyopathy.  Patient's pacemaker was interrogated that showed an episode of tachycardia up to 171 bpm 2 days ago.  Cardiology saw and evaluated the patient starting him on IV Bumex twice daily.  Patient was started back on anticoagulation with Lovenox. EP cardiology has also been consulted to review.  Patient went for cardiac MRI today however was unable to complete due to the fact he was unable to be still for the MRI after IV diuresis given as he was continuously needing to void.  Dr. Tong with cardiology has been updated.  She would like to admit the patient to her service to continue diuresis and reattempt cardiac MRI in a couple of days.  I discussed all of the findings and plan with the patient who endorses understanding is in agreement.      ROS:  General: no fevers, chills  Respiratory: no cough, dyspnea  Cardiovascular: no chest pain, palpitations  Abdomen: No abdominal pain, nausea, vomiting, or diarrhea  Neurologic: No focal weakness    Objective   Physical Exam:  I have reviewed the vital signs.  Temp:  [97.9 °F (36.6 °C)-98 °F (36.7 °C)] 98  °F (36.7 °C)  Heart Rate:  [59-75] 60  Resp:  [16-20] 18  BP: (133-159)/(85-97) 146/97  General Appearance:  62-year-old male in no acute distress on room air  Head:    Normocephalic, atraumatic  Eyes:    Sclerae anicteric  Neck:   Supple, no mass  Lungs: Faint crackles bilaterally, respirations unlabored  Heart: Regular rate and rhythm, S1 and S2 normal, no murmur, rub or gallop  Abdomen:  Soft, non-tender, bowel sounds active, nondistended  Extremities: No clubbing, cyanosis, or edema to lower extremities  Pulses:  2+ and symmetric in distal lower extremities  Skin: No rashes   Neurologic: Oriented x3, Normal strength to extremities    Results Review:    I have reviewed the labs, radiology results and diagnostic studies.    Results from last 7 days   Lab Units 08/21/23  0422   WBC 10*3/mm3 5.28   HEMOGLOBIN g/dL 12.1*   HEMATOCRIT % 38.4   PLATELETS 10*3/mm3 205     Results from last 7 days   Lab Units 08/21/23  0422 08/20/23  0905   SODIUM mmol/L 139 141   POTASSIUM mmol/L 3.5 3.7   CHLORIDE mmol/L 102 103   CO2 mmol/L 26.0 27.2   BUN mg/dL 17 17   CREATININE mg/dL 0.75* 0.84   CALCIUM mg/dL 8.9 9.5   BILIRUBIN mg/dL  --  1.3*   ALK PHOS U/L  --  122*   ALT (SGPT) U/L  --  48*   AST (SGOT) U/L  --  47*   GLUCOSE mg/dL 126* 143*     Imaging Results (Last 24 Hours)       ** No results found for the last 24 hours. **            I have reviewed the medications.  ---------------------------------------------------------------------------------------------  Assessment & Plan   Assessment/Problem List    Exertional dyspnea      Plan:    Acute on chronic heart failure with reduced ejection fraction  Paroxysmal atrial fibrillation  -CTA chest shows no evidence of PE.  There there are several sub-6 mm nodules scattered in both lungs that is unchanged from previous scan on 4/4/2023  -Initial EKG showed paced rhythm, then a junctional rhythm  -Pacemaker interrogation shows an episode of tachycardia up to 171 2 days  ago  -High-sensitivity troponin 44, 39, 48, 47   -Echocardiogram shows a decline in systolic function with an LVEF of 31.3%, abnormal septal wall motion consistent with right ventricular pacing, and can findings consistent with infiltrative cardiomyopathy  -Cardiology following, admit to service for continued IV diuresis and plan for future cardiac MRI  -Cardiac MRI attempted today however patient was unable to sit for test due to frequent voiding from IV diuresis  -IV Bumex twice daily  -Lovenox twice daily  -Continue metoprolol  -EP cardiology consulted     Type 2 diabetes  -Hemoglobin A1c 6.8  -Moderate insulin sliding scale protocol initiated  -Hold metformin for 72 hours following administration of IV contrast     Hypertension  -Vitals every 4 hours  -Continue home medications       Disposition: Admit to cardiology, Dr. Tong    This note will serve as a transfer summary    Carlene Ruiz PA-C 08/21/23 16:42 EDT    I have worn appropriate PPE during this patient encounter, sanitized my hands both with entering and exiting patient's room.      58 minutes has been spent by [facility] providers in the care of this patient while under observation status

## 2023-08-21 NOTE — PLAN OF CARE
Goal Outcome Evaluation:      Patient admitted to the observation unit for treatment of exertional dyspnea. Vss. No complaints overnight of SOA. Patient has refused the majority of his medications. Cardiology consulting. Will continue to monitor for any changes

## 2023-08-21 NOTE — PLAN OF CARE
Goal Outcome Evaluation:      Pt being admitted to [facility] for continuing diuretics and repeat cardiac MRI. VSS.

## 2023-08-21 NOTE — CONSULTS
[facility]  Consult Note                                                                              8/21/2023  Byron Jain MD    Patient Identification:  Shashi Engel:   62 y.o.  male  1960     Date of Admission:8/20/2023    CC: Chest Pain    History of Present Illness:    Shashi Engel is a 62 y.o. patient who follows Dr. Vanegas in the office. He has a past medical history significant for recurrent syncope, SVT, VT, chronic atrial fibrillation, pacemaker placement, DM, hypertension, hyperlipidemia, and VENESSA on CPAP.  In 2013 he was found to have left ventricular hypertrophy moderate to moderate mitral regurgitation atrial fibrillation and pulmonary hypertension.  He has been treated with Xarelto and rate control.  He has had intermittent symptomatic hypotension though rapid rates in atrial fibrillation were also felt to be contributing.    Stress echo done on 1/19/22 showing  moderate left atrial enlargement, moderate concentric left ventricle hypertrophy, ejection fraction 62%, post exercise ejection fraction of 60%, mild right ventricular dilation, severe right atrial enlargement, moderate elevation of RVSP, moderate concentric left ventricular hypertrophy.  After exercise, there was apical septal hypokinesis.  He had a cardiac catheterization done 3/2/2022 which showed mild pulmonary hypertension, normal LVEDP, luminal irregularities throughout the coronary arteries but no significant coronary artery disease, normal cardiac output and cardiac index.  His mean pulmonary pressure was 27 mmHg.  He had a equivocal PYP study done 11/4/2022.       Telephone encounter from cardiology on 8/18/23 shows the patient had a NSVT event on 8/17/23 that lasted for 20 beats with an average ventricular rate of 171. He had a previous episode on 8/7/23 and his metoprolol was increased to 50mg BID by Dr. Vanegas.     He presented to the ED on 8/20/2023 with worsening shortness of breath and with  concerns of recent tachycardia. In the ED, CTA of the chest was negative for PE, pleural effusion, congestive failure, or pneumonia. BNP normal. Initial troponin was 44, EKG initially showed V-paced rhythm, then a junctional rhythm. His pacemaker was interrogated and showed an episode of tachycardia up to 171 two days ago.  Currently he is resting comfortably.  He has not had Xarelto since Saturday p.m.  He did not get metoprolol yesterday p.m.  Or this AM.  He received lisinopril this morning.  Echo this morning as below with decline in ejection fraction.    ECHO 8/21/23    Left ventricular systolic function is moderately decreased. Calculated left ventricular EF = 31.3% Septal wall motion is abnormal, consistent with right ventricular pacing. Abnormal global longitudinal LV strain (GLS) = -10.1%. Left ventricle strain data was reviewed by the physician and found to be accurate. Apical sparing is not evident. This suggest that cardiomyopathy may not be due to amyloid heart disease though it does not rule out other infiltrative diseases. The left ventricular cavity is moderately dilated. Left ventricular wall thickness is consistent with moderate concentric hypertrophy. There is left ventricular global hypokinesis noted. The findings are consistent with infiltrative cardiomyopathy. Left ventricular diastolic function was indeterminate. Elevated left atrial pressure.    : The right ventricular cavity is moderately dilated. Severely reduced right ventricular systolic function noted. Electronic lead present in the ventricle.    Left atrial volume is severely increased.    : The right atrial cavity is severely dilated.  An electronic lead is present in the right atrium.    The aortic valve is abnormal in structure. There is moderate calcification of the aortic valve mainly affecting the non-coronary and left coronary cusp(s).    Mild mitral valve regurgitation is present.    Mild tricuspid valve regurgitation is  present. Estimated right ventricular systolic pressure from tricuspid regurgitation is moderately elevated (45-55 mmHg). Calculated right ventricular systolic pressure from tricuspid regurgitation is 54 mmHg.    Compared to prior study of 9/20/2022 left ventricular ejection fraction has decreased.  Much regurgitation has improved.  RVSP is lower but assessment of pulmonary hypertension is confounded by RV dysfunction which is severe.    Cath 3/2/22  Conclusions:  No significant angiographic evidence of coronary artery disease with essentially luminal regularities throughout the right and left coronary arteries.  Normal cardiac output of 6.2 L/min and cardiac index of 2.3 L/min/m2  Mild pulmonary hypertension with a mean PA pressure 27 mmHg.  Normal left ventricular filling pressures of 12 mmHg     Recommendations:   Further evaluation for alternative etiologies for patient's dyspnea exertion per primary cardiology team.     Stress Test 1/19/22    Left atrial volume is moderately increased.  Left ventricular wall thickness is consistent with moderate concentric hypertrophy.  Calculated left ventricular EF = 62% Estimated left ventricular EF was in agreement with the calculated left ventricular EF. Left ventricular systolic function is normal.  Estimated right ventricular systolic pressure from tricuspid regurgitation is moderately elevated (45-55 mmHg).  Moderate pulmonary hypertension is present.  There is calcification of the aortic valve.  The right atrial cavity is moderate to severely dilated.  The right ventricular cavity is mildly dilated.  Mildly reduced right ventricular systolic function noted.  Equivocal echocardiographic evidence for myocardial ischemia.  Post EF=60%  The following left ventricular wall segments are hypokinetic: apical septal.  Findings consistent with an abnormal ECG stress test.    Past Medical History:  Past Medical History:   Diagnosis Date    Abnormal electrocardiogram     Anxiety      Cardiomyopathy, hypertrophic, primary familial     Erectile dysfunction     Health care maintenance     Hyperlipidemia     Hypertension     Mild concentric left ventricular hypertrophy (LVH)     Mild mitral regurgitation     Mild tricuspid regurgitation     Near syncope     Noncompliance     Nonsustained ventricular tachycardia 09/05/2018    Obesity     VENESSA (obstructive sleep apnea)     uses CPAP faithfully    PAF (paroxysmal atrial fibrillation)     Pneumonia 01/01/2016    Type 2 diabetes mellitus        Past Surgical History:  Past Surgical History:   Procedure Laterality Date    CARDIAC CATHETERIZATION N/A 03/02/2022    Procedure: RIGHT HEART CATH;  Surgeon: Anjel Beasley MD;  Location: [facility] CATH INVASIVE LOCATION;  Service: Cardiovascular;  Laterality: N/A;    CARDIAC CATHETERIZATION N/A 03/02/2022    Procedure: Coronary angiography;  Surgeon: Anjel Beasley MD;  Location: [facility] CATH INVASIVE LOCATION;  Service: Cardiovascular;  Laterality: N/A;    CARDIAC ELECTROPHYSIOLOGY PROCEDURE Left 06/06/2016    Procedure: Pacemaker DC new  [place];  Surgeon: Juan Chacon MD;  Location: [facility] CATH INVASIVE LOCATION;  Service:     CARDIAC ELECTROPHYSIOLOGY PROCEDURE N/A 05/09/2022    Procedure: PPM generator change - dual- [place];  Surgeon: Juan Chacon MD;  Location: [facility] CATH INVASIVE LOCATION;  Service: Cardiology;  Laterality: N/A;    ENDOSCOPY N/A 10/19/2022    Procedure: ESOPHAGOGASTRODUODENOSCOPY WITH BX;  Surgeon: Anjel Carney MD;  Location: [facility] ENDOSCOPY;  Service: Gastroenterology;  Laterality: N/A;  PREOP/ DYSPEPSIA  POSTOP/ HIATAL HERNIA, DUODENITIS, GASTRITIS    ENDOSCOPY      INSERT / REPLACE / REMOVE PACEMAKER      KNEE ARTHROSCOPY Right        Allergies:  No Known Allergies    Home Meds:  Medications Prior to Admission   Medication Sig Dispense Refill Last Dose    amLODIPine (NORVASC) 5 MG tablet TAKE 1 TABLET BY MOUTH EVERY DAY 15 tablet 0 8/19/2023     atorvastatin (LIPITOR) 40 MG tablet TAKE 1 TABLET BY MOUTH EVERY DAY 90 tablet 3 8/19/2023    bumetanide (BUMEX) 2 MG tablet Take 1 tablet by mouth 2 (Two) Times a Day. 60 tablet 11 8/19/2023    cholecalciferol (VITAMIN D3) 25 MCG (1000 UT) tablet Take 1 tablet by mouth Daily.   8/19/2023    [facility] Lancets Original [facility] Use as directed and appropo lancet for his monitor 100 each 11 8/19/2023    glucose blood test strip He needs [employer] brand TrueTrack with lancets strips. Test daily. 100 each 12 8/19/2023    metFORMIN (GLUCOPHAGE) 1000 MG tablet TAKE 1 TABLET BY MOUTH 2 (TWO) TIMES A DAY WITH MEALS. INDICATIONS: TYPE 2 DIABETES, NOTED 30 tablet 0 8/19/2023    metoprolol tartrate (LOPRESSOR) 50 MG tablet Take 1 tablet by mouth 2 (Two) Times a Day. 180 tablet 3 8/19/2023    potassium chloride 10 MEQ CR tablet TAKE 1 TABLET BY MOUTH EVERY DAY 90 tablet 3 8/19/2023    quinapril (ACCUPRIL) 40 MG tablet TAKE 2 TABLETS BY MOUTH DAILY 180 tablet 1 8/19/2023    tadalafil (CIALIS) 20 MG tablet Take 1 tablet by mouth Daily As Needed for Erectile Dysfunction. 10 tablet 1 Past Month    vitamin B-12 (CYANOCOBALAMIN) 1000 MCG tablet Take 1 tablet by mouth Daily.   Past Week    Xarelto 20 MG tablet TAKE 1 TABLET BY MOUTH EVERY DAY WITH DINNER 90 tablet 2 8/19/2023    hydrOXYzine (ATARAX) 10 MG tablet TAKE 1 TABLET BY MOUTH 2 (TWO) TIMES A DAY AS NEEDED FOR ITCHING OR ANXIETY. (Patient not taking: Reported on 7/6/2023) 180 tablet 3 More than a month       Current Meds  Scheduled Meds:amLODIPine, 5 mg, Oral, Daily  aspirin, 81 mg, Oral, Daily  atorvastatin, 40 mg, Oral, Daily  bumetanide, 1.5 mg, Oral, BID   Or  bumetanide, 0.5 mg, Intravenous, BID  enoxaparin, 1 mg/kg, Subcutaneous, Once  insulin lispro, 2-9 Units, Subcutaneous, 4x Daily AC & at Bedtime  ipratropium-albuterol, 3 mL, Nebulization, 4x Daily - RT  lisinopril, 40 mg, Oral, Q12H  metoprolol tartrate, 50 mg, Oral, Q12H  potassium chloride, 10 mEq, Oral, Daily  senna-docusate  sodium, 2 tablet, Oral, BID  sodium chloride, 10 mL, Intravenous, Q12H      Social History:   Social History     Socioeconomic History    Marital status:    Tobacco Use    Smoking status: Former     Types: Cigars     Passive exposure: Past    Smokeless tobacco: Never    Tobacco comments:     NO caffeine use    Vaping Use    Vaping Use: Never used   Substance and Sexual Activity    Alcohol use: No    Drug use: Not Currently     Types: Marijuana     Comment: out of cigars in the remote past    Sexual activity: Yes     Partners: Female       Family History:  Family History   Problem Relation Age of Onset    Depression Mother     Hypertension Mother     Heart disease Mother     Atrial fibrillation Sister     Hypertension Sister     Heart disease Sister     Hypertension Sister     Heart disease Sister     Hypertension Brother     Kidney disease Other     Sleep apnea Other     Diabetes Neg Hx     Breast cancer Neg Hx        REVIEW OF SYSTEMS:   CONSTITUTIONAL: No weight loss, fever, chills, weakness or fatigue.   HEENT: Eyes: No visual loss, blurred vision, double vision or yellow sclerae. Ears, Nose, Throat: No hearing loss, sneezing, congestion, runny nose or sore throat.   SKIN: No rash or itching.     RESPIRATORY: No hemoptysis, cough or sputum.   GASTROINTESTINAL: No anorexia, nausea, vomiting or diarrhea. No abdominal pain, bright red blood per rectum or melena.  GENITOURINARY: No burning on urination, hematuria or increased frequency.  NEUROLOGICAL: No headache, dizziness, syncope, paralysis, ataxia, numbness or tingling in the extremities. No change in bowel or bladder control.   MUSCULOSKELETAL: No muscle, back pain, joint pain or stiffness.   HEMATOLOGIC: No anemia, bleeding or bruising.   LYMPHATICS: No enlarged nodes. No history of splenectomy.   PSYCHIATRIC: No history of depression, anxiety, hallucinations.   ENDOCRINOLOGIC: No reports of sweating, cold or heat intolerance. No polyuria or polydipsia.  "    Physical Exam    /96   Pulse 70   Temp 98 °F (36.7 °C) (Oral)   Resp 20   Ht 188 cm (74\")   Wt 134 kg (295 lb)   SpO2 97%   BMI 37.88 kg/m²     General Appearance Well developed, cooperative and well nourished and no acute distress   Head Normocephalic, without abnormality, atraumatic   Ears Ears appear intact with no abnormalities noted   Throat No oral lesions, no thrush, oral mucosa moist   Neck No adenopathy, supple, trachea midline, no thyromegaly, no carotid bruit   Back No skin lesions, erythema or scars, no tenderness to percussion or palpation,range of motion is normal   Lungs Bibasilar rales (L > R),respirations regular, even and unlabored   Heart Regular rhythm and normal rate, normal S1 and S2, no murmur, no gallop, no rub, no click   Chest wall No abnormalities observed   Abdomen Normal bowel sounds, no masses, no hepatomegaly,    Extremities Moves all extremities well, 1 + edema, no cyanosis, no redness   Pulses Pulses palpable and equal bilaterally. Normal radial, carotid, femoral, dorsalis pedis and posterior tibial pulses bilaterally.   Skin No bleeding, bruising or rash   Psychiatric Alert and oriented x 3, normal mood and affect     Results from last 7 days   Lab Units 08/21/23 0422 08/20/23  0905   SODIUM mmol/L 139 141   POTASSIUM mmol/L 3.5 3.7   CHLORIDE mmol/L 102 103   CO2 mmol/L 26.0 27.2   BUN mg/dL 17 17   CREATININE mg/dL 0.75* 0.84   CALCIUM mg/dL 8.9 9.5   BILIRUBIN mg/dL  --  1.3*   ALK PHOS U/L  --  122*   ALT (SGPT) U/L  --  48*   AST (SGOT) U/L  --  47*   GLUCOSE mg/dL 126* 143*     Results from last 7 days   Lab Units 08/21/23  0835 08/21/23 0422 08/20/23  1107   HSTROP T ng/L 47* 48* 39*     Results from last 7 days   Lab Units 08/21/23  0422 08/20/23  0905   WBC 10*3/mm3 5.28 5.41   HEMOGLOBIN g/dL 12.1* 13.6   HEMATOCRIT % 38.4 41.8   PLATELETS 10*3/mm3 205 226         Results from last 7 days   Lab Units 08/21/23  0422   MAGNESIUM mg/dL 2.0     Results from " last 7 days   Lab Units 08/20/23  0905   PROBNP pg/mL 464.0       8/21/23    3/29/23    I personally viewed and interpreted the patient's EKG/Telemetry data  I have reviewed HPI and ROS above.    Assessment and Plan  1.  Acute on chronic diastolic and now systolic heart failure.  Cardiac cath last year negative for significant struct of disease and is no anginal symptoms.  I remain concerned about blood pressure had been controlled.  His atrial fibrillation rates appear fairly well controlled.  He is treating his CPAP conscientiously with the CPAP.  This appears to be more of a myopathic possibly infiltrative process.  PYP scan was indeterminate.  We will check a cardiac MRI.  (Pacemaker is MRI compatible).  Depending on these results may need RV biopsy for more definitive diagnosis versus possible consideration for BiV pacing as dyssynchrony was evident on echo.  He is on lisinopril and metoprolol at home.  Would continue with these.  He still sounds wet.  We will switch to IV Bumex at slightly lower dose of 1.5 mg twice daily.  He has not been on Entresto I believe in part due to symptoms of orthostasis he is had in the past.  Can consider adding spironolactone prior to dismissal.  2.  VT, as above.  We will ask EP service to also review.  Resume beta-blocker therapy.  3.  Atrial fibrillation, chronic. On Xarelto at home (last dose Sat 7 pm).  Depending on MRI results and need for possible RV biopsy, will start back anticoagulation today either in the form of Xarelto or Lovenox.  4.  Hypertension with history of orthostatic hypotension., controlled to low at times  5.  VENESSA, on CPAP  6.  Chronic anticoagulation  7.  S/p pacer placement.  8.  History of mitral regurgitation.  This has intermittently been noted to be more significant.  Today's echo only showed mild to moderate regurgitation.  9.  RV dysfunction.  Again noted on echo today.  He is on CPAP.  10.  Pulmonary hypertension.  May be underestimated on  today's echo given RV dysfunction.  Recommendations as above    Mini Tong  8/21/2023  12:09 EDT    60min spent in reviewing records, discussion and examination of the patient and discussion with other members of the patient's medical team.     Dictated utilizing Dragon dictation

## 2023-08-21 NOTE — CASE MANAGEMENT/SOCIAL WORK
Discharge Planning Assessment  [facility]     Patient Name: Shashi Engel  MRN: [id number]  Today's Date: 8/21/2023    Admit Date: 8/20/2023        Discharge Needs Assessment       Row Name 08/21/23 1530       Living Environment    People in Home child(bo), adult;spouse    Name(s) of People in Home Sunday Toro- spouse    Current Living Arrangements home    Potentially Unsafe Housing Conditions none    Primary Care Provided by self    Provides Primary Care For no one    Family Caregiver if Needed spouse    Quality of Family Relationships helpful;involved;supportive    Able to Return to Prior Arrangements yes       Resource/Environmental Concerns    Resource/Environmental Concerns financial    Financial Concerns --  medical bills       Food Insecurity    Within the past 12 months, you worried that your food would run out before you got the money to buy more. Never true    Within the past 12 months, the food you bought just didn't last and you didn't have money to get more. Never true       Transition Planning    Patient/Family Anticipates Transition to home with family    Patient/Family Anticipated Services at Transition community agency    Transportation Anticipated family or friend will provide       Discharge Needs Assessment    Equipment Currently Used at Home glucometer;bp cuff;cpap    Concerns to be Addressed decision-making;discharge planning    Anticipated Changes Related to Illness none    Equipment Needed After Discharge none    Provided Post Acute Provider List? N/A    Provided Post Acute Provider Quality & Resource List? N/A    Current Discharge Risk chronically ill                   Discharge Plan       Row Name 08/21/23 7785       Plan    Plan Comments Entered room, introduced self and explained role to patient; verified information on facesheet; patient lives in a house w/spouse and adult daughter; is retired, independent w/ADL's and still drives. Pt uses a glucose machine, B/P cuff and CPAP  machine at home; Verified PCP listed on facesheet; RX are filled at [facility] on [address]. Pt presents to the ER w/SOA x 1 week; Pt does report difficulty affording medical bills- offered referral to [facility] for further resources- pt agreed. Pt plans to return home upon d/c w/spouse to transport.                  Continued Care and Services - Admitted Since 8/20/2023    Coordination has not been started for this encounter.          Demographic Summary       Row Name 08/21/23 1528       General Information    Admission Type observation    Arrived From home    Required Notices Provided Observation Status Notice    Reason for Consult discharge planning    Preferred Language English       Contact Information    Permission Granted to Share Info With                    Functional Status       Row Name 08/21/23 1528       Functional Status    Usual Activity Tolerance good    Current Activity Tolerance good       Assessment of Health Literacy    How often do you have someone help you read hospital materials? Never    How often do you have problems learning about your medical condition because of difficulty understanding written information? Never    How often do you have a problem understanding what is told to you about your medical condition? Never    How confident are you filling out medical forms by yourself? Extremely    Health Literacy Good       Functional Status, IADL    Medications independent    Meal Preparation independent    Housekeeping assistive person;independent    Laundry independent;assistive person    Shopping independent       Mental Status    General Appearance WDL WDL       Mental Status Summary    Recent Changes in Mental Status/Cognitive Functioning no changes       Employment/    Employment Status retired;, previous service            [employer]                   Psychosocial    No documentation.                  Abuse/Neglect    No documentation.                   Legal    No documentation.                  Substance Abuse    No documentation.                  Patient Forms    No documentation.                     Emily Rousseau RN

## 2023-08-21 NOTE — PLAN OF CARE
Goal Outcome Evaluation:  Plan of Care Reviewed With: patient           Outcome Evaluation: Received from OBS unit, AOx4 VSS, Pt denies any soa or chest pain, Unable to complete cardiac MRI, plan to repeat tomorrow or Wednesday. Diuresing well from Bumex.  Pt resting comfortable, continue with current plan of care and update as needed.

## 2023-08-22 PROBLEM — I50.43 ACUTE ON CHRONIC COMBINED SYSTOLIC (CONGESTIVE) AND DIASTOLIC (CONGESTIVE) HEART FAILURE: Status: ACTIVE | Noted: 2023-08-22

## 2023-08-22 LAB
ANION GAP SERPL CALCULATED.3IONS-SCNC: 12.2 MMOL/L (ref 5–15)
BUN SERPL-MCNC: 17 MG/DL (ref 8–23)
BUN/CREAT SERPL: 18.7 (ref 7–25)
CALCIUM SPEC-SCNC: 9 MG/DL (ref 8.6–10.5)
CHLORIDE SERPL-SCNC: 100 MMOL/L (ref 98–107)
CO2 SERPL-SCNC: 26.8 MMOL/L (ref 22–29)
CREAT SERPL-MCNC: 0.91 MG/DL (ref 0.76–1.27)
EGFRCR SERPLBLD CKD-EPI 2021: 95.9 ML/MIN/1.73
GLUCOSE BLDC GLUCOMTR-MCNC: 120 MG/DL (ref 70–130)
GLUCOSE BLDC GLUCOMTR-MCNC: 128 MG/DL (ref 70–130)
GLUCOSE BLDC GLUCOMTR-MCNC: 139 MG/DL (ref 70–130)
GLUCOSE BLDC GLUCOMTR-MCNC: 149 MG/DL (ref 70–130)
GLUCOSE SERPL-MCNC: 156 MG/DL (ref 65–99)
MAGNESIUM SERPL-MCNC: 2 MG/DL (ref 1.6–2.4)
POTASSIUM SERPL-SCNC: 3.4 MMOL/L (ref 3.5–5.2)
QT INTERVAL: 508 MS
QT INTERVAL: 514 MS
SODIUM SERPL-SCNC: 139 MMOL/L (ref 136–145)

## 2023-08-22 PROCEDURE — 83735 ASSAY OF MAGNESIUM: CPT | Performed by: INTERNAL MEDICINE

## 2023-08-22 PROCEDURE — 82948 REAGENT STRIP/BLOOD GLUCOSE: CPT

## 2023-08-22 PROCEDURE — G0378 HOSPITAL OBSERVATION PER HR: HCPCS

## 2023-08-22 PROCEDURE — 99232 SBSQ HOSP IP/OBS MODERATE 35: CPT | Performed by: INTERNAL MEDICINE

## 2023-08-22 PROCEDURE — 80048 BASIC METABOLIC PNL TOTAL CA: CPT | Performed by: INTERNAL MEDICINE

## 2023-08-22 PROCEDURE — 25010000002 ENOXAPARIN PER 10 MG: Performed by: STUDENT IN AN ORGANIZED HEALTH CARE EDUCATION/TRAINING PROGRAM

## 2023-08-22 PROCEDURE — 99232 SBSQ HOSP IP/OBS MODERATE 35: CPT

## 2023-08-22 PROCEDURE — 25010000002 ENOXAPARIN PER 10 MG: Performed by: INTERNAL MEDICINE

## 2023-08-22 PROCEDURE — 96372 THER/PROPH/DIAG INJ SC/IM: CPT

## 2023-08-22 RX ORDER — POTASSIUM CHLORIDE 1.5 G/1.58G
20 POWDER, FOR SOLUTION ORAL ONCE
Status: COMPLETED | OUTPATIENT
Start: 2023-08-22 | End: 2023-08-22

## 2023-08-22 RX ORDER — BUMETANIDE 2 MG/1
2 TABLET ORAL
Status: DISCONTINUED | OUTPATIENT
Start: 2023-08-22 | End: 2023-08-23

## 2023-08-22 RX ORDER — POTASSIUM CHLORIDE 750 MG/1
20 TABLET, FILM COATED, EXTENDED RELEASE ORAL DAILY
Status: DISCONTINUED | OUTPATIENT
Start: 2023-08-22 | End: 2023-08-26 | Stop reason: HOSPADM

## 2023-08-22 RX ORDER — BUMETANIDE 0.25 MG/ML
0.5 INJECTION INTRAMUSCULAR; INTRAVENOUS
Status: DISCONTINUED | OUTPATIENT
Start: 2023-08-22 | End: 2023-08-23

## 2023-08-22 RX ADMIN — Medication 10 ML: at 09:25

## 2023-08-22 RX ADMIN — ATORVASTATIN CALCIUM 40 MG: 20 TABLET, FILM COATED ORAL at 09:24

## 2023-08-22 RX ADMIN — POTASSIUM CHLORIDE 20 MEQ: 1.5 POWDER, FOR SOLUTION ORAL at 15:33

## 2023-08-22 RX ADMIN — POTASSIUM CHLORIDE 20 MEQ: 750 TABLET, EXTENDED RELEASE ORAL at 09:24

## 2023-08-22 RX ADMIN — ENOXAPARIN SODIUM 135 MG: 150 INJECTION SUBCUTANEOUS at 03:33

## 2023-08-22 RX ADMIN — LISINOPRIL 40 MG: 20 TABLET ORAL at 09:24

## 2023-08-22 RX ADMIN — BUMETANIDE 2 MG: 2 TABLET ORAL at 09:24

## 2023-08-22 RX ADMIN — METOPROLOL TARTRATE 50 MG: 50 TABLET, FILM COATED ORAL at 09:24

## 2023-08-22 RX ADMIN — SENNOSIDES AND DOCUSATE SODIUM 2 TABLET: 50; 8.6 TABLET ORAL at 20:37

## 2023-08-22 RX ADMIN — AMLODIPINE BESYLATE 5 MG: 5 TABLET ORAL at 09:24

## 2023-08-22 RX ADMIN — Medication 10 ML: at 20:37

## 2023-08-22 RX ADMIN — BUMETANIDE 2 MG: 2 TABLET ORAL at 17:30

## 2023-08-22 RX ADMIN — METOPROLOL TARTRATE 50 MG: 50 TABLET, FILM COATED ORAL at 20:36

## 2023-08-22 RX ADMIN — ENOXAPARIN SODIUM 135 MG: 150 INJECTION SUBCUTANEOUS at 17:30

## 2023-08-22 RX ADMIN — LISINOPRIL 40 MG: 20 TABLET ORAL at 20:36

## 2023-08-22 RX ADMIN — ASPIRIN 81 MG: 81 TABLET, COATED ORAL at 09:24

## 2023-08-22 NOTE — PLAN OF CARE
Goal Outcome Evaluation:                   Patient denies pain nor discomfort, family at bedside,pt on room air, paced on the monitor, ad denisa may need cardiac MRI when ordered, pt on oral Bumex ,call light in reach , educate to call for assist, cont to monitor

## 2023-08-22 NOTE — PROGRESS NOTES
[facility]  Progress note: 2023    Patient Identification:  Name:Shashi Engel  Age:62 y.o.  Sex: male  :  1960  MRN: [id number]           CC:  Cardiomyopathy, congestive heart failure    Interval history:  Inaccurate intake and output.  Vital stable overnight.  Potassium slightly low.     Vital Signs:   Temp:  [98.4 °F (36.9 °C)-98.6 °F (37 °C)] 98.4 °F (36.9 °C)  Heart Rate:  [60-75] 64  Resp:  [18-20] 18  BP: (130-153)/(61-97) 134/77    Intake/Output Summary (Last 24 hours) at 2023 0745  Last data filed at 2023 0335  Gross per 24 hour   Intake --   Output 575 ml   Net -575 ml       Physical Examination:    General Appearance No acute distress   Neck No adenopathy, supple, trachea midline, no thyromegaly, no carotid bruit   Lungs Bibasilar rales left greater than right.  Room trace you respirations regular, even and unlabored   Heart Regular rhythm and normal rate, normal S1 and S2, no murmur, no gallop, no rub, no click   Chest wall No abnormalities observed   Abdomen Normal bowel sounds, no masses, no hepatomegaly, soft   Extremities Moves all extremities well, trace edema, no cyanosis, no redness   Neurological Alert and oriented x 3     Lab Review:  Personally reviewed the labs, radiology imaging and other cardiac procedures.   Results from last 7 days   Lab Units 23  0342 23  0422 23  0905   SODIUM mmol/L 139   < > 141   POTASSIUM mmol/L 3.4*   < > 3.7   CHLORIDE mmol/L 100   < > 103   CO2 mmol/L 26.8   < > 27.2   BUN mg/dL 17   < > 17   CREATININE mg/dL 0.91   < > 0.84   CALCIUM mg/dL 9.0   < > 9.5   BILIRUBIN mg/dL  --   --  1.3*   ALK PHOS U/L  --   --  122*   ALT (SGPT) U/L  --   --  48*   AST (SGOT) U/L  --   --  47*   GLUCOSE mg/dL 156*   < > 143*    < > = values in this interval not displayed.     Results from last 7 days   Lab Units 23  0835 23  0422 23  1107   HSTROP T ng/L 47* 48* 39*     Results from last 7 days   Lab Units  08/21/23  0422 08/20/23  0905   WBC 10*3/mm3 5.28 5.41   HEMOGLOBIN g/dL 12.1* 13.6   HEMATOCRIT % 38.4 41.8   PLATELETS 10*3/mm3 205 226         Medication Review:   Meds reviewed  Scheduled Meds:amLODIPine, 5 mg, Oral, Daily  aspirin, 81 mg, Oral, Daily  atorvastatin, 40 mg, Oral, Daily  bumetanide, 1.5 mg, Oral, BID   Or  bumetanide, 0.5 mg, Intravenous, BID  enoxaparin, 1 mg/kg, Subcutaneous, Once  enoxaparin, 1 mg/kg, Subcutaneous, Q12H  insulin lispro, 2-9 Units, Subcutaneous, 4x Daily AC & at Bedtime  ipratropium-albuterol, 3 mL, Nebulization, 4x Daily - RT  lisinopril, 40 mg, Oral, Q12H  metoprolol tartrate, 50 mg, Oral, Q12H  potassium chloride, 10 mEq, Oral, Daily  senna-docusate sodium, 2 tablet, Oral, BID  sodium chloride, 10 mL, Intravenous, Q12H      I personally viewed and interpreted the patient's EKG/Telemetry data    Assessment and Plan  1.  Acute on chronic diastolic and now systolic heart failure.  Cardiac cath last year negative for significant obstructive disease and is no anginal symptoms.  Unable to do cardiac MRI due to frequency of urination.  Continue with diuresis and when more compensated will attempt MRI with hopeful decrease in polyuria.  Supplement potassium and check magnesium.  Diuresis seems to have improved by failure fairly well.  We will change back to Bumex 2 mg twice daily.  Cardiac MRI tomorrow  2.  VT, as above.  EP consult pending  3.  Atrial fibrillation, chronic. On Xarelto at home (last dose Sat 7 pm).  Holding Xarelto with probable need for RV biopsy in the near future.  Currently on Lovenox.  4.  Hypertension with history of orthostatic hypotension., controlled to low at times  5.  VENESSA, on CPAP  6.  Chronic anticoagulation, as above  7.  S/p pacer placement MRI compatible  8.  History of mitral regurgitation.  This has intermittently been noted to be more significant.  Today's echo only showed mild to moderate regurgitation.  9.  RV dysfunction.  Again noted on echo  today.  He is on CPAP.  10.  Pulmonary hypertension.  May be underestimated on today's echo given RV dysfunction.  Recommendations as above      Mini Tong  8/22/202307:45 EDT  25min spent in reviewing records, discussion and examination of the patient and discussion with other members of the patient's medical team.     Dictated utilizing Dragon dictation

## 2023-08-22 NOTE — PROGRESS NOTES
Electrophysiology Hospital Consult            Patient Name: Shashi nEgel  Age/Sex: 62 y.o. male  : 1960  MRN: [id number]    Date of Admission: 2023  Date of Encounter Visit: 23  Encounter Provider: DENNIS Monroe  Referring Provider: Byron Jain MD  Place of Service: [facility] CARDIOLOGY  Patient Care Team:  Keesha Kirkpatrick MD as PCP - General (Family Medicine)  Keesha Kirkpatrick MD as PCP - Family Medicine  Jennie Vanegas MD as Consulting Physician (Cardiology)      Subjective:   EP Consultation for: non-sustained VT, upgrade     Chief Complaint: dyspnea     History of Present Illness:  Shashi Engel is a 62 y.o. male who follows with Dr. Vanegas. He previously saw Dr. Chacon. He has a history of CHB--s/p SC PPM (), permanent atrial fibrillation, non-sustained VT, HTN, and VENESSA.     He had DC pacemaker placed for CHB in .     In  he was having dizziness and NSVT on monitor. Saw Dr. Covarrubias and felt like it was not cause of symptoms.     Stress echo in  showed EF of 62%. Had cath in 2022 showing mild pulm HTN and normal coronaries.     He had some episode of NSVT on pacmaker on . His metoprolol was increased.     On  he noted dyspnea and came to ED. Dr. Tong saw.     Echo was done showing decline in EF to ~30% with hypokinesis. There was also discussion of infiltrative CM.     EP has been asked to see for NSVT and consideration of possible upgrade.       Dr. Brady and I saw this afternoon. He says on  he noted some increased dyspnea, he says this occurs from time to time. He was also called by our office about the NSVT and the two things together provoked him to come to the ED.  He was not aware of NSVT and has episodes in the past similar to . He says he now feels much better and is back at baseline. Apparrently tried to do cardiac MRI and he could not tolerate. He has permanent AF and is  ventricularly paced.     Past Medical History:  Past Medical History:   Diagnosis Date    Abnormal electrocardiogram     Anxiety     Cardiomyopathy, hypertrophic, primary familial     Erectile dysfunction     Health care maintenance     Hyperlipidemia     Hypertension     Mild concentric left ventricular hypertrophy (LVH)     Mild mitral regurgitation     Mild tricuspid regurgitation     Near syncope     Noncompliance     Nonsustained ventricular tachycardia 09/05/2018    Obesity     VENESSA (obstructive sleep apnea)     uses CPAP faithfully    PAF (paroxysmal atrial fibrillation)     Pneumonia 01/01/2016    Type 2 diabetes mellitus        Past Surgical History:   Procedure Laterality Date    CARDIAC CATHETERIZATION N/A 03/02/2022    Procedure: RIGHT HEART CATH;  Surgeon: Anjel Beasley MD;  Location: [facility] CATH INVASIVE LOCATION;  Service: Cardiovascular;  Laterality: N/A;    CARDIAC CATHETERIZATION N/A 03/02/2022    Procedure: Coronary angiography;  Surgeon: Anjel Beasley MD;  Location: [facility] CATH INVASIVE LOCATION;  Service: Cardiovascular;  Laterality: N/A;    CARDIAC ELECTROPHYSIOLOGY PROCEDURE Left 06/06/2016    Procedure: Pacemaker DC new  [place];  Surgeon: Juan Chacon MD;  Location: [facility] CATH INVASIVE LOCATION;  Service:     CARDIAC ELECTROPHYSIOLOGY PROCEDURE N/A 05/09/2022    Procedure: PPM generator change - dual- [place];  Surgeon: Juan Chacon MD;  Location: [facility] CATH INVASIVE LOCATION;  Service: Cardiology;  Laterality: N/A;    ENDOSCOPY N/A 10/19/2022    Procedure: ESOPHAGOGASTRODUODENOSCOPY WITH BX;  Surgeon: Anjel Carney MD;  Location: [facility] ENDOSCOPY;  Service: Gastroenterology;  Laterality: N/A;  PREOP/ DYSPEPSIA  POSTOP/ HIATAL HERNIA, DUODENITIS, GASTRITIS    ENDOSCOPY      INSERT / REPLACE / REMOVE PACEMAKER      KNEE ARTHROSCOPY Right        Home Medications:   Medications Prior to Admission   Medication Sig Dispense Refill Last Dose    amLODIPine (NORVASC)  5 MG tablet TAKE 1 TABLET BY MOUTH EVERY DAY 15 tablet 0 8/19/2023    atorvastatin (LIPITOR) 40 MG tablet TAKE 1 TABLET BY MOUTH EVERY DAY 90 tablet 3 8/19/2023    bumetanide (BUMEX) 2 MG tablet Take 1 tablet by mouth 2 (Two) Times a Day. 60 tablet 11 8/19/2023    cholecalciferol (VITAMIN D3) 25 MCG (1000 UT) tablet Take 1 tablet by mouth Daily.   8/19/2023    [employer] Lancets Original [facility] Use as directed and appropo lancet for his monitor 100 each 11 8/19/2023    glucose blood test strip He needs [employer] brand TrueTrack with lancets strips. Test daily. 100 each 12 8/19/2023    metFORMIN (GLUCOPHAGE) 1000 MG tablet TAKE 1 TABLET BY MOUTH 2 (TWO) TIMES A DAY WITH MEALS. INDICATIONS: TYPE 2 DIABETES, NOTED 30 tablet 0 8/19/2023    metoprolol tartrate (LOPRESSOR) 50 MG tablet Take 1 tablet by mouth 2 (Two) Times a Day. 180 tablet 3 8/19/2023    potassium chloride 10 MEQ CR tablet TAKE 1 TABLET BY MOUTH EVERY DAY 90 tablet 3 8/19/2023    quinapril (ACCUPRIL) 40 MG tablet TAKE 2 TABLETS BY MOUTH DAILY 180 tablet 1 8/19/2023    tadalafil (CIALIS) 20 MG tablet Take 1 tablet by mouth Daily As Needed for Erectile Dysfunction. 10 tablet 1 Past Month    vitamin B-12 (CYANOCOBALAMIN) 1000 MCG tablet Take 1 tablet by mouth Daily.   Past Week    Xarelto 20 MG tablet TAKE 1 TABLET BY MOUTH EVERY DAY WITH DINNER 90 tablet 2 8/19/2023    hydrOXYzine (ATARAX) 10 MG tablet TAKE 1 TABLET BY MOUTH 2 (TWO) TIMES A DAY AS NEEDED FOR ITCHING OR ANXIETY. (Patient not taking: Reported on 7/6/2023) 180 tablet 3 More than a month       Allergies:  No Known Allergies    Past Social History:  Social History     Socioeconomic History    Marital status:    Tobacco Use    Smoking status: Former     Types: Cigars     Passive exposure: Past    Smokeless tobacco: Never    Tobacco comments:     NO caffeine use    Vaping Use    Vaping Use: Never used   Substance and Sexual Activity    Alcohol use: No    Drug use: Not Currently     Types: Marijuana      Comment: out of cigars in the remote past    Sexual activity: Yes     Partners: Female       Past Family History:  Family History   Problem Relation Age of Onset    Depression Mother     Hypertension Mother     Heart disease Mother     Atrial fibrillation Sister     Hypertension Sister     Heart disease Sister     Hypertension Sister     Heart disease Sister     Hypertension Brother     Kidney disease Other     Sleep apnea Other     Diabetes Neg Hx     Breast cancer Neg Hx        Review of Systems: All systems reviewed. Pertinent positives identified in HPI. All other systems are negative.     14 point ROS was performed and is negative except as outlined in HPI.     Objective:     Objective:  Vital Signs (last 24 hours)         08/21 0700  08/22 0659 08/22 0700  08/22 1714   Most Recent      Temp (°F) 98.4 -  98.6    98.4 -  98.6     98.4 (36.9) 08/22 1230    Heart Rate 59 -  75    64 -  71     71 08/22 1230    Resp 18 -  20      18     18 08/22 1230    /61 -  159/96    134/92 -  146/92     146/92 08/22 1230    SpO2 (%) 93 -  100    97 -  98     98 08/22 0815          Temp:  [98.4 °F (36.9 °C)-98.6 °F (37 °C)] 98.4 °F (36.9 °C)  Heart Rate:  [60-71] 71  Resp:  [18] 18  BP: (130-153)/(61-92) 146/92  Body mass index is 36.89 kg/m².        Physical Exam:     General Appearance: No acute distress, well developed and well nourished.   Eyes: Conjunctiva and lids: No erythema, swelling, or discharge. Sclera non-icteric.   HENT: Atraumatic, normocephalic. External eyes, ears, and nose normal.   Respiratory: No signs of respiratory distress. Respiration rhythm and depth normal.   Clear to auscultation. No rales, crackles, rhonchi, or wheezing auscultated.   Cardiovascular:  Heart Rate and Rhythm: Normal, Heart Sounds: Normal S1 and S2. No S3 or S4 noted.  Gastrointestinal:  Abdomen soft, non-distended, non-tender.  Musculoskeletal: Normal movement of extremities  Skin: Warm and dry.   Psychiatric: Patient alert and  oriented to person, place, and time. Speech and behavior appropriate. Normal mood and affect.    Labs:   Lab Review:     Results from last 7 days   Lab Units 08/22/23  0342 08/21/23  0422 08/20/23  0905   SODIUM mmol/L 139 139 141   POTASSIUM mmol/L 3.4* 3.5 3.7   CHLORIDE mmol/L 100 102 103   CO2 mmol/L 26.8 26.0 27.2   BUN mg/dL 17 17 17   CREATININE mg/dL 0.91 0.75* 0.84   GLUCOSE mg/dL 156* 126* 143*   CALCIUM mg/dL 9.0 8.9 9.5   AST (SGOT) U/L  --   --  47*   ALT (SGPT) U/L  --   --  48*     Results from last 7 days   Lab Units 08/21/23  0835 08/21/23  0422 08/20/23  1107   HSTROP T ng/L 47* 48* 39*     Results from last 7 days   Lab Units 08/21/23  0422   WBC 10*3/mm3 5.28   HEMOGLOBIN g/dL 12.1*   HEMATOCRIT % 38.4   PLATELETS 10*3/mm3 205         Results from last 7 days   Lab Units 08/20/23  1107   CHOLESTEROL mg/dL 159     Results from last 7 days   Lab Units 08/22/23  0342   MAGNESIUM mg/dL 2.0     Results from last 7 days   Lab Units 08/20/23  1107   CHOLESTEROL mg/dL 159   TRIGLYCERIDES mg/dL 60   HDL CHOL mg/dL 53   LDL CHOL mg/dL 94     Results from last 7 days   Lab Units 08/20/23  0905   PROBNP pg/mL 464.0             I personally viewed and interpreted the patient's EKG/Telemetry tracings.    Assessment:       Exertional dyspnea    Acute on chronic heart failure with reduced ejection fraction and diastolic dysfunction    Acute on chronic combined systolic (congestive) and diastolic (congestive) heart failure        Plan:   Dr. Grace and I saw this patient.        In the past he has had normal LVEF. This admission was noted to had decline in LVEF to ~30%. He RV paces about 85% of the time which going back to 2020 is unchanged.     We do think it would be appropriate to consider upgrade to biventricular device but there is no urgency to do this.     Would recommend that he follow up in clinic with 3-4 weeks after discharge and repeat echo at that time.     If his LVEF is still down then consider BiV  upgrade.     Thank you for allowing me to participate in the care of Shashianderson Engel. Feel free to contact me directly with any further questions or concerns.    DENNIS Monroe  [facility]  08/22/23  17:14 EDT

## 2023-08-23 DIAGNOSIS — I50.43 ACUTE ON CHRONIC HEART FAILURE WITH REDUCED EJECTION FRACTION AND DIASTOLIC DYSFUNCTION: Primary | ICD-10-CM

## 2023-08-23 LAB
ALBUMIN SERPL-MCNC: 3.9 G/DL (ref 3.5–5.2)
ALBUMIN/GLOB SERPL: 1.3 G/DL
ALP SERPL-CCNC: 124 U/L (ref 39–117)
ALT SERPL W P-5'-P-CCNC: 42 U/L (ref 1–41)
ANION GAP SERPL CALCULATED.3IONS-SCNC: 9.3 MMOL/L (ref 5–15)
AST SERPL-CCNC: 36 U/L (ref 1–40)
BILIRUB SERPL-MCNC: 1.6 MG/DL (ref 0–1.2)
BUN SERPL-MCNC: 18 MG/DL (ref 8–23)
BUN/CREAT SERPL: 20.7 (ref 7–25)
CALCIUM SPEC-SCNC: 9.1 MG/DL (ref 8.6–10.5)
CHLORIDE SERPL-SCNC: 102 MMOL/L (ref 98–107)
CO2 SERPL-SCNC: 30.7 MMOL/L (ref 22–29)
CREAT SERPL-MCNC: 0.87 MG/DL (ref 0.76–1.27)
EGFRCR SERPLBLD CKD-EPI 2021: 98.2 ML/MIN/1.73
GLOBULIN UR ELPH-MCNC: 3.1 GM/DL
GLUCOSE BLDC GLUCOMTR-MCNC: 106 MG/DL (ref 70–130)
GLUCOSE BLDC GLUCOMTR-MCNC: 115 MG/DL (ref 70–130)
GLUCOSE BLDC GLUCOMTR-MCNC: 122 MG/DL (ref 70–130)
GLUCOSE BLDC GLUCOMTR-MCNC: 133 MG/DL (ref 70–130)
GLUCOSE SERPL-MCNC: 113 MG/DL (ref 65–99)
POTASSIUM SERPL-SCNC: 3.6 MMOL/L (ref 3.5–5.2)
PROT SERPL-MCNC: 7 G/DL (ref 6–8.5)
SODIUM SERPL-SCNC: 142 MMOL/L (ref 136–145)

## 2023-08-23 PROCEDURE — 25010000002 ENOXAPARIN PER 10 MG: Performed by: STUDENT IN AN ORGANIZED HEALTH CARE EDUCATION/TRAINING PROGRAM

## 2023-08-23 PROCEDURE — G0378 HOSPITAL OBSERVATION PER HR: HCPCS

## 2023-08-23 PROCEDURE — 99232 SBSQ HOSP IP/OBS MODERATE 35: CPT | Performed by: INTERNAL MEDICINE

## 2023-08-23 PROCEDURE — 82948 REAGENT STRIP/BLOOD GLUCOSE: CPT

## 2023-08-23 PROCEDURE — 80053 COMPREHEN METABOLIC PANEL: CPT | Performed by: INTERNAL MEDICINE

## 2023-08-23 PROCEDURE — 96372 THER/PROPH/DIAG INJ SC/IM: CPT

## 2023-08-23 RX ORDER — AMLODIPINE BESYLATE 2.5 MG/1
2.5 TABLET ORAL DAILY
Status: DISCONTINUED | OUTPATIENT
Start: 2023-08-24 | End: 2023-08-26 | Stop reason: HOSPADM

## 2023-08-23 RX ORDER — ALPRAZOLAM 0.5 MG/1
0.5 TABLET ORAL ONCE
Status: COMPLETED | OUTPATIENT
Start: 2023-08-23 | End: 2023-08-24

## 2023-08-23 RX ORDER — IPRATROPIUM BROMIDE AND ALBUTEROL SULFATE 2.5; .5 MG/3ML; MG/3ML
3 SOLUTION RESPIRATORY (INHALATION) EVERY 6 HOURS PRN
Status: DISCONTINUED | OUTPATIENT
Start: 2023-08-23 | End: 2023-08-26 | Stop reason: HOSPADM

## 2023-08-23 RX ADMIN — ASPIRIN 81 MG: 81 TABLET, COATED ORAL at 08:38

## 2023-08-23 RX ADMIN — SENNOSIDES AND DOCUSATE SODIUM 2 TABLET: 50; 8.6 TABLET ORAL at 20:48

## 2023-08-23 RX ADMIN — METOPROLOL TARTRATE 50 MG: 50 TABLET, FILM COATED ORAL at 08:38

## 2023-08-23 RX ADMIN — ATORVASTATIN CALCIUM 40 MG: 20 TABLET, FILM COATED ORAL at 08:38

## 2023-08-23 RX ADMIN — METOPROLOL TARTRATE 50 MG: 50 TABLET, FILM COATED ORAL at 20:48

## 2023-08-23 RX ADMIN — LISINOPRIL 40 MG: 20 TABLET ORAL at 08:38

## 2023-08-23 RX ADMIN — AMLODIPINE BESYLATE 5 MG: 5 TABLET ORAL at 08:38

## 2023-08-23 RX ADMIN — ENOXAPARIN SODIUM 135 MG: 150 INJECTION SUBCUTANEOUS at 05:52

## 2023-08-23 RX ADMIN — LISINOPRIL 40 MG: 20 TABLET ORAL at 20:48

## 2023-08-23 RX ADMIN — SENNOSIDES AND DOCUSATE SODIUM 2 TABLET: 50; 8.6 TABLET ORAL at 08:38

## 2023-08-23 RX ADMIN — Medication 10 ML: at 20:48

## 2023-08-23 RX ADMIN — ENOXAPARIN SODIUM 135 MG: 150 INJECTION SUBCUTANEOUS at 18:01

## 2023-08-23 RX ADMIN — POTASSIUM CHLORIDE 20 MEQ: 750 TABLET, EXTENDED RELEASE ORAL at 08:38

## 2023-08-23 RX ADMIN — BUMETANIDE 2 MG: 2 TABLET ORAL at 08:38

## 2023-08-23 RX ADMIN — Medication 10 ML: at 08:42

## 2023-08-23 NOTE — PROGRESS NOTES
[facility]  Progress note: 2023    Patient Identification:  Name:Shashi Engel  Age:62 y.o.  Sex: male  :  1960  MRN: [id number]           CC:  Cardiomyopathy, V. tach, congestive heart failure    Interval history:  Significant diuresis overnight.  Blood pressure slightly elevated.  Rates reasonable.  However he is having polyuria can and feels weak when he ambulates.  He does not think he can stay in the MRI scanner today.  His dyspnea however has improved significantly.    Vital Signs:   Temp:  [97.5 °F (36.4 °C)-98.8 °F (37.1 °C)] 98.8 °F (37.1 °C)  Heart Rate:  [60-71] 60  Resp:  [16-20] 20  BP: (121-154)/() 154/95    Intake/Output Summary (Last 24 hours) at 2023 09  Last data filed at 2023  Gross per 24 hour   Intake 810 ml   Output 3000 ml   Net -2190 ml       Physical Examination:    General Appearance No acute distress   Neck No adenopathy, supple, trachea midline, no thyromegaly, no carotid bruit, no JVD   Lungs Clear to auscultation,respirations regular, even and unlabored   Heart Regular rhythm and normal rate, normal S1 and S2, no murmur, no gallop, no rub, no click   Chest wall No abnormalities observed   Abdomen Normal bowel sounds, no masses, no hepatomegaly, soft   Extremities Moves all extremities well, no edema, no cyanosis, no redness   Neurological Alert and oriented x 3     Lab Review:  Personally reviewed the labs, radiology imaging and other cardiac procedures.   Results from last 7 days   Lab Units 23  0325   SODIUM mmol/L 142   POTASSIUM mmol/L 3.6   CHLORIDE mmol/L 102   CO2 mmol/L 30.7*   BUN mg/dL 18   CREATININE mg/dL 0.87   CALCIUM mg/dL 9.1   BILIRUBIN mg/dL 1.6*   ALK PHOS U/L 124*   ALT (SGPT) U/L 42*   AST (SGOT) U/L 36   GLUCOSE mg/dL 113*     Results from last 7 days   Lab Units 23  0835 23  0422 23  1107   HSTROP T ng/L 47* 48* 39*     Results from last 7 days   Lab Units 23  0422 23  0905   WBC  10*3/mm3 5.28 5.41   HEMOGLOBIN g/dL 12.1* 13.6   HEMATOCRIT % 38.4 41.8   PLATELETS 10*3/mm3 205 226         Medication Review:   Meds reviewed  Scheduled Meds:amLODIPine, 5 mg, Oral, Daily  aspirin, 81 mg, Oral, Daily  atorvastatin, 40 mg, Oral, Daily  bumetanide, 2 mg, Oral, BID   Or  bumetanide, 0.5 mg, Intravenous, BID  enoxaparin, 1 mg/kg, Subcutaneous, Once  enoxaparin, 1 mg/kg, Subcutaneous, Q12H  insulin lispro, 2-9 Units, Subcutaneous, 4x Daily AC & at Bedtime  ipratropium-albuterol, 3 mL, Nebulization, 4x Daily - RT  lisinopril, 40 mg, Oral, Q12H  metoprolol tartrate, 50 mg, Oral, Q12H  potassium chloride, 20 mEq, Oral, Daily  senna-docusate sodium, 2 tablet, Oral, BID  sodium chloride, 10 mL, Intravenous, Q12H        I personally viewed and interpreted the patient's EKG/Telemetry data    Assessment and Plan  1.  Acute on chronic diastolic and now systolic heart failure.  Cardiac cath last year negative for significant obstructive disease and is no anginal symptoms.  Unable to do cardiac MRI due to frequency of urination.  Continue with diuresis and when more compensated will attempt MRI with hopeful decrease in polyuria.  Will hold Bumex and decrease rate of diuresis.  Hopefully he can stay still without polyuria tomorrow follow-up MRI.  We will also give Xanax prior to procedure  2.  VT, as above.  EP consult appreciated.  Follow-up with them as an outpatient to consider possible upgrade to BiV device  3.  Atrial fibrillation, chronic. On Xarelto at home (last dose Sat 7 pm).  Holding Xarelto with probable need for RV biopsy in the near future.  Currently on Lovenox and Xarelto on hold.  4.  Hypertension with history of orthostatic hypotension., controlled to low at times  5.  VENESSA, on CPAP  6.  Chronic anticoagulation, as above  7.  S/p pacer placement MRI compatible  8.  History of mitral regurgitation.  This has intermittently been noted to be more significant.  Today's echo only showed mild to  moderate regurgitation.  9.  RV dysfunction.  Again noted on echo today.  He is on CPAP.  10.  Pulmonary hypertension.  May be underestimated on today's echo given RV dysfunction.  Recommendations as above  11.  Elevated liver function test.  Slightly improved.  Recheck in a.m.      Keya Tong  8/23/202309:19 EDT  25min spent in reviewing records, discussion and examination of the patient and discussion with other members of the patient's medical team.     Dictated utilizing Dragon dictation

## 2023-08-23 NOTE — PLAN OF CARE
Goal Outcome Evaluation:                   Patient denies pain nor discomfort, vital signs stable, pt on room air, SR sometimes paced on the monitor, pt NPO for cardaic MRI on the monitor, cont on bumex po,  pt educate to call for assist , call light intact cont to moitor.

## 2023-08-24 ENCOUNTER — APPOINTMENT (OUTPATIENT)
Dept: MRI IMAGING | Facility: HOSPITAL | Age: 63
End: 2023-08-24
Payer: MEDICARE

## 2023-08-24 LAB
ALBUMIN SERPL-MCNC: 3.6 G/DL (ref 3.5–5.2)
ALBUMIN/GLOB SERPL: 1.2 G/DL
ALP SERPL-CCNC: 113 U/L (ref 39–117)
ALT SERPL W P-5'-P-CCNC: 33 U/L (ref 1–41)
ANION GAP SERPL CALCULATED.3IONS-SCNC: 10.4 MMOL/L (ref 5–15)
AST SERPL-CCNC: 23 U/L (ref 1–40)
BILIRUB SERPL-MCNC: 1.4 MG/DL (ref 0–1.2)
BUN SERPL-MCNC: 16 MG/DL (ref 8–23)
BUN/CREAT SERPL: 19.5 (ref 7–25)
CALCIUM SPEC-SCNC: 9.3 MG/DL (ref 8.6–10.5)
CHLORIDE SERPL-SCNC: 102 MMOL/L (ref 98–107)
CO2 SERPL-SCNC: 28.6 MMOL/L (ref 22–29)
CREAT SERPL-MCNC: 0.82 MG/DL (ref 0.76–1.27)
EGFRCR SERPLBLD CKD-EPI 2021: 99.9 ML/MIN/1.73
GLOBULIN UR ELPH-MCNC: 3.1 GM/DL
GLUCOSE BLDC GLUCOMTR-MCNC: 109 MG/DL (ref 70–130)
GLUCOSE BLDC GLUCOMTR-MCNC: 120 MG/DL (ref 70–130)
GLUCOSE BLDC GLUCOMTR-MCNC: 147 MG/DL (ref 70–130)
GLUCOSE BLDC GLUCOMTR-MCNC: 149 MG/DL (ref 70–130)
GLUCOSE BLDC GLUCOMTR-MCNC: 86 MG/DL (ref 70–130)
GLUCOSE SERPL-MCNC: 103 MG/DL (ref 65–99)
POTASSIUM SERPL-SCNC: 3.3 MMOL/L (ref 3.5–5.2)
PROT SERPL-MCNC: 6.7 G/DL (ref 6–8.5)
SODIUM SERPL-SCNC: 141 MMOL/L (ref 136–145)

## 2023-08-24 PROCEDURE — G0378 HOSPITAL OBSERVATION PER HR: HCPCS

## 2023-08-24 PROCEDURE — A9577 INJ MULTIHANCE: HCPCS | Performed by: INTERNAL MEDICINE

## 2023-08-24 PROCEDURE — 82948 REAGENT STRIP/BLOOD GLUCOSE: CPT

## 2023-08-24 PROCEDURE — 80053 COMPREHEN METABOLIC PANEL: CPT | Performed by: INTERNAL MEDICINE

## 2023-08-24 PROCEDURE — 99232 SBSQ HOSP IP/OBS MODERATE 35: CPT | Performed by: INTERNAL MEDICINE

## 2023-08-24 PROCEDURE — 75561 CARDIAC MRI FOR MORPH W/DYE: CPT | Performed by: INTERNAL MEDICINE

## 2023-08-24 PROCEDURE — 75561 CARDIAC MRI FOR MORPH W/DYE: CPT

## 2023-08-24 PROCEDURE — 96372 THER/PROPH/DIAG INJ SC/IM: CPT

## 2023-08-24 PROCEDURE — 0 GADOBENATE DIMEGLUMINE 529 MG/ML SOLUTION: Performed by: INTERNAL MEDICINE

## 2023-08-24 PROCEDURE — 25010000002 ENOXAPARIN PER 10 MG: Performed by: STUDENT IN AN ORGANIZED HEALTH CARE EDUCATION/TRAINING PROGRAM

## 2023-08-24 RX ORDER — POTASSIUM CHLORIDE 1.5 G/1.58G
40 POWDER, FOR SOLUTION ORAL ONCE
Status: DISCONTINUED | OUTPATIENT
Start: 2023-08-24 | End: 2023-08-26 | Stop reason: HOSPADM

## 2023-08-24 RX ORDER — POTASSIUM CHLORIDE 750 MG/1
40 TABLET, FILM COATED, EXTENDED RELEASE ORAL ONCE
Status: COMPLETED | OUTPATIENT
Start: 2023-08-24 | End: 2023-08-24

## 2023-08-24 RX ADMIN — METOPROLOL TARTRATE 50 MG: 50 TABLET, FILM COATED ORAL at 08:27

## 2023-08-24 RX ADMIN — ENOXAPARIN SODIUM 135 MG: 150 INJECTION SUBCUTANEOUS at 05:53

## 2023-08-24 RX ADMIN — ASPIRIN 81 MG: 81 TABLET, COATED ORAL at 08:27

## 2023-08-24 RX ADMIN — AMLODIPINE BESYLATE 2.5 MG: 2.5 TABLET ORAL at 08:27

## 2023-08-24 RX ADMIN — ENOXAPARIN SODIUM 135 MG: 150 INJECTION SUBCUTANEOUS at 17:02

## 2023-08-24 RX ADMIN — SENNOSIDES AND DOCUSATE SODIUM 2 TABLET: 50; 8.6 TABLET ORAL at 08:27

## 2023-08-24 RX ADMIN — METOPROLOL TARTRATE 50 MG: 50 TABLET, FILM COATED ORAL at 20:58

## 2023-08-24 RX ADMIN — Medication 10 ML: at 08:28

## 2023-08-24 RX ADMIN — POTASSIUM CHLORIDE 20 MEQ: 750 TABLET, EXTENDED RELEASE ORAL at 08:26

## 2023-08-24 RX ADMIN — LISINOPRIL 40 MG: 20 TABLET ORAL at 08:27

## 2023-08-24 RX ADMIN — ALPRAZOLAM 0.5 MG: 0.5 TABLET ORAL at 11:44

## 2023-08-24 RX ADMIN — Medication 10 ML: at 20:58

## 2023-08-24 RX ADMIN — ATORVASTATIN CALCIUM 40 MG: 20 TABLET, FILM COATED ORAL at 08:27

## 2023-08-24 RX ADMIN — LISINOPRIL 40 MG: 20 TABLET ORAL at 20:58

## 2023-08-24 RX ADMIN — POTASSIUM CHLORIDE 40 MEQ: 750 TABLET, EXTENDED RELEASE ORAL at 10:09

## 2023-08-24 RX ADMIN — GADOBENATE DIMEGLUMINE 20 ML: 529 INJECTION, SOLUTION INTRAVENOUS at 13:15

## 2023-08-24 NOTE — PROGRESS NOTES
[facility]  Progress note: 2023    Patient Identification:  Name:Shashi Engel  Age:62 y.o.  Sex: male  :  1960  MRN: [id number]           CC:  Vital stable.    Interval history:  Significant diuresis again overnight.  No dyspnea or chest pain.  He complains of mild leg cramps.  Frequency of urination has decreased.    Vital Signs:   Temp:  [97.4 °F (36.3 °C)-98.7 °F (37.1 °C)] 97.4 °F (36.3 °C)  Heart Rate:  [63-73] 63  Resp:  [18-20] 18  BP: (114-142)/() 134/88    Intake/Output Summary (Last 24 hours) at 2023 1001  Last data filed at 2023 0902  Gross per 24 hour   Intake 240 ml   Output 2300 ml   Net -2060 ml       Physical Examination:    General Appearance No acute distress   Neck No adenopathy, supple, trachea midline, no thyromegaly, no carotid bruit, no JVD   Lungs Clear to auscultation,respirations regular, even and unlabored   Heart Regular rhythm and normal rate, normal S1 and S2, no murmur, no gallop, no rub, no click   Chest wall No abnormalities observed   Abdomen Normal bowel sounds, no masses, no hepatomegaly, soft   Extremities Moves all extremities well, no edema, no cyanosis, no redness   Neurological Alert and oriented x 3     Lab Review:  Personally reviewed the labs, radiology imaging and other cardiac procedures.   Results from last 7 days   Lab Units 23  0338   SODIUM mmol/L 141   POTASSIUM mmol/L 3.3*   CHLORIDE mmol/L 102   CO2 mmol/L 28.6   BUN mg/dL 16   CREATININE mg/dL 0.82   CALCIUM mg/dL 9.3   BILIRUBIN mg/dL 1.4*   ALK PHOS U/L 113   ALT (SGPT) U/L 33   AST (SGOT) U/L 23   GLUCOSE mg/dL 103*     Results from last 7 days   Lab Units 23  0835 23  0422 23  1107   HSTROP T ng/L 47* 48* 39*     Results from last 7 days   Lab Units 23  0422 23  0905   WBC 10*3/mm3 5.28 5.41   HEMOGLOBIN g/dL 12.1* 13.6   HEMATOCRIT % 38.4 41.8   PLATELETS 10*3/mm3 205 226         Medication Review:   Meds reviewed  Scheduled  Meds:ALPRAZolam, 0.5 mg, Oral, Once  amLODIPine, 2.5 mg, Oral, Daily  aspirin, 81 mg, Oral, Daily  atorvastatin, 40 mg, Oral, Daily  enoxaparin, 1 mg/kg, Subcutaneous, Q12H  insulin lispro, 2-9 Units, Subcutaneous, 4x Daily AC & at Bedtime  lisinopril, 40 mg, Oral, Q12H  metoprolol tartrate, 50 mg, Oral, Q12H  potassium chloride, 20 mEq, Oral, Daily  potassium chloride, 40 mEq, Oral, Once  senna-docusate sodium, 2 tablet, Oral, BID  sodium chloride, 10 mL, Intravenous, Q12H        I personally viewed and interpreted the patient's EKG/Telemetry data    Assessment and Plan  1.  Acute on chronic diastolic and now systolic heart failure.  Cardiac cath last year negative for significant obstructive disease and is no anginal symptoms.  Significant urine output without Bumex last night or this morning.  Plan to proceed with cardiac MRI today with Xanax for claustrophobia  2.  VT, as above.  EP consult appreciated.  Follow-up with them as an outpatient to consider possible upgrade to BiV device  3.  Atrial fibrillation, chronic. On Xarelto at home (last dose Sat 7 pm).  Holding Xarelto with probable need for RV biopsy in the near future.  Currently on Lovenox and Xarelto on hold.  4.  Hypertension with history of orthostatic hypotension., controlled to low at times  5.  VENESSA, on CPAP  6.  Chronic anticoagulation, as above  7.  S/p pacer placement MRI compatible  8.  History of mitral regurgitation.  This has intermittently been noted to be more significant.  Today's echo only showed mild to moderate regurgitation.  9.  RV dysfunction.  Again noted on echo today.  He is on CPAP.  10.  Pulmonary hypertension.  May be underestimated on today's echo given RV dysfunction.  Recommendations as above  11.  Elevated liver function test.  Improved       Mini Tong  8/24/202310:01 EDT  25min spent in reviewing records, discussion and examination of the patient and discussion with other members of the patient's medical team.     Dictated  utilizing Dragon dictation

## 2023-08-24 NOTE — PLAN OF CARE
Goal Outcome Evaluation:                      Patient resting at this time, denies chest pain nor discomfort, vital signs stable, paced on the monitor, pt NPO for cardiac MRI cont to monitor.

## 2023-08-24 NOTE — PLAN OF CARE
Goal Outcome Evaluation:         Pt alert and oriented no chest pain. Cardiac MRI done. Vitals stable no complaints at this time

## 2023-08-25 LAB
GLUCOSE BLDC GLUCOMTR-MCNC: 104 MG/DL (ref 70–130)
GLUCOSE BLDC GLUCOMTR-MCNC: 119 MG/DL (ref 70–130)
GLUCOSE BLDC GLUCOMTR-MCNC: 153 MG/DL (ref 70–130)
GLUCOSE BLDC GLUCOMTR-MCNC: 99 MG/DL (ref 70–130)

## 2023-08-25 PROCEDURE — 96372 THER/PROPH/DIAG INJ SC/IM: CPT

## 2023-08-25 PROCEDURE — 82948 REAGENT STRIP/BLOOD GLUCOSE: CPT

## 2023-08-25 PROCEDURE — 99232 SBSQ HOSP IP/OBS MODERATE 35: CPT | Performed by: INTERNAL MEDICINE

## 2023-08-25 PROCEDURE — G0378 HOSPITAL OBSERVATION PER HR: HCPCS

## 2023-08-25 PROCEDURE — 25010000002 ENOXAPARIN PER 10 MG: Performed by: STUDENT IN AN ORGANIZED HEALTH CARE EDUCATION/TRAINING PROGRAM

## 2023-08-25 RX ORDER — BUMETANIDE 2 MG/1
2 TABLET ORAL
Status: DISCONTINUED | OUTPATIENT
Start: 2023-08-25 | End: 2023-08-26 | Stop reason: HOSPADM

## 2023-08-25 RX ADMIN — Medication 10 ML: at 08:32

## 2023-08-25 RX ADMIN — AMLODIPINE BESYLATE 2.5 MG: 2.5 TABLET ORAL at 08:31

## 2023-08-25 RX ADMIN — METOPROLOL TARTRATE 50 MG: 50 TABLET, FILM COATED ORAL at 08:31

## 2023-08-25 RX ADMIN — ASPIRIN 81 MG: 81 TABLET, COATED ORAL at 08:31

## 2023-08-25 RX ADMIN — LISINOPRIL 40 MG: 20 TABLET ORAL at 20:40

## 2023-08-25 RX ADMIN — LISINOPRIL 40 MG: 20 TABLET ORAL at 08:31

## 2023-08-25 RX ADMIN — BUMETANIDE 2 MG: 2 TABLET ORAL at 14:07

## 2023-08-25 RX ADMIN — ATORVASTATIN CALCIUM 40 MG: 20 TABLET, FILM COATED ORAL at 08:31

## 2023-08-25 RX ADMIN — RIVAROXABAN 20 MG: 20 TABLET, FILM COATED ORAL at 17:46

## 2023-08-25 RX ADMIN — METOPROLOL TARTRATE 50 MG: 50 TABLET, FILM COATED ORAL at 20:40

## 2023-08-25 RX ADMIN — ENOXAPARIN SODIUM 135 MG: 150 INJECTION SUBCUTANEOUS at 05:15

## 2023-08-25 RX ADMIN — SENNOSIDES AND DOCUSATE SODIUM 2 TABLET: 50; 8.6 TABLET ORAL at 08:32

## 2023-08-25 RX ADMIN — POTASSIUM CHLORIDE 20 MEQ: 750 TABLET, EXTENDED RELEASE ORAL at 08:31

## 2023-08-25 NOTE — PROGRESS NOTES
[facility]  Progress note: 2023    Patient Identification:  Name:Shashi Engel  Age:62 y.o.  Sex: male  :  1960  MRN: [id number]           CC:  Congestive heart failure, cardiomyopathy, questionable infiltrative disease    Interval history:  Less fatigue less myalgias.  Dyspnea stable and none.    Vital Signs:   Temp:  [97.5 °F (36.4 °C)-98.1 °F (36.7 °C)] 97.8 °F (36.6 °C)  Heart Rate:  [60-70] 61  Resp:  [15-18] 18  BP: (123-150)/() 135/69    Intake/Output Summary (Last 24 hours) at 2023 1306  Last data filed at 2023 0856  Gross per 24 hour   Intake 720 ml   Output --   Net 720 ml       Physical Examination:    General Appearance No acute distress   Neck No adenopathy, supple, trachea midline, no thyromegaly, no carotid bruit, no JVD   Lungs Few bibasilar rales,respirations regular, even and unlabored   Heart Regular rhythm and normal rate, normal S1 and S2, no murmur, no gallop, no rub, no click   Chest wall No abnormalities observed   Abdomen Normal bowel sounds, no masses, no hepatomegaly, soft   Extremities Moves all extremities well, no edema, no cyanosis, no redness   Neurological Alert and oriented x 3     Lab Review:  Personally reviewed the labs, radiology imaging and other cardiac procedures.   Results from last 7 days   Lab Units 23  0338   SODIUM mmol/L 141   POTASSIUM mmol/L 3.3*   CHLORIDE mmol/L 102   CO2 mmol/L 28.6   BUN mg/dL 16   CREATININE mg/dL 0.82   CALCIUM mg/dL 9.3   BILIRUBIN mg/dL 1.4*   ALK PHOS U/L 113   ALT (SGPT) U/L 33   AST (SGOT) U/L 23   GLUCOSE mg/dL 103*     Results from last 7 days   Lab Units 23  0835 23  0422 23  1107   HSTROP T ng/L 47* 48* 39*     Results from last 7 days   Lab Units 23  0422 23  0905   WBC 10*3/mm3 5.28 5.41   HEMOGLOBIN g/dL 12.1* 13.6   HEMATOCRIT % 38.4 41.8   PLATELETS 10*3/mm3 205 226         Medication Review:   Meds reviewed  Scheduled Meds:amLODIPine, 2.5 mg, Oral,  Daily  atorvastatin, 40 mg, Oral, Daily  bumetanide, 2 mg, Oral, BID  insulin lispro, 2-9 Units, Subcutaneous, 4x Daily AC & at Bedtime  lisinopril, 40 mg, Oral, Q12H  metoprolol tartrate, 50 mg, Oral, Q12H  potassium chloride, 20 mEq, Oral, Daily  potassium chloride, 40 mEq, Oral, Once  rivaroxaban, 20 mg, Oral, Daily With Dinner  senna-docusate sodium, 2 tablet, Oral, BID  sodium chloride, 10 mL, Intravenous, Q12H        I personally viewed and interpreted the patient's EKG/Telemetry data    Assessment and Plan    1.  Acute on chronic diastolic and now systolic heart failure.  Cardiac cath last year negative for significant obstructive disease and is no anginal symptoms.  Clinically improved and slightly more wet than yesterday with few rales on exam.  Will resume Bumex at home dose of 2 mg twice daily.  He had had significant diuresis with this 2 days ago after some more significant diuresis with IV Bumex.  May need to titrate further as an outpatient.  Ejection fraction has improved on the MRI to 48%.  Await EP thoughts regarding BiV pacing but likely will not be a candidate for this.  Of note is that there is some delayed enhancement suggestive of possible infiltrative cardiomyopathy.  Clinically is better.  We will plan on likely discharging him tomorrow and follow-up with Dr. Vanegas to consider possible benefit of RV biopsy. thoughts pending  2.  VT, as above.  EP consult appreciated and additional thoughts pending.  3.  Atrial fibrillation, chronic.  Discontinue Lovenox and resume Xarelto  4.  Hypertension with history of orthostatic hypotension.  Controlled  5.  VENESSA, on CPAP  6.  Chronic anticoagulation, as above  7.  S/p pacer placement MRI compatible  8.  History of mitral regurgitation.  This has intermittently been noted to be more significant.  Today's echo only showed mild to moderate regurgitation.  9.  RV dysfunction.  Again noted on echo today.  He is on CPAP.  10.  Pulmonary hypertension.  May  be underestimated on today's echo given RV dysfunction.  Recommendations as above  11.  Elevated liver function test.  Improved       Mini Tong  8/25/202313:06 EDT  25min spent in reviewing records, discussion and examination of the patient and discussion with other members of the patient's medical team.     Dictated utilizing Dragon dictation

## 2023-08-25 NOTE — PLAN OF CARE
Goal Outcome Evaluation:                 Patient denies pain nor discomfort, pt on room air, paced on the monitor, cardiac MRI done,   Up ad denisa,  educate to call for assist , call light in reach cont to monitor

## 2023-08-26 ENCOUNTER — READMISSION MANAGEMENT (OUTPATIENT)
Dept: CALL CENTER | Facility: HOSPITAL | Age: 63
End: 2023-08-26
Payer: MEDICARE

## 2023-08-26 VITALS
TEMPERATURE: 98.6 F | RESPIRATION RATE: 18 BRPM | HEIGHT: 74 IN | WEIGHT: 283.07 LBS | BODY MASS INDEX: 36.33 KG/M2 | HEART RATE: 62 BPM | DIASTOLIC BLOOD PRESSURE: 89 MMHG | SYSTOLIC BLOOD PRESSURE: 126 MMHG | OXYGEN SATURATION: 97 %

## 2023-08-26 LAB
ANION GAP SERPL CALCULATED.3IONS-SCNC: 11.6 MMOL/L (ref 5–15)
BUN SERPL-MCNC: 17 MG/DL (ref 8–23)
BUN/CREAT SERPL: 21 (ref 7–25)
CALCIUM SPEC-SCNC: 9 MG/DL (ref 8.6–10.5)
CHLORIDE SERPL-SCNC: 104 MMOL/L (ref 98–107)
CO2 SERPL-SCNC: 27.4 MMOL/L (ref 22–29)
CREAT SERPL-MCNC: 0.81 MG/DL (ref 0.76–1.27)
EGFRCR SERPLBLD CKD-EPI 2021: 100.3 ML/MIN/1.73
GLUCOSE BLDC GLUCOMTR-MCNC: 137 MG/DL (ref 70–130)
GLUCOSE BLDC GLUCOMTR-MCNC: 93 MG/DL (ref 70–130)
GLUCOSE SERPL-MCNC: 113 MG/DL (ref 65–99)
POTASSIUM SERPL-SCNC: 3.8 MMOL/L (ref 3.5–5.2)
SODIUM SERPL-SCNC: 143 MMOL/L (ref 136–145)

## 2023-08-26 PROCEDURE — G0378 HOSPITAL OBSERVATION PER HR: HCPCS

## 2023-08-26 PROCEDURE — 99239 HOSP IP/OBS DSCHRG MGMT >30: CPT | Performed by: NURSE PRACTITIONER

## 2023-08-26 PROCEDURE — 80048 BASIC METABOLIC PNL TOTAL CA: CPT | Performed by: INTERNAL MEDICINE

## 2023-08-26 PROCEDURE — 82948 REAGENT STRIP/BLOOD GLUCOSE: CPT

## 2023-08-26 RX ADMIN — Medication 10 ML: at 08:38

## 2023-08-26 RX ADMIN — SENNOSIDES AND DOCUSATE SODIUM 2 TABLET: 50; 8.6 TABLET ORAL at 08:37

## 2023-08-26 RX ADMIN — LISINOPRIL 40 MG: 20 TABLET ORAL at 08:37

## 2023-08-26 RX ADMIN — ATORVASTATIN CALCIUM 40 MG: 20 TABLET, FILM COATED ORAL at 08:37

## 2023-08-26 RX ADMIN — AMLODIPINE BESYLATE 2.5 MG: 2.5 TABLET ORAL at 08:37

## 2023-08-26 RX ADMIN — POTASSIUM CHLORIDE 20 MEQ: 750 TABLET, EXTENDED RELEASE ORAL at 08:37

## 2023-08-26 RX ADMIN — METOPROLOL TARTRATE 50 MG: 50 TABLET, FILM COATED ORAL at 08:37

## 2023-08-26 RX ADMIN — BUMETANIDE 2 MG: 2 TABLET ORAL at 08:37

## 2023-08-26 NOTE — DISCHARGE SUMMARY
HOSPITAL DISCHARGE SUMMARY    Patient Name: Shashi Engel  Age/Sex: 62 y.o. male  : 1960  MRN: [id number]    Encounter Provider: DENNIS Navarrete  Referring Provider: Byron Jain MD  Place of Service: [facility] CARDIOLOGY  Patient Care Team:  Keesha Kirkpatrick MD as PCP - General (Family Medicine)  Keesha Kirkpatrick MD as PCP - Family Medicine  Jennie Vanegas MD as Consulting Physician (Cardiology)         Date of Discharge:  2023     Date of Admit: 2023      Discharge Diagnosis:     Exertional dyspnea    Acute on chronic heart failure with reduced ejection fraction and diastolic dysfunction    Acute on chronic combined systolic (congestive) and diastolic (congestive) heart failure  1.  Chronic diastolic heart failure: TTE  shows LVEF 31%, however cardiac MRI  shows LVEF 48%.  (TTE 10/2022 : LVEF 50%) Diuresed well on home dose Bumex 2 mg twice daily which was resumed yesterday. Mild to moderate MR.   2.  Nonischemic cardiomyopathy: Left heart cath  nonobstructive coronaries. Poss lower LVEF on TTE due to abnormal septal wall motion 2/2 RV pacing  3.  Possible infiltrative cardiomyopathy: Secondary to some delayed enhancement on cardiac MRI.  Follow-up with Dr. Vanegas.   4.  Chronic atrial fibrillation: Anticoagulation with Xarelto  5.  Hypertension: well controlled  6.  Obstructive sleep apnea: CPAP compliant  7.  Status post pacemaker placement: MRI compatible  8.  Nonrheumatic mitral regurgitation: Mild to moderate  9 . Pulmonary hypertension: RVSP 54 mmHg .  Mild TR.        Hospital Course:   Mr. Engel is a 62-year-old patient who follows with Dr. Vanegas in the office.  He has a past medical history of recurrent syncope, SVT, ventricular tachycardia, chronic atrial fibrillation, CHB s/p pacemaker (2016), diabetes mellitus, hypertension,  hyperlipidemia and obstructive sleep apnea on CPAP.  He presented to the emergency department on 8/20/2023 with worsening shortness of breath and concerns of recent tachycardia.  CTA of the chest was negative for PE, pleural effusion congestive failure or pneumonia.  Initial troponin was 44.  EKG initially showed V paced rhythm, then junctional.  Pacemaker was interrogated and showed an episode of tachycardia up to 171 bpm approximately 2 days ago.  Patient is on Xarelto for atrial fibrillation.  He had not had his nighttime dose of metoprolol the night before.    Echocardiogram showed LVEF 31%, previously 50% on RAKESH on 10/2022.  GLS -10.1%.  Left ventricular cavity moderately dilated.  Findings were consistent with infiltrative cardiomyopathy.  Right ventricular cavity was moderately dilated with severely reduced right ventricular systolic function.  There was an electronic lead present in the right ventricle and right atrium.  He further went on to have a cardiac MRI which showed ejection fraction which similar to the previous echocardiogram with an EF of 48%.  There was normal myocardial perfusion and normal right ventricular size with some mild hypokinesis of the right ventricle.  Of note  previous cardiac cath I 03/2022 showed normal coronaries and mild pulmonary hypertension.      EP was consulted and saw patient.  Patient RV paces about 85% of the time which is unchanged going back to 2020.  It was recommended that he follow-up in the EP clinic in 3 to 4 weeks and an echo repeated at that time.      Patient is doing well this morning.  He states that every few weeks he will have an episode where he feels fatigued and some shortness of breath.  This has been ongoing for quite some time.  He is stable to discharge from a cardiac standpoint and follow-up with Dr. Vanegas in the next week or 2.  We will discharge him on his previous home medications.      Physical Exam:   General Appearance:    Alert,  "cooperative, in no acute distress   Head:    Normocephalic, without obvious abnormality, atraumatic   Eyes:            Conjunctivae and sclerae normal, no   icterus, no pallor, corneas clear, PERRLA   Neck:   No adenopathy, supple, trachea midline, no thyromegaly, no   carotid bruit, no JVD   Lungs:     Clear to auscultation,respirations regular, even and unlabored    Heart:    Irregular rhythm/rate, normal S1 and S2, no murmur, no gallop, no rub, no click   Chest Wall:    No abnormalities observed   Abdomen:     Normal bowel sounds, no masses, no organomegaly, soft, nontender, nondistended, no guarding, no rebound  tenderness   Extremities:   Moves all extremities well, + carlene LE edema, wearing compression socks, no cyanosis, no redness   Pulses:   Pulses palpable and equal bilaterally.      /89 (BP Location: Left arm, Patient Position: Sitting)   Pulse 62   Temp 98.6 °F (37 °C) (Oral)   Resp 18   Ht 188 cm (74\")   Wt 128 kg (283 lb 1.1 oz)   SpO2 97%   BMI 36.34 kg/m²      Pertinent Test Results:    Results from last 7 days   Lab Units 08/26/23  0315   SODIUM mmol/L 143   POTASSIUM mmol/L 3.8   CHLORIDE mmol/L 104   CO2 mmol/L 27.4   BUN mg/dL 17   CREATININE mg/dL 0.81   GLUCOSE mg/dL 113*   CALCIUM mg/dL 9.0     Results from last 7 days   Lab Units 08/21/23  0835 08/21/23  0422 08/20/23  1107 08/20/23  0905   HSTROP T ng/L 47* 48* 39* 44*     Results from last 7 days   Lab Units 08/21/23  0422   WBC 10*3/mm3 5.28   HEMOGLOBIN g/dL 12.1*   HEMATOCRIT % 38.4   PLATELETS 10*3/mm3 205         Results from last 7 days   Lab Units 08/22/23  0342   MAGNESIUM mg/dL 2.0     Results from last 7 days   Lab Units 08/20/23  1107   CHOLESTEROL mg/dL 159   TRIGLYCERIDES mg/dL 60   HDL CHOL mg/dL 53   LDL CHOL mg/dL 94     Results from last 7 days   Lab Units 08/20/23  0905   PROBNP pg/mL 464.0               Discharge Medications     Discharge Medications        Continue These Medications        Instructions Start " Date   [employer] Lancets Original misc   Use as directed and appropo lancet for his monitor             ASK your doctor about these medications        Instructions Start Date   amLODIPine 5 MG tablet  Commonly known as: NORVASC   TAKE 1 TABLET BY MOUTH EVERY DAY      atorvastatin 40 MG tablet  Commonly known as: LIPITOR   TAKE 1 TABLET BY MOUTH EVERY DAY      bumetanide 2 MG tablet  Commonly known as: BUMEX   2 mg, Oral, 2 Times Daily      cholecalciferol 25 MCG (1000 UT) tablet  Commonly known as: VITAMIN D3   1,000 Units, Oral, Daily      glucose blood test strip   He needs [employer] brand TrueTrack with lancets strips. Test daily.      hydrOXYzine 10 MG tablet  Commonly known as: ATARAX   10 mg, Oral, 2 Times Daily PRN      metFORMIN 1000 MG tablet  Commonly known as: GLUCOPHAGE   1,000 mg, Oral, 2 Times Daily With Meals      metoprolol tartrate 50 MG tablet  Commonly known as: LOPRESSOR   50 mg, Oral, 2 Times Daily      potassium chloride 10 MEQ CR tablet   TAKE 1 TABLET BY MOUTH EVERY DAY      quinapril 40 MG tablet  Commonly known as: ACCUPRIL   80 mg, Oral, Daily      tadalafil 20 MG tablet  Commonly known as: CIALIS   20 mg, Oral, Daily PRN      vitamin B-12 1000 MCG tablet  Commonly known as: CYANOCOBALAMIN   1,000 mcg, Oral, Daily      Xarelto 20 MG tablet  Generic drug: rivaroxaban   TAKE 1 TABLET BY MOUTH EVERY DAY WITH DINNER                     Discharge disposition:   Home    Follow-up Appointments  Future Appointments   Date Time Provider Department Center   9/12/2023 10:30 AM Emiliano Brady MD [facility] CD [facility]   9/26/2023 10:00 AM [facility]DA [facility] ECHO/VAS [facility] ECHO [facility]   10/11/2023  9:00 AM Tamara Ibarra APRN [facility]      Follow-up Information       [facility] .    Specialty: Cardiology  Contact information:  [facility] [place] [address] [address] [phone]                                DENNIS Navarrete  08/26/23  15:27  EDT  50 min

## 2023-08-27 NOTE — OUTREACH NOTE
Prep Survey      Flowsheet Row Responses   [facility] patient discharged from? [place]   Is LACE score < 7 ? No   Eligibility [facility]   Date of Admission 08/20/23   Date of Discharge 08/26/23   Discharge Disposition Home or Self Care   Discharge diagnosis Exertional dyspnea, Acute on chronic heart failure   Does the patient have one of the following disease processes/diagnoses(primary or secondary)? CHF   Does the patient have Home health ordered? No   Is there a DME ordered? No   Prep survey completed? Yes            Silvia NAPOLES - Registered Nurse

## 2023-08-28 ENCOUNTER — TRANSITIONAL CARE MANAGEMENT TELEPHONE ENCOUNTER (OUTPATIENT)
Dept: CALL CENTER | Facility: HOSPITAL | Age: 63
End: 2023-08-28
Payer: MEDICARE

## 2023-08-28 NOTE — OUTREACH NOTE
Call Center TCM Note      Flowsheet Row Responses   [facility] patient discharged from? [place]   Does the patient have one of the following disease processes/diagnoses(primary or secondary)? CHF   TCM attempt successful? Yes   Call start time 1139   Call end time 1142   Discharge diagnosis Exertional dyspnea, Acute on chronic heart failure   Meds reviewed with patient/caregiver? Yes   Does the patient have all medications ordered at discharge? N/A   Is the patient taking all medications as directed (includes completed medication regime)? Yes   Does the patient have an appointment with their PCP within 7-14 days of discharge? No appointments available   Nursing Interventions Routed TCM call to PCP office   Has home health visited the patient within 72 hours of discharge? N/A   Psychosocial issues? No   Did the patient receive a copy of their discharge instructions? Yes   Nursing interventions Reviewed instructions with patient   What is the patient's perception of their health status since discharge? Improving   If the patient is a current smoker, are they able to teach back resources for cessation? Not a smoker   Is the patient/caregiver able to teach back the hierarchy of who to call/visit for symptoms/problems? PCP, Specialist, Home health nurse, Urgent Care, ED, 911 Yes   TCM call completed? Yes   Wrap up additional comments D/C DX: Exertional dyspnea, Acute on chronic heart failure - Pt feeing better, no questions at this time. CARDIO MD follow up is 09/12/2023. PCP Dr Kirkpatrick has no available times I could access to sched TCM APPT by 09/09/2023.   Call end time 1142             Emily Blake MA    8/28/2023, 11:44 EDT

## 2023-09-02 DIAGNOSIS — I10 BENIGN ESSENTIAL HTN: ICD-10-CM

## 2023-09-02 DIAGNOSIS — E11.9 DIABETES MELLITUS TYPE 2, NONINSULIN DEPENDENT: ICD-10-CM

## 2023-09-05 RX ORDER — QUINAPRIL 40 MG/1
80 TABLET ORAL DAILY
Qty: 180 TABLET | Refills: 1 | Status: SHIPPED | OUTPATIENT
Start: 2023-09-05

## 2023-09-06 ENCOUNTER — HOSPITAL ENCOUNTER (OUTPATIENT)
Dept: CARDIOLOGY | Facility: HOSPITAL | Age: 63
Discharge: HOME | End: 2023-09-06
Payer: MEDICARE

## 2023-09-06 VITALS
DIASTOLIC BLOOD PRESSURE: 90 MMHG | WEIGHT: 287.4 LBS | BODY MASS INDEX: 36.88 KG/M2 | SYSTOLIC BLOOD PRESSURE: 153 MMHG | HEART RATE: 63 BPM | OXYGEN SATURATION: 94 % | HEIGHT: 74 IN

## 2023-09-06 VITALS
OXYGEN SATURATION: 94 % | HEIGHT: 74 IN | WEIGHT: 287.4 LBS | HEART RATE: 63 BPM | BODY MASS INDEX: 36.88 KG/M2 | SYSTOLIC BLOOD PRESSURE: 153 MMHG | DIASTOLIC BLOOD PRESSURE: 90 MMHG

## 2023-09-06 DIAGNOSIS — I50.812 CHRONIC RIGHT-SIDED HEART FAILURE: ICD-10-CM

## 2023-09-06 DIAGNOSIS — I42.8 NONISCHEMIC CARDIOMYOPATHY: ICD-10-CM

## 2023-09-06 DIAGNOSIS — G47.33 OSA (OBSTRUCTIVE SLEEP APNEA): ICD-10-CM

## 2023-09-06 DIAGNOSIS — I42.8 NONISCHEMIC CARDIOMYOPATHY: Primary | ICD-10-CM

## 2023-09-06 DIAGNOSIS — I27.20 PULMONARY HYPERTENSION: Primary | ICD-10-CM

## 2023-09-06 DIAGNOSIS — Z95.0 S/P PLACEMENT OF CARDIAC PACEMAKER: ICD-10-CM

## 2023-09-06 PROCEDURE — 94618 PULMONARY STRESS TESTING: CPT

## 2023-09-06 RX ORDER — METOPROLOL SUCCINATE 100 MG/1
100 TABLET, EXTENDED RELEASE ORAL DAILY
Qty: 30 TABLET | Refills: 3 | Status: SHIPPED | OUTPATIENT
Start: 2023-09-06

## 2023-09-06 NOTE — ADDENDUM NOTE
Encounter addended by: Stanley Mccormack RN on: 9/6/2023 2:51 PM   Actions taken: Flowsheet accepted

## 2023-09-06 NOTE — PROGRESS NOTES
"[facility] Failure [facility]      Patient Name: Shashi Engel  :1960  Age: 62 y.o.  Sex: male  Referring Provider: Keya Tong MD   Primary Cardiologist: Dr. Vanegas  Encounter Provider: Lena Mortensen, Pharmacy Intern      Chief Complaint:   Chief Complaint   Patient presents with    Congestive Heart Failure       HPI:  Patient presents for their initial visit. 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 Patient presented to the ER with SOB on 23 where he was diuresed with IV Bumex then transitioned to oral. He was discharged on previous home medications. His PYP in 2022 was equivocal, but his MRI in 2023 was consistent with an infiltrative disease. Today, he is feeling well, but he does have some SOB sometimes and gets fatigued easily.          Current Regimen:  Heart Failure  Bumex 2 mg BID  Metoprolol Tartrate 50 mg BID  Quinapril 40 mg (2 tablets) daily      Other CV Meds  Xarelto 20 mg daily  Atorvastatin 40 mg daily  Amlodipine 5 mg daily    Medication Use:  Adherence: Good. Missed 1-2 doses in the last week. Adherence tools used include: pillbox. Barriers for adherence include: none  Past hx of medication use/intolerance: spironolactone (changed to chlorthalidone), furosemide (changed to Bumex)  Affordability: Traditional Medicare      Social History:  Tobacco use: former smoker (cigars)  EtOH use: none  Illicit drug use: previously used marijuana  Exercise: has not been as active recently      Immunization Status:  Pneumococcal: due for PCV 20; had PPSV23 on 2/15/2007  Influenza: due this season  COVID-19: last dose on 2021      OBJECTIVE:    /90 (BP Location: Left arm, Patient Position: Sitting, Cuff Size: Adult)   Pulse 63   Ht 188 cm (74.02\")   Wt 130 kg (287 lb 6.4 oz)   SpO2 94%   BMI 36.88 kg/m²     Body mass index is 36.88 kg/m².  Wt Readings from Last 1 Encounters:   23 130 kg (287 lb " 6.4 oz)         Home BP Readings: He has a cuff at home     Lab Review:  Renal Function: Estimated Creatinine Clearance: 136.8 mL/min (by C-G formula based on SCr of 0.81 mg/dL).    Lab Results   Component Value Date    PROBNP 464.0 08/20/2023       Results for orders placed during the hospital encounter of 08/20/23    Adult Transthoracic Echo Complete W/ Cont if Necessary Per Protocol    Interpretation Summary    Left ventricular systolic function is moderately decreased. Calculated left ventricular EF = 31.3% Septal wall motion is abnormal, consistent with right ventricular pacing. Abnormal global longitudinal LV strain (GLS) = -10.1%. Left ventricle strain data was reviewed by the physician and found to be accurate. Apical sparing is not evident. This suggest that cardiomyopathy may not be due to amyloid heart disease though it does not rule out other infiltrative diseases. The left ventricular cavity is moderately dilated. Left ventricular wall thickness is consistent with moderate concentric hypertrophy. There is left ventricular global hypokinesis noted. The findings are consistent with infiltrative cardiomyopathy. Left ventricular diastolic function was indeterminate. Elevated left atrial pressure.    : The right ventricular cavity is moderately dilated. Severely reduced right ventricular systolic function noted. Electronic lead present in the ventricle.    Left atrial volume is severely increased.    : The right atrial cavity is severely dilated.  An electronic lead is present in the right atrium.    The aortic valve is abnormal in structure. There is moderate calcification of the aortic valve mainly affecting the non-coronary and left coronary cusp(s).    Mild mitral valve regurgitation is present.    Mild tricuspid valve regurgitation is present. Estimated right ventricular systolic pressure from tricuspid regurgitation is moderately elevated (45-55 mmHg). Calculated right ventricular systolic pressure  from tricuspid regurgitation is 54 mmHg.    Compared to prior study of 9/20/2022 left ventricular ejection fraction has decreased.  Much regurgitation has improved.  RVSP is lower but assessment of pulmonary hypertension is confounded by RV dysfunction which is severe.          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 His blood pressure is high today, but patient states is is much lower when he checks at home. He said that this morning his systolic was 119. I instructed him to bring his blood pressure monitor at his next visit to decide if his cuff is not working properly or if he is experiencing white coat syndrome. Will continue to optimize GDMT.   HF Beta-blocker: Change metoprolol tartrate 50 BID to metoprolol succinate 100 mg daily.  Goal HR 50s-60s. HR at goal.   ACEi/ARB/ARNI: Continue  Quinapril  40 mg (2 tablets) daily. Would look to transition to Entresto 49/51 mg BID at a future visit. Copay was $87 when a test claim was run. Also have a one month free trial to use if prescribed in the future.   Diuretics: Continue bumetanide 2 mg BID.  SGLT2i:  can consider  Farxiga 10 mg daily. Reviewed MOA/dosing/side effects. Ran a test claim and the copay will be $47/month. Also have a one month free trial to use if prescribed in the future.   MRA:  can consider  spironolactone 25 mg daily at a future visit.  Ivabradine: Not indicated.  Vericiguat: Not indicated.  Hydralazine/ISDN: Not indicated.   Advised patient to call clinic with 2-3 lb weight gain in 24 hrs or >5 lb weight gain in 1 week  Reviewed diet recommendations including limiting sodium intake to <2000 mg/day. Discussed reading nutrition labels and reviewed the [place].  Discussed exercise recommendations including 150 minutes of moderate exercise per week      Thank you for allowing me to participate in the care of your patient,         Lena  Balbina, Pharmacy Intern  [facility]  09/06/23  09:37 EDT

## 2023-09-06 NOTE — ADDENDUM NOTE
Encounter addended by: Yvette Lopez MA on: 9/6/2023 3:51 PM   Actions taken: Charge Capture section accepted

## 2023-09-06 NOTE — PROGRESS NOTES
Heart Failure & Pulmonary Arterial Hypertension Clinic  [facility]  Trung Cervantes M.D., Ph.D., [facility]       Keya Tong MD  [address]  [facility] [address]  [place] [address]    Thank you for asking me to see Shashi Engel.     History of Present Illness  This is a 62 y.o. male with:    1. PAH  A. RHC  Right Atrium: */12/8 mmHg  Right Ventricle: 41/10/10 mmHg  Pulmonary Artery: 41/18/26 mmHg  Pulmonary Wedge: N/A not good wedge tracing  Left Ventricle: 118/8/12 mmHg  Aorta: 121/71/94 mmHg  Cardiac Output/Index: 6.2 L/min 2.32 L/min/m2  PA Sat: 69 %  AO Sat: 93 %  Hb: 15.0  2. NICM  3. RHF      Shashi Engel is a 62 y.o. male who presents today.  The patient is typically seen by Keesha Kirkpatrick MD.  The patient's cardiologist is Dr. Vanegas.    The patient presents today for further evaluation and management of pulmonary arterial hypertension, NICM, and RHF.  Of note, the patient was recently admitted with ADHF.  2D TTE revealed depressed LVEF. CMRI with LGE.  Patient's PYP scan in 2022 was grade 2.  Current regimen includes Bumex at 2 mg 1 tablet p.o. twice daily, Lopressor 50 mg 1 tablet p.o. twice daily, Accupril 80 mg daily.    He continues to report intermittent exercise intolerance.  He does have a history of atrial fibrillation.  He has a history of SSS with PPM implant.  He does have a significant family history of atrial fibrillation, pacemakers, coronary disease, and heart failure.  No orthopnea, PND, lower extremity edema, ascites, early abdominal satiety.          Review of Systems - Review of Systems   Cardiovascular:  Positive for dyspnea on exertion. Negative for chest pain, claudication and cyanosis.   Respiratory:  Positive for shortness of breath. Negative for cough and hemoptysis.    All other systems reviewed and are negative.      All other systems were reviewed and were negative.    Patient Active Problem List   Diagnosis    PAF (paroxysmal atrial fibrillation)     Essential hypertension    ED (erectile dysfunction) of organic origin    HLD (hyperlipidemia)    Hypertrophic polyarthritis    Diabetes mellitus type 2, noninsulin dependent    LVH (left ventricular hypertrophy) due to hypertensive disease    SSS (sick sinus syndrome)    Sleep related hypoxia    Class 2 severe obesity due to excess calories with serious comorbidity and body mass index (BMI) of 38.0 to 38.9 in adult    VENESSA (obstructive sleep apnea)    Nonsustained ventricular tachycardia    S/P placement of cardiac pacemaker    Pulmonary hypertension    Other proteinuria    Nonrheumatic tricuspid valve regurgitation    Nonrheumatic mitral valve regurgitation    Exertional dyspnea    Acute on chronic heart failure with reduced ejection fraction and diastolic dysfunction    Acute on chronic combined systolic (congestive) and diastolic (congestive) heart failure    Nonischemic cardiomyopathy    Chronic right-sided heart failure       family history includes Atrial fibrillation in his sister; Depression in his mother; Heart disease in his mother, sister, and sister; Hypertension in his brother, mother, sister, and sister; Kidney disease in an other family member; Sleep apnea in an other family member.     reports that he has quit smoking. His smoking use included cigars. He has been exposed to tobacco smoke. He has never used smokeless tobacco. He reports that he does not currently use drugs after having used the following drugs: Marijuana. He reports that he does not drink alcohol.    No Known Allergies    Social Determinants of Health     Tobacco Use: Medium Risk    Smoking Tobacco Use: Former    Smokeless Tobacco Use: Never    Passive Exposure: Past   Alcohol Use: Not At Risk    Frequency of Alcohol Consumption: Never    Average Number of Drinks: Patient does not drink    Frequency of Binge Drinking: Never   Financial Resource Strain: Not on file   Food Insecurity: No Food Insecurity    Worried About Running Out of  Food in the Last Year: Never true    Ran Out of Food in the Last Year: Never true   Transportation Needs: Not on file   Physical Activity: Not on file   Stress: Not on file   Social Connections: Not on file   Intimate Partner Violence: Not on file   Depression: Not at risk    PHQ-2 Score: 0   Housing Stability: Not on file        Physical Activity: Not on file          Current Outpatient Medications:     amLODIPine (NORVASC) 5 MG tablet, TAKE 1 TABLET BY MOUTH EVERY DAY, Disp: 15 tablet, Rfl: 0    atorvastatin (LIPITOR) 40 MG tablet, TAKE 1 TABLET BY MOUTH EVERY DAY, Disp: 90 tablet, Rfl: 3    bumetanide (BUMEX) 2 MG tablet, Take 1 tablet by mouth 2 (Two) Times a Day., Disp: 60 tablet, Rfl: 11    cholecalciferol (VITAMIN D3) 25 MCG (1000 UT) tablet, Take 1 tablet by mouth Daily., Disp: , Rfl:     [facility] Lanc[facility] [place], Use as directed and appropo lancet for his monitor, Disp: 100 each, Rfl: 11    metFORMIN (GLUCOPHAGE) 1000 MG tablet, TAKE 1 TABLET BY MOUTH 2 (TWO) TIMES A DAY WITH MEALS. INDICATIONS: TYPE 2 DIABETES, NOTED, Disp: 30 tablet, Rfl: 0    metoprolol succinate XL (Toprol XL) 100 MG 24 hr tablet, Take 1 tablet by mouth Daily., Disp: 30 tablet, Rfl: 3    potassium chloride 10 MEQ CR tablet, TAKE 1 TABLET BY MOUTH EVERY DAY, Disp: 90 tablet, Rfl: 3    quinapril (ACCUPRIL) 40 MG tablet, Take 2 tablets by mouth Daily., Disp: 180 tablet, Rfl: 1    tadalafil (CIALIS) 20 MG tablet, Take 1 tablet by mouth Daily As Needed for Erectile Dysfunction., Disp: 10 tablet, Rfl: 1    vitamin B-12 (CYANOCOBALAMIN) 1000 MCG tablet, Take 1 tablet by mouth Daily., Disp: , Rfl:     Xarelto 20 MG tablet, TAKE 1 TABLET BY MOUTH EVERY DAY WITH DINNER, Disp: 90 tablet, Rfl: 2    Vital Sign Review:     Vitals:    09/06/23 0951   BP: 153/90   Pulse: 63   SpO2: 94%     PP:high  Pulse Ox: normal oxygenation on room air    Body mass index is 36.88 kg/m².      09/06/23 0951   Weight: 130 kg (287 lb 6.4 oz)       Physical  Exam:    Vitals reviewed.   Constitutional:       General: Not in acute distress.     Appearance: Healthy appearance. Not in distress. Obese. Not ill-appearing, toxic-appearing or diaphoretic.      Interventions: Not intubated.  Eyes:      Conjunctiva/sclera: Conjunctivae normal.      Pupils: Pupils are equal, round, and reactive to light.   Neck:      Vascular: No hepatojugular reflux, JVD or JVR. JVD normal with 7 cm of water.   Pulmonary:      Effort: Pulmonary effort is normal. No tachypnea, bradypnea, accessory muscle usage, prolonged expiration, respiratory distress or retractions. Not intubated.      Breath sounds: Normal breath sounds and air entry. No stridor, decreased air movement or transmitted upper airway sounds. No decreased breath sounds. No wheezing. No rhonchi. No rales.   Cardiovascular:      PMI at left midclavicular line. Normal rate. Regular rhythm. S1 with normal intensity. loud S2.       Murmurs: There is no murmur.      No gallop.  No click. No rub.   Neurological:      General: No focal deficit present.      Mental Status: Alert and oriented to person, place and time.          DATA REVIEWED:     EKG:  I personally reviewed and interpreted the EKG.        Atrial fibrillation ventricular pacing    ---------------------------------------------------  TTE/RAKESH:    Results for orders placed during the hospital encounter of 08/20/23    Adult Transthoracic Echo Complete W/ Cont if Necessary Per Protocol    Interpretation Summary    Left ventricular systolic function is moderately decreased. Calculated left ventricular EF = 31.3% Septal wall motion is abnormal, consistent with right ventricular pacing. Abnormal global longitudinal LV strain (GLS) = -10.1%. Left ventricle strain data was reviewed by the physician and found to be accurate. Apical sparing is not evident. This suggest that cardiomyopathy may not be due to amyloid heart disease though it does not rule out other infiltrative diseases. The  left ventricular cavity is moderately dilated. Left ventricular wall thickness is consistent with moderate concentric hypertrophy. There is left ventricular global hypokinesis noted. The findings are consistent with infiltrative cardiomyopathy. Left ventricular diastolic function was indeterminate. Elevated left atrial pressure.    : The right ventricular cavity is moderately dilated. Severely reduced right ventricular systolic function noted. Electronic lead present in the ventricle.    Left atrial volume is severely increased.    : The right atrial cavity is severely dilated.  An electronic lead is present in the right atrium.    The aortic valve is abnormal in structure. There is moderate calcification of the aortic valve mainly affecting the non-coronary and left coronary cusp(s).    Mild mitral valve regurgitation is present.    Mild tricuspid valve regurgitation is present. Estimated right ventricular systolic pressure from tricuspid regurgitation is moderately elevated (45-55 mmHg). Calculated right ventricular systolic pressure from tricuspid regurgitation is 54 mmHg.    Compared to prior study of 9/20/2022 left ventricular ejection fraction has decreased.  Much regurgitation has improved.  RVSP is lower but assessment of pulmonary hypertension is confounded by RV dysfunction which is severe.      My interpretation of the TTE is below.     LVEF calculates at 30%.  Moderate global hypokinesis.  Right ventricle is dilated with severely reduced systolic function.  Pulmonary hypertension.    -----------------------------------------------------  RHC/LHC  I personally reviewed the cardiac catheterization.  Results for orders placed during the hospital encounter of 03/02/22    Cardiac Catheterization/Vascular Study    Narrative    Procedure performed:  1. Diagnostic Left Heart Catheterization  2. Diagnostic Right Heart Catheterization  3. Coronary Angiography    Access Sites:  1. Right radial artery  2. Right  brachial vein    Findings:  1. Coronary Artery Anatomy:  Dominance: Right  Left Main: Mildly ectatic at approximately 5 to 6 mm diameter.  Left Anterior Descending: Large in caliber size.  Contains luminal irregularities throughout supplying a proximal, mid and distal diagonal branch  Circumflex Artery: Moderate caliber size.  Contains luminal irregularities throughout.  Supplying a high and inferior marginal branches.  Right Coronary Artery:  Moderate in caliber size with an anterior take-off. Moderate in caliber size and contain luminal irregularities throughout.  Supplies a PDA and posterior lateral branch.    2. Hemodynamics:  Right Atrium: */12/8 mmHg  Right Ventricle: 41/10/10 mmHg  Pulmonary Artery: 41/18/26 mmHg  Pulmonary Wedge: N/A not good wedge tracing  Left Ventricle: 118/8/12 mmHg  Aorta: 121/71/94 mmHg  Cardiac Output/Index: 6.2 L/min 2.32 L/min/m2  PA Sat: 69 %  AO Sat: 93 %  Hb: 15.0      CXR/Imaging:       I personally reviewed and interpreted the CXR.      Cardiomegaly.  Pacemaker.  Vascular congestion.  Laboratory evaluations:    Lab Results   Component Value Date    GLUCOSE 113 (H) 08/26/2023    BUN 17 08/26/2023    CREATININE 0.81 08/26/2023    EGFRIFNONA 103 04/01/2021    EGFRIFAFRI 125 02/23/2022    BCR 21.0 08/26/2023    K 3.8 08/26/2023    CO2 27.4 08/26/2023    CALCIUM 9.0 08/26/2023    PROTENTOTREF 6.9 09/28/2022    ALBUMIN 3.6 08/24/2023    LABIL2 1.0 09/28/2022    AST 23 08/24/2023    ALT 33 08/24/2023     Lab Results   Component Value Date    WBC 5.28 08/21/2023    HGB 12.1 (L) 08/21/2023    HCT 38.4 08/21/2023    MCV 83.7 08/21/2023     08/21/2023     Lab Results   Component Value Date    CHOL 159 08/20/2023    CHLPL 146 06/09/2022    TRIG 60 08/20/2023    HDL 53 08/20/2023    LDL 94 08/20/2023     Lab Results   Component Value Date    TSH 1.770 09/28/2022     Lab Results   Component Value Date    TROPONINT 47 (H) 08/21/2023     Lab Results   Component Value Date    HGBA1C 6.80  (H) 08/20/2023     No results found for: DDIMER  Lab Results   Component Value Date    ALT 33 08/24/2023     Lab Results   Component Value Date    HGBA1C 6.80 (H) 08/20/2023    HGBA1C 6.90 (H) 12/09/2022    HGBA1C 7.1 (H) 06/09/2022     Lab Results   Component Value Date    MICROALBUR 17.6 06/09/2022    CREATININE 0.81 08/26/2023     No results found for: IRON, TIBC, FERRITIN  Lab Results   Component Value Date    INR 1.63 (H) 10/07/2022    PROTIME 19.5 (H) 10/07/2022       PAH RISK ASSESSMENT:    Assessment & Plan      1. Pulmonary hypertension. WHO Group  2/3 ; FC: III.  RV status: Abnormal:.  PFTs:  Ordered today .     mPAP: 26mmHg   Tamiko: 2.8   PVR: 2 VELA   TAPSE/PASP: 0.02   COMPERA 2 Risk Assessment INTERMEDIATE-HIGH     The KCCQ-12 was updated at the visit today. The Boothe Index was updated today. Most recent score: 6. Score change: 0.     The patient's presentation, diagnostic work-up, testing, and hemodynamics are consistent with Likely IpC-PH.      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 There is not evidence of abnormal pulmonary vascular remodeling.    Today, I discussed the evaluation, treatment, and usual course of pulmonary hypertension.  All questions were answered.  I recommended that we continue active clinical surveillance.  Signs and symptoms of worsening pulmonary hypertension and right ventricular failure were discussed.  Handouts were provided in discharge paperwork. At this time, there is not indication for consideration of PAH-specific therapies. There is not indication for a RHC at this point.     General Recommendations:  -Patient is encouraged to be active within symptom limits.  -There is not exertional hypoxemia, supplemental oxygen therapy is not recommended today.  -The patient has been encouraged to adhere to treatment options prescribed, expectations, side effects, and potential consequences of non- adherence.    Vaccinations:  -I recommended the patient be vaccinated  against influenza, Streptococcus pneumoniae, and SARS-CoV-2      Additional Studies Ordered Today:  -PFTs    PAH-Specific Medications Ordered:    Based on the patient's clincical presentation, hemodynamics, and risk assessment today, I have recommended no PAH-specific  therapy.      I have asked the patient that if they were to move to be certain they see someone with expertise in PAH. These providers can be located at [email or web address].      2. Nonischemic cardiomyopathy.  NYHA stage C; functional class III.  Today, euvolemic and perfused.  Clinical trajectory was reviewed today and is improving.    His PYP was grade 2 last year without evidence of monoclonality.  Most recent CMRI with LGE.  There is no mention in the report of any issues with T1 and T2 signal intensity, other areas of thickening, or elevated T2 mapping.  Also, there is a discrepancy in the reported LVEF in the LVEF as calculated in the report.  I am going to send a message to Dr. Beck for clarification.  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  I will go ahead and send genetic testing today.  I will send a message to his primary cardiologist, but we likely will proceed prescription for tafamidis at his next appointment.    HF Medical/Device Therapy:    Changes today:  CHANGE to Toprol XL    HF-Specific  Therapy:      HF-Specific  Class Rx Dose Recommended Monitoring/Instructions   Beta-Blocker  already has   change  Metoprolol Succinate 100mg daily -Monitor heart rate.  Please call for heart rate <50, dizziness, lightheadedness, syncope.   A/A/ARNi  already has      quinapril (Accupril)     daily     -Occasional monitoring of Chem-7 has been recommended  -Call for new cough   MRA  Indicated    Reasons Not given: DEFERRED to next appt      SGLT2: Not Rx due to concerns for ATTR-CM            Nutrition:  no nutritional compromise at this time:             Lifestyle Advice:   - call office if I  gain more than 2 pounds in one day or 5 pounds in one week   - keep legs up while sitting   - use salt in moderation   - watch for swelling in feet, ankles and legs every day   - weigh myself daily   -call for concerning s/sx   -- barriers to activities addressed  - daily activity/exercise promoted     Vaccines:  -Seasonal influenza vaccination was recommended.   COVID vaccination was recommended.    Pneumococcal polysaccharide vaccine (PPSV23)vaccination was recommended.      Managed Risks of Exacerbation:  -- counseling with pharmacist provided       CODE STATUS: FULL                  3. Chronic right-sided heart failure.  As above   4. S/P placement of cardiac pacemaker.  Follow-up with the EP.   5. VENESSA (obstructive sleep apnea).  CPAP.  Follow-up with sleep medicine.         ORDERS PLACED TODAY:  No orders of the defined types were placed in this encounter.         MEDS ORDERED TODAY:    New Medications Ordered This Visit   Medications    metoprolol succinate XL (Toprol XL) 100 MG 24 hr tablet     Sig: Take 1 tablet by mouth Daily.     Dispense:  30 tablet     Refill:  3        MEDS DISCONTINUED TODAY:    Medications Discontinued During This Encounter   Medication Reason    metoprolol tartrate (LOPRESSOR) 50 MG tablet               Return in about 2 weeks (around 9/20/2023).          This document has been electronically signed by Trung Cervantes MD PhD on September 6, 2023 10:35 EDT        Trung Cervantes M.D., Ph.D., [facility] Heart Failure and Pulmonary Hypertension Clinic  System Medical Director for HF and PAH

## 2023-09-12 ENCOUNTER — OFFICE VISIT (OUTPATIENT)
Dept: CARDIOLOGY | Facility: CLINIC | Age: 63
End: 2023-09-12
Payer: MEDICARE

## 2023-09-12 ENCOUNTER — CLINICAL SUPPORT NO REQUIREMENTS (OUTPATIENT)
Dept: CARDIOLOGY | Facility: CLINIC | Age: 63
End: 2023-09-12
Payer: MEDICARE

## 2023-09-12 VITALS
HEART RATE: 60 BPM | SYSTOLIC BLOOD PRESSURE: 140 MMHG | BODY MASS INDEX: 37.09 KG/M2 | HEIGHT: 74 IN | WEIGHT: 289 LBS | DIASTOLIC BLOOD PRESSURE: 70 MMHG

## 2023-09-12 DIAGNOSIS — I42.0 DCM (DILATED CARDIOMYOPATHY): Primary | ICD-10-CM

## 2023-09-12 DIAGNOSIS — E66.01 CLASS 2 SEVERE OBESITY DUE TO EXCESS CALORIES WITH SERIOUS COMORBIDITY AND BODY MASS INDEX (BMI) OF 38.0 TO 38.9 IN ADULT: ICD-10-CM

## 2023-09-12 DIAGNOSIS — I42.8 NONISCHEMIC CARDIOMYOPATHY: Primary | ICD-10-CM

## 2023-09-12 PROCEDURE — 3078F DIAST BP <80 MM HG: CPT | Performed by: INTERNAL MEDICINE

## 2023-09-12 PROCEDURE — 93000 ELECTROCARDIOGRAM COMPLETE: CPT | Performed by: INTERNAL MEDICINE

## 2023-09-12 PROCEDURE — 3077F SYST BP >= 140 MM HG: CPT | Performed by: INTERNAL MEDICINE

## 2023-09-12 PROCEDURE — 99214 OFFICE O/P EST MOD 30 MIN: CPT | Performed by: INTERNAL MEDICINE

## 2023-09-20 ENCOUNTER — HOSPITAL ENCOUNTER (OUTPATIENT)
Dept: CARDIOLOGY | Facility: HOSPITAL | Age: 63
Discharge: HOME | End: 2023-09-20
Payer: MEDICARE

## 2023-09-20 ENCOUNTER — TELEPHONE (OUTPATIENT)
Dept: CARDIOLOGY | Facility: HOSPITAL | Age: 63
End: 2023-09-20
Payer: MEDICARE

## 2023-09-20 VITALS
WEIGHT: 297 LBS | OXYGEN SATURATION: 96 % | SYSTOLIC BLOOD PRESSURE: 124 MMHG | HEIGHT: 74 IN | HEART RATE: 62 BPM | DIASTOLIC BLOOD PRESSURE: 76 MMHG | BODY MASS INDEX: 38.12 KG/M2

## 2023-09-20 DIAGNOSIS — I42.0 DCM (DILATED CARDIOMYOPATHY): ICD-10-CM

## 2023-09-20 DIAGNOSIS — I27.20 PULMONARY HYPERTENSION: Primary | ICD-10-CM

## 2023-09-20 DIAGNOSIS — I50.812 CHRONIC RIGHT-SIDED HEART FAILURE: ICD-10-CM

## 2023-09-20 DIAGNOSIS — G47.33 OSA (OBSTRUCTIVE SLEEP APNEA): ICD-10-CM

## 2023-09-20 PROBLEM — I50.43 ACUTE ON CHRONIC COMBINED SYSTOLIC (CONGESTIVE) AND DIASTOLIC (CONGESTIVE) HEART FAILURE: Status: RESOLVED | Noted: 2023-08-22 | Resolved: 2023-09-20

## 2023-09-20 PROBLEM — I50.43 ACUTE ON CHRONIC HEART FAILURE WITH REDUCED EJECTION FRACTION AND DIASTOLIC DYSFUNCTION: Status: RESOLVED | Noted: 2023-08-21 | Resolved: 2023-09-20

## 2023-09-20 RX ORDER — LISINOPRIL 5 MG/1
5 TABLET ORAL DAILY
Qty: 30 TABLET | Refills: 0 | Status: SHIPPED | OUTPATIENT
Start: 2023-09-20

## 2023-09-20 NOTE — ADDENDUM NOTE
Encounter addended by: Sydnee Campbell PharmD on: 9/20/2023 10:39 AM   Actions taken: Charge Capture section accepted

## 2023-09-20 NOTE — LETTER
September 20, 2023     Keesha Kirkpatrick MD  [address]  [facility] [address]    Patient: Shashi Engel   YOB: 1960   Date of Visit: 9/20/2023     Dear Keesha Kirkpatrick MD:       Thank you for referring Shashi Engel to me for evaluation. Below are the relevant portions of my assessment and plan of care.    If you have questions, please do not hesitate to call me. I look forward to following Shashi along with you.         Sincerely,        Trung Cervantes MD PhD        CC: MD Helen Timmnos Steven Joel, MD PhD  09/20/23 1031  [facility]  Heart Failure & Pulmonary Arterial Hypertension Clinic  [facility]  Trung Cervantes M.D., Ph.D., [facility] HospitalKeya MD  [address] [place] [address]    Thank you for asking me to see Shashi Engel.     History of Present Illness  This is a 62 y.o. male with:    1. PAH  A. RHC  Right Atrium: */12/8 mmHg  Right Ventricle: 41/10/10 mmHg  Pulmonary Artery: 41/18/26 mmHg  Pulmonary Wedge: N/A not good wedge tracing  Left Ventricle: 118/8/12 mmHg  Aorta: 121/71/94 mmHg  Cardiac Output/Index: 6.2 L/min 2.32 L/min/m2  PA Sat: 69 %  AO Sat: 93 %  Hb: 15.0  2. NICM  3. RHF      Shashi Engel is a 62 y.o. male who presents today.  The patient is typically seen by Keesha Kirkpatrick MD.  The patient's cardiologist is Dr. Vanegas.    The patient presents today for further evaluation and management of pulmonary arterial hypertension, NICM, and RHF.  Of note, the patient was recently admitted with ADHF.  2D TTE revealed depressed LVEF. CMRI with LGE.  Patient's PYP scan in 2022 was grade 2.  Current regimen includes Bumex at 2 mg 1 tablet p.o. twice daily, Lopressor 50 mg 1 tablet p.o. twice daily, Accupril 80 mg daily.    The patient returns to clinic today.  He continues to have exercise intolerance.  He does have a history of atrial fibrillation. PYP was grade 1. CMRI was consistent with an infiltrative  process.  Denies orthopnea, PND, lower extremity edema, ascites, early abdominal satiety.    Review of Systems - Review of Systems   Cardiovascular:  Positive for dyspnea on exertion. Negative for chest pain, claudication and cyanosis.   Respiratory:  Positive for shortness of breath. Negative for cough and hemoptysis.    All other systems reviewed and are negative.      All other systems were reviewed and were negative.    Patient Active Problem List   Diagnosis   • PAF (paroxysmal atrial fibrillation)   • Essential hypertension   • ED (erectile dysfunction) of organic origin   • HLD (hyperlipidemia)   • Hypertrophic polyarthritis   • Diabetes mellitus type 2, noninsulin dependent   • LVH (left ventricular hypertrophy) due to hypertensive disease   • SSS (sick sinus syndrome)   • Sleep related hypoxia   • Class 2 severe obesity due to excess calories with serious comorbidity and body mass index (BMI) of 38.0 to 38.9 in adult   • VENESSA (obstructive sleep apnea)   • Nonsustained ventricular tachycardia   • S/P placement of cardiac pacemaker   • Pulmonary hypertension   • Other proteinuria   • Nonrheumatic tricuspid valve regurgitation   • Nonrheumatic mitral valve regurgitation   • Exertional dyspnea   • Acute on chronic heart failure with reduced ejection fraction and diastolic dysfunction   • Acute on chronic combined systolic (congestive) and diastolic (congestive) heart failure   • Nonischemic cardiomyopathy   • Chronic right-sided heart failure       family history includes Atrial fibrillation in his sister; Depression in his mother; Heart disease in his mother, sister, and sister; Hypertension in his brother, mother, sister, and sister; Kidney disease in an other family member; Sleep apnea in an other family member.     reports that he has quit smoking. His smoking use included cigars. He has been exposed to tobacco smoke. He has never used smokeless tobacco. He reports that he does not currently use drugs after  having used the following drugs: Marijuana. He reports that he does not drink alcohol.    No Known Allergies    Social Determinants of Health     Tobacco Use: Medium Risk   • Smoking Tobacco Use: Former   • Smokeless Tobacco Use: Never   • Passive Exposure: Past   Alcohol Use: Not At Risk   • Frequency of Alcohol Consumption: Never   • Average Number of Drinks: Patient does not drink   • Frequency of Binge Drinking: Never   Financial Resource Strain: Not on file   Food Insecurity: No Food Insecurity   • Worried About Running Out of Food in the Last Year: Never true   • Ran Out of Food in the Last Year: Never true   Transportation Needs: Not on file   Physical Activity: Not on file   Stress: Not on file   Social Connections: Not on file   Intimate Partner Violence: Not on file   Depression: Not at risk   • PHQ-2 Score: 0   Housing Stability: Not on file        Physical Activity: Not on file          Current Outpatient Medications:   •  amLODIPine (NORVASC) 5 MG tablet, TAKE 1 TABLET BY MOUTH EVERY DAY, Disp: 15 tablet, Rfl: 0  •  atorvastatin (LIPITOR) 40 MG tablet, TAKE 1 TABLET BY MOUTH EVERY DAY, Disp: 90 tablet, Rfl: 3  •  bumetanide (BUMEX) 2 MG tablet, Take 1 tablet by mouth 2 (Two) Times a Day., Disp: 60 tablet, Rfl: 11  •  cholecalciferol (VITAMIN D3) 25 MCG (1000 UT) tablet, Take 1 tablet by mouth Daily., Disp: , Rfl:   •  [employer] [place] [facility] [place] Use as directed and appropo lancet for his monitor, Disp: 100 each, Rfl: 11  •  metFORMIN (GLUCOPHAGE) 1000 MG tablet, TAKE 1 TABLET BY MOUTH 2 (TWO) TIMES A DAY WITH MEALS. INDICATIONS: TYPE 2 DIABETES, NOTED, Disp: 30 tablet, Rfl: 0  •  metoprolol succinate XL (Toprol XL) 100 MG 24 hr tablet, Take 1 tablet by mouth Daily., Disp: 30 tablet, Rfl: 3  •  potassium chloride 10 MEQ CR tablet, TAKE 1 TABLET BY MOUTH EVERY DAY, Disp: 90 tablet, Rfl: 3  •  tadalafil (CIALIS) 20 MG tablet, Take 1 tablet by mouth Daily As Needed for Erectile Dysfunction., Disp: 10 tablet,  Rfl: 1  •  vitamin B-12 (CYANOCOBALAMIN) 1000 MCG tablet, Take 1 tablet by mouth Daily., Disp: , Rfl:   •  Xarelto 20 MG tablet, TAKE 1 TABLET BY MOUTH EVERY DAY WITH DINNER, Disp: 90 tablet, Rfl: 2  •  lisinopril (PRINIVIL,ZESTRIL) 5 MG tablet, Take 1 tablet by mouth Daily., Disp: 30 tablet, Rfl: 0    Vital Sign Review:     Vitals:    09/20/23 1004   BP: 124/76   Pulse: 62   SpO2: 96%     PP:high  Pulse Ox: normal oxygenation on room air    Body mass index is 38.13 kg/m².      09/20/23  1004   Weight: 135 kg (297 lb)       Physical Exam:    Vitals reviewed.   Constitutional:       General: Not in acute distress.     Appearance: Healthy appearance. Not in distress. Obese. Not ill-appearing, toxic-appearing or diaphoretic.      Interventions: Not intubated.  Eyes:      Conjunctiva/sclera: Conjunctivae normal.      Pupils: Pupils are equal, round, and reactive to light.   Neck:      Vascular: No hepatojugular reflux, JVD or JVR. JVD normal with 7 cm of water.   Pulmonary:      Effort: Pulmonary effort is normal. No tachypnea, bradypnea, accessory muscle usage, prolonged expiration, respiratory distress or retractions. Not intubated.      Breath sounds: Normal breath sounds and air entry. No stridor, decreased air movement or transmitted upper airway sounds. No decreased breath sounds. No wheezing. No rhonchi. No rales.   Cardiovascular:      PMI at left midclavicular line. Normal rate. Regular rhythm. S1 with normal intensity. loud S2.       Murmurs: There is no murmur.      No gallop.  No click. No rub.   Neurological:      General: No focal deficit present.      Mental Status: Alert and oriented to person, place and time.          DATA REVIEWED:     EKG:  I personally reviewed and interpreted the EKG.        Atrial fibrillation with ventricular pacing    ---------------------------------------------------  TTE/RAKESH:    Results for orders placed during the hospital encounter of 08/20/23    Adult Transthoracic Echo  Complete W/ Cont if Necessary Per Protocol    Interpretation Summary  •  Left ventricular systolic function is moderately decreased. Calculated left ventricular EF = 31.3% Septal wall motion is abnormal, consistent with right ventricular pacing. Abnormal global longitudinal LV strain (GLS) = -10.1%. Left ventricle strain data was reviewed by the physician and found to be accurate. Apical sparing is not evident. This suggest that cardiomyopathy may not be due to amyloid heart disease though it does not rule out other infiltrative diseases. The left ventricular cavity is moderately dilated. Left ventricular wall thickness is consistent with moderate concentric hypertrophy. There is left ventricular global hypokinesis noted. The findings are consistent with infiltrative cardiomyopathy. Left ventricular diastolic function was indeterminate. Elevated left atrial pressure.  •  : The right ventricular cavity is moderately dilated. Severely reduced right ventricular systolic function noted. Electronic lead present in the ventricle.  •  Left atrial volume is severely increased.  •  : The right atrial cavity is severely dilated.  An electronic lead is present in the right atrium.  •  The aortic valve is abnormal in structure. There is moderate calcification of the aortic valve mainly affecting the non-coronary and left coronary cusp(s).  •  Mild mitral valve regurgitation is present.  •  Mild tricuspid valve regurgitation is present. Estimated right ventricular systolic pressure from tricuspid regurgitation is moderately elevated (45-55 mmHg). Calculated right ventricular systolic pressure from tricuspid regurgitation is 54 mmHg.  •  Compared to prior study of 9/20/2022 left ventricular ejection fraction has decreased.  Much regurgitation has improved.  RVSP is lower but assessment of pulmonary hypertension is confounded by RV dysfunction which is severe.      My interpretation of the TTE is below.     LVEF calculates at 30%.   Moderate global hypokinesis.  Right ventricle is dilated with severely reduced systolic function.  Pulmonary hypertension.      -----------------------------------------------------  RHC/LHC  I personally reviewed the cardiac catheterization.  Results for orders placed during the hospital encounter of 03/02/22    Cardiac Catheterization/Vascular Study    Narrative    Procedure performed:  1. Diagnostic Left Heart Catheterization  2. Diagnostic Right Heart Catheterization  3. Coronary Angiography    Access Sites:  1. Right radial artery  2. Right brachial vein    Findings:  1. Coronary Artery Anatomy:  Dominance: Right  Left Main: Mildly ectatic at approximately 5 to 6 mm diameter.  Left Anterior Descending: Large in caliber size.  Contains luminal irregularities throughout supplying a proximal, mid and distal diagonal branch  Circumflex Artery: Moderate caliber size.  Contains luminal irregularities throughout.  Supplying a high and inferior marginal branches.  Right Coronary Artery:  Moderate in caliber size with an anterior take-off. Moderate in caliber size and contain luminal irregularities throughout.  Supplies a PDA and posterior lateral branch.    2. Hemodynamics:  Right Atrium: */12/8 mmHg  Right Ventricle: 41/10/10 mmHg  Pulmonary Artery: 41/18/26 mmHg  Pulmonary Wedge: N/A not good wedge tracing  Left Ventricle: 118/8/12 mmHg  Aorta: 121/71/94 mmHg  Cardiac Output/Index: 6.2 L/min 2.32 L/min/m2  PA Sat: 69 %  AO Sat: 93 %  Hb: 15.0      CXR/Imaging:       I personally reviewed and interpreted the CXR.      Cardiomegaly.  Pacemaker.  Vascular congestion.            Laboratory evaluations:    Lab Results   Component Value Date    GLUCOSE 113 (H) 08/26/2023    BUN 17 08/26/2023    CREATININE 0.81 08/26/2023    EGFRIFNONA 103 04/01/2021    EGFRIFAFRI 125 02/23/2022    BCR 21.0 08/26/2023    K 3.8 08/26/2023    CO2 27.4 08/26/2023    CALCIUM 9.0 08/26/2023    PROTENTOTREF 6.9 09/28/2022    ALBUMIN 3.6  08/24/2023    LABIL2 1.0 09/28/2022    AST 23 08/24/2023    ALT 33 08/24/2023     Lab Results   Component Value Date    WBC 5.28 08/21/2023    HGB 12.1 (L) 08/21/2023    HCT 38.4 08/21/2023    MCV 83.7 08/21/2023     08/21/2023     Lab Results   Component Value Date    CHOL 159 08/20/2023    CHLPL 146 06/09/2022    TRIG 60 08/20/2023    HDL 53 08/20/2023    LDL 94 08/20/2023     Lab Results   Component Value Date    TSH 1.770 09/28/2022     Lab Results   Component Value Date    TROPONINT 47 (H) 08/21/2023     Lab Results   Component Value Date    HGBA1C 6.80 (H) 08/20/2023     No results found for: DDIMER  Lab Results   Component Value Date    ALT 33 08/24/2023     Lab Results   Component Value Date    HGBA1C 6.80 (H) 08/20/2023    HGBA1C 6.90 (H) 12/09/2022    HGBA1C 7.1 (H) 06/09/2022     Lab Results   Component Value Date    MICROALBUR 17.6 06/09/2022    CREATININE 0.81 08/26/2023     No results found for: IRON, TIBC, FERRITIN  Lab Results   Component Value Date    INR 1.63 (H) 10/07/2022    PROTIME 19.5 (H) 10/07/2022       PAH RISK ASSESSMENT:    Assessment & Plan      1. Pulmonary hypertension. WHO Group  2/3 ; FC: III.  RV status: Abnormal:.  PFTs:  Ordered today .     mPAP: 26mmHg   Tamiko: 2.8   PVR: 2 VELA   TAPSE/PASP: 0.02   COMPERA 2 Risk Assessment INTERMEDIATE-HIGH     The KCCQ-12 was updated at the visit today. The Boothe Index was updated today. Most recent score: 6. Score change: 0.     The patient's presentation, diagnostic work-up, testing, and hemodynamics are consistent with Likely IpC-PH.      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 There is not evidence of abnormal pulmonary vascular remodeling.    Today, I discussed the evaluation, treatment, and usual course of pulmonary hypertension.  All questions were answered.  I recommended that we continue active clinical surveillance.  Signs and symptoms of worsening pulmonary hypertension and right ventricular failure were discussed.   Handouts were provided in discharge paperwork. At this time, there is not indication for consideration of PAH-specific therapies. There is not indication for a RHC at this point.     General Recommendations:  -Patient is encouraged to be active within symptom limits.  -There is not exertional hypoxemia, supplemental oxygen therapy is not recommended today.  -The patient has been encouraged to adhere to treatment options prescribed, expectations, side effects, and potential consequences of non- adherence.    Vaccinations:  -I recommended the patient be vaccinated against influenza, Streptococcus pneumoniae, and SARS-CoV-2      Additional Studies Ordered Today:  -PFTs    PAH-Specific Medications Ordered:    Based on the patient's clincical presentation, hemodynamics, and risk assessment today, I have recommended no PAH-specific  therapy.      I have asked the patient that if they were to move to be certain they see someone with expertise in PAH. These providers can be located at [email or web address].      2. Nonischemic cardiomyopathy.  NYHA stage C; functional class II.  Today, he is euvolemic and perfused.  Clinical trajectory was reviewed today and is improving.      His PYP was grade 1 last year, equivocal.  No evidence of monoclonality.  Most recent C MRI with LGE but this was mid myocardial and somewhat diffuse.  No other issues with T1 and T2 signal intensity.  No elevated T2.  No evidence of right atrial dilatation or thickening.  His CMRI is not very classic for cardiac amyloid, though this could be detected early.  I want to make sure that he does not have cardiac sarcoidosis.    Today, we discussed arranging F-FDG PET/CT at [facility] and seeking opinion of Dr. Bartlett.  I would like to exclude cardiac sarcoid, before we label him as ATTR and treat this.  Before we order the PET/CT, I will send his CMRI images to [facility] for an opinion, as well.     HF Medical/Device Therapy:    Changes today:  Changed to  lisinopril    HF-Specific  Therapy:      HF-Specific  Class Rx Dose Recommended Monitoring/Instructions   Beta-Blocker  already has   change  Metoprolol Succinate 100mg daily -Monitor heart rate.  Please call for heart rate <50, dizziness, lightheadedness, syncope.   A/A/ARNi  already has      lisinopril (Prinivil)     5 mgdaily     -Occasional monitoring of Chem-7 has been recommended  -Call for new cough   MRA  Indicated    Reasons Not given: DEFERRED to next appt      SGLT2: Not Rx due to concerns for ATTR-CM            Nutrition:  no nutritional compromise at this time:             Lifestyle Advice:   - call office if I gain more than 2 pounds in one day or 5 pounds in one week   - keep legs up while sitting   - use salt in moderation   - watch for swelling in feet, ankles and legs every day   - weigh myself daily   -call for concerning s/sx   -- barriers to activities addressed  - daily activity/exercise promoted     Vaccines:  -Seasonal influenza vaccination was recommended.   COVID vaccination was recommended.    Pneumococcal polysaccharide vaccine (PPSV23)vaccination was recommended.      Managed Risks of Exacerbation:  -- counseling with pharmacist provided       CODE STATUS: FULL                  3. Chronic right-sided heart failure.  As above.   4. S/P placement of cardiac pacemaker.  Follow-up with EP.   5. VENESSA (obstructive sleep apnea).  CPAP.  Follow-up with sleep medicine.         ORDERS PLACED TODAY:  No orders of the defined types were placed in this encounter.         MEDS ORDERED TODAY:    New Medications Ordered This Visit   Medications   • lisinopril (PRINIVIL,ZESTRIL) 5 MG tablet     Sig: Take 1 tablet by mouth Daily.     Dispense:  30 tablet     Refill:  0        MEDS DISCONTINUED TODAY:    Medications Discontinued During This Encounter   Medication Reason   • quinapril (ACCUPRIL) 40 MG tablet                 Return in about 6 weeks (around 11/1/2023).          This document has been  electronically signed by Trung Cervantes MD PhD on September 20, 2023 10:31 EDT        Trung Cervantes M.D., Ph.D., [facility]  System Medical Director for HF and PAH

## 2023-09-20 NOTE — TELEPHONE ENCOUNTER
Sent Fax to [facility], Per Dr. Cervantes Patient is needing to see Dr. Bartlett.     Also, I requested Images of Cardiac MRI to be burned on a disc. The disc has been picked up and will be mailed today to [facility] for Dr. Bartlett.    Thank you,  Nisha TRACY [facility]

## 2023-09-20 NOTE — PROGRESS NOTES
Heart Failure & Pulmonary Arterial Hypertension Clinic  [facility]  Trung Cervantes M.D., Ph.D., [facility]       Keya Tong MD  [address]  [facility] [address]  [place] [address]    Thank you for asking me to see Shashi Engel.     History of Present Illness  This is a 62 y.o. male with:    1. PAH  A. RHC  Right Atrium: */12/8 mmHg  Right Ventricle: 41/10/10 mmHg  Pulmonary Artery: 41/18/26 mmHg  Pulmonary Wedge: N/A not good wedge tracing  Left Ventricle: 118/8/12 mmHg  Aorta: 121/71/94 mmHg  Cardiac Output/Index: 6.2 L/min 2.32 L/min/m2  PA Sat: 69 %  AO Sat: 93 %  Hb: 15.0  2. NICM  3. RHF      Shashi Engel is a 62 y.o. male who presents today.  The patient is typically seen by Keesha Kirkpatrick MD.  The patient's cardiologist is Dr. Vanegas.    The patient presents today for further evaluation and management of pulmonary arterial hypertension, NICM, and RHF.  Of note, the patient was recently admitted with ADHF.  2D TTE revealed depressed LVEF. CMRI with LGE.  Patient's PYP scan in 2022 was grade 2.  Current regimen includes Bumex at 2 mg 1 tablet p.o. twice daily, Lopressor 50 mg 1 tablet p.o. twice daily, Accupril 80 mg daily.    The patient returns to clinic today.  He continues to have exercise intolerance.  He does have a history of atrial fibrillation. PYP was grade 1. CMRI was consistent with an infiltrative process.  Denies orthopnea, PND, lower extremity edema, ascites, early abdominal satiety.    Review of Systems - Review of Systems   Cardiovascular:  Positive for dyspnea on exertion. Negative for chest pain, claudication and cyanosis.   Respiratory:  Positive for shortness of breath. Negative for cough and hemoptysis.    All other systems reviewed and are negative.      All other systems were reviewed and were negative.    Patient Active Problem List   Diagnosis    PAF (paroxysmal atrial fibrillation)    Essential hypertension    ED (erectile dysfunction) of organic origin    HLD  (hyperlipidemia)    Hypertrophic polyarthritis    Diabetes mellitus type 2, noninsulin dependent    LVH (left ventricular hypertrophy) due to hypertensive disease    SSS (sick sinus syndrome)    Sleep related hypoxia    Class 2 severe obesity due to excess calories with serious comorbidity and body mass index (BMI) of 38.0 to 38.9 in adult    VENESSA (obstructive sleep apnea)    Nonsustained ventricular tachycardia    S/P placement of cardiac pacemaker    Pulmonary hypertension    Other proteinuria    Nonrheumatic tricuspid valve regurgitation    Nonrheumatic mitral valve regurgitation    Exertional dyspnea    Acute on chronic heart failure with reduced ejection fraction and diastolic dysfunction    Acute on chronic combined systolic (congestive) and diastolic (congestive) heart failure    Nonischemic cardiomyopathy    Chronic right-sided heart failure       family history includes Atrial fibrillation in his sister; Depression in his mother; Heart disease in his mother, sister, and sister; Hypertension in his brother, mother, sister, and sister; Kidney disease in an other family member; Sleep apnea in an other family member.     reports that he has quit smoking. His smoking use included cigars. He has been exposed to tobacco smoke. He has never used smokeless tobacco. He reports that he does not currently use drugs after having used the following drugs: Marijuana. He reports that he does not drink alcohol.    No Known Allergies    Social Determinants of Health     Tobacco Use: Medium Risk    Smoking Tobacco Use: Former    Smokeless Tobacco Use: Never    Passive Exposure: Past   Alcohol Use: Not At Risk    Frequency of Alcohol Consumption: Never    Average Number of Drinks: Patient does not drink    Frequency of Binge Drinking: Never   Financial Resource Strain: Not on file   Food Insecurity: No Food Insecurity    Worried About Running Out of Food in the Last Year: Never true    Ran Out of Food in the Last Year: Never  true   Transportation Needs: Not on file   Physical Activity: Not on file   Stress: Not on file   Social Connections: Not on file   Intimate Partner Violence: Not on file   Depression: Not at risk    PHQ-2 Score: 0   Housing Stability: Not on file        Physical Activity: Not on file          Current Outpatient Medications:     amLODIPine (NORVASC) 5 MG tablet, TAKE 1 TABLET BY MOUTH EVERY DAY, Disp: 15 tablet, Rfl: 0    atorvastatin (LIPITOR) 40 MG tablet, TAKE 1 TABLET BY MOUTH EVERY DAY, Disp: 90 tablet, Rfl: 3    bumetanide (BUMEX) 2 MG tablet, Take 1 tablet by mouth 2 (Two) Times a Day., Disp: 60 tablet, Rfl: 11    cholecalciferol (VITAMIN D3) 25 MCG (1000 UT) tablet, Take 1 tablet by mouth Daily., Disp: , Rfl:     [facility], Use as directed and appropo lancet for his monitor, Disp: 100 each, Rfl: 11    metFORMIN (GLUCOPHAGE) 1000 MG tablet, TAKE 1 TABLET BY MOUTH 2 (TWO) TIMES A DAY WITH MEALS. INDICATIONS: TYPE 2 DIABETES, NOTED, Disp: 30 tablet, Rfl: 0    metoprolol succinate XL (Toprol XL) 100 MG 24 hr tablet, Take 1 tablet by mouth Daily., Disp: 30 tablet, Rfl: 3    potassium chloride 10 MEQ CR tablet, TAKE 1 TABLET BY MOUTH EVERY DAY, Disp: 90 tablet, Rfl: 3    tadalafil (CIALIS) 20 MG tablet, Take 1 tablet by mouth Daily As Needed for Erectile Dysfunction., Disp: 10 tablet, Rfl: 1    vitamin B-12 (CYANOCOBALAMIN) 1000 MCG tablet, Take 1 tablet by mouth Daily., Disp: , Rfl:     Xarelto 20 MG tablet, TAKE 1 TABLET BY MOUTH EVERY DAY WITH DINNER, Disp: 90 tablet, Rfl: 2    lisinopril (PRINIVIL,ZESTRIL) 5 MG tablet, Take 1 tablet by mouth Daily., Disp: 30 tablet, Rfl: 0    Vital Sign Review:     Vitals:    09/20/23 1004   BP: 124/76   Pulse: 62   SpO2: 96%     PP:high  Pulse Ox: normal oxygenation on room air    Body mass index is 38.13 kg/m².      09/20/23  1004   Weight: 135 kg (297 lb)       Physical Exam:    Vitals reviewed.   Constitutional:       General: Not in acute distress.      Appearance: Healthy appearance. Not in distress. Obese. Not ill-appearing, toxic-appearing or diaphoretic.      Interventions: Not intubated.  Eyes:      Conjunctiva/sclera: Conjunctivae normal.      Pupils: Pupils are equal, round, and reactive to light.   Neck:      Vascular: No hepatojugular reflux, JVD or JVR. JVD normal with 7 cm of water.   Pulmonary:      Effort: Pulmonary effort is normal. No tachypnea, bradypnea, accessory muscle usage, prolonged expiration, respiratory distress or retractions. Not intubated.      Breath sounds: Normal breath sounds and air entry. No stridor, decreased air movement or transmitted upper airway sounds. No decreased breath sounds. No wheezing. No rhonchi. No rales.   Cardiovascular:      PMI at left midclavicular line. Normal rate. Regular rhythm. S1 with normal intensity. loud S2.       Murmurs: There is no murmur.      No gallop.  No click. No rub.   Neurological:      General: No focal deficit present.      Mental Status: Alert and oriented to person, place and time.          DATA REVIEWED:     EKG:  I personally reviewed and interpreted the EKG.        Atrial fibrillation with ventricular pacing    ---------------------------------------------------  TTE/RAKESH:    Results for orders placed during the hospital encounter of 08/20/23    Adult Transthoracic Echo Complete W/ Cont if Necessary Per Protocol    Interpretation Summary    Left ventricular systolic function is moderately decreased. Calculated left ventricular EF = 31.3% Septal wall motion is abnormal, consistent with right ventricular pacing. Abnormal global longitudinal LV strain (GLS) = -10.1%. Left ventricle strain data was reviewed by the physician and found to be accurate. Apical sparing is not evident. This suggest that cardiomyopathy may not be due to amyloid heart disease though it does not rule out other infiltrative diseases. The left ventricular cavity is moderately dilated. Left ventricular wall thickness  is consistent with moderate concentric hypertrophy. There is left ventricular global hypokinesis noted. The findings are consistent with infiltrative cardiomyopathy. Left ventricular diastolic function was indeterminate. Elevated left atrial pressure.    : The right ventricular cavity is moderately dilated. Severely reduced right ventricular systolic function noted. Electronic lead present in the ventricle.    Left atrial volume is severely increased.    : The right atrial cavity is severely dilated.  An electronic lead is present in the right atrium.    The aortic valve is abnormal in structure. There is moderate calcification of the aortic valve mainly affecting the non-coronary and left coronary cusp(s).    Mild mitral valve regurgitation is present.    Mild tricuspid valve regurgitation is present. Estimated right ventricular systolic pressure from tricuspid regurgitation is moderately elevated (45-55 mmHg). Calculated right ventricular systolic pressure from tricuspid regurgitation is 54 mmHg.    Compared to prior study of 9/20/2022 left ventricular ejection fraction has decreased.  Much regurgitation has improved.  RVSP is lower but assessment of pulmonary hypertension is confounded by RV dysfunction which is severe.      My interpretation of the TTE is below.     LVEF calculates at 30%.  Moderate global hypokinesis.  Right ventricle is dilated with severely reduced systolic function.  Pulmonary hypertension.      -----------------------------------------------------  RHC/LHC  I personally reviewed the cardiac catheterization.  Results for orders placed during the hospital encounter of 03/02/22    Cardiac Catheterization/Vascular Study    Narrative    Procedure performed:  1. Diagnostic Left Heart Catheterization  2. Diagnostic Right Heart Catheterization  3. Coronary Angiography    Access Sites:  1. Right radial artery  2. Right brachial vein    Findings:  1. Coronary Artery Anatomy:  Dominance: Right  Left  Main: Mildly ectatic at approximately 5 to 6 mm diameter.  Left Anterior Descending: Large in caliber size.  Contains luminal irregularities throughout supplying a proximal, mid and distal diagonal branch  Circumflex Artery: Moderate caliber size.  Contains luminal irregularities throughout.  Supplying a high and inferior marginal branches.  Right Coronary Artery:  Moderate in caliber size with an anterior take-off. Moderate in caliber size and contain luminal irregularities throughout.  Supplies a PDA and posterior lateral branch.    2. Hemodynamics:  Right Atrium: */12/8 mmHg  Right Ventricle: 41/10/10 mmHg  Pulmonary Artery: 41/18/26 mmHg  Pulmonary Wedge: N/A not good wedge tracing  Left Ventricle: 118/8/12 mmHg  Aorta: 121/71/94 mmHg  Cardiac Output/Index: 6.2 L/min 2.32 L/min/m2  PA Sat: 69 %  AO Sat: 93 %  Hb: 15.0      CXR/Imaging:       I personally reviewed and interpreted the CXR.      Cardiomegaly.  Pacemaker.  Vascular congestion.            Laboratory evaluations:    Lab Results   Component Value Date    GLUCOSE 113 (H) 08/26/2023    BUN 17 08/26/2023    CREATININE 0.81 08/26/2023    EGFRIFNONA 103 04/01/2021    EGFRIFAFRI 125 02/23/2022    BCR 21.0 08/26/2023    K 3.8 08/26/2023    CO2 27.4 08/26/2023    CALCIUM 9.0 08/26/2023    PROTENTOTREF 6.9 09/28/2022    ALBUMIN 3.6 08/24/2023    LABIL2 1.0 09/28/2022    AST 23 08/24/2023    ALT 33 08/24/2023     Lab Results   Component Value Date    WBC 5.28 08/21/2023    HGB 12.1 (L) 08/21/2023    HCT 38.4 08/21/2023    MCV 83.7 08/21/2023     08/21/2023     Lab Results   Component Value Date    CHOL 159 08/20/2023    CHLPL 146 06/09/2022    TRIG 60 08/20/2023    HDL 53 08/20/2023    LDL 94 08/20/2023     Lab Results   Component Value Date    TSH 1.770 09/28/2022     Lab Results   Component Value Date    TROPONINT 47 (H) 08/21/2023     Lab Results   Component Value Date    HGBA1C 6.80 (H) 08/20/2023     No results found for: DAVIE  Lab Results    Component Value Date    ALT 33 08/24/2023     Lab Results   Component Value Date    HGBA1C 6.80 (H) 08/20/2023    HGBA1C 6.90 (H) 12/09/2022    HGBA1C 7.1 (H) 06/09/2022     Lab Results   Component Value Date    MICROALBUR 17.6 06/09/2022    CREATININE 0.81 08/26/2023     No results found for: IRON, TIBC, FERRITIN  Lab Results   Component Value Date    INR 1.63 (H) 10/07/2022    PROTIME 19.5 (H) 10/07/2022       PAH RISK ASSESSMENT:    Assessment & Plan      1. Pulmonary hypertension. WHO Group  2/3 ; FC: III.  RV status: Abnormal:.  PFTs:  Ordered today .     mPAP: 26mmHg   Tamiko: 2.8   PVR: 2 VELA   TAPSE/PASP: 0.02   COMPERA 2 Risk Assessment INTERMEDIATE-HIGH     The KCCQ-12 was updated at the visit today. The Boothe Index was updated today. Most recent score: 6. Score change: 0.     The patient's presentation, diagnostic work-up, testing, and hemodynamics are consistent with Likely IpC-PH.      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 There is not evidence of abnormal pulmonary vascular remodeling.    Today, I discussed the evaluation, treatment, and usual course of pulmonary hypertension.  All questions were answered.  I recommended that we continue active clinical surveillance.  Signs and symptoms of worsening pulmonary hypertension and right ventricular failure were discussed.  Handouts were provided in discharge paperwork. At this time, there is not indication for consideration of PAH-specific therapies. There is not indication for a RHC at this point.     General Recommendations:  -Patient is encouraged to be active within symptom limits.  -There is not exertional hypoxemia, supplemental oxygen therapy is not recommended today.  -The patient has been encouraged to adhere to treatment options prescribed, expectations, side effects, and potential consequences of non- adherence.    Vaccinations:  -I recommended the patient be vaccinated against influenza, Streptococcus pneumoniae, and  SARS-CoV-2      Additional Studies Ordered Today:  -PFTs    PAH-Specific Medications Ordered:    Based on the patient's clincical presentation, hemodynamics, and risk assessment today, I have recommended no PAH-specific  therapy.      I have asked the patient that if they were to move to be certain they see someone with expertise in PAH. These providers can be located at [email or web address].      2. Nonischemic cardiomyopathy.  NYHA stage C; functional class II.  Today, he is euvolemic and perfused.  Clinical trajectory was reviewed today and is improving.      His PYP was grade 1 last year, equivocal.  No evidence of monoclonality.  ATTR genetics negative. Most recent CMRI with LGE but this was mid myocardial and somewhat diffuse.  No other issues with T1 and T2 signal intensity.  No elevated T2.  No evidence of right atrial dilatation or thickening.  His CMRI is not very classic for cardiac amyloid, though this could be detected early.  I want to make sure that he does not have cardiac sarcoidosis.    Today, we discussed arranging F-FDG PET/CT at [facility] and seeking opinion of Dr. Bartlett.  I would like to exclude cardiac sarcoid, before we label him as ATTR and treat this.  Before we order the PET/CT, I will send his CMRI images to [facility] for an opinion, as well.     HF Medical/Device Therapy:    Changes today:  Changed to lisinopril    HF-Specific  Therapy:      HF-Specific  Class Rx Dose Recommended Monitoring/Instructions   Beta-Blocker  already has   change  Metoprolol Succinate 100mg daily -Monitor heart rate.  Please call for heart rate <50, dizziness, lightheadedness, syncope.   A/A/ARNi  already has      lisinopril (Prinivil)     5 mgdaily     -Occasional monitoring of Chem-7 has been recommended  -Call for new cough   MRA  Indicated    Reasons Not given: DEFERRED to next appt      SGLT2: Not Rx due to concerns for ATTR-CM            Nutrition:  no nutritional compromise at this time:              Lifestyle Advice:   - call office if I gain more than 2 pounds in one day or 5 pounds in one week   - keep legs up while sitting   - use salt in moderation   - watch for swelling in feet, ankles and legs every day   - weigh myself daily   -call for concerning s/sx   -- barriers to activities addressed  - daily activity/exercise promoted     Vaccines:  -Seasonal influenza vaccination was recommended.   COVID vaccination was recommended.    Pneumococcal polysaccharide vaccine (PPSV23)vaccination was recommended.      Managed Risks of Exacerbation:  -- counseling with pharmacist provided       CODE STATUS: FULL                  3. Chronic right-sided heart failure.  As above.   4. S/P placement of cardiac pacemaker.  Follow-up with EP.   5. VENESSA (obstructive sleep apnea).  CPAP.  Follow-up with sleep medicine.         ORDERS PLACED TODAY:  No orders of the defined types were placed in this encounter.         MEDS ORDERED TODAY:    New Medications Ordered This Visit   Medications    lisinopril (PRINIVIL,ZESTRIL) 5 MG tablet     Sig: Take 1 tablet by mouth Daily.     Dispense:  30 tablet     Refill:  0        MEDS DISCONTINUED TODAY:    Medications Discontinued During This Encounter   Medication Reason    quinapril (ACCUPRIL) 40 MG tablet                 Return in about 6 weeks (around 11/1/2023).          This document has been electronically signed by Trung Cervantes MD PhD on September 20, 2023 10:31 EDT        Trung Cervantes M.D., Ph.D., [facility] Heart Failure and Pulmonary Hypertension Clinic  System Medical Director for HF and PAH

## 2023-09-20 NOTE — ADDENDUM NOTE
Encounter addended by: Trung Cervantes MD PhD on: 9/20/2023 11:01 AM   Actions taken: Clinical Note Signed

## 2023-09-20 NOTE — PROGRESS NOTES
[facility] Heart Failure Clinic      Patient Name: Shashi Engel  :1960  Age: 62 y.o.  Sex: male  Referring Provider: Keya Tong MD   Primary Cardiologist: Dr. Vanegas  Encounter Provider: María Delacruz YASMEEN      Chief Complaint:   No chief complaint on file.      HPI:  Patient presents for their initial visit. 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 Patient presented to the ER with SOB on 23 where he was diuresed with IV Bumex then transitioned to oral. He was discharged on previous home medications. His PYP in 2022 was equivocal, but his MRI in 2023 was consistent with an infiltrative disease. Today, he is not reporting any symptoms. Mentioned he has not been able to  quinapril from pharmacy in a few months due to supply problems with pharmacy. Ruling out amyloid vs. Sarcoid through PET scan at [facility].     Current Regimen:  Heart Failure  Bumex 2 mg BID  Metoprolol Succinate 100 mg daily   Quinapril 40 mg (2 tablets) daily    Other CV Meds  Xarelto 20 mg daily  Atorvastatin 40 mg daily  Amlodipine 5 mg daily    Medication Use:  Adherence: Good. Missed 1-2 doses in the last week. Adherence tools used include: pillbox. Barriers for adherence include: none  Past hx of medication use/intolerance: spironolactone (changed to chlorthalidone), furosemide (changed to Bumex)  Affordability: Traditional Medicare    Social History:  Tobacco use: former smoker (cigars)  EtOH use: none  Illicit drug use: previously used marijuana  Exercise: has not been as active recently    Immunization Status:  Pneumococcal: due for PCV 20; had PPSV23 on 2/15/2007  Influenza: Due for   COVID-19: last dose on 2021    OBJECTIVE:    There were no vitals taken for this visit.    There is no height or weight on file to calculate BMI.  Wt Readings from Last 1 Encounters:   23 131 kg (289 lb)     Home BP Readings: He has a cuff at  home     Lab Review:  Renal Function: Estimated Creatinine Clearance: 138.2 mL/min (by C-G formula based on SCr of 0.81 mg/dL).    Lab Results   Component Value Date    PROBNP 464.0 08/20/2023       Results for orders placed during the hospital encounter of 08/20/23    Adult Transthoracic Echo Complete W/ Cont if Necessary Per Protocol    Interpretation Summary    Left ventricular systolic function is moderately decreased. Calculated left ventricular EF = 31.3% Septal wall motion is abnormal, consistent with right ventricular pacing. Abnormal global longitudinal LV strain (GLS) = -10.1%. Left ventricle strain data was reviewed by the physician and found to be accurate. Apical sparing is not evident. This suggest that cardiomyopathy may not be due to amyloid heart disease though it does not rule out other infiltrative diseases. The left ventricular cavity is moderately dilated. Left ventricular wall thickness is consistent with moderate concentric hypertrophy. There is left ventricular global hypokinesis noted. The findings are consistent with infiltrative cardiomyopathy. Left ventricular diastolic function was indeterminate. Elevated left atrial pressure.    : The right ventricular cavity is moderately dilated. Severely reduced right ventricular systolic function noted. Electronic lead present in the ventricle.    Left atrial volume is severely increased.    : The right atrial cavity is severely dilated.  An electronic lead is present in the right atrium.    The aortic valve is abnormal in structure. There is moderate calcification of the aortic valve mainly affecting the non-coronary and left coronary cusp(s).    Mild mitral valve regurgitation is present.    Mild tricuspid valve regurgitation is present. Estimated right ventricular systolic pressure from tricuspid regurgitation is moderately elevated (45-55 mmHg). Calculated right ventricular systolic pressure from tricuspid regurgitation is 54 mmHg.    Compared  to prior study of 9/20/2022 left ventricular ejection fraction has decreased.  Much regurgitation has improved.  RVSP is lower but assessment of pulmonary hypertension is confounded by RV dysfunction which is severe.      ASSESSMENT/PLAN OF CARE:    Cardiac Amyloidosis   Patient to obtain PET scan to determine amyloid vs. Sarcoid potential.   2. 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   HF Beta-blocker: Continue  metoprolol succinate 100 mg daily.  Goal HR 50s-60s. HR at goal.   ACEi/ARB/ARNI: Change  Quinapril  40 mg (2 tablets) daily to lisinopril 5 mg daily. Patient has not been taking quinapril for months due to supply issues at pharmacy. Would look to transition to Entresto 49/51 mg BID at a future visit. Copay was $87 when a test claim was run. Also have a one month free trial to use if prescribed in the future.   Diuretics: Continue bumetanide 2 mg BID.  SGLT2i:  can consider  Farxiga 10 mg daily. Reviewed MOA/dosing/side effects. Ran a test claim and the copay will be $47/month. Also have a one month free trial to use if prescribed in the future.   MRA:  can consider  spironolactone 25 mg daily at a future visit.  Ivabradine: Not indicated.  Vericiguat: Not indicated.  Hydralazine/ISDN: Not indicated.   Advised patient to call clinic with 2-3 lb weight gain in 24 hrs or >5 lb weight gain in 1 week  Reviewed diet recommendations including limiting sodium intake to <2000 mg/day. Discussed reading nutrition labels and reviewed the [place].  Discussed exercise recommendations including 150 minutes of moderate exercise per week      Thank you for allowing me to participate in the care of your patient,    María Delacruz, [facility] Heart Failure Clinic  09/20/23  09:37 EDT

## 2023-10-06 NOTE — TELEPHONE ENCOUNTER
FYI:     After reviewing most recent remote, pt had another NSVT event that occurred on 9/28/23 and lasted for 18 beats with an average v.rate of 171 bpm.             Pt has an appointment scheduled with DENNIS Hammond on 10/11/23

## 2023-10-11 ENCOUNTER — OFFICE VISIT (OUTPATIENT)
Dept: CARDIOLOGY | Facility: CLINIC | Age: 63
End: 2023-10-11
Payer: MEDICARE

## 2023-10-11 VITALS
SYSTOLIC BLOOD PRESSURE: 140 MMHG | HEART RATE: 60 BPM | OXYGEN SATURATION: 98 % | DIASTOLIC BLOOD PRESSURE: 90 MMHG | HEIGHT: 74 IN | BODY MASS INDEX: 37.22 KG/M2 | WEIGHT: 290 LBS

## 2023-10-11 DIAGNOSIS — E11.9 DIABETES MELLITUS TYPE 2, NONINSULIN DEPENDENT: ICD-10-CM

## 2023-10-11 DIAGNOSIS — I42.8 INFILTRATIVE CARDIOMYOPATHY: ICD-10-CM

## 2023-10-11 DIAGNOSIS — Z95.0 S/P PLACEMENT OF CARDIAC PACEMAKER: ICD-10-CM

## 2023-10-11 DIAGNOSIS — I47.29 NONSUSTAINED VENTRICULAR TACHYCARDIA: ICD-10-CM

## 2023-10-11 DIAGNOSIS — I11.9 HYPERTENSIVE LEFT VENTRICULAR HYPERTROPHY, WITHOUT HEART FAILURE: ICD-10-CM

## 2023-10-11 DIAGNOSIS — M79.89 LEG SWELLING: ICD-10-CM

## 2023-10-11 DIAGNOSIS — R06.09 EXERTIONAL DYSPNEA: ICD-10-CM

## 2023-10-11 DIAGNOSIS — I48.0 PAF (PAROXYSMAL ATRIAL FIBRILLATION): ICD-10-CM

## 2023-10-11 DIAGNOSIS — I49.5 SSS (SICK SINUS SYNDROME): ICD-10-CM

## 2023-10-11 DIAGNOSIS — I10 ESSENTIAL HYPERTENSION: ICD-10-CM

## 2023-10-11 DIAGNOSIS — I34.0 NONRHEUMATIC MITRAL VALVE REGURGITATION: ICD-10-CM

## 2023-10-11 DIAGNOSIS — G47.33 OSA (OBSTRUCTIVE SLEEP APNEA): ICD-10-CM

## 2023-10-11 DIAGNOSIS — I27.20 PULMONARY HYPERTENSION: ICD-10-CM

## 2023-10-11 DIAGNOSIS — I36.1 NONRHEUMATIC TRICUSPID VALVE REGURGITATION: Primary | ICD-10-CM

## 2023-10-11 DIAGNOSIS — E78.2 MIXED HYPERLIPIDEMIA: ICD-10-CM

## 2023-10-11 DIAGNOSIS — I50.812 CHRONIC RIGHT-SIDED HEART FAILURE: ICD-10-CM

## 2023-10-11 NOTE — PROGRESS NOTES
[facility] CARDIOLOGY  [address]  [facility] [address]  [facility] [address]  Phone: [phone]  Fax: [phone]      Patient Name: Shashi Engel  :1960  Age: 62 y.o.  Primary Cardiologist: Jennie Vanegas MD  Encounter Provider:  DENNIS Gallegos      Chief Complaint     Chief Complaint: No chief complaint on file.  Fatigue and dyspnea with exertion    SUBJECTIVE     History of Present Illness:  Shashi Engel is a 62 y.o. black male whose medical history includes type 2 diabetes, hypertension, hyperlipidemia and VENESSA/CPAP (he follows with Dr. Dias).  He is followed in our office by Dr. Vanegas for recurrent syncope, SVT, VT, PAF, pulmonary hypertension, likely infiltrative process and mildly reduced RV systolic function.     10/11/23 Follow-up:  He is here for Hospital follow-up.  He is device interrogation has shown that he has been having more nonsustained VT and his metoprolol was increased.  1 week he did have an episode of nonsustained VT but was feeling fine however a few days later he started to feel very fatigued and went to the hospital.  His initial echo showed decreased EF but cardiac MRI showed stable EF and likely infiltrative cardiomyopathy.  He was also noted to be pacing about 85% of the time.  He has seen EP as an outpatient and they have made changes to his device settings; no plans to upgrade his device at this time given his stable EF.  He is following with Dr. Cervantes as an outpatient and has been referred to [facility] for evaluation for infiltrative cardiomyopathy.  His biggest complaint is fatigue.  He was feeling pretty good the month prior to admission but now he feels he cannot walk as far as he used to.  He still gets short of breath with walking distances and walking upstairs.  He denies chest pain, palpitations, or orthopnea.  His leg swelling is stable on 2 mg bumetanide twice daily.  His blood pressure at home runs 110s to 130s.   He is taking his medications as prescribed.    September 2023 device interrogation showed normal function, 9 years of battery life, the presenting rhythm was V pacing with heart rate 60s and PVCs.    Below is a summary of pertinent cardiology findings:  February 2012 stress nuclear perfusion study showed no evidence of ischemia.  Paroxysmal atrial fibrillation: May 2013 [facility] evaluation for dyspnea.  Echocardiogram showed severe concentric LV hypertrophy, EF 66%, mild to moderate mitral insufficiency, mild to moderate tricuspid insufficiency mild elevation of RV systolic pressure.  May 2013 24-hour Holter monitor showed A. fib with average heart rate 70 bpm.  [id number] score is 2.  December 2017 stress echocardiogram for dyspnea and decreased exercise tolerance showed EF 50%, severe concentric LV hypertrophy, severe left atrial enlargement, RVSP 46 mmHg, mildly reduced RV systolic function, but no evidence of ischemia.  December 2017 12-day heart monitor showed atrial fibrillation with bursts of tachycardia.  Recurrent syncope: 2017 evaluated at [facility]; testing showed grossly intact autonomic function.  His symptoms of fatigue and syncope were attributed to atrial fibrillation.  Is also been documented to occur with drops in blood pressure and bursts of atrial fibrillation with RVR.  December 2017 he had normal serum protein electrophoresis.  Pacemaker: May 2022 generator change.   June 2022 device interrogation showed 3 nonsustained runs of VT lasting from 9-21 beats.  He had COVID in August 2022 but did not require hospitalization.  Severe pulmonary hypertension/severe tricuspid regurgitation/mitral regurgitation: Echocardiogram September 2022 shows EF 51%, severe tricuspid regurgitation, moderate to severe mitral regurgitation with eccentric jet noted, severe biatrial enlargement, moderately dilated RV cavity, severe pulmonary hypertension with RVSP 65 mmHg; findings consistent with  infiltrative cardiomyopathy.  October 2022 RAKESH shows EF 50%, moderate concentric LV hypertrophy, hypokinesis of the apical inferior and apical septal walls.  Mildly reduced RV systolic function, moderate mitral valve regurgitation, mild to moderate tricuspid valve regurgitation, RVSP 44 mmHg, negative saline test results, severely dilated right atrial cavity, and severely increased volume of the left atrium.  His valves and pulmonary hypertension were better compared to September 2022 2D echocardiogram.  November 2022 cardiac PYP study showed equivocal results that could represent AL amyloid or early ATTR cardiac amyloid.  September 2022 serum JOSE ALFREDO showed no monoclonality, 24-hour urine showed no M spike, but 24-hour urine showed mildly increased total protein at 340 g per 24 hours.  He was referred to nephrology.  August 2023 he presented to the emergency room with dyspnea and tachycardia.  Device interrogation showed an episode of tachycardia with 171 bpm prior to admission.  Echocardiogram showed EF 31% (down from 50), GLS -10.1%, moderately dilated LV cavity, moderately dilated RV cavity with severely reduced RV systolic function.  He had cardiac MRI which showed EF similar to previous (48%), normal myocardial perfusion, normal RV size with some mild hypokinesis of the RV ventricle.  EP was consulted as he RV paces 85% of the time; he is going to follow-up as an outpatient.  September 2023 he was seen in the heart failure clinic by Dr. Cervantes for pulmonary artery hypertension and cardiac MRI consistent with infiltrative process; Toprol tartrate was stopped and transition to 100 mg metoprolol succinate daily.  At follow-up appointment he was referred to Dr. Bartlett at [facility] for further evaluation of infiltrative cardiomyopathy.    Past Medical History:   Diagnosis Date    Abnormal electrocardiogram     Anxiety     Cardiomyopathy, hypertrophic, primary familial     Erectile dysfunction     Health care  maintenance     Hyperlipidemia     Hypertension     Mild concentric left ventricular hypertrophy (LVH)     Mild mitral regurgitation     Mild tricuspid regurgitation     Near syncope     Noncompliance     Nonsustained ventricular tachycardia 09/05/2018    Obesity     VENESSA (obstructive sleep apnea)     PAF (paroxysmal atrial fibrillation)     Pneumonia 01/01/2016    Type 2 diabetes mellitus        Past Surgical History:  Right knee arthroscopy    Social History     Socioeconomic History    Marital status:    Tobacco Use    Smoking status: Former     Types: Cigars     Passive exposure: Past    Smokeless tobacco: Never    Tobacco comments:     NO caffeine use    Vaping Use    Vaping Use: Never used   Substance and Sexual Activity    Alcohol use: No    Drug use: Not Currently     Types: Marijuana     Comment: out of cigars in the remote past    Sexual activity: Yes     Partners: Female         Review of Systems     Review of Systems   Constitutional: Positive for malaise/fatigue. Negative for weight loss.   Cardiovascular:  Positive for dyspnea on exertion. Negative for chest pain, claudication, cyanosis, irregular heartbeat, leg swelling, near-syncope, orthopnea, palpitations, paroxysmal nocturnal dyspnea and syncope.       Medications     Allergies as of 10/11/2023    (No Known Allergies)       Current Outpatient Medications on File Prior to Visit   Medication Sig    amLODIPine (NORVASC) 5 MG tablet TAKE 1 TABLET BY MOUTH EVERY DAY    atorvastatin (LIPITOR) 40 MG tablet TAKE 1 TABLET BY MOUTH EVERY DAY    bumetanide (BUMEX) 2 MG tablet Take 1 tablet by mouth 2 (Two) Times a Day.    cholecalciferol (VITAMIN D3) 25 MCG (1000 UT) tablet Take 1 tablet by mouth Daily.    [employer] Lancets Original [facility] Use as directed and appropo lancet for his monitor    lisinopril (PRINIVIL,ZESTRIL) 5 MG tablet Take 1 tablet by mouth Daily.    metFORMIN (GLUCOPHAGE) 1000 MG tablet TAKE 1 TABLET BY MOUTH 2 (TWO) TIMES A DAY WITH MEALS.  "INDICATIONS: TYPE 2 DIABETES, NOTED    metoprolol succinate XL (Toprol XL) 100 MG 24 hr tablet Take 1 tablet by mouth Daily.    potassium chloride 10 MEQ CR tablet TAKE 1 TABLET BY MOUTH EVERY DAY    tadalafil (CIALIS) 20 MG tablet Take 1 tablet by mouth Daily As Needed for Erectile Dysfunction.    vitamin B-12 (CYANOCOBALAMIN) 1000 MCG tablet Take 1 tablet by mouth Daily.    Xarelto 20 MG tablet TAKE 1 TABLET BY MOUTH EVERY DAY WITH DINNER     No current facility-administered medications on file prior to visit.          OBJECTIVE     Vital Signs:   /90   Pulse 60   Ht 188 cm (74\")   Wt 132 kg (290 lb)   SpO2 98%   BMI 37.23 kg/mý       Weight:  Wt Readings from Last 3 Encounters:   10/11/23 132 kg (290 lb)   23 135 kg (297 lb)   23 131 kg (289 lb)     Body mass index is 37.23 kg/mý.      Physical Exam     Physical Exam  Constitutional:       General: He is not in acute distress.  HENT:      Head: Normocephalic and atraumatic.      Mouth/Throat:      Mouth: Mucous membranes are moist.   Eyes:      General: No scleral icterus.     Extraocular Movements: Extraocular movements intact.      Conjunctiva/sclera: Conjunctivae normal.      Pupils: Pupils are equal, round, and reactive to light.   Cardiovascular:      Rate and Rhythm: Normal rate and regular rhythm.      Pulses: Normal pulses.      Heart sounds: S1 normal and S2 normal. Murmur heard.   Pulmonary:      Effort: No respiratory distress.      Breath sounds: Decreased breath sounds present. No wheezing, rhonchi or rales.   Abdominal:      General: Bowel sounds are normal. There is no distension.      Palpations: Abdomen is soft.      Tenderness: There is no abdominal tenderness.   Musculoskeletal:         General: Normal range of motion.      Cervical back: Normal range of motion and neck supple.      Right lower le+ Pitting Edema present.      Left lower le+ Pitting Edema present.   Skin:     General: Skin is warm and dry.      " Coloration: Skin is not jaundiced.   Neurological:      Mental Status: He is alert and oriented to person, place, and time.   Psychiatric:         Mood and Affect: Mood normal.           Reviewed Data     Result Review :  The following data was reviewed by DENNIS Gallegos on 10/11/23:  Labs 06/09/2022:  cr 0.8, K 4.1, otherwise unremarkable CMP, Hgb 13.5, Plt 281, Chol 146, HDL 45, LDL 88, Trig 67, hemoglobin A1c 7.1  Labs 09/28/2022: TSH 1.770, serum JOSE ALFREDO shows no monoclonality, 24-hour urine JOSE ALFREDO shows no monoclonality, 24-hour urine protein 340 mg/day  Labs 11/23/2022: cr 0.9, K3.4, T bili 2.9, otherwise unremarkable CMP Hgb 12.7,   08/20/2023:  cr 0.8, K 3.7, AST 47, ALT 48, T. Edenilson 1.3, proBNP 464, stable troponin, HgbA1c 6.8, Chol 159, HDL 53, LDL 94, Trig 60, Hgb 13.6,       ECG 12 Lead    Date/Time: 10/11/2023 9:48 AM  Performed by: Tamara Ibarra APRN    Authorized by: Tamara Ibarra APRN  Comparison: compared with previous ECG from 8/20/2023  Comparison to previous ECG: Accelerated junctional   Conduction: non-specific intraventricular conduction delay  Other findings: left ventricular hypertrophy    Clinical impression: abnormal EKG          Assessment and Plan        Assessment and Plan     Assessment:  1. Nonrheumatic tricuspid valve regurgitation    2. Nonrheumatic mitral valve regurgitation    3. Infiltrative cardiomyopathy    4. Pulmonary hypertension    5. PAF (paroxysmal atrial fibrillation)    6. SSS (sick sinus syndrome)    7. S/P placement of cardiac pacemaker    8. Nonsustained ventricular tachycardia    9. Hypertensive left ventricular hypertrophy, without heart failure    10. Chronic right-sided heart failure    11. Essential hypertension    12. Mixed hyperlipidemia    13. Diabetes mellitus type 2, noninsulin dependent    14. VENESSA (obstructive sleep apnea)    15. Leg swelling    16. Exertional dyspnea           Likely infiltrative  cardiomyopathy: Previous PYP study in November 2022 was equivocal but cardiac MRI in August 2023 was suggestive of infiltrative process with EF in the 50s.  He is following with Dr. Cevrantes and has been referred to [facility] for further evaluation.  Tricuspid regurgitation: New finding of severe tricuspid valve regurgitation noted on echocardiogram from September 2022.  He also has severe pulmonary hypertension with RVSP 62 mmHg.  Tricuspid valve regurgitation was mild to moderate on RAKESH in October 2022; mild on echo in August 2023.  Mitral valve regurgitation: New finding of moderate mitral valve regurgitation noted on echocardiogram from September 2022.  Was also moderate on October 2022 RAKESH, mild on echo in August 2023.  Pulmonary hypertension: This is graded as severe on echocardiogram from September 2022.  This was graded as moderate on October 2022 RAKESH.  RVSP 54 mmHg on August 2023 echocardiogram.  His dyspnea is improved on 2 mg Bumex twice daily.  Paroxysmal atrial fibrillation: His [id number] score is 4; he is anticoagulated with Xarelto.  Last Holter monitor in December 2017 showed mostly rate controlled atrial fibrillation though with bursts of tachycardia.  He has had episodes of recurrent syncope felt to be related to bursts of A. fib with RVR.  No rate control issues identified on device check in July 2022.  No evidence of A-fib on September 2023 device interrogation.  Sick sinus syndrome: He has pacemaker for bouts of recurrent syncope/sick sinus syndrome; originally placed in June 2016.  He had a generator change in May 2022.  Normal device function noted on July 2022 interrogation.  Normal pacing function per EKG today; last device check in July 2022.  In August 2023 he was noted to pace about 85% of the time; his device settings were changed in September 2023.  Nonsustained ventricular tachycardia: 3 nonsustained runs of VT lasting from 9-21 beats noted on device interrogation June 2022.  July  2022 device interrogation showed 1 episode of NSVT lasting 13 beats.  August 2023 he had another run of nonsustained VT; he is now on 100 mg metoprolol succinate daily.  Hypertensive LV hypertrophy: Stress echocardiogram December 2017 showed severe concentric LV hypertrophy with EF 50%.  Findings on echocardiogram from September 2022 are consistent with infiltrative cardiomyopathy.  November 2022 PYP study was equivocal for amyloidosis; September 2022 serum and 24-hour urine JOSE ALFREDO were negative for monoclonality.  Cardiac MRI suggestive of infiltrative process.  Mildly reduced RV systolic function: Noted on echocardiogram from May 2013 and December 2017.  Severely reduced in August 2023.  Hypertension: Controlled; will not add spironolactone at this time given his recurrent syncope.  Hyperlipidemia: Lipids August 2023 were at goal; he is treated with 40 mg atorvastatin daily.   Type 2 diabetes: Hemoglobin A1c was at goal in August 2023.  Obstructive sleep apnea: He remains compliant with CPAP.  Dyspnea with exertion: Overall he states this is stable and he is  Leg swelling: Controlled on 2 mg bumetanide twice daily..  Proteinuria: September 20 20 to 24-hour urine protein showed 140 mg/day.  He is on 5 mg lisinopril daily.    Plan:  Mr. Arreola is a patient of Dr. Vanegas's paroxysmal atrial fibrillation, sick sinus syndrome with pacemaker implant, recurrent syncope, mitral valve regurgitation, tricuspid valve regurgitation, and pulmonary hypertension.  His 2D echo in September 2022 showed worsening mitral and tricuspid valve regurgitation with severe pulmonary hypertension; valve regurgitation and pulmonary hypertension were better on RAKESH in October 2022.  He also had normal serum and 24-hour urine JOSE ALFREDO studies in October 2022; he was referred to nephrology for mildly elevated 24-hour urine protein.  He had PYP studies in November 2022 which were equivocal for amyloidosis.  He has been having more episodes of  nonsustained VT on device check and is pacing 85% of the time; his device settings have been changed by EP.  Cardiac MRI in August 2023 was suggestive of an infiltrative process with EF about 52%.  He is seeing Dr. Cervantes and has been referred to [facility] for further evaluation.  I will make no medication changes today.  I will have him see me in 3 months and Dr. Vanegas in 6 months.      Follow Up:  Return for 3 months Yvette; 6 months with Dr. Vanegas.  Orders Placed This Encounter   Procedures    ECG 12 Lead      No orders of the defined types were placed in this encounter.        Thank you the opportunity to participate in this patient's care.    DENNIS Mireles    This office note has been dictated.

## 2023-10-17 RX ORDER — LISINOPRIL 5 MG/1
5 TABLET ORAL DAILY
Qty: 30 TABLET | Refills: 0 | Status: SHIPPED | OUTPATIENT
Start: 2023-10-17

## 2023-10-19 NOTE — PROGRESS NOTES
Date of Office Visit: 2023  Encounter Provider: Emiliano Brady MD  Place of Service: [facility] CARDIOLOGY  Patient Name: Shashi Engel  :1960    Chief Complaint  Heart Failure  ICD in place, single chamber      HPI: Shashi Engel is a 62 y.o. male who presents today for consideration of upgrade to biventricular pacing.     Patient was seen during recent hospitalization for acute heart failure.     It seems that he had a high degree of pacing with associated decline in EF. 30% by echo    We where considering upgrade to CRT, CSP.  He is in atrial fibrillation 100% of the time.     He had a cardiac MRI which demonstrated EF of 48%    Since discharge from the hospital, he has done well stable.           Past Medical History:   Diagnosis Date    Abnormal electrocardiogram     Anxiety     Cardiomyopathy, hypertrophic, primary familial     Erectile dysfunction     Health care maintenance     Hyperlipidemia     Hypertension     Mild concentric left ventricular hypertrophy (LVH)     Mild mitral regurgitation     Mild tricuspid regurgitation     Near syncope     Noncompliance     Nonsustained ventricular tachycardia 2018    Obesity     VENESSA (obstructive sleep apnea)     uses CPAP faithfully    PAF (paroxysmal atrial fibrillation)     Pneumonia 2016    Type 2 diabetes mellitus        Past Surgical History:   Procedure Laterality Date    CARDIAC CATHETERIZATION N/A 2022    Procedure: RIGHT HEART CATH;  Surgeon: Anjel Beasley MD;  Location: [facility] INVASIVE LOCATION;  Service: Cardiovascular;  Laterality: N/A;    CARDIAC CATHETERIZATION N/A 2022    Procedure: Coronary angiography;  Surgeon: Anjel Beasley MD;  [facility];  Service: Cardiovascular;  Laterality: N/A;    CARDIAC ELECTROPHYSIOLOGY PROCEDURE Left 2016    Procedure: Pacemaker DC new  [place];  Surgeon: Juan Chacon MD;  Location: [facility] CATH  INVASIVE LOCATION;  Service:     CARDIAC ELECTROPHYSIOLOGY PROCEDURE N/A 05/09/2022    Procedure: PPM generator change - dual- [place];  Surgeon: Juan Chacon MD;  Location: [facility] CATH INVASIVE LOCATION;  Service: Cardiology;  Laterality: N/A;    ENDOSCOPY N/A 10/19/2022    Procedure: ESOPHAGOGASTRODUODENOSCOPY WITH BX;  Surgeon: Anjel Carney MD;  Location: [facility] ENDOSCOPY;  Service: Gastroenterology;  Laterality: N/A;  PREOP/ DYSPEPSIA  POSTOP/ HIATAL HERNIA, DUODENITIS, GASTRITIS    ENDOSCOPY      INSERT / REPLACE / REMOVE PACEMAKER      KNEE ARTHROSCOPY Right        Social History     Socioeconomic History    Marital status:    Tobacco Use    Smoking status: Former     Types: Cigars     Passive exposure: Past    Smokeless tobacco: Never    Tobacco comments:     NO caffeine use    Vaping Use    Vaping Use: Never used   Substance and Sexual Activity    Alcohol use: No    Drug use: Not Currently     Types: Marijuana     Comment: out of cigars in the remote past    Sexual activity: Yes     Partners: Female       Family History   Problem Relation Age of Onset    Depression Mother     Hypertension Mother     Heart disease Mother     Atrial fibrillation Sister     Hypertension Sister     Heart disease Sister     Hypertension Sister     Heart disease Sister     Hypertension Brother     Kidney disease Other     Sleep apnea Other     Diabetes Neg Hx     Breast cancer Neg Hx        Review of Systems   Constitutional: Negative.   Cardiovascular: Negative.    Respiratory: Negative.     Gastrointestinal: Negative.        No Known Allergies      Current Outpatient Medications:     amLODIPine (NORVASC) 5 MG tablet, TAKE 1 TABLET BY MOUTH EVERY DAY, Disp: 15 tablet, Rfl: 0    atorvastatin (LIPITOR) 40 MG tablet, TAKE 1 TABLET BY MOUTH EVERY DAY, Disp: 90 tablet, Rfl: 3    bumetanide (BUMEX) 2 MG tablet, Take 1 tablet by mouth 2 (Two) Times a Day., Disp: 60 tablet, Rfl: 11    cholecalciferol (VITAMIN D3)  "25 MCG (1000 UT) tablet, Take 1 tablet by mouth Daily., Disp: , Rfl:     [employer] Lancets Original [facility], Use as directed and appropo lancet for his monitor, Disp: 100 each, Rfl: 11    metFORMIN (GLUCOPHAGE) 1000 MG tablet, TAKE 1 TABLET BY MOUTH 2 (TWO) TIMES A DAY WITH MEALS. INDICATIONS: TYPE 2 DIABETES, NOTED, Disp: 30 tablet, Rfl: 0    metoprolol succinate XL (Toprol XL) 100 MG 24 hr tablet, Take 1 tablet by mouth Daily., Disp: 30 tablet, Rfl: 3    potassium chloride 10 MEQ CR tablet, TAKE 1 TABLET BY MOUTH EVERY DAY, Disp: 90 tablet, Rfl: 3    tadalafil (CIALIS) 20 MG tablet, Take 1 tablet by mouth Daily As Needed for Erectile Dysfunction., Disp: 10 tablet, Rfl: 1    vitamin B-12 (CYANOCOBALAMIN) 1000 MCG tablet, Take 1 tablet by mouth Daily., Disp: , Rfl:     Xarelto 20 MG tablet, TAKE 1 TABLET BY MOUTH EVERY DAY WITH DINNER, Disp: 90 tablet, Rfl: 2    lisinopril (PRINIVIL,ZESTRIL) 5 MG tablet, Take 1 tablet by mouth Daily., Disp: 30 tablet, Rfl: 0      Objective:     Vitals:    09/12/23 1019   BP: 140/70   BP Location: Right arm   Pulse: 60   Weight: 131 kg (289 lb)   Height: 188 cm (74\")     Body mass index is 37.11 kg/m².    PHYSICAL EXAM:    Vitals and nursing note reviewed.   Constitutional:       Appearance: Well-developed and not in distress.   Eyes:      General:         Right eye: No discharge.         Left eye: No discharge.      Conjunctiva/sclera: Conjunctivae normal.   HENT:      Head: Normocephalic and atraumatic.   Neck:      Vascular: No JVD.   Pulmonary:      Effort: Pulmonary effort is normal. No respiratory distress.   Cardiovascular:      Normal rate.   Edema:     Peripheral edema present.     Feet: bilateral 1+ edema of the feet.  Abdominal:      General: There is no distension.      Tenderness: There is no abdominal tenderness.   Musculoskeletal: Normal range of motion.      Cervical back: Neck supple. Neurological:      General: No focal deficit present.      Mental Status: Alert.      " Cranial Nerves: No cranial nerve deficit.   Psychiatric:         Attention and Perception: Attention and perception normal.         Mood and Affect: Mood and affect normal.         Behavior: Behavior is cooperative.             ECG 12 Lead    Date/Time: 9/12/2023 12:06 PM  Performed by: Emiliano Brady MD    Authorized by: Emiliano Brady MD  Comparison: compared with previous ECG from 8/22/2023  Rhythm: atrial fibrillation and paced        MRI with delayed enhancement, EF of 48%      Assessment:       Diagnosis Plan   1. DCM (dilated cardiomyopathy)        2. Class 2 severe obesity due to excess calories with serious comorbidity and body mass index (BMI) of 38.0 to 38.9 in adult               Plan:       Given EF of 48 % on MRI, don't feel that he has an indication for upgrade to CRT    If EF is seen to decline, I would strongly consider it.     As always, it has been a pleasure to participate in your patient's care.      Sincerely,         Emiliano Brady MD

## 2023-11-03 ENCOUNTER — TELEPHONE (OUTPATIENT)
Dept: CARDIOLOGY | Facility: CLINIC | Age: 63
End: 2023-11-03
Payer: MEDICARE

## 2023-11-03 NOTE — TELEPHONE ENCOUNTER
Caller: Shashi Engel    Relationship: Self    Best call back number:[phone]    What is the best time to reach you: ANY    Who are you requesting to speak with (clinical staff, provider,  specific staff member): ANY        What was the call regarding:PATIENT WOULD LIKE A CALL WHEN SAMPLES CAN BE READY

## 2023-11-03 NOTE — TELEPHONE ENCOUNTER
Caller: SUSHMA FOFANAELL    Relationship:SELF    Callback number: [phone]   Is it ok to leave a message: [x] Yes [] No    Requested medication for samples: XARELTO 20 MG    How much medication does the patient currently have left: LAST PILL    Who will be picking up the samples: SUNDAY [employer] WIFE    Do you need information about patient financial assistance for this medication: [] Yes [x] No    Additional details provided: END OF THE YEAR MAKING INSURANCE DIFFERENT FOR THIS MED, WENT UP TO $500, CAN'T AFFORD THAT.

## 2023-11-09 RX ORDER — LISINOPRIL 5 MG/1
5 TABLET ORAL DAILY
Qty: 30 TABLET | Refills: 0 | Status: SHIPPED | OUTPATIENT
Start: 2023-11-09

## 2023-11-15 ENCOUNTER — HOSPITAL ENCOUNTER (OUTPATIENT)
Dept: CARDIOLOGY | Facility: HOSPITAL | Age: 63
Discharge: HOME | End: 2023-11-15
Admitting: INTERNAL MEDICINE
Payer: MEDICARE

## 2023-11-15 VITALS
WEIGHT: 300.4 LBS | DIASTOLIC BLOOD PRESSURE: 82 MMHG | HEIGHT: 74 IN | SYSTOLIC BLOOD PRESSURE: 122 MMHG | HEART RATE: 61 BPM | BODY MASS INDEX: 38.55 KG/M2 | OXYGEN SATURATION: 95 %

## 2023-11-15 DIAGNOSIS — I42.0 DCM (DILATED CARDIOMYOPATHY): ICD-10-CM

## 2023-11-15 DIAGNOSIS — G47.33 OSA (OBSTRUCTIVE SLEEP APNEA): ICD-10-CM

## 2023-11-15 DIAGNOSIS — Z95.0 S/P PLACEMENT OF CARDIAC PACEMAKER: ICD-10-CM

## 2023-11-15 DIAGNOSIS — I27.20 PULMONARY HYPERTENSION: Primary | ICD-10-CM

## 2023-11-15 DIAGNOSIS — I50.812 CHRONIC RIGHT-SIDED HEART FAILURE: ICD-10-CM

## 2023-11-15 PROCEDURE — G0463 HOSPITAL OUTPT CLINIC VISIT: HCPCS

## 2023-11-15 RX ORDER — SPIRONOLACTONE 25 MG/1
25 TABLET ORAL DAILY
Qty: 30 TABLET | Refills: 0 | Status: SHIPPED | OUTPATIENT
Start: 2023-11-15

## 2023-11-15 NOTE — LETTER
November 15, 2023       No Recipients    Patient: Shashi Engel   YOB: 1960   Date of Visit: 11/15/2023     Dear Keesha Kirkpatrick MD:       Thank you for referring Shashi Engel to me for evaluation. Below are the relevant portions of my assessment and plan of care.    If you have questions, please do not hesitate to call me. I look forward to following Shashi along with you.         Sincerely,        Trung Cervantes MD PhD        CC:   No Recipients    Trung Cervantes MD PhD  11/15/23 0919  Signed  [facility] Failure [facility]  [facility]  Trung Cervantes M.D., Ph.D., [facility]       Keya Tong MD  [address] [place] [address]    Thank you for asking me to see Shashi Engel.     History of Present Illness  This is a 62 y.o. male with:    1. PAH  A. RHC  Right Atrium: */12/8 mmHg  Right Ventricle: 41/10/10 mmHg  Pulmonary Artery: 41/18/26 mmHg  Pulmonary Wedge: N/A not good wedge tracing  Left Ventricle: 118/8/12 mmHg  Aorta: 121/71/94 mmHg  Cardiac Output/Index: 6.2 L/min 2.32 L/min/m2  PA Sat: 69 %  AO Sat: 93 %  Hb: 15.0  2. NICM  3. RHF      Shashi Engel is a 62 y.o. male who presents today.  The patient is typically seen by Keesha Kirkpatrick MD.  The patient's cardiologist is Dr. Vanegas.    The patient presents today for further evaluation and management of pulmonary arterial hypertension, NICM, and RHF.  Of note, the patient was recently admitted with ADHF.  2D TTE revealed depressed LVEF. CMRI with LGE.  Patient's PYP scan in 2022 was grade 2.  Current regimen includes Bumex at 2 mg 1 tablet p.o. twice daily, Lopressor 50 mg 1 tablet p.o. twice daily, Accupril 80 mg daily.    The patient returns to clinic today.  He continues to have exercise intolerance.  PYP was grade 1.  CMRI was consistent with an infiltrative process.  Today, denies orthopnea, PND, lower extremity edema, ascites, and early abdominal  satiety.  I did have him seen at [facility] for consideration for PET.  He has a follow-up with them today.    Review of Systems - Review of Systems   Cardiovascular:  Positive for dyspnea on exertion. Negative for chest pain, claudication and cyanosis.   Respiratory:  Positive for shortness of breath. Negative for cough and hemoptysis.    All other systems reviewed and are negative.        All other systems were reviewed and were negative.    Patient Active Problem List   Diagnosis   • Persistent atrial fibrillation   • Essential hypertension   • ED (erectile dysfunction) of organic origin   • HLD (hyperlipidemia)   • Hypertrophic polyarthritis   • Diabetes mellitus type 2, noninsulin dependent   • LVH (left ventricular hypertrophy) due to hypertensive disease   • SSS (sick sinus syndrome)   • Sleep related hypoxia   • Class 2 severe obesity due to excess calories with serious comorbidity and body mass index (BMI) of 38.0 to 38.9 in adult   • VENESSA (obstructive sleep apnea)   • Nonsustained ventricular tachycardia   • S/P placement of cardiac pacemaker   • Pulmonary hypertension   • Other proteinuria   • Nonrheumatic tricuspid valve regurgitation   • Nonrheumatic mitral valve regurgitation   • Exertional dyspnea   • DCM (dilated cardiomyopathy)   • Chronic right-sided heart failure   • Infiltrative cardiomyopathy       family history includes Atrial fibrillation in his sister; Depression in his mother; Heart disease in his mother, sister, and sister; Hypertension in his brother, mother, sister, and sister; Kidney disease in an other family member; Sleep apnea in an other family member.     reports that he has quit smoking. His smoking use included cigars. He has been exposed to tobacco smoke. He has never used smokeless tobacco. He reports that he does not currently use drugs after having used the following drugs: Marijuana. He reports that he does not drink alcohol.    No Known Allergies    Social Determinants of Health      Tobacco Use: Medium Risk (11/15/2023)    Patient History    • Smoking Tobacco Use: Former    • Smokeless Tobacco Use: Never    • Passive Exposure: Past   Alcohol Use: Not At Risk (8/20/2023)    AUDIT-C    • Frequency of Alcohol Consumption: Never    • Average Number of Drinks: Patient does not drink    • Frequency of Binge Drinking: Never   Financial Resource Strain: Not on file   Food Insecurity: No Food Insecurity (8/21/2023)    Hunger Vital Sign    • Worried About Running Out of Food in the Last Year: Never true    • Ran Out of Food in the Last Year: Never true   Transportation Needs: Not on file   Physical Activity: Not on file   Stress: Not on file   Social Connections: Unknown (10/10/2023)    Family and Community Support    • Help with Day-to-Day Activities: Not on file    • Lonely or Isolated: Not on file   Interpersonal Safety: Not At Risk (8/20/2023)    Abuse Screen    • Unsafe at Home or Work/School: no    • Feels Threatened by Someone?: no    • Does Anyone Keep You from Contacting Others or Doint Things Outside the Home?: no    • Physical Sign of Abuse Present: no   Depression: Not at risk (12/9/2022)    PHQ-2    • PHQ-2 Score: 0   Housing Stability: Not At Risk (8/21/2023)    Housing Stability    • Current Living Arrangements: home    • Potentially Unsafe Housing Conditions: none   Utilities: Not on file   Health Literacy: Unknown (8/21/2023)    Education    • Help with school or training?: Not on file    • Preferred Language: English   Employment: Unknown (10/10/2023)    Employment    • Do you want help finding or keeping work or a job?: Not on file   Disabilities: Not At Risk (8/20/2023)    Disabilities    • Concentrating, Remembering, or Making Decisions Difficulty: no    • Doing Errands Independently Difficulty: no        Physical Activity: Not on file          Current Outpatient Medications:   •  amLODIPine (NORVASC) 5 MG tablet, TAKE 1 TABLET BY MOUTH EVERY DAY, Disp: 15 tablet, Rfl: 0  •   atorvastatin (LIPITOR) 40 MG tablet, TAKE 1 TABLET BY MOUTH EVERY DAY, Disp: 90 tablet, Rfl: 3  •  bumetanide (BUMEX) 2 MG tablet, Take 1 tablet by mouth 2 (Two) Times a Day., Disp: 60 tablet, Rfl: 11  •  cholecalciferol (VITAMIN D3) 25 MCG (1000 UT) tablet, Take 1 tablet by mouth Daily., Disp: , Rfl:   •  [employer] Lancets Original [facility], Use as directed and appropo lancet for his monitor, Disp: 100 each, Rfl: 11  •  lisinopril (PRINIVIL,ZESTRIL) 5 MG tablet, TAKE 1 TABLET BY MOUTH EVERY DAY, Disp: 30 tablet, Rfl: 0  •  metFORMIN (GLUCOPHAGE) 1000 MG tablet, TAKE 1 TABLET BY MOUTH 2 (TWO) TIMES A DAY WITH MEALS. INDICATIONS: TYPE 2 DIABETES, NOTED, Disp: 30 tablet, Rfl: 0  •  metoprolol succinate XL (Toprol XL) 100 MG 24 hr tablet, Take 1 tablet by mouth Daily., Disp: 30 tablet, Rfl: 3  •  potassium chloride 10 MEQ CR tablet, TAKE 1 TABLET BY MOUTH EVERY DAY, Disp: 90 tablet, Rfl: 3  •  tadalafil (CIALIS) 20 MG tablet, Take 1 tablet by mouth Daily As Needed for Erectile Dysfunction., Disp: 10 tablet, Rfl: 1  •  vitamin B-12 (CYANOCOBALAMIN) 1000 MCG tablet, Take 1 tablet by mouth Daily., Disp: , Rfl:   •  Xarelto 20 MG tablet, TAKE 1 TABLET BY MOUTH EVERY DAY WITH DINNER, Disp: 90 tablet, Rfl: 2  •  spironolactone (ALDACTONE) 25 MG tablet, Take 1 tablet by mouth Daily., Disp: 30 tablet, Rfl: 0    Vital Sign Review:     Vitals:    11/15/23 0906   BP: 122/82   Pulse: 61   SpO2: 95%       PP:high  Pulse Ox: normal oxygenation on room air    Body mass index is 38.55 kg/m².      11/15/23  0906   Weight: (!) 136 kg (300 lb 6.4 oz)         Physical Exam:    Vitals reviewed.   Constitutional:       General: Not in acute distress.     Appearance: Healthy appearance. Not in distress. Obese. Not ill-appearing, toxic-appearing or diaphoretic.      Interventions: Not intubated.  Eyes:      Conjunctiva/sclera: Conjunctivae normal.      Pupils: Pupils are equal, round, and reactive to light.   Neck:      Vascular: No hepatojugular  reflux, JVD or JVR. JVD normal with 7 cm of water.   Pulmonary:      Effort: Pulmonary effort is normal. No tachypnea, bradypnea, accessory muscle usage, prolonged expiration, respiratory distress or retractions. Not intubated.      Breath sounds: Normal breath sounds and air entry. No stridor or decreased air movement. No decreased breath sounds. No wheezing. No rhonchi. No rales.   Cardiovascular:      PMI at left midclavicular line. Normal rate. Regular rhythm. S1 with normal intensity. loud S2.       Murmurs: There is no murmur.      No gallop.  No click. No rub.   Neurological:      General: No focal deficit present.      Mental Status: Alert and oriented to person, place and time.            DATA REVIEWED:     EKG:  I personally reviewed and interpreted the EKG.        Atrial fibrillation with ventricular pacing    ---------------------------------------------------  TTE/RAKESH:    Results for orders placed during the hospital encounter of 08/20/23    Adult Transthoracic Echo Complete W/ Cont if Necessary Per Protocol    Interpretation Summary  •  Left ventricular systolic function is moderately decreased. Calculated left ventricular EF = 31.3% Septal wall motion is abnormal, consistent with right ventricular pacing. Abnormal global longitudinal LV strain (GLS) = -10.1%. Left ventricle strain data was reviewed by the physician and found to be accurate. Apical sparing is not evident. This suggest that cardiomyopathy may not be due to amyloid heart disease though it does not rule out other infiltrative diseases. The left ventricular cavity is moderately dilated. Left ventricular wall thickness is consistent with moderate concentric hypertrophy. There is left ventricular global hypokinesis noted. The findings are consistent with infiltrative cardiomyopathy. Left ventricular diastolic function was indeterminate. Elevated left atrial pressure.  •  : The right ventricular cavity is moderately dilated. Severely reduced  right ventricular systolic function noted. Electronic lead present in the ventricle.  •  Left atrial volume is severely increased.  •  : The right atrial cavity is severely dilated.  An electronic lead is present in the right atrium.  •  The aortic valve is abnormal in structure. There is moderate calcification of the aortic valve mainly affecting the non-coronary and left coronary cusp(s).  •  Mild mitral valve regurgitation is present.  •  Mild tricuspid valve regurgitation is present. Estimated right ventricular systolic pressure from tricuspid regurgitation is moderately elevated (45-55 mmHg). Calculated right ventricular systolic pressure from tricuspid regurgitation is 54 mmHg.  •  Compared to prior study of 9/20/2022 left ventricular ejection fraction has decreased.  Much regurgitation has improved.  RVSP is lower but assessment of pulmonary hypertension is confounded by RV dysfunction which is severe.      My interpretation of the TTE is below.   LVEF calculates at 30% with moderate global hypokinesis.  Right ventricle is dilated with severely reduced systolic function.  Pulmonary hypertension.    -----------------------------------------------------  RHC/C  I personally reviewed the cardiac catheterization.  Results for orders placed during the hospital encounter of 03/02/22    Cardiac Catheterization/Vascular Study    Narrative    Procedure performed:  1. Diagnostic Left Heart Catheterization  2. Diagnostic Right Heart Catheterization  3. Coronary Angiography    Access Sites:  1. Right radial artery  2. Right brachial vein    Findings:  1. Coronary Artery Anatomy:  Dominance: Right  Left Main: Mildly ectatic at approximately 5 to 6 mm diameter.  Left Anterior Descending: Large in caliber size.  Contains luminal irregularities throughout supplying a proximal, mid and distal diagonal branch  Circumflex Artery: Moderate caliber size.  Contains luminal irregularities throughout.  Supplying a high and  "inferior marginal branches.  Right Coronary Artery:  Moderate in caliber size with an anterior take-off. Moderate in caliber size and contain luminal irregularities throughout.  Supplies a PDA and posterior lateral branch.    2. Hemodynamics:  Right Atrium: */12/8 mmHg  Right Ventricle: 41/10/10 mmHg  Pulmonary Artery: 41/18/26 mmHg  Pulmonary Wedge: N/A not good wedge tracing  Left Ventricle: 118/8/12 mmHg  Aorta: 121/71/94 mmHg  Cardiac Output/Index: 6.2 L/min 2.32 L/min/m2  PA Sat: 69 %  AO Sat: 93 %  Hb: 15.0      CXR/Imaging:       I personally reviewed and interpreted the CXR.      Cardiomegaly.  Pacemaker.  Vascular congestion.            Laboratory evaluations:    Lab Results   Component Value Date    GLUCOSE 113 (H) 08/26/2023    BUN 17 08/26/2023    CREATININE 0.81 08/26/2023    EGFRIFNONA 103 04/01/2021    EGFRIFAFRI 125 02/23/2022    BCR 21.0 08/26/2023    K 3.8 08/26/2023    CO2 27.4 08/26/2023    CALCIUM 9.0 08/26/2023    PROTENTOTREF 6.9 09/28/2022    ALBUMIN 3.6 08/24/2023    LABIL2 1.0 09/28/2022    AST 23 08/24/2023    ALT 33 08/24/2023     Lab Results   Component Value Date    WBC 5.28 08/21/2023    HGB 12.1 (L) 08/21/2023    HCT 38.4 08/21/2023    MCV 83.7 08/21/2023     08/21/2023     Lab Results   Component Value Date    CHOL 159 08/20/2023    CHLPL 153 10/18/2023    TRIG 101 10/18/2023    HDL 51 10/18/2023    LDL 94 08/20/2023     Lab Results   Component Value Date    TSH 1.770 09/28/2022     Lab Results   Component Value Date    TROPONINT 47 (H) 08/21/2023     Lab Results   Component Value Date    HGBA1C 6.80 (H) 08/20/2023     No results found for: \"DDIMER\"  Lab Results   Component Value Date    ALT 33 08/24/2023     Lab Results   Component Value Date    HGBA1C 6.80 (H) 08/20/2023    HGBA1C 6.90 (H) 12/09/2022    HGBA1C 7.1 (H) 06/09/2022     Lab Results   Component Value Date    MICROALBUR 17.6 06/09/2022    CREATININE 0.81 08/26/2023     Lab Results   Component Value Date    IRON 56 " 10/18/2023    TIBC 470 (H) 10/18/2023    FERRITIN 44 10/18/2023     Lab Results   Component Value Date    INR 1.63 (H) 10/07/2022    PROTIME 19.5 (H) 10/07/2022       PAH RISK ASSESSMENT:    Assessment & Plan     1. Pulmonary hypertension. WHO Group  2/3 ; FC: III.  RV status: Abnormal:.  PFTs:  Ordered today .     mPAP: 26mmHg   Tamiko: 2.8   PVR: 2 VELA   TAPSE/PASP: 0.02   COMPERA 2 Risk Assessment INTERMEDIATE-HIGH     The Boothe Index was updated today. Most recent score: 6. Score change: 0.     The patient's presentation, diagnostic work-up, testing, and hemodynamics are consistent with Likely IpC-PH.      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 There is not evidence of abnormal pulmonary vascular remodeling.    The following information was reviewed and updated during the visit to be accurate. As many patients have chronic medical problems, many of their individual problems and ongoing plans do not change significantly from visit to visit.  This information is correct and is consistent with my treatment plan as of today's visit.     General Recommendations:  -Patient is encouraged to be active within symptom limits.  -There is not exertional hypoxemia, supplemental oxygen therapy is not recommended today.  -The patient has been encouraged to adhere to treatment options prescribed, expectations, side effects, and potential consequences of non- adherence.    Vaccinations:  -I recommended the patient be vaccinated against influenza, Streptococcus pneumoniae, and SARS-CoV-2      Additional Studies Ordered Today:  -PFTs    PAH-Specific Medications Ordered:    Based on the patient's clincical presentation, hemodynamics, and risk assessment today, I have recommended no PAH-specific therapy.      I have asked the patient that if they were to move to be certain they see someone with expertise in PAH. These providers can be located at [email or web address].      2. Nonischemic cardiomyopathy.  NYHA stage C;  functional class II.  Today, he is euvolemic and perfused.  Clinical trajectory was reviewed today and is stable.      His PYP was grade 1 last year, equivocal.  No evidence of monoclonality.  ATTR genetics negative. Most recent CMRI with LGE but this was mid myocardial and somewhat diffuse.  No other issues with T1 and T2 signal intensity.  No elevated T2.  No evidence of right atrial dilatation or thickening.  His CMRI is not very classic for cardiac amyloid, though this could be detected early.  I want to make sure that he does not have cardiac sarcoidosis.    I have arranged for him to be seen at [facility] for consideration for F-FDG PET. I am also going to have them review his CMRI.  I remain suspicious that he has ATTR.    HF Medical/Device Therapy:    Changes today: No changes to medication therapy.    HF-Specific  Therapy:      HF-Specific  Class Rx Dose Recommended Monitoring/Instructions   Beta-Blocker  already has   continue  Metoprolol Succinate 100mg daily -Monitor heart rate.  Please call for heart rate <50, dizziness, lightheadedness, syncope.   A/A/ARNi  already has      lisinopril (Prinivil)     5 mgdaily     -Occasional monitoring of Chem-7 has been recommended  -Call for new cough   MRA  Indicated     START Spironolactone  25mg daily -RTC to check BMP after 3-5 doses  -Continue KCL for now. Will reassess next week.    SGLT2: Not Rx due to concerns for ATTR-CM            Nutrition:  no nutritional compromise at this time:             Lifestyle Advice:   - call office if I gain more than 2 pounds in one day or 5 pounds in one week   - keep legs up while sitting   - use salt in moderation   - watch for swelling in feet, ankles and legs every day   - weigh myself daily   -call for concerning s/sx   -- barriers to activities addressed  - daily activity/exercise promoted     Vaccines:  -Seasonal influenza vaccination was recommended.   COVID vaccination was recommended.    Pneumococcal polysaccharide  vaccine (PPSV23)vaccination was recommended.      Managed Risks of Exacerbation:  -- barriers to lifestyle changes reviewed and addressed  - healthy lifestyle promoted       CODE STATUS: FULL       -Obtain limited 2D TTE for LV EF and Bi-V upgrade potential           3. Chronic right-sided heart failure.  As above.   4. S/P placement of cardiac pacemaker.  Follow-up with the EP.   5. VENESSA (obstructive sleep apnea).  CPAP.  Follow-up with sleep medicine.         ORDERS PLACED TODAY:  Orders Placed This Encounter   Procedures   • Hemoglobin   • Adult Transthoracic Echo Limited W/ Cont if Necessary Per Protocol   • Complete PFT - Pre & Post Bronchodilator          MEDS ORDERED TODAY:    New Medications Ordered This Visit   Medications   • spironolactone (ALDACTONE) 25 MG tablet     Sig: Take 1 tablet by mouth Daily.     Dispense:  30 tablet     Refill:  0        MEDS DISCONTINUED TODAY:    There are no discontinued medications.               Return in about 1 week (around 11/22/2023).          This document has been electronically signed by Trung Cervantes MD PhD on November 15, 2023 09:19 EST      Trung Cervantes M.D., Ph.D., [facility]  System Medical Director for HF and PAH

## 2023-11-15 NOTE — ADDENDUM NOTE
Encounter addended by: Yvette Lopez MA on: 11/15/2023 10:58 AM   Actions taken: Charge Capture section accepted

## 2023-11-15 NOTE — PROGRESS NOTES
Heart Failure & Pulmonary Arterial Hypertension Clinic  [facility]  Trung Cervantes M.D., Ph.D., [facility]       Keya Tong MD  [address]  [facility] [address]  [place] [address]    Thank you for asking me to see Shashi Engel.     History of Present Illness  This is a 62 y.o. male with:    1. PAH  A. RHC  Right Atrium: */12/8 mmHg  Right Ventricle: 41/10/10 mmHg  Pulmonary Artery: 41/18/26 mmHg  Pulmonary Wedge: N/A not good wedge tracing  Left Ventricle: 118/8/12 mmHg  Aorta: 121/71/94 mmHg  Cardiac Output/Index: 6.2 L/min 2.32 L/min/m2  PA Sat: 69 %  AO Sat: 93 %  Hb: 15.0  2. NICM  3. RHF      Shashi Engel is a 62 y.o. male who presents today.  The patient is typically seen by Keesha Kirkpatrick MD.  The patient's cardiologist is Dr. Vanegas.    The patient presents today for further evaluation and management of pulmonary arterial hypertension, NICM, and RHF.  Of note, the patient was recently admitted with ADHF.  2D TTE revealed depressed LVEF. CMRI with LGE.  Patient's PYP scan in 2022 was grade 2.  Current regimen includes Bumex at 2 mg 1 tablet p.o. twice daily, Lopressor 50 mg 1 tablet p.o. twice daily, Accupril 80 mg daily.    The patient returns to clinic today.  He continues to have exercise intolerance.  PYP was grade 1.  CMRI was consistent with an infiltrative process.  Today, denies orthopnea, PND, lower extremity edema, ascites, and early abdominal satiety.  I did have him seen at [facility] for consideration for PET.  He has a follow-up with them today.    Review of Systems - Review of Systems   Cardiovascular:  Positive for dyspnea on exertion. Negative for chest pain, claudication and cyanosis.   Respiratory:  Positive for shortness of breath. Negative for cough and hemoptysis.    All other systems reviewed and are negative.        All other systems were reviewed and were negative.    Patient Active Problem List   Diagnosis    Persistent atrial fibrillation    Essential hypertension     ED (erectile dysfunction) of organic origin    HLD (hyperlipidemia)    Hypertrophic polyarthritis    Diabetes mellitus type 2, noninsulin dependent    LVH (left ventricular hypertrophy) due to hypertensive disease    SSS (sick sinus syndrome)    Sleep related hypoxia    Class 2 severe obesity due to excess calories with serious comorbidity and body mass index (BMI) of 38.0 to 38.9 in adult    VENESSA (obstructive sleep apnea)    Nonsustained ventricular tachycardia    S/P placement of cardiac pacemaker    Pulmonary hypertension    Other proteinuria    Nonrheumatic tricuspid valve regurgitation    Nonrheumatic mitral valve regurgitation    Exertional dyspnea    DCM (dilated cardiomyopathy)    Chronic right-sided heart failure    Infiltrative cardiomyopathy       family history includes Atrial fibrillation in his sister; Depression in his mother; Heart disease in his mother, sister, and sister; Hypertension in his brother, mother, sister, and sister; Kidney disease in an other family member; Sleep apnea in an other family member.     reports that he has quit smoking. His smoking use included cigars. He has been exposed to tobacco smoke. He has never used smokeless tobacco. He reports that he does not currently use drugs after having used the following drugs: Marijuana. He reports that he does not drink alcohol.    No Known Allergies    Social Determinants of Health     Tobacco Use: Medium Risk (11/15/2023)    Patient History     Smoking Tobacco Use: Former     Smokeless Tobacco Use: Never     Passive Exposure: Past   Alcohol Use: Not At Risk (8/20/2023)    AUDIT-C     Frequency of Alcohol Consumption: Never     Average Number of Drinks: Patient does not drink     Frequency of Binge Drinking: Never   Financial Resource Strain: Not on file   Food Insecurity: No Food Insecurity (8/21/2023)    Hunger Vital Sign     Worried About Running Out of Food in the Last Year: Never true     Ran Out of Food in the Last Year: Never  true   Transportation Needs: Not on file   Physical Activity: Not on file   Stress: Not on file   Social Connections: Unknown (10/10/2023)    Family and Community Support     Help with Day-to-Day Activities: Not on file     Lonely or Isolated: Not on file   Interpersonal Safety: Not At Risk (8/20/2023)    Abuse Screen     Unsafe at Home or Work/School: no     Feels Threatened by Someone?: no     Does Anyone Keep You from Contacting Others or Doint Things Outside the Home?: no     Physical Sign of Abuse Present: no   Depression: Not at risk (12/9/2022)    PHQ-2     PHQ-2 Score: 0   Housing Stability: Not At Risk (8/21/2023)    Housing Stability     Current Living Arrangements: home     Potentially Unsafe Housing Conditions: none   Utilities: Not on file   Health Literacy: Unknown (8/21/2023)    Education     Help with school or training?: Not on file     Preferred Language: English   Employment: Unknown (10/10/2023)    Employment     Do you want help finding or keeping work or a job?: Not on file   Disabilities: Not At Risk (8/20/2023)    Disabilities     Concentrating, Remembering, or Making Decisions Difficulty: no     Doing Errands Independently Difficulty: no        Physical Activity: Not on file          Current Outpatient Medications:     amLODIPine (NORVASC) 5 MG tablet, TAKE 1 TABLET BY MOUTH EVERY DAY, Disp: 15 tablet, Rfl: 0    atorvastatin (LIPITOR) 40 MG tablet, TAKE 1 TABLET BY MOUTH EVERY DAY, Disp: 90 tablet, Rfl: 3    bumetanide (BUMEX) 2 MG tablet, Take 1 tablet by mouth 2 (Two) Times a Day., Disp: 60 tablet, Rfl: 11    cholecalciferol (VITAMIN D3) 25 MCG (1000 UT) tablet, Take 1 tablet by mouth Daily., Disp: , Rfl:     [employer] Lancets Original [facility], Use as directed and appropo lancet for his monitor, Disp: 100 each, Rfl: 11    lisinopril (PRINIVIL,ZESTRIL) 5 MG tablet, TAKE 1 TABLET BY MOUTH EVERY DAY, Disp: 30 tablet, Rfl: 0    metFORMIN (GLUCOPHAGE) 1000 MG tablet, TAKE 1 TABLET BY MOUTH 2 (TWO) TIMES  A DAY WITH MEALS. INDICATIONS: TYPE 2 DIABETES, NOTED, Disp: 30 tablet, Rfl: 0    metoprolol succinate XL (Toprol XL) 100 MG 24 hr tablet, Take 1 tablet by mouth Daily., Disp: 30 tablet, Rfl: 3    potassium chloride 10 MEQ CR tablet, TAKE 1 TABLET BY MOUTH EVERY DAY, Disp: 90 tablet, Rfl: 3    tadalafil (CIALIS) 20 MG tablet, Take 1 tablet by mouth Daily As Needed for Erectile Dysfunction., Disp: 10 tablet, Rfl: 1    vitamin B-12 (CYANOCOBALAMIN) 1000 MCG tablet, Take 1 tablet by mouth Daily., Disp: , Rfl:     Xarelto 20 MG tablet, TAKE 1 TABLET BY MOUTH EVERY DAY WITH DINNER, Disp: 90 tablet, Rfl: 2    spironolactone (ALDACTONE) 25 MG tablet, Take 1 tablet by mouth Daily., Disp: 30 tablet, Rfl: 0    Vital Sign Review:     Vitals:    11/15/23 0906   BP: 122/82   Pulse: 61   SpO2: 95%       PP:high  Pulse Ox: normal oxygenation on room air    Body mass index is 38.55 kg/m².      11/15/23  0906   Weight: (!) 136 kg (300 lb 6.4 oz)         Physical Exam:    Vitals reviewed.   Constitutional:       General: Not in acute distress.     Appearance: Healthy appearance. Not in distress. Obese. Not ill-appearing, toxic-appearing or diaphoretic.      Interventions: Not intubated.  Eyes:      Conjunctiva/sclera: Conjunctivae normal.      Pupils: Pupils are equal, round, and reactive to light.   Neck:      Vascular: No hepatojugular reflux, JVD or JVR. JVD normal with 7 cm of water.   Pulmonary:      Effort: Pulmonary effort is normal. No tachypnea, bradypnea, accessory muscle usage, prolonged expiration, respiratory distress or retractions. Not intubated.      Breath sounds: Normal breath sounds and air entry. No stridor or decreased air movement. No decreased breath sounds. No wheezing. No rhonchi. No rales.   Cardiovascular:      PMI at left midclavicular line. Normal rate. Regular rhythm. S1 with normal intensity. loud S2.       Murmurs: There is no murmur.      No gallop.  No click. No rub.   Neurological:      General: No  focal deficit present.      Mental Status: Alert and oriented to person, place and time.            DATA REVIEWED:     EKG:  I personally reviewed and interpreted the EKG.        Atrial fibrillation with ventricular pacing    ---------------------------------------------------  TTE/RAKESH:    Results for orders placed during the hospital encounter of 08/20/23    Adult Transthoracic Echo Complete W/ Cont if Necessary Per Protocol    Interpretation Summary    Left ventricular systolic function is moderately decreased. Calculated left ventricular EF = 31.3% Septal wall motion is abnormal, consistent with right ventricular pacing. Abnormal global longitudinal LV strain (GLS) = -10.1%. Left ventricle strain data was reviewed by the physician and found to be accurate. Apical sparing is not evident. This suggest that cardiomyopathy may not be due to amyloid heart disease though it does not rule out other infiltrative diseases. The left ventricular cavity is moderately dilated. Left ventricular wall thickness is consistent with moderate concentric hypertrophy. There is left ventricular global hypokinesis noted. The findings are consistent with infiltrative cardiomyopathy. Left ventricular diastolic function was indeterminate. Elevated left atrial pressure.    : The right ventricular cavity is moderately dilated. Severely reduced right ventricular systolic function noted. Electronic lead present in the ventricle.    Left atrial volume is severely increased.    : The right atrial cavity is severely dilated.  An electronic lead is present in the right atrium.    The aortic valve is abnormal in structure. There is moderate calcification of the aortic valve mainly affecting the non-coronary and left coronary cusp(s).    Mild mitral valve regurgitation is present.    Mild tricuspid valve regurgitation is present. Estimated right ventricular systolic pressure from tricuspid regurgitation is moderately elevated (45-55 mmHg).  Calculated right ventricular systolic pressure from tricuspid regurgitation is 54 mmHg.    Compared to prior study of 9/20/2022 left ventricular ejection fraction has decreased.  Much regurgitation has improved.  RVSP is lower but assessment of pulmonary hypertension is confounded by RV dysfunction which is severe.      My interpretation of the TTE is below.   LVEF calculates at 30% with moderate global hypokinesis.  Right ventricle is dilated with severely reduced systolic function.  Pulmonary hypertension.    -----------------------------------------------------  RHC/LHC  I personally reviewed the cardiac catheterization.  Results for orders placed during the hospital encounter of 03/02/22    Cardiac Catheterization/Vascular Study    Narrative    Procedure performed:  1. Diagnostic Left Heart Catheterization  2. Diagnostic Right Heart Catheterization  3. Coronary Angiography    Access Sites:  1. Right radial artery  2. Right brachial vein    Findings:  1. Coronary Artery Anatomy:  Dominance: Right  Left Main: Mildly ectatic at approximately 5 to 6 mm diameter.  Left Anterior Descending: Large in caliber size.  Contains luminal irregularities throughout supplying a proximal, mid and distal diagonal branch  Circumflex Artery: Moderate caliber size.  Contains luminal irregularities throughout.  Supplying a high and inferior marginal branches.  Right Coronary Artery:  Moderate in caliber size with an anterior take-off. Moderate in caliber size and contain luminal irregularities throughout.  Supplies a PDA and posterior lateral branch.    2. Hemodynamics:  Right Atrium: */12/8 mmHg  Right Ventricle: 41/10/10 mmHg  Pulmonary Artery: 41/18/26 mmHg  Pulmonary Wedge: N/A not good wedge tracing  Left Ventricle: 118/8/12 mmHg  Aorta: 121/71/94 mmHg  Cardiac Output/Index: 6.2 L/min 2.32 L/min/m2  PA Sat: 69 %  AO Sat: 93 %  Hb: 15.0      CXR/Imaging:       I personally reviewed and interpreted the CXR.      Cardiomegaly.   "Pacemaker.  Vascular congestion.            Laboratory evaluations:    Lab Results   Component Value Date    GLUCOSE 113 (H) 08/26/2023    BUN 17 08/26/2023    CREATININE 0.81 08/26/2023    EGFRIFNONA 103 04/01/2021    EGFRIFAFRI 125 02/23/2022    BCR 21.0 08/26/2023    K 3.8 08/26/2023    CO2 27.4 08/26/2023    CALCIUM 9.0 08/26/2023    PROTENTOTREF 6.9 09/28/2022    ALBUMIN 3.6 08/24/2023    LABIL2 1.0 09/28/2022    AST 23 08/24/2023    ALT 33 08/24/2023     Lab Results   Component Value Date    WBC 5.28 08/21/2023    HGB 12.1 (L) 08/21/2023    HCT 38.4 08/21/2023    MCV 83.7 08/21/2023     08/21/2023     Lab Results   Component Value Date    CHOL 159 08/20/2023    CHLPL 153 10/18/2023    TRIG 101 10/18/2023    HDL 51 10/18/2023    LDL 94 08/20/2023     Lab Results   Component Value Date    TSH 1.770 09/28/2022     Lab Results   Component Value Date    TROPONINT 47 (H) 08/21/2023     Lab Results   Component Value Date    HGBA1C 6.80 (H) 08/20/2023     No results found for: \"DDIMER\"  Lab Results   Component Value Date    ALT 33 08/24/2023     Lab Results   Component Value Date    HGBA1C 6.80 (H) 08/20/2023    HGBA1C 6.90 (H) 12/09/2022    HGBA1C 7.1 (H) 06/09/2022     Lab Results   Component Value Date    MICROALBUR 17.6 06/09/2022    CREATININE 0.81 08/26/2023     Lab Results   Component Value Date    IRON 56 10/18/2023    TIBC 470 (H) 10/18/2023    FERRITIN 44 10/18/2023     Lab Results   Component Value Date    INR 1.63 (H) 10/07/2022    PROTIME 19.5 (H) 10/07/2022       PAH RISK ASSESSMENT:    Assessment & Plan      1. Pulmonary hypertension. WHO Group  2/3 ; FC: III.  RV status: Abnormal:.  PFTs:  Ordered today .     mPAP: 26mmHg   Tamiko: 2.8   PVR: 2 VELA   TAPSE/PASP: 0.02   COMPERA 2 Risk Assessment INTERMEDIATE-HIGH     The Boothe Index was updated today. Most recent score: 6. Score change: 0.     The patient's presentation, diagnostic work-up, testing, and hemodynamics are consistent with Likely IpC-PH. "      The patient does have evidence of RV-PA uncoupling. There is not evidence of abnormal pulmonary vascular remodeling.    The following information was reviewed and updated during the visit to be accurate. As many patients have chronic medical problems, many of their individual problems and ongoing plans do not change significantly from visit to visit.  This information is correct and is consistent with my treatment plan as of today's visit.     General Recommendations:  -Patient is encouraged to be active within symptom limits.  -There is not exertional hypoxemia, supplemental oxygen therapy is not recommended today.  -The patient has been encouraged to adhere to treatment options prescribed, expectations, side effects, and potential consequences of non- adherence.    Vaccinations:  -I recommended the patient be vaccinated against influenza, Streptococcus pneumoniae, and SARS-CoV-2      Additional Studies Ordered Today:  -PFTs    PAH-Specific Medications Ordered:    Based on the patient's clincical presentation, hemodynamics, and risk assessment today, I have recommended no PAH-specific therapy.      I have asked the patient that if they were to move to be certain they see someone with expertise in PAH. These providers can be located at [email or web address].      2. Nonischemic cardiomyopathy.  NYHA stage C; functional class II.  Today, he is euvolemic and perfused.  Clinical trajectory was reviewed today and is stable.      His PYP was grade 1 last year, equivocal.  No evidence of monoclonality.  ATTR genetics negative. Most recent CMRI with LGE but this was mid myocardial and somewhat diffuse.  No other issues with T1 and T2 signal intensity.  No elevated T2.  No evidence of right atrial dilatation or thickening.  His CMRI is not very classic for cardiac amyloid, though this could be detected early.  I want to make sure that he does not have cardiac sarcoidosis.    I have arranged for him to be seen at  [facility] for consideration for F-FDG PET. I am also going to have them review his CMRI.  I remain suspicious that he has ATTR.    HF Medical/Device Therapy:    Changes today: No changes to medication therapy.    HF-Specific  Therapy:      HF-Specific  Class Rx Dose Recommended Monitoring/Instructions   Beta-Blocker  already has   continue  Metoprolol Succinate 100mg daily -Monitor heart rate.  Please call for heart rate <50, dizziness, lightheadedness, syncope.   A/A/ARNi  already has      lisinopril (Prinivil)     5 mgdaily     -Occasional monitoring of Chem-7 has been recommended  -Call for new cough   MRA  Indicated     START Spironolactone  25mg daily -RTC to check BMP after 3-5 doses  -Continue KCL for now. Will reassess next week.    SGLT2: Not Rx due to concerns for ATTR-CM            Nutrition:  no nutritional compromise at this time:             Lifestyle Advice:   - call office if I gain more than 2 pounds in one day or 5 pounds in one week   - keep legs up while sitting   - use salt in moderation   - watch for swelling in feet, ankles and legs every day   - weigh myself daily   -call for concerning s/sx   -- barriers to activities addressed  - daily activity/exercise promoted     Vaccines:  -Seasonal influenza vaccination was recommended.   COVID vaccination was recommended.    Pneumococcal polysaccharide vaccine (PPSV23)vaccination was recommended.      Managed Risks of Exacerbation:  -- barriers to lifestyle changes reviewed and addressed  - healthy lifestyle promoted       CODE STATUS: FULL       -Obtain limited 2D TTE for LV EF and Bi-V upgrade potential           3. Chronic right-sided heart failure.  As above.   4. S/P placement of cardiac pacemaker.  Follow-up with the EP.   5. VENESSA (obstructive sleep apnea).  CPAP.  Follow-up with sleep medicine.         ORDERS PLACED TODAY:  Orders Placed This Encounter   Procedures    Hemoglobin    Adult Transthoracic Echo Limited W/ Cont if Necessary Per Protocol     Complete PFT - Pre & Post Bronchodilator          MEDS ORDERED TODAY:    New Medications Ordered This Visit   Medications    spironolactone (ALDACTONE) 25 MG tablet     Sig: Take 1 tablet by mouth Daily.     Dispense:  30 tablet     Refill:  0        MEDS DISCONTINUED TODAY:    There are no discontinued medications.               Return in about 1 week (around 11/22/2023).          This document has been electronically signed by Trung Cervantes MD PhD on November 15, 2023 09:19 EST      Trung Cervantes M.D., Ph.D., [facility]  System Medical Director for HF and PAH

## 2023-11-16 RX ORDER — TADALAFIL 20 MG/1
20 TABLET ORAL DAILY PRN
Qty: 10 TABLET | Refills: 1 | Status: SHIPPED | OUTPATIENT
Start: 2023-11-16

## 2023-11-27 ENCOUNTER — TELEPHONE (OUTPATIENT)
Dept: CARDIOLOGY | Facility: HOSPITAL | Age: 63
End: 2023-11-27
Payer: MEDICARE

## 2023-11-27 NOTE — TELEPHONE ENCOUNTER
Patient left a voicemail. He is asking for clarification on a new medication that he is supposed to start taking. He mentioned he thought he was to take 4 doses before returning for his appointment with Dr. Cervantes today. (Patient cancelled today's appointment via [email or web address].)    Please call patient to clarify his instructions and then we will reschedule today's appointment that was cancelled.    Call back number [phone]    Thank you,  Nisha TRACY DONY

## 2023-11-29 ENCOUNTER — HOSPITAL ENCOUNTER (OUTPATIENT)
Dept: CARDIOLOGY | Facility: HOSPITAL | Age: 63
Discharge: HOME | End: 2023-11-29
Admitting: INTERNAL MEDICINE
Payer: MEDICARE

## 2023-11-29 VITALS
BODY MASS INDEX: 38.48 KG/M2 | SYSTOLIC BLOOD PRESSURE: 128 MMHG | DIASTOLIC BLOOD PRESSURE: 78 MMHG | HEIGHT: 74 IN | WEIGHT: 299.83 LBS | HEART RATE: 60 BPM

## 2023-11-29 LAB
BH CV ECHO LEFT VENTRICLE GLOBAL LONGITUDINAL STRAIN: -16.2 %
BH CV ECHO MEAS - ECCENTRICITY INDEX DIASTOLE: 0.9
BH CV ECHO MEAS - ECCENTRICITY INDEX SYS: 1
BH CV ECHO MEAS - EDV(CUBED): 271.3 ML
BH CV ECHO MEAS - EDV(MOD-SP2): 277 ML
BH CV ECHO MEAS - EDV(MOD-SP4): 267 ML
BH CV ECHO MEAS - EF(MOD-BP): 45.1 %
BH CV ECHO MEAS - EF(MOD-SP2): 43 %
BH CV ECHO MEAS - EF(MOD-SP4): 46.4 %
BH CV ECHO MEAS - ESV(CUBED): 104.3 ML
BH CV ECHO MEAS - ESV(MOD-SP2): 158 ML
BH CV ECHO MEAS - ESV(MOD-SP4): 143 ML
BH CV ECHO MEAS - FS: 27.3 %
BH CV ECHO MEAS - IVS/LVPW: 1.18 CM
BH CV ECHO MEAS - IVSD: 1.8 CM
BH CV ECHO MEAS - LAT PEAK E' VEL: 14.9 CM/SEC
BH CV ECHO MEAS - LV DIASTOLIC VOL/BSA (35-75): 103.9 CM2
BH CV ECHO MEAS - LV MASS(C)D: 558.2 GRAMS
BH CV ECHO MEAS - LV SYSTOLIC VOL/BSA (12-30): 55.6 CM2
BH CV ECHO MEAS - LVIDD: 6.5 CM
BH CV ECHO MEAS - LVIDS: 4.7 CM
BH CV ECHO MEAS - LVPWD: 1.53 CM
BH CV ECHO MEAS - MED PEAK E' VEL: 7.4 CM/SEC
BH CV ECHO MEAS - MV DEC SLOPE: 589.2 CM/SEC2
BH CV ECHO MEAS - MV DEC TIME: 0.19 SEC
BH CV ECHO MEAS - MV E MAX VEL: 109.3 CM/SEC
BH CV ECHO MEAS - MV MAX PG: 7.7 MMHG
BH CV ECHO MEAS - MV MEAN PG: 2.09 MMHG
BH CV ECHO MEAS - MV P1/2T: 69.5 MSEC
BH CV ECHO MEAS - MV V2 VTI: 38.4 CM
BH CV ECHO MEAS - MVA(P1/2T): 3.2 CM2
BH CV ECHO MEAS - PA ACC TIME: 0.1 SEC
BH CV ECHO MEAS - PA V2 MAX: 86.9 CM/SEC
BH CV ECHO MEAS - PI END-D VEL: 65.2 CM/SEC
BH CV ECHO MEAS - RAP SYSTOLE: 8 MMHG
BH CV ECHO MEAS - RV FS: 25.1 %
BH CV ECHO MEAS - RV MAX PG: 1.32 MMHG
BH CV ECHO MEAS - RV V1 MAX: 57.4 CM/SEC
BH CV ECHO MEAS - RV V1 VTI: 10.9 CM
BH CV ECHO MEAS - RVOT DIAM: 2.6 CM
BH CV ECHO MEAS - RVSP: 49.3 MMHG
BH CV ECHO MEAS - SI(MOD-SP2): 46.3 ML/M2
BH CV ECHO MEAS - SI(MOD-SP4): 48.2 ML/M2
BH CV ECHO MEAS - SV(MOD-SP2): 119 ML
BH CV ECHO MEAS - SV(MOD-SP4): 124 ML
BH CV ECHO MEAS - SV(RVOT): 58.1 ML
BH CV ECHO MEAS - TAPSE (>1.6): 1.07 CM
BH CV ECHO MEAS - TR MAX PG: 41.3 MMHG
BH CV ECHO MEAS - TR MAX VEL: 321.4 CM/SEC
BH CV ECHO MEAS RV FREE WALL STRAIN: -17.3 %
BH CV ECHO MEASUREMENTS AVERAGE E/E' RATIO: 9.8
BH CV XLRA - RV BASE: 5.2 CM
BH CV XLRA - RV LENGTH: 9.1 CM
BH CV XLRA - RV MID: 4.4 CM
BH CV XLRA - TDI S': 6.4 CM/SEC
LEFT ATRIUM VOLUME INDEX: 55.3 ML/M2

## 2023-11-29 PROCEDURE — 93356 MYOCRD STRAIN IMG SPCKL TRCK: CPT

## 2023-11-29 PROCEDURE — 93308 TTE F-UP OR LMTD: CPT

## 2023-11-29 PROCEDURE — 93321 DOPPLER ECHO F-UP/LMTD STD: CPT

## 2023-11-29 PROCEDURE — 93325 DOPPLER ECHO COLOR FLOW MAPG: CPT

## 2023-11-29 PROCEDURE — 25510000001 PERFLUTREN (DEFINITY) 8.476 MG IN SODIUM CHLORIDE (PF) 0.9 % 10 ML INJECTION: Performed by: INTERNAL MEDICINE

## 2023-11-29 RX ADMIN — PERFLUTREN 2 ML: 6.52 INJECTION, SUSPENSION INTRAVENOUS at 15:55

## 2023-11-30 ENCOUNTER — LAB (OUTPATIENT)
Dept: LAB | Facility: HOSPITAL | Age: 63
End: 2023-11-30
Payer: MEDICARE

## 2023-11-30 ENCOUNTER — HOSPITAL ENCOUNTER (OUTPATIENT)
Dept: CARDIOLOGY | Facility: HOSPITAL | Age: 63
Discharge: HOME | End: 2023-11-30
Payer: MEDICARE

## 2023-11-30 VITALS
HEIGHT: 74 IN | WEIGHT: 290.2 LBS | BODY MASS INDEX: 37.24 KG/M2 | SYSTOLIC BLOOD PRESSURE: 140 MMHG | HEART RATE: 80 BPM | DIASTOLIC BLOOD PRESSURE: 80 MMHG | OXYGEN SATURATION: 98 %

## 2023-11-30 DIAGNOSIS — I42.8 INFILTRATIVE CARDIOMYOPATHY: ICD-10-CM

## 2023-11-30 DIAGNOSIS — E11.9 DIABETES MELLITUS TYPE 2, NONINSULIN DEPENDENT: ICD-10-CM

## 2023-11-30 DIAGNOSIS — I48.19 PERSISTENT ATRIAL FIBRILLATION: ICD-10-CM

## 2023-11-30 DIAGNOSIS — I10 ESSENTIAL HYPERTENSION: ICD-10-CM

## 2023-11-30 DIAGNOSIS — I27.20 PULMONARY HYPERTENSION: ICD-10-CM

## 2023-11-30 DIAGNOSIS — E78.2 MIXED HYPERLIPIDEMIA: ICD-10-CM

## 2023-11-30 DIAGNOSIS — I42.0 DCM (DILATED CARDIOMYOPATHY): ICD-10-CM

## 2023-11-30 DIAGNOSIS — I50.812 CHRONIC RIGHT-SIDED HEART FAILURE: Primary | ICD-10-CM

## 2023-11-30 DIAGNOSIS — G47.33 OSA (OBSTRUCTIVE SLEEP APNEA): ICD-10-CM

## 2023-11-30 LAB — HGB BLD-MCNC: 13.8 G/DL (ref 13–17.7)

## 2023-11-30 PROCEDURE — 85018 HEMOGLOBIN: CPT

## 2023-11-30 PROCEDURE — 36415 COLL VENOUS BLD VENIPUNCTURE: CPT

## 2023-11-30 PROCEDURE — G0463 HOSPITAL OUTPT CLINIC VISIT: HCPCS

## 2023-11-30 NOTE — LETTER
2023       No Recipients    Patient: Shashi Engel   YOB: 1960   Date of Visit: 2023     Dear Keesha Kirkpatrick MD:       Thank you for referring Shashi Engel to me for evaluation. Below are the relevant portions of my assessment and plan of care.    If you have questions, please do not hesitate to call me. I look forward to following Shashi along with you.         Sincerely,        DENNIS Hernandez        CC:   No Recipients    Ines Conde APRN  [id number]  Signed          [facility] HEART FAILURE      Patient Name: Shashi Engel  :1960  Age: 62 y.o.  Sex: male  Referring Provider: Keya Tong MD   Primary Cardiologist: Jennie Vanegas MD  Encounter Provider:  DENNIS Hernandez      Chief Complaint:   Chief Complaint   Patient presents with   • Follow-up         History of Present Illness 62-year-old male, new to this provider, comes today for further evaluation regarding his chronic diastolic heart failure, suspected cardiomyopathy, and pulmonary hypertension.  Current diagnoses to include paroxysmal atrial fibrillation, hypertrophic cardiomyopathy, hyperlipidemia, hypertension, valvular heart disease with mild MR/TR, obesity, obstructive sleep apnea.  Records have been reviewed by me.    He was initially admitted at [facility] with chest pain in  and underwent evaluation at that time.  ACS was ruled out but he had been previously noted to have severe LVH related to his uncontrolled hypertension and was diagnosed previously with hypertrophic cardiomyopathy.  It appears per chart review that he had had trouble with syncope and presyncope for which EP followed him.  Atrial fibrillation was at some point diagnosed.  Dual-chamber pacemaker was placed in  for symptomatic bradycardia with associated syncope.  Echo revealed an LVEF of 60% at the time and stress testing was negative.    In  he was found to have pulmonary  hypertension with an RVSP elevated on echocardiogram with moderate to severe TR noted and wall motion abnormality.  This led to cardiac catheterization revealing patent coronary arteries.  He was referred to the heart failure clinic to follow with Dr. Cervantes for his pulmonary hypertension and heart failure.    Repeat echocardiogram and CMR in August 2023 revealed an LVEF of 31.3% on echo and normal perfusion noted on CMR.    Repeat echocardiogram was performed in November 2023 revealing an improvement in LVEF to 45%.  He is to be evaluated for upgrade to biventricular device.    He is currently feeling very well.  Spironolactone was added and he is tolerating this well.  He has had his labs drawn this morning and his chief complaint today is shortness of breath on exertion.  He is unsure if he is going to be able to continue following with [facility] as his insurance is changing.    The following portions of the patient's history were reviewed and updated as appropriate: allergies, current medications, past family history, past medical history, past social history, past surgical history and problem list.    Current Outpatient Medications   Medication Sig Dispense Refill   • amLODIPine (NORVASC) 5 MG tablet TAKE 1 TABLET BY MOUTH EVERY DAY 15 tablet 0   • atorvastatin (LIPITOR) 40 MG tablet TAKE 1 TABLET BY MOUTH EVERY DAY 90 tablet 3   • bumetanide (BUMEX) 2 MG tablet Take 1 tablet by mouth 2 (Two) Times a Day. 60 tablet 11   • cholecalciferol (VITAMIN D3) 25 MCG (1000 UT) tablet Take 1 tablet by mouth Daily.     • [facility] Lancets Original [facility] Use as directed and appropo lancet for his monitor 100 each 11   • lisinopril (PRINIVIL,ZESTRIL) 5 MG tablet TAKE 1 TABLET BY MOUTH EVERY DAY 30 tablet 0   • metFORMIN (GLUCOPHAGE) 1000 MG tablet TAKE 1 TABLET BY MOUTH 2 (TWO) TIMES A DAY WITH MEALS. INDICATIONS: TYPE 2 DIABETES, NOTED 30 tablet 0   • metoprolol succinate XL (Toprol XL) 100 MG 24 hr tablet Take 1 tablet by mouth  Daily. 30 tablet 3   • potassium chloride 10 MEQ CR tablet TAKE 1 TABLET BY MOUTH EVERY DAY 90 tablet 3   • rivaroxaban (Xarelto) 20 MG tablet Take 1 tablet by mouth Daily With Dinner. 14 tablet 2   • spironolactone (ALDACTONE) 25 MG tablet Take 1 tablet by mouth Daily. 30 tablet 0   • tadalafil (CIALIS) 20 MG tablet Take 1 tablet by mouth Daily As Needed for Erectile Dysfunction. 10 tablet 1   • vitamin B-12 (CYANOCOBALAMIN) 1000 MCG tablet Take 1 tablet by mouth Daily.       No current facility-administered medications for this encounter.       Past Medical History:   Diagnosis Date   • Abnormal electrocardiogram    • Anxiety    • Cardiomyopathy, hypertrophic, primary familial    • Erectile dysfunction    • Health care maintenance    • Hyperlipidemia    • Hypertension    • Mild concentric left ventricular hypertrophy (LVH)    • Mild mitral regurgitation    • Mild tricuspid regurgitation    • Near syncope    • Noncompliance    • Nonsustained ventricular tachycardia 09/05/2018   • Obesity    • VENESSA (obstructive sleep apnea)     uses CPAP faithfully   • PAF (paroxysmal atrial fibrillation)    • Pneumonia 01/01/2016   • Type 2 diabetes mellitus        Past Surgical History:   Procedure Laterality Date   • CARDIAC CATHETERIZATION N/A 03/02/2022    Procedure: RIGHT HEART CATH;  Surgeon: Anjel Beasley MD;  Location: [facility] INVASIVE LOCATION;  Service: Cardiovascular;  Laterality: N/A;   • CARDIAC CATHETERIZATION N/A 03/02/2022    Procedure: Coronary angiography;  Surgeon: Anjel Beasley MD;  Location: [facility] INVASIVE LOCATION;  Service: Cardiovascular;  Laterality: N/A;   • CARDIAC ELECTROPHYSIOLOGY PROCEDURE Left 06/06/2016    Procedure: Pacemaker DC new  [place];  Surgeon: Juan Chacon MD;  Location: [facility] CATH INVASIVE LOCATION;  Service:    • CARDIAC ELECTROPHYSIOLOGY PROCEDURE N/A 05/09/2022    Procedure: PPM generator change - dual- [place];  Surgeon: Juan Chacon MD;  Location:   [facility] CATH INVASIVE LOCATION;  Service: Cardiology;  Laterality: N/A;   • ENDOSCOPY N/A 10/19/2022    Procedure: ESOPHAGOGASTRODUODENOSCOPY WITH BX;  Surgeon: Anjel Carney MD;  Location: [facility] ENDOSCOPY;  Service: Gastroenterology;  Laterality: N/A;  PREOP/ DYSPEPSIA  POSTOP/ HIATAL HERNIA, DUODENITIS, GASTRITIS   • ENDOSCOPY     • INSERT / REPLACE / REMOVE PACEMAKER     • KNEE ARTHROSCOPY Right        Physical Exam  Vitals and nursing note reviewed.   Constitutional:       General: He is not in acute distress.     Appearance: He is well-developed. He is obese. He is not ill-appearing.   HENT:      Head: Normocephalic and atraumatic.   Eyes:      Conjunctiva/sclera: Conjunctivae normal.      Pupils: Pupils are equal, round, and reactive to light.   Neck:      Vascular: No JVD.   Cardiovascular:      Rate and Rhythm: Normal rate and regular rhythm.      Heart sounds: Normal heart sounds. No murmur heard.     No friction rub. No gallop.   Pulmonary:      Effort: Pulmonary effort is normal. No respiratory distress.      Breath sounds: Normal breath sounds.   Abdominal:      General: Bowel sounds are normal. There is no distension.      Palpations: Abdomen is soft.   Musculoskeletal:         General: No swelling or deformity.   Skin:     General: Skin is warm and dry.      Capillary Refill: Capillary refill takes less than 2 seconds.   Neurological:      Mental Status: He is alert and oriented to person, place, and time. Mental status is at baseline.   Psychiatric:         Mood and Affect: Mood normal.         Behavior: Behavior normal.          Review of Systems   Constitutional:  Positive for fatigue. Negative for unexpected weight change.   HENT:  Negative for congestion and nosebleeds.    Eyes:  Negative for photophobia and visual disturbance.   Respiratory:  Positive for shortness of breath. Negative for cough and chest tightness.    Cardiovascular:  Negative for chest pain, palpitations and leg swelling.  "  Gastrointestinal:  Negative for abdominal distention and blood in stool.   Endocrine: Negative for polyphagia and polyuria.   Genitourinary:  Positive for frequency. Negative for urgency.   Musculoskeletal:  Negative for joint swelling and myalgias.   Skin:  Negative for pallor and rash.   Neurological:  Negative for dizziness, syncope, weakness, light-headedness, numbness and headaches.   Hematological:  Does not bruise/bleed easily.   Psychiatric/Behavioral:  Negative for confusion and sleep disturbance.         OBJECTIVE:  /80 (BP Location: Right arm, Patient Position: Sitting, Cuff Size: Adult)   Pulse 80   Ht 188 cm (74\")   Wt 132 kg (290 lb 3.2 oz)   SpO2 98%   BMI 37.26 kg/m²      Body mass index is 37.26 kg/m².  Wt Readings from Last 1 Encounters:   11/30/23 132 kg (290 lb 3.2 oz)       Lab Review:  Renal Function: CrCl cannot be calculated (Patient's most recent lab result is older than the maximum 30 days allowed.).    Lab Results   Component Value Date    PROBNP 464.0 08/20/2023       Results for orders placed during the hospital encounter of 11/15/23    Adult Transthoracic Echo Limited W/ Cont if Necessary Per Protocol    Interpretation Summary  •  Left ventricular systolic function is low normal. Calculated left ventricular EF = 45.1% Septal wall motion is abnormal, consistent with right ventricular pacing. Normal global longitudinal LV strain (GLS) = -16.2%. Left ventricle strain data was reviewed by the physician. The left ventricular cavity is severely dilated. Left ventricular wall thickness is consistent with moderate concentric hypertrophy. There is left ventricular global hypokinesis noted. Left ventricular diastolic function was indeterminate. Myocardium has a speckled/echo bright appearance. Infiltrative cardiomyopathy is suspected.  •  The right ventricular cavity is moderately dilated. Right ventricular wall thickness is consistent with moderate hypertrophy. Normal right " ventricular systolic function noted.  •  The left atrial cavity is severely dilated.  •  The right atrial cavity is severely dilated.  •  There is mild, bileaflet mitral valve thickening present. Moderate mitral valve regurgitation is present. No significant mitral valve stenosis is present. There is an extremely eccentric jet of mitral regurgitation that extends posteriorly; there is significant Coanda affect, and the mitral regurgitation may be underestimated.  •  Mild to moderate tricuspid valve regurgitation is present. Estimated right ventricular systolic pressure from tricuspid regurgitation is moderately elevated (45-55 mmHg). Calculated right ventricular systolic pressure from tricuspid regurgitation is 49.3 mmHg.  •  The inferior vena cava is normally sized. Partial IVC inspiratory collapse of less than 50% noted.      Procedures      6 MINUTE WALK                      Cardiac Procedures:  1. 3/2/2022 cardiac catheterization  Findings:  1. Coronary Artery Anatomy:  Dominance: Right  Left Main: Mildly ectatic at approximately 5 to 6 mm diameter.  Left Anterior Descending: Large in caliber size.  Contains luminal irregularities throughout supplying a proximal, mid and distal diagonal branch  Circumflex Artery: Moderate caliber size.  Contains luminal irregularities throughout.  Supplying a high and inferior marginal branches.  Right Coronary Artery:  Moderate in caliber size with an anterior take-off. Moderate in caliber size and contain luminal irregularities throughout.  Supplies a PDA and posterior lateral branch.     2. Hemodynamics:  Right Atrium: */12/8 mmHg  Right Ventricle: 41/10/10 mmHg  Pulmonary Artery: 41/18/26 mmHg  Pulmonary Wedge: N/A not good wedge tracing  Left Ventricle: 118/8/12 mmHg  Aorta: 121/71/94 mmHg  Cardiac Output/Index: 6.2 L/min 2.32 L/min/m2  PA Sat: 69 %  AO Sat: 93 %  Hb: 15.0     Conclusions:  No significant angiographic evidence of coronary artery disease with essentially  luminal regularities throughout the right and left coronary arteries.  Normal cardiac output of 6.2 L/min and cardiac index of 2.3 L/min/m2  Mild pulmonary hypertension with a mean PA pressure 27 mmHg.  Normal left ventricular filling pressures of 12 mmHg     Recommendations:   Further evaluation for alternative etiologies for patient's dyspnea exertion per primary cardiology team.       Previously trialed diuretics  Lasix  Bumex      Previously trialed GDMT    Lisinopril  Metoprolol succinate  Spironolactone      ASSESSMENT:     Diagnosis Plan   1. Chronic right-sided heart failure        2. Infiltrative cardiomyopathy        3. DCM (dilated cardiomyopathy)        4. Persistent atrial fibrillation        5. Essential hypertension        6. Mixed hyperlipidemia        7. Diabetes mellitus type 2, noninsulin dependent        8. VENESSA (obstructive sleep apnea)        9. Pulmonary hypertension              PLAN OF CARE:  1.  HFrEF-NYHA class II.  Most recent ejection fraction 45%, improved from 31% per echocardiogram as above.  Current GDMT to include lisinopril, metoprolol succinate, and spironolactone.  Diuresed on Bumex.    He is currently euvolemic on exam.  Upgrade for device determination will be left to Dr. Cervantes.  He is to continue following a low-sodium diet of 2000 mg daily or less, fluid restriction of 1500 mL daily, as well as remain adherent with daily weights.  CPET performed at [facility] but unable to complete due to technical difficulties that this is going to be rescheduled.    Directions for when to call the clinic reviewed with the patient to include weight gain of 2 to 3 pounds in 24 hours, weight gain of 5 to 10 pounds within 7 days; worsening shortness of breath; worsening lower extremity edema or abdominal distention.    2.  Pulmonary hypertension-managed by Dr. Cervantes.    3.  Suspected cardiac amyloidosis-genetic testing revealed uncertain results.  Equivocal PYP scan.    4.   Hypertension-currently stable and controlled.    5.  Persistent atrial fibrillation-managed by EP.    6.  Sick sinus syndrome-presence of pacemaker noted.      BMP; continue current GDMT; follow-up with Dr. Cervantes in 4 weeks or sooner if needed        Advance Care Planning  Will address at subsequent visit      Thank you for allowing me to participate in the care of your patient,         DENNIS Hernandez  [facility] HEART FAILURE  11/30/23  09:11 EST      **Kathy Disclaimer:**  Much of this encounter note is an electronic transcription/translation of spoken language to printed text. The electronic translation of spoken language may permit erroneous, or at times, nonsensical words or phrases to be inadvertently transcribed. Although I have reviewed the note for such errors, some may still exist.

## 2023-11-30 NOTE — PROGRESS NOTES
[facility] HEART FAILURE      Patient Name: Shashi Engel  :1960  Age: 62 y.o.  Sex: male  Referring Provider: Keya Tong MD   Primary Cardiologist: Jennie Vanegas MD  Encounter Provider:  DENNIS Hernandez      Chief Complaint:   Chief Complaint   Patient presents with    Follow-up         History of Present Illness 62-year-old male, new to this provider, comes today for further evaluation regarding his chronic diastolic heart failure, suspected cardiomyopathy, and pulmonary hypertension.  Current diagnoses to include paroxysmal atrial fibrillation, hypertrophic cardiomyopathy, hyperlipidemia, hypertension, valvular heart disease with mild MR/TR, obesity, obstructive sleep apnea.  Records have been reviewed by me.    He was initially admitted at [facility] with chest pain in  and underwent evaluation at that time.  ACS was ruled out but he had been previously noted to have severe LVH related to his uncontrolled hypertension and was diagnosed previously with hypertrophic cardiomyopathy.  It appears per chart review that he had had trouble with syncope and presyncope for which EP followed him.  Atrial fibrillation was at some point diagnosed.  Dual-chamber pacemaker was placed in  for symptomatic bradycardia with associated syncope.  Echo revealed an LVEF of 60% at the time and stress testing was negative.    In  he was found to have pulmonary hypertension with an RVSP elevated on echocardiogram with moderate to severe TR noted and wall motion abnormality.  This led to cardiac catheterization revealing patent coronary arteries.  He was referred to the heart failure clinic to follow with Dr. Cervantes for his pulmonary hypertension and heart failure.    Repeat echocardiogram and CMR in 2023 revealed an LVEF of 31.3% on echo and normal perfusion noted on CMR.    Repeat echocardiogram was performed in 2023 revealing an improvement in LVEF to 45%.   He is to be evaluated for upgrade to biventricular device.    He is currently feeling very well.  Spironolactone was added and he is tolerating this well.  He has had his labs drawn this morning and his chief complaint today is shortness of breath on exertion.  He is unsure if he is going to be able to continue following with [facility] as his insurance is changing.    The following portions of the patient's history were reviewed and updated as appropriate: allergies, current medications, past family history, past medical history, past social history, past surgical history and problem list.    Current Outpatient Medications   Medication Sig Dispense Refill    amLODIPine (NORVASC) 5 MG tablet TAKE 1 TABLET BY MOUTH EVERY DAY 15 tablet 0    atorvastatin (LIPITOR) 40 MG tablet TAKE 1 TABLET BY MOUTH EVERY DAY 90 tablet 3    bumetanide (BUMEX) 2 MG tablet Take 1 tablet by mouth 2 (Two) Times a Day. 60 tablet 11    cholecalciferol (VITAMIN D3) 25 MCG (1000 UT) tablet Take 1 tablet by mouth Daily.      [facility] Lanc[facility] Original [facility] Use as directed and appropo lancet for his monitor 100 each 11    lisinopril (PRINIVIL,ZESTRIL) 5 MG tablet TAKE 1 TABLET BY MOUTH EVERY DAY 30 tablet 0    metFORMIN (GLUCOPHAGE) 1000 MG tablet TAKE 1 TABLET BY MOUTH 2 (TWO) TIMES A DAY WITH MEALS. INDICATIONS: TYPE 2 DIABETES, NOTED 30 tablet 0    metoprolol succinate XL (Toprol XL) 100 MG 24 hr tablet Take 1 tablet by mouth Daily. 30 tablet 3    potassium chloride 10 MEQ CR tablet TAKE 1 TABLET BY MOUTH EVERY DAY 90 tablet 3    rivaroxaban (Xarelto) 20 MG tablet Take 1 tablet by mouth Daily With Dinner. 14 tablet 2    spironolactone (ALDACTONE) 25 MG tablet Take 1 tablet by mouth Daily. 30 tablet 0    tadalafil (CIALIS) 20 MG tablet Take 1 tablet by mouth Daily As Needed for Erectile Dysfunction. 10 tablet 1    vitamin B-12 (CYANOCOBALAMIN) 1000 MCG tablet Take 1 tablet by mouth Daily.       No current facility-administered medications for this  encounter.       Past Medical History:   Diagnosis Date    Abnormal electrocardiogram     Anxiety     Cardiomyopathy, hypertrophic, primary familial     Erectile dysfunction     Health care maintenance     Hyperlipidemia     Hypertension     Mild concentric left ventricular hypertrophy (LVH)     Mild mitral regurgitation     Mild tricuspid regurgitation     Near syncope     Noncompliance     Nonsustained ventricular tachycardia 09/05/2018    Obesity     VENESSA (obstructive sleep apnea)     uses CPAP faithfully    PAF (paroxysmal atrial fibrillation)     Pneumonia 01/01/2016    Type 2 diabetes mellitus        Past Surgical History:   Procedure Laterality Date    CARDIAC CATHETERIZATION N/A 03/02/2022    Procedure: RIGHT HEART CATH;  Surgeon: Anjel Beasley MD;  Location: [facility] CATH INVASIVE LOCATION;  Service: Cardiovascular;  Laterality: N/A;    CARDIAC CATHETERIZATION N/A 03/02/2022    Procedure: Coronary angiography;  Surgeon: Anjel Beasley MD;  Location: [facility] CATH INVASIVE LOCATION;  Service: Cardiovascular;  Laterality: N/A;    CARDIAC ELECTROPHYSIOLOGY PROCEDURE Left 06/06/2016    Procedure: Pacemaker DC new  [place];  Surgeon: Juan Chacon MD;  Location: [facility] CATH INVASIVE LOCATION;  Service:     CARDIAC ELECTROPHYSIOLOGY PROCEDURE N/A 05/09/2022    Procedure: PPM generator change - dual- B[place];  Surgeon: Juan Chacon MD;  Location: [facility] CATH INVASIVE LOCATION;  Service: Cardiology;  Laterality: N/A;    ENDOSCOPY N/A 10/19/2022    Procedure: ESOPHAGOGASTRODUODENOSCOPY WITH BX;  Surgeon: Anjel Careny MD;  Location: [facility] ENDOSCOPY;  Service: Gastroenterology;  Laterality: N/A;  PREOP/ DYSPEPSIA  POSTOP/ HIATAL HERNIA, DUODENITIS, GASTRITIS    ENDOSCOPY      INSERT / REPLACE / REMOVE PACEMAKER      KNEE ARTHROSCOPY Right        Physical Exam  Vitals and nursing note reviewed.   Constitutional:       General: He is not in acute distress.     Appearance: He is well-developed.  He is obese. He is not ill-appearing.   HENT:      Head: Normocephalic and atraumatic.   Eyes:      Conjunctiva/sclera: Conjunctivae normal.      Pupils: Pupils are equal, round, and reactive to light.   Neck:      Vascular: No JVD.   Cardiovascular:      Rate and Rhythm: Normal rate and regular rhythm.      Heart sounds: Normal heart sounds. No murmur heard.     No friction rub. No gallop.   Pulmonary:      Effort: Pulmonary effort is normal. No respiratory distress.      Breath sounds: Normal breath sounds.   Abdominal:      General: Bowel sounds are normal. There is no distension.      Palpations: Abdomen is soft.   Musculoskeletal:         General: No swelling or deformity.   Skin:     General: Skin is warm and dry.      Capillary Refill: Capillary refill takes less than 2 seconds.   Neurological:      Mental Status: He is alert and oriented to person, place, and time. Mental status is at baseline.   Psychiatric:         Mood and Affect: Mood normal.         Behavior: Behavior normal.          Review of Systems   Constitutional:  Positive for fatigue. Negative for unexpected weight change.   HENT:  Negative for congestion and nosebleeds.    Eyes:  Negative for photophobia and visual disturbance.   Respiratory:  Positive for shortness of breath. Negative for cough and chest tightness.    Cardiovascular:  Negative for chest pain, palpitations and leg swelling.   Gastrointestinal:  Negative for abdominal distention and blood in stool.   Endocrine: Negative for polyphagia and polyuria.   Genitourinary:  Positive for frequency. Negative for urgency.   Musculoskeletal:  Negative for joint swelling and myalgias.   Skin:  Negative for pallor and rash.   Neurological:  Negative for dizziness, syncope, weakness, light-headedness, numbness and headaches.   Hematological:  Does not bruise/bleed easily.   Psychiatric/Behavioral:  Negative for confusion and sleep disturbance.         OBJECTIVE:  /80 (BP Location: Right  "arm, Patient Position: Sitting, Cuff Size: Adult)   Pulse 80   Ht 188 cm (74\")   Wt 132 kg (290 lb 3.2 oz)   SpO2 98%   BMI 37.26 kg/m²      Body mass index is 37.26 kg/m².  Wt Readings from Last 1 Encounters:   11/30/23 132 kg (290 lb 3.2 oz)       Lab Review:  Renal Function: CrCl cannot be calculated (Patient's most recent lab result is older than the maximum 30 days allowed.).    Lab Results   Component Value Date    PROBNP 464.0 08/20/2023       Results for orders placed during the hospital encounter of 11/15/23    Adult Transthoracic Echo Limited W/ Cont if Necessary Per Protocol    Interpretation Summary    Left ventricular systolic function is low normal. Calculated left ventricular EF = 45.1% Septal wall motion is abnormal, consistent with right ventricular pacing. Normal global longitudinal LV strain (GLS) = -16.2%. Left ventricle strain data was reviewed by the physician. The left ventricular cavity is severely dilated. Left ventricular wall thickness is consistent with moderate concentric hypertrophy. There is left ventricular global hypokinesis noted. Left ventricular diastolic function was indeterminate. Myocardium has a speckled/echo bright appearance. Infiltrative cardiomyopathy is suspected.    The right ventricular cavity is moderately dilated. Right ventricular wall thickness is consistent with moderate hypertrophy. Normal right ventricular systolic function noted.    The left atrial cavity is severely dilated.    The right atrial cavity is severely dilated.    There is mild, bileaflet mitral valve thickening present. Moderate mitral valve regurgitation is present. No significant mitral valve stenosis is present. There is an extremely eccentric jet of mitral regurgitation that extends posteriorly; there is significant Coanda affect, and the mitral regurgitation may be underestimated.    Mild to moderate tricuspid valve regurgitation is present. Estimated right ventricular systolic pressure " from tricuspid regurgitation is moderately elevated (45-55 mmHg). Calculated right ventricular systolic pressure from tricuspid regurgitation is 49.3 mmHg.    The inferior vena cava is normally sized. Partial IVC inspiratory collapse of less than 50% noted.      Procedures      6 MINUTE WALK                      Cardiac Procedures:  1. 3/2/2022 cardiac catheterization  Findings:  1. Coronary Artery Anatomy:  Dominance: Right  Left Main: Mildly ectatic at approximately 5 to 6 mm diameter.  Left Anterior Descending: Large in caliber size.  Contains luminal irregularities throughout supplying a proximal, mid and distal diagonal branch  Circumflex Artery: Moderate caliber size.  Contains luminal irregularities throughout.  Supplying a high and inferior marginal branches.  Right Coronary Artery:  Moderate in caliber size with an anterior take-off. Moderate in caliber size and contain luminal irregularities throughout.  Supplies a PDA and posterior lateral branch.     2. Hemodynamics:  Right Atrium: */12/8 mmHg  Right Ventricle: 41/10/10 mmHg  Pulmonary Artery: 41/18/26 mmHg  Pulmonary Wedge: N/A not good wedge tracing  Left Ventricle: 118/8/12 mmHg  Aorta: 121/71/94 mmHg  Cardiac Output/Index: 6.2 L/min 2.32 L/min/m2  PA Sat: 69 %  AO Sat: 93 %  Hb: 15.0     Conclusions:  No significant angiographic evidence of coronary artery disease with essentially luminal regularities throughout the right and left coronary arteries.  Normal cardiac output of 6.2 L/min and cardiac index of 2.3 L/min/m2  Mild pulmonary hypertension with a mean PA pressure 27 mmHg.  Normal left ventricular filling pressures of 12 mmHg     Recommendations:   Further evaluation for alternative etiologies for patient's dyspnea exertion per primary cardiology team.       Previously trialed diuretics  Lasix  Bumex      Previously trialed GDMT    Lisinopril  Metoprolol succinate  Spironolactone      ASSESSMENT:     Diagnosis Plan   1. Chronic right-sided  heart failure        2. Infiltrative cardiomyopathy        3. DCM (dilated cardiomyopathy)        4. Persistent atrial fibrillation        5. Essential hypertension        6. Mixed hyperlipidemia        7. Diabetes mellitus type 2, noninsulin dependent        8. VENESSA (obstructive sleep apnea)        9. Pulmonary hypertension              PLAN OF CARE:  1.  HFrEF-NYHA class II.  Most recent ejection fraction 45%, improved from 31% per echocardiogram as above.  Current GDMT to include lisinopril, metoprolol succinate, and spironolactone.  Diuresed on Bumex.    He is currently euvolemic on exam.  Upgrade for device determination will be left to Dr. Cervantes.  He is to continue following a low-sodium diet of 2000 mg daily or less, fluid restriction of 1500 mL daily, as well as remain adherent with daily weights.  CPET performed at [facility] but unable to complete due to technical difficulties that this is going to be rescheduled.    Directions for when to call the clinic reviewed with the patient to include weight gain of 2 to 3 pounds in 24 hours, weight gain of 5 to 10 pounds within 7 days; worsening shortness of breath; worsening lower extremity edema or abdominal distention.    2.  Pulmonary hypertension-managed by Dr. Cervantes.    3.  Suspected cardiac amyloidosis-genetic testing revealed uncertain results.  Equivocal PYP scan.    4.  Hypertension-currently stable and controlled.    5.  Persistent atrial fibrillation-managed by EP.    6.  Sick sinus syndrome-presence of pacemaker noted.      BMP; continue current GDMT; follow-up with Dr. Cervantes in 4 weeks or sooner if needed        Advance Care Planning   Will address at subsequent visit      Thank you for allowing me to participate in the care of your patient,         Ines DENNIS Silvestre  [facility] HEART FAILURE  11/30/23  09:11 EST      **Kathy Disclaimer:**  Much of this encounter note is an electronic transcription/translation of spoken language to  printed text. The electronic translation of spoken language may permit erroneous, or at times, nonsensical words or phrases to be inadvertently transcribed. Although I have reviewed the note for such errors, some may still exist.

## 2023-12-01 ENCOUNTER — HOSPITAL ENCOUNTER (OUTPATIENT)
Dept: RESPIRATORY THERAPY | Facility: HOSPITAL | Age: 63
Discharge: HOME | End: 2023-12-01
Payer: MEDICARE

## 2023-12-01 DIAGNOSIS — I42.0 DCM (DILATED CARDIOMYOPATHY): ICD-10-CM

## 2023-12-01 DIAGNOSIS — I27.20 PULMONARY HYPERTENSION: ICD-10-CM

## 2023-12-01 PROCEDURE — 94726 PLETHYSMOGRAPHY LUNG VOLUMES: CPT

## 2023-12-01 PROCEDURE — 94060 EVALUATION OF WHEEZING: CPT

## 2023-12-01 PROCEDURE — 94729 DIFFUSING CAPACITY: CPT

## 2023-12-01 RX ORDER — METOPROLOL SUCCINATE 100 MG/1
100 TABLET, EXTENDED RELEASE ORAL DAILY
Qty: 90 TABLET | Refills: 0 | Status: SHIPPED | OUTPATIENT
Start: 2023-12-01

## 2023-12-01 RX ORDER — ALBUTEROL SULFATE 2.5 MG/3ML
2.5 SOLUTION RESPIRATORY (INHALATION) ONCE AS NEEDED
Status: COMPLETED | OUTPATIENT
Start: 2023-12-01 | End: 2023-12-01

## 2023-12-01 RX ADMIN — ALBUTEROL SULFATE 2.5 MG: 2.5 SOLUTION RESPIRATORY (INHALATION) at 11:24

## 2023-12-01 NOTE — ADDENDUM NOTE
Encounter addended by: Yvette Lopez MA on: 12/1/2023 8:19 AM   Actions taken: Charge Capture section accepted

## 2023-12-04 ENCOUNTER — TELEPHONE (OUTPATIENT)
Age: 63
End: 2023-12-04
Payer: MEDICARE

## 2023-12-04 NOTE — TELEPHONE ENCOUNTER
PT notified of insurance being out of network as of 1/1/24. Explained patient can switch plans or call existing insurance to get continuity of care started until a new provider is found. Explained to patient device will continue to send automatic remotes through home monitor until a new provider has transferred their monitor to the new provider. Patient will be responsible for any charges due to home monitoring.    Pt states he plans to switch to [place].

## 2023-12-07 RX ORDER — LISINOPRIL 5 MG/1
5 TABLET ORAL DAILY
Qty: 30 TABLET | Refills: 0 | Status: SHIPPED | OUTPATIENT
Start: 2023-12-07

## 2023-12-13 RX ORDER — SPIRONOLACTONE 25 MG/1
25 TABLET ORAL DAILY
Qty: 30 TABLET | Refills: 0 | Status: SHIPPED | OUTPATIENT
Start: 2023-12-13

## 2023-12-15 ENCOUNTER — TELEPHONE (OUTPATIENT)
Dept: CARDIOLOGY | Facility: HOSPITAL | Age: 63
End: 2023-12-15
Payer: MEDICARE

## 2023-12-15 NOTE — TELEPHONE ENCOUNTER
Spoke with [facility] gave verbal for Metoprolol and lisinopril.     [phone]

## 2023-12-18 RX ORDER — POTASSIUM CHLORIDE 750 MG/1
10 TABLET, FILM COATED, EXTENDED RELEASE ORAL DAILY
Qty: 30 TABLET | Refills: 2 | Status: SHIPPED | OUTPATIENT
Start: 2023-12-18

## 2023-12-18 RX ORDER — BUMETANIDE 2 MG/1
2 TABLET ORAL 2 TIMES DAILY
Qty: 60 TABLET | Refills: 2 | Status: SHIPPED | OUTPATIENT
Start: 2023-12-18

## 2023-12-21 ENCOUNTER — OFFICE VISIT (OUTPATIENT)
Dept: FAMILY MEDICINE CLINIC | Facility: CLINIC | Age: 63
End: 2023-12-21
Payer: MEDICARE

## 2023-12-21 VITALS
SYSTOLIC BLOOD PRESSURE: 122 MMHG | BODY MASS INDEX: 37.18 KG/M2 | HEART RATE: 60 BPM | WEIGHT: 289.7 LBS | OXYGEN SATURATION: 96 % | RESPIRATION RATE: 16 BRPM | HEIGHT: 74 IN | DIASTOLIC BLOOD PRESSURE: 80 MMHG

## 2023-12-21 DIAGNOSIS — R14.0 BLOATING SYMPTOM: ICD-10-CM

## 2023-12-21 DIAGNOSIS — N52.9 ERECTILE DYSFUNCTION, UNSPECIFIED ERECTILE DYSFUNCTION TYPE: ICD-10-CM

## 2023-12-21 DIAGNOSIS — R14.1 GAS PAIN: ICD-10-CM

## 2023-12-21 DIAGNOSIS — I10 ESSENTIAL HYPERTENSION: Primary | ICD-10-CM

## 2023-12-21 DIAGNOSIS — Z12.5 ENCOUNTER FOR SCREENING FOR MALIGNANT NEOPLASM OF PROSTATE: ICD-10-CM

## 2023-12-21 DIAGNOSIS — E78.2 MIXED HYPERLIPIDEMIA: ICD-10-CM

## 2023-12-21 PROCEDURE — 3079F DIAST BP 80-89 MM HG: CPT | Performed by: NURSE PRACTITIONER

## 2023-12-21 PROCEDURE — 1160F RVW MEDS BY RX/DR IN RCRD: CPT | Performed by: NURSE PRACTITIONER

## 2023-12-21 PROCEDURE — 99214 OFFICE O/P EST MOD 30 MIN: CPT | Performed by: NURSE PRACTITIONER

## 2023-12-21 PROCEDURE — 1159F MED LIST DOCD IN RCRD: CPT | Performed by: NURSE PRACTITIONER

## 2023-12-21 PROCEDURE — 3044F HG A1C LEVEL LT 7.0%: CPT | Performed by: NURSE PRACTITIONER

## 2023-12-21 PROCEDURE — 3074F SYST BP LT 130 MM HG: CPT | Performed by: NURSE PRACTITIONER

## 2023-12-21 RX ORDER — CALCIUM CITRATE/VITAMIN D3 200MG-6.25
1 TABLET ORAL DAILY
COMMUNITY
Start: 2023-11-24

## 2023-12-21 RX ORDER — SILDENAFIL 50 MG/1
50 TABLET, FILM COATED ORAL DAILY PRN
Qty: 30 TABLET | Refills: 0 | Status: SHIPPED | OUTPATIENT
Start: 2023-12-21

## 2023-12-21 RX ORDER — FAMOTIDINE 20 MG/1
20 TABLET, FILM COATED ORAL 2 TIMES DAILY
Qty: 30 TABLET | Refills: 0 | Status: SHIPPED | OUTPATIENT
Start: 2023-12-21

## 2023-12-21 RX ORDER — AMLODIPINE BESYLATE 5 MG/1
5 TABLET ORAL DAILY
Qty: 90 TABLET | Refills: 0 | Status: SHIPPED | OUTPATIENT
Start: 2023-12-21

## 2023-12-21 RX ORDER — METFORMIN HYDROCHLORIDE 500 MG/1
1000 TABLET, EXTENDED RELEASE ORAL 2 TIMES DAILY WITH MEALS
Qty: 360 TABLET | Refills: 0 | Status: SHIPPED | OUTPATIENT
Start: 2023-12-21 | End: 2024-03-20

## 2023-12-21 RX ORDER — ATORVASTATIN CALCIUM 40 MG/1
40 TABLET, FILM COATED ORAL DAILY
Qty: 90 TABLET | Refills: 0 | Status: SHIPPED | OUTPATIENT
Start: 2023-12-21

## 2023-12-21 NOTE — PROGRESS NOTES
Subjective   Shashi Engel is a 62 y.o. male.     History of Present Illness     New to this provider, prev saw Dr Kirkpatrick. Due med refills and has had some stomach discomfort.     Chronic diastolic CHF, cardiomyopathy, pulm htn, a fib, HLD, HTN, valvular heart disease, VENESSA on CPAP. Pacemaker.   He denies chest pain, palps, GALLAGHER, weight gain, lower leg edema. He is not adding salt. He is not restricting fluids.   On amlodipine 5 qd (no edema), atorvastatin 40qd (no cramps or pain), bumetanide 2 mg bid, Vit D3 1000 IU, Lisinopril 5 mg (no cough), metformin 1000 mg bid, metoprolol succ xl 100 mg qd, potassium 10 meq qd, xarelto 20 mg qd (no falls , no bleeding, no melena), spironolactone 25 mg qd, cialis 20 mg as needed, B12 1000Mcg  Last labs 11/15/23: bun 16, creat 0.84, na 139, k 3.5, gfr 99. LDL 94, Hdl 53.     Chronic ED x years. He is wanting to change to viagra due to insurance not covering med. He denies issues w meds or chest pain w sex.     Some bloating and gas on and off x 4 days, this comes and goes over past several months. No diet changes. No fever, no vomiting , occasional GERD.  He is open to taking meds.  Normal BM today. Has tried gas X, TUMS, gaviscon and probiotics with some relief. He is on metformin 1000 mg bid.  Has never been on extended release metformin.  He denies history of diverticulitis. Cologuard done 2023.  Last A1c 6.9. Last labs 10/18/23: wbc 6.2, hgb 13.     The following portions of the patient's history were reviewed and updated as appropriate: allergies, current medications, past family history, past medical history, past social history, past surgical history and problem list.    Review of Systems    Objective   Physical Exam  Vitals reviewed.   Constitutional:       Appearance: Normal appearance. He is obese.   HENT:      Head: Normocephalic.      Nose: Nose normal.      Mouth/Throat:      Mouth: Mucous membranes are moist.   Eyes:      Pupils: Pupils are equal, round, and  reactive to light.   Cardiovascular:      Rate and Rhythm: Normal rate and regular rhythm.      Pulses: Normal pulses.      Heart sounds: Normal heart sounds.   Pulmonary:      Effort: Pulmonary effort is normal.      Breath sounds: Normal breath sounds.   Abdominal:      General: Bowel sounds are normal.      Palpations: Abdomen is soft.   Musculoskeletal:         General: No swelling. Normal range of motion.      Cervical back: Normal range of motion.      Right lower leg: No edema.      Left lower leg: No edema.   Skin:     General: Skin is warm.   Neurological:      General: No focal deficit present.      Mental Status: He is alert.   Psychiatric:         Mood and Affect: Mood normal.         Vitals:    12/21/23 1416   BP: 122/80   Pulse: 60   Resp: 16   SpO2: 96%     Body mass index is 37.2 kg/m².    Procedures    Assessment & Plan   Problems Addressed this Visit       Essential hypertension - Primary    Relevant Medications    amLODIPine (NORVASC) 5 MG tablet    HLD (hyperlipidemia)    Relevant Medications    atorvastatin (LIPITOR) 40 MG tablet     Other Visit Diagnoses       Erectile dysfunction, unspecified erectile dysfunction type        Relevant Orders    PSA Screen    Gas pain        Bloating symptom        Encounter for screening for malignant neoplasm of prostate        Relevant Orders    PSA Screen          Diagnoses         Codes Comments    Essential hypertension    -  Primary ICD-10-CM: I10  ICD-9-CM: 401.9     Mixed hyperlipidemia     ICD-10-CM: E78.2  ICD-9-CM: 272.2     Erectile dysfunction, unspecified erectile dysfunction type     ICD-10-CM: N52.9  ICD-9-CM: 607.84     Gas pain     ICD-10-CM: R14.1  ICD-9-CM: 787.3     Bloating symptom     ICD-10-CM: R14.0  ICD-9-CM: 787.3     Encounter for screening for malignant neoplasm of prostate     ICD-10-CM: Z12.5  ICD-9-CM: V76.44           Orders Placed This Encounter   Procedures    PSA Screen     Standing Status:   Future     Standing Expiration  Date:   12/21/2024     Order Specific Question:   Release to patient     Answer:   Routine Release [[id number]]     Hypertension/CHF/hyperlipidemia-refill meds for 90-day supply to mail order pharmacy, patient will be following with new provider with insurance change in the new year  Follow-up with heart failure clinic,, reviewed medication list and latest labs with patient.    Bloating/gas pain-likely due to metformin, change metformin to extended release 1000 a.m., 1000 p.m.  Gentle diet, Rx Pepcid 20 mg twice daily  Limit gas producing foods or triggers    Erectile dysfunction-discussed with cardiologist DENNIS madrid to change to sildenafil 50 mg.  Patient has previously tolerated this.  He is aware of side effect profile, ER for any chest pain or concerns  Patient does not have recent screening PSA in his chart, does not see urology, have entered future orders to be collected by new provider or with next blood draw.           Education provided in AVS   No follow-ups on file.

## 2023-12-29 ENCOUNTER — TELEPHONE (OUTPATIENT)
Age: 63
End: 2023-12-29
Payer: MEDICARE

## 2023-12-29 NOTE — TELEPHONE ENCOUNTER
Returned Gia's call back from [facility] pharmacy and spoke to a pharmacist regarding pt's potassium medication. Pharmacist wanted confirmation on how pt is taking the prescribed med. I did let them know pt is prescribed to take it once daily.    Pharmacist verbally understood       [facility] pharmacy # [phone]

## 2024-01-04 ENCOUNTER — TELEPHONE (OUTPATIENT)
Dept: CARDIOLOGY | Facility: HOSPITAL | Age: 64
End: 2024-01-04
Payer: MEDICARE

## 2024-01-04 RX ORDER — SPIRONOLACTONE 25 MG/1
25 TABLET ORAL DAILY
Qty: 30 TABLET | Refills: 0 | Status: SHIPPED | OUTPATIENT
Start: 2024-01-04

## 2024-01-04 RX ORDER — LISINOPRIL 5 MG/1
5 TABLET ORAL DAILY
Qty: 30 TABLET | Refills: 0 | Status: SHIPPED | OUTPATIENT
Start: 2024-01-04

## 2024-01-04 NOTE — TELEPHONE ENCOUNTER
Called patient this morning to schedule a follow-up appointment with Dr. Cervantes.   Patient states he will no longer be coming to [facility], as his insurance [employer] Replacement is NO Longer accepted.  He states he will be finding a new Cardiologist. He states he does see [facility] for HF.     I let patient know that I would note this in his chart.    Thank you,  Nisha TRACY [facility]

## 2024-01-08 RX ORDER — FAMOTIDINE 20 MG/1
20 TABLET, FILM COATED ORAL 2 TIMES DAILY
Qty: 90 TABLET | Refills: 3 | Status: SHIPPED | OUTPATIENT
Start: 2024-01-08

## 2024-02-29 RX ORDER — METOPROLOL SUCCINATE 100 MG/1
100 TABLET, EXTENDED RELEASE ORAL DAILY
Qty: 90 TABLET | Refills: 0 | OUTPATIENT
Start: 2024-02-29

## 2024-02-29 RX ORDER — CALCIUM CITRATE/VITAMIN D3 200MG-6.25
1 TABLET ORAL DAILY
Qty: 100 EACH | Refills: 11 | Status: SHIPPED | OUTPATIENT
Start: 2024-02-29

## 2024-03-04 DIAGNOSIS — I10 ESSENTIAL HYPERTENSION: ICD-10-CM

## 2024-03-04 DIAGNOSIS — E78.2 MIXED HYPERLIPIDEMIA: ICD-10-CM

## 2024-03-04 RX ORDER — RIVAROXABAN 20 MG/1
20 TABLET, FILM COATED ORAL
Qty: 30 TABLET | Refills: 10 | Status: SHIPPED | OUTPATIENT
Start: 2024-03-04

## 2024-03-04 RX ORDER — AMLODIPINE BESYLATE 5 MG/1
5 TABLET ORAL DAILY
Qty: 30 TABLET | Refills: 10 | Status: SHIPPED | OUTPATIENT
Start: 2024-03-04

## 2024-03-04 RX ORDER — LISINOPRIL 5 MG/1
5 TABLET ORAL DAILY
Qty: 30 TABLET | Refills: 10 | OUTPATIENT
Start: 2024-03-04

## 2024-03-04 RX ORDER — SPIRONOLACTONE 25 MG/1
25 TABLET ORAL DAILY
Qty: 30 TABLET | Refills: 10 | OUTPATIENT
Start: 2024-03-04

## 2024-03-04 RX ORDER — POTASSIUM CHLORIDE 750 MG/1
10 TABLET, FILM COATED, EXTENDED RELEASE ORAL DAILY
Qty: 30 TABLET | Refills: 10 | Status: SHIPPED | OUTPATIENT
Start: 2024-03-04

## 2024-03-04 RX ORDER — METFORMIN HYDROCHLORIDE 500 MG/1
1000 TABLET, EXTENDED RELEASE ORAL 2 TIMES DAILY
Qty: 120 TABLET | Refills: 0 | Status: SHIPPED | OUTPATIENT
Start: 2024-03-04

## 2024-03-04 RX ORDER — ATORVASTATIN CALCIUM 40 MG/1
40 TABLET, FILM COATED ORAL
Qty: 30 TABLET | Refills: 10 | Status: SHIPPED | OUTPATIENT
Start: 2024-03-04

## 2024-03-04 RX ORDER — BUMETANIDE 2 MG/1
2 TABLET ORAL 2 TIMES DAILY
Qty: 60 TABLET | Refills: 10 | Status: SHIPPED | OUTPATIENT
Start: 2024-03-04

## 2024-03-04 RX ORDER — METOPROLOL SUCCINATE 100 MG/1
100 TABLET, EXTENDED RELEASE ORAL DAILY
Qty: 30 TABLET | Refills: 10 | OUTPATIENT
Start: 2024-03-04

## 2024-03-18 ENCOUNTER — TRANSCRIBE ORDERS (OUTPATIENT)
Dept: ADMINISTRATIVE | Facility: HOSPITAL | Age: 64
End: 2024-03-18
Payer: MEDICARE

## 2024-03-18 DIAGNOSIS — R91.1 PULMONARY NODULE: Primary | ICD-10-CM

## 2024-03-25 ENCOUNTER — TELEPHONE (OUTPATIENT)
Dept: SURGERY | Facility: CLINIC | Age: 64
End: 2024-03-25
Payer: MEDICARE

## 2024-03-25 NOTE — TELEPHONE ENCOUNTER
Patient being referred back to see Dr Eisenberg,   Chart has been reviewed, typed, he just needs his new patient apt.     Patient originally referred 7/2023, but patient could not commit to apt.

## 2024-03-27 ENCOUNTER — TELEPHONE (OUTPATIENT)
Dept: SURGERY | Facility: CLINIC | Age: 64
End: 2024-03-27
Payer: MEDICARE

## 2024-03-27 NOTE — TELEPHONE ENCOUNTER
Need to speak with patient-does he have a dermatologist?    If not we would like for him to see Dr Phu Kendall, Dermatology   A dermatologist may be able to prescribe a topical medication that would prevent him from needing surgery.

## 2024-03-29 ENCOUNTER — TELEPHONE (OUTPATIENT)
Dept: SURGERY | Facility: CLINIC | Age: 64
End: 2024-03-29
Payer: MEDICARE

## 2024-04-11 ENCOUNTER — TELEPHONE (OUTPATIENT)
Dept: SURGERY | Facility: CLINIC | Age: 64
End: 2024-04-11
Payer: MEDICARE

## 2024-04-11 NOTE — TELEPHONE ENCOUNTER
Mr Gael is awaiting a call from [facility], if he has not gotten a call by Monday he will let me know.

## 2024-04-16 RX ORDER — BUMETANIDE 2 MG/1
2 TABLET ORAL 2 TIMES DAILY
Qty: 60 TABLET | Refills: 10 | OUTPATIENT
Start: 2024-04-16

## 2024-04-16 NOTE — TELEPHONE ENCOUNTER
Caller: [facility] [address] - [phone] [place] [phone] FX    Relationship: Pharmacy    Best call back number: [phone]     Requested Prescriptions:   Requested Prescriptions     Pending Prescriptions Disp Refills    bumetanide (BUMEX) 2 MG tablet 60 tablet 10     Sig: Take 1 tablet by mouth 2 (Two) Times a Day.        Pharmacy where request should be sent: [facility] [address] - [phone] [facility] [phone] FX     Last office visit with prescribing clinician: 10/11/2023   Last telemedicine visit with prescribing clinician: Visit date not found   Next office visit with prescribing clinician: Visit date not found     Additional details provided by patient: PHARMACY REACHING OUT AS PATIENT IS REPORTING A DIFFERENT DOSAGE, SAME 2MG BUT TAKEN ONCE  DAY INSTEAD OF TWICE - PHARMACY ASKING TO CONFIRM AND RESEND UPDATED PRESCRIPTION     Kimberly Vazquez Rep   04/16/24 10:44 EDT

## 2024-04-17 ENCOUNTER — TELEPHONE (OUTPATIENT)
Dept: SURGERY | Facility: CLINIC | Age: 64
End: 2024-04-17
Payer: MEDICARE

## 2024-05-06 RX ORDER — METFORMIN HYDROCHLORIDE 500 MG/1
1000 TABLET, EXTENDED RELEASE ORAL 2 TIMES DAILY
Qty: 120 TABLET | Refills: 10 | Status: SHIPPED | OUTPATIENT
Start: 2024-05-06

## 2024-07-03 RX ORDER — FAMOTIDINE 20 MG/1
20 TABLET, FILM COATED ORAL 2 TIMES DAILY
Qty: 180 TABLET | Refills: 1 | Status: SHIPPED | OUTPATIENT
Start: 2024-07-03

## 2025-02-02 ENCOUNTER — HOSPITAL ENCOUNTER (EMERGENCY)
Facility: HOSPITAL | Age: 65
Discharge: HOME | End: 2025-02-02
Attending: EMERGENCY MEDICINE | Admitting: EMERGENCY MEDICINE
Payer: MEDICARE

## 2025-02-02 ENCOUNTER — APPOINTMENT (OUTPATIENT)
Dept: GENERAL RADIOLOGY | Facility: HOSPITAL | Age: 65
End: 2025-02-02
Payer: MEDICARE

## 2025-02-02 VITALS
HEART RATE: 61 BPM | OXYGEN SATURATION: 94 % | TEMPERATURE: 100.7 F | RESPIRATION RATE: 20 BRPM | DIASTOLIC BLOOD PRESSURE: 79 MMHG | SYSTOLIC BLOOD PRESSURE: 161 MMHG

## 2025-02-02 DIAGNOSIS — R05.1 ACUTE COUGH: ICD-10-CM

## 2025-02-02 DIAGNOSIS — L03.115 CELLULITIS OF RIGHT LOWER EXTREMITY: Primary | ICD-10-CM

## 2025-02-02 LAB
ALBUMIN SERPL-MCNC: 3.7 G/DL (ref 3.5–5.2)
ALBUMIN/GLOB SERPL: 1.2 G/DL
ALP SERPL-CCNC: 75 U/L (ref 39–117)
ALT SERPL W P-5'-P-CCNC: 32 U/L (ref 1–41)
ANION GAP SERPL CALCULATED.3IONS-SCNC: 8.7 MMOL/L (ref 5–15)
AST SERPL-CCNC: 37 U/L (ref 1–40)
B PARAPERT DNA SPEC QL NAA+PROBE: NOT DETECTED
B PERT DNA SPEC QL NAA+PROBE: NOT DETECTED
BASOPHILS # BLD AUTO: 0.01 10*3/MM3 (ref 0–0.2)
BASOPHILS NFR BLD AUTO: 0.1 % (ref 0–1.5)
BILIRUB SERPL-MCNC: 1 MG/DL (ref 0–1.2)
BUN SERPL-MCNC: 17 MG/DL (ref 8–23)
BUN/CREAT SERPL: 18.1 (ref 7–25)
C PNEUM DNA NPH QL NAA+NON-PROBE: NOT DETECTED
CALCIUM SPEC-SCNC: 9 MG/DL (ref 8.6–10.5)
CHLORIDE SERPL-SCNC: 104 MMOL/L (ref 98–107)
CO2 SERPL-SCNC: 28.3 MMOL/L (ref 22–29)
CREAT SERPL-MCNC: 0.94 MG/DL (ref 0.76–1.27)
DEPRECATED RDW RBC AUTO: 42.8 FL (ref 37–54)
EGFRCR SERPLBLD CKD-EPI 2021: 91.1 ML/MIN/1.73
EOSINOPHIL # BLD AUTO: 0.03 10*3/MM3 (ref 0–0.4)
EOSINOPHIL NFR BLD AUTO: 0.3 % (ref 0.3–6.2)
ERYTHROCYTE [DISTWIDTH] IN BLOOD BY AUTOMATED COUNT: 12.9 % (ref 12.3–15.4)
FLUAV SUBTYP SPEC NAA+PROBE: NOT DETECTED
FLUBV RNA ISLT QL NAA+PROBE: NOT DETECTED
GLOBULIN UR ELPH-MCNC: 3 GM/DL
GLUCOSE SERPL-MCNC: 163 MG/DL (ref 65–99)
HADV DNA SPEC NAA+PROBE: NOT DETECTED
HCOV 229E RNA SPEC QL NAA+PROBE: NOT DETECTED
HCOV HKU1 RNA SPEC QL NAA+PROBE: NOT DETECTED
HCOV NL63 RNA SPEC QL NAA+PROBE: NOT DETECTED
HCOV OC43 RNA SPEC QL NAA+PROBE: NOT DETECTED
HCT VFR BLD AUTO: 42.9 % (ref 37.5–51)
HGB BLD-MCNC: 14.1 G/DL (ref 13–17.7)
HMPV RNA NPH QL NAA+NON-PROBE: NOT DETECTED
HOLD SPECIMEN: NORMAL
HOLD SPECIMEN: NORMAL
HPIV1 RNA ISLT QL NAA+PROBE: NOT DETECTED
HPIV2 RNA SPEC QL NAA+PROBE: NOT DETECTED
HPIV3 RNA NPH QL NAA+PROBE: NOT DETECTED
HPIV4 P GENE NPH QL NAA+PROBE: NOT DETECTED
IMM GRANULOCYTES # BLD AUTO: 0.05 10*3/MM3 (ref 0–0.05)
IMM GRANULOCYTES NFR BLD AUTO: 0.4 % (ref 0–0.5)
LYMPHOCYTES # BLD AUTO: 0.85 10*3/MM3 (ref 0.7–3.1)
LYMPHOCYTES NFR BLD AUTO: 7.3 % (ref 19.6–45.3)
M PNEUMO IGG SER IA-ACNC: NOT DETECTED
MCH RBC QN AUTO: 29.5 PG (ref 26.6–33)
MCHC RBC AUTO-ENTMCNC: 32.9 G/DL (ref 31.5–35.7)
MCV RBC AUTO: 89.7 FL (ref 79–97)
MONOCYTES # BLD AUTO: 0.78 10*3/MM3 (ref 0.1–0.9)
MONOCYTES NFR BLD AUTO: 6.7 % (ref 5–12)
NEUTROPHILS NFR BLD AUTO: 85.2 % (ref 42.7–76)
NEUTROPHILS NFR BLD AUTO: 9.98 10*3/MM3 (ref 1.7–7)
NRBC BLD AUTO-RTO: 0 /100 WBC (ref 0–0.2)
PLATELET # BLD AUTO: 255 10*3/MM3 (ref 140–450)
PMV BLD AUTO: 9.5 FL (ref 6–12)
POTASSIUM SERPL-SCNC: 4.4 MMOL/L (ref 3.5–5.2)
PROT SERPL-MCNC: 6.7 G/DL (ref 6–8.5)
RBC # BLD AUTO: 4.78 10*6/MM3 (ref 4.14–5.8)
RHINOVIRUS RNA SPEC NAA+PROBE: NOT DETECTED
RSV RNA NPH QL NAA+NON-PROBE: NOT DETECTED
SARS-COV-2 RNA NPH QL NAA+NON-PROBE: NOT DETECTED
SODIUM SERPL-SCNC: 141 MMOL/L (ref 136–145)
WBC NRBC COR # BLD AUTO: 11.7 10*3/MM3 (ref 3.4–10.8)
WHOLE BLOOD HOLD COAG: NORMAL
WHOLE BLOOD HOLD SPECIMEN: NORMAL

## 2025-02-02 PROCEDURE — 85025 COMPLETE CBC W/AUTO DIFF WBC: CPT | Performed by: STUDENT IN AN ORGANIZED HEALTH CARE EDUCATION/TRAINING PROGRAM

## 2025-02-02 PROCEDURE — 71046 X-RAY EXAM CHEST 2 VIEWS: CPT

## 2025-02-02 PROCEDURE — 80053 COMPREHEN METABOLIC PANEL: CPT | Performed by: STUDENT IN AN ORGANIZED HEALTH CARE EDUCATION/TRAINING PROGRAM

## 2025-02-02 PROCEDURE — 0202U NFCT DS 22 TRGT SARS-COV-2: CPT | Performed by: EMERGENCY MEDICINE

## 2025-02-02 PROCEDURE — 36415 COLL VENOUS BLD VENIPUNCTURE: CPT | Performed by: STUDENT IN AN ORGANIZED HEALTH CARE EDUCATION/TRAINING PROGRAM

## 2025-02-02 PROCEDURE — 99283 EMERGENCY DEPT VISIT LOW MDM: CPT

## 2025-02-02 RX ORDER — DOXYCYCLINE 100 MG/1
100 CAPSULE ORAL 2 TIMES DAILY
Qty: 14 CAPSULE | Refills: 0 | Status: SHIPPED | OUTPATIENT
Start: 2025-02-02 | End: 2025-02-09

## 2025-02-02 RX ORDER — DOXYCYCLINE 100 MG/1
100 CAPSULE ORAL ONCE
Status: COMPLETED | OUTPATIENT
Start: 2025-02-02 | End: 2025-02-02

## 2025-02-02 RX ORDER — ACETAMINOPHEN 500 MG
1000 TABLET ORAL ONCE
Status: COMPLETED | OUTPATIENT
Start: 2025-02-02 | End: 2025-02-02

## 2025-02-02 RX ADMIN — ACETAMINOPHEN 1000 MG: 500 TABLET ORAL at 20:15

## 2025-02-02 RX ADMIN — DOXYCYCLINE 100 MG: 100 CAPSULE ORAL at 21:42

## 2025-02-03 NOTE — ED PROVIDER NOTES
" EMERGENCY DEPARTMENT ENCOUNTER  Room Number:  HA1/A  PCP: Provider, No Known  Independent Historians: {Historian:52489::\"Patient\"}      HPI:  Chief Complaint: had concerns including Flu Symptoms. ***    A complete HPI/ROS/PMH/PSH/SH/FH are unobtainable due to: {EM_Unobtainable History (Optional):16287::\"None\"}    Chronic or social conditions impacting patient care (Social Determinants of Health): {EM_SDOH (Optional):98706::\"None\"}      Context: Shashi Engel is a 64 y.o. male with a medical history of *** who presents to the ED c/o acute ***      Review of prior external notes (non-ED) -and- Review of prior external test results outside of this encounter: {EM_OldRecords:28005::\"***\"}    Prescription drug monitoring program review:     {EM_Kasper (Optional):07759::\"N/A\"}    PAST MEDICAL HISTORY  Active Ambulatory Problems     Diagnosis Date Noted    Persistent atrial fibrillation 11/02/2015    Essential hypertension 11/02/2015    ED (erectile dysfunction) of organic origin 11/02/2015    HLD (hyperlipidemia) 11/02/2015    Hypertrophic polyarthritis 11/02/2015    Diabetes mellitus type 2, noninsulin dependent 11/02/2015    LVH (left ventricular hypertrophy) due to hypertensive disease 08/04/2016    SSS (sick sinus syndrome) 11/10/2016    Sleep related hypoxia 12/04/2017    Class 2 severe obesity due to excess calories with serious comorbidity and body mass index (BMI) of 38.0 to 38.9 in adult     VENESSA (obstructive sleep apnea)     Nonsustained ventricular tachycardia 09/05/2018    S/P placement of cardiac pacemaker 07/08/2021    Pulmonary hypertension 11/30/2022    Other proteinuria 11/30/2022    Nonrheumatic tricuspid valve regurgitation 11/30/2022    Nonrheumatic mitral valve regurgitation 11/30/2022    Exertional dyspnea 08/20/2023    DCM (dilated cardiomyopathy) 09/06/2023    Chronic right-sided heart failure 09/06/2023    Infiltrative cardiomyopathy 10/11/2023     Resolved Ambulatory Problems     Diagnosis Date " Noted    Cardiomyopathy, hypertrophic, primary familial 11/02/2015    Dizzy spells 05/05/2016    Near syncope 08/04/2016    Orthostasis 06/21/2017    Obesity (BMI 30-39.9) 12/04/2017    Hypersomnia with sleep apnea 12/04/2017    Severe VENESSA on CPAP 01/29/2018    Obesity, morbid, BMI 40.0-49.9 02/26/2018    Shortness of breath 02/24/2022    Abnormal stress echo 02/24/2022    Dyspepsia 07/25/2022    Acute on chronic heart failure with reduced ejection fraction and diastolic dysfunction 08/21/2023    Acute on chronic combined systolic (congestive) and diastolic (congestive) heart failure 08/22/2023     Past Medical History:   Diagnosis Date    Abnormal electrocardiogram     Anxiety     Erectile dysfunction     Health care maintenance     Hyperlipidemia     Hypertension     Mild concentric left ventricular hypertrophy (LVH)     Mild mitral regurgitation     Mild tricuspid regurgitation     Noncompliance     Obesity     PAF (paroxysmal atrial fibrillation)     Pneumonia 01/01/2016    Type 2 diabetes mellitus          PAST SURGICAL HISTORY  Past Surgical History:   Procedure Laterality Date    CARDIAC CATHETERIZATION N/A 03/02/2022    Procedure: RIGHT HEART CATH;  Surgeon: Anjel Beasley MD;  Location: [facility] CATH INVASIVE LOCATION;  Service: Cardiovascular;  Laterality: N/A;    CARDIAC CATHETERIZATION N/A 03/02/2022    Procedure: Coronary angiography;  Surgeon: Anjel Beasley MD;  Location: [facility] CATH INVASIVE LOCATION;  Service: Cardiovascular;  Laterality: N/A;    CARDIAC ELECTROPHYSIOLOGY PROCEDURE Left 06/06/2016    Procedure: Pacemaker DC new  [place];  Surgeon: Juan Chacon MD;  Location: [facility] CATH INVASIVE LOCATION;  Service:     CARDIAC ELECTROPHYSIOLOGY PROCEDURE N/A 05/09/2022    Procedure: PPM generator change - dual- [place];  Surgeon: Juan Chacon MD;  Location: [facility] CATH INVASIVE LOCATION;  Service: Cardiology;  Laterality: N/A;    ENDOSCOPY N/A 10/19/2022    Procedure:  ESOPHAGOGASTRODUODENOSCOPY WITH BX;  Surgeon: Anjel Carney MD;  Location: [facility] ENDOSCOPY;  Service: Gastroenterology;  Laterality: N/A;  PREOP/ DYSPEPSIA  POSTOP/ HIATAL HERNIA, DUODENITIS, GASTRITIS    ENDOSCOPY      INSERT / REPLACE / REMOVE PACEMAKER      KNEE ARTHROSCOPY Right          FAMILY HISTORY  Family History   Problem Relation Age of Onset    Depression Mother     Hypertension Mother     Heart disease Mother     Atrial fibrillation Sister     Hypertension Sister     Heart disease Sister     Hypertension Sister     Heart disease Sister     Hypertension Brother     Kidney disease Other     Sleep apnea Other     Diabetes Neg Hx     Breast cancer Neg Hx          SOCIAL HISTORY  Social History     Socioeconomic History    Marital status:    Tobacco Use    Smoking status: Never     Passive exposure: Never    Smokeless tobacco: Never    Tobacco comments:     NO caffeine use    Vaping Use    Vaping status: Never Used   Substance and Sexual Activity    Alcohol use: No    Drug use: Not Currently     Types: Marijuana     Comment: out of cigars in the remote past    Sexual activity: Yes     Partners: Female     Birth control/protection: None         ALLERGIES  Patient has no known allergies.      REVIEW OF SYSTEMS  Included in HPI  All systems reviewed and negative except for those discussed in HPI.      PHYSICAL EXAM    I have reviewed the triage vital signs and nursing notes.    ED Triage Vitals   Temp Heart Rate Resp BP SpO2   02/02/25 1815 02/02/25 1816 02/02/25 1816 02/02/25 1819 02/02/25 1816   (!) 100.9 °F (38.3 °C) 61 20 161/79 94 %      Temp src Heart Rate Source Patient Position BP Location FiO2 (%)   02/02/25 1815 02/02/25 1816 02/02/25 1817 02/02/25 1817 --   Tympanic Monitor Sitting Left arm        Physical Exam        LAB RESULTS  Recent Results (from the past 24 hours)   Comprehensive Metabolic Panel    Collection Time: 02/02/25  7:16 PM    Specimen: Arm, Right; Blood   Result Value  Ref Range    Glucose 163 (H) 65 - 99 mg/dL    BUN 17 8 - 23 mg/dL    Creatinine 0.94 0.76 - 1.27 mg/dL    Sodium 141 136 - 145 mmol/L    Potassium 4.4 3.5 - 5.2 mmol/L    Chloride 104 98 - 107 mmol/L    CO2 28.3 22.0 - 29.0 mmol/L    Calcium 9.0 8.6 - 10.5 mg/dL    Total Protein 6.7 6.0 - 8.5 g/dL    Albumin 3.7 3.5 - 5.2 g/dL    ALT (SGPT) 32 1 - 41 U/L    AST (SGOT) 37 1 - 40 U/L    Alkaline Phosphatase 75 39 - 117 U/L    Total Bilirubin 1.0 0.0 - 1.2 mg/dL    Globulin 3.0 gm/dL    A/G Ratio 1.2 g/dL    BUN/Creatinine Ratio 18.1 7.0 - 25.0    Anion Gap 8.7 5.0 - 15.0 mmol/L    eGFR 91.1 >60.0 mL/min/1.73   CBC Auto Differential    Collection Time: 02/02/25  7:16 PM    Specimen: Arm, Right; Blood   Result Value Ref Range    WBC 11.70 (H) 3.40 - 10.80 10*3/mm3    RBC 4.78 4.14 - 5.80 10*6/mm3    Hemoglobin 14.1 13.0 - 17.7 g/dL    Hematocrit 42.9 37.5 - 51.0 %    MCV 89.7 79.0 - 97.0 fL    MCH 29.5 26.6 - 33.0 pg    MCHC 32.9 31.5 - 35.7 g/dL    RDW 12.9 12.3 - 15.4 %    RDW-SD 42.8 37.0 - 54.0 fl    MPV 9.5 6.0 - 12.0 fL    Platelets 255 140 - 450 10*3/mm3    Neutrophil % 85.2 (H) 42.7 - 76.0 %    Lymphocyte % 7.3 (L) 19.6 - 45.3 %    Monocyte % 6.7 5.0 - 12.0 %    Eosinophil % 0.3 0.3 - 6.2 %    Basophil % 0.1 0.0 - 1.5 %    Immature Grans % 0.4 0.0 - 0.5 %    Neutrophils, Absolute 9.98 (H) 1.70 - 7.00 10*3/mm3    Lymphocytes, Absolute 0.85 0.70 - 3.10 10*3/mm3    Monocytes, Absolute 0.78 0.10 - 0.90 10*3/mm3    Eosinophils, Absolute 0.03 0.00 - 0.40 10*3/mm3    Basophils, Absolute 0.01 0.00 - 0.20 10*3/mm3    Immature Grans, Absolute 0.05 0.00 - 0.05 10*3/mm3    nRBC 0.0 0.0 - 0.2 /100 WBC   Green Top (Gel)    Collection Time: 02/02/25  7:16 PM   Result Value Ref Range    Extra Tube Hold for add-ons.    Lavender Top    Collection Time: 02/02/25  7:16 PM   Result Value Ref Range    Extra Tube hold for add-on    Gold Top - SST    Collection Time: 02/02/25  7:16 PM   Result Value Ref Range    Extra Tube Hold for  add-ons.    Light Blue Top    Collection Time: 02/02/25  7:16 PM   Result Value Ref Range    Extra Tube Hold for add-ons.          RADIOLOGY  No Radiology Exams Resulted Within Past 24 Hours      MEDICATIONS GIVEN IN ER  Medications - No data to display      ORDERS PLACED DURING THIS VISIT:  Orders Placed This Encounter   Procedures    Respiratory Panel PCR w/COVID-19(SARS-CoV-2) MARANDA/HAILY/BASIM/PAD/COR/TONY In-House, NP Swab in UTM/VTM, 2 HR TAT - Swab, Nasopharynx    Comprehensive Metabolic Panel    [place] Draw    CBC Auto Differential    CBC & Differential    Green Top (Gel)    Lavender Top    Gold Top - SST    Light Blue Top         OUTPATIENT MEDICATION MANAGEMENT:  No current Epic-ordered facility-administered medications on file.     Current Outpatient Medications Ordered in Epic   Medication Sig Dispense Refill    amLODIPine (NORVASC) 5 MG tablet TAKE 1 TABLET BY MOUTH EVERY DAY 30 tablet 10    atorvastatin (LIPITOR) 40 MG tablet TAKE 1 TABLET BY MOUTH EVERY DAY AT BEDTIME 30 tablet 10    bumetanide (BUMEX) 2 MG tablet TAKE 1 TABLET BY MOUTH TWICE A DAY 60 tablet 10    cholecalciferol (VITAMIN D3) 25 MCG (1000 UT) tablet Take 1 tablet by mouth Daily.      [facility] Lancets Original [facility] Use as directed and appropo lancet for his monitor 100 each 11    famotidine (PEPCID) 20 MG tablet TAKE 1 TABLET BY MOUTH TWICE A  tablet 1    lisinopril (PRINIVIL,ZESTRIL) 5 MG tablet TAKE 1 TABLET BY MOUTH EVERY DAY 30 tablet 0    metFORMIN ER (GLUCOPHAGE-XR) 500 MG 24 hr tablet TAKE 2 TABLETS BY MOUTH TWICE A  tablet 10    metoprolol succinate XL (TOPROL-XL) 100 MG 24 hr tablet TAKE 1 TABLET BY MOUTH EVERY DAY 90 tablet 0    potassium chloride 10 MEQ CR tablet TAKE 1 TABLET BY MOUTH EVERY DAY 30 tablet 10    sildenafil (Viagra) 50 MG tablet Take 1 tablet by mouth Daily As Needed for Erectile Dysfunction. 30 tablet 0    spironolactone (ALDACTONE) 25 MG tablet TAKE 1 TABLET BY MOUTH EVERY DAY 30 tablet 0    True Metrix  Blood Glucose Test test strip TEST BLOOD SUGAR EVERY  each 11    vitamin B-12 (CYANOCOBALAMIN) 1000 MCG tablet Take 1 tablet by mouth Daily.      Xarelto 20 MG tablet TAKE 1 TABLET BY MOUTH EVERY DAY   WITH DINNER 30 tablet 10         PROCEDURES  Procedures      {EM_CC (Optional):27631}      PROGRESS, DATA ANALYSIS, CONSULTS, AND MEDICAL DECISION MAKING  All labs have been independently interpreted by me.  All radiology studies have been reviewed by me. All EKG's have been independently viewed and interpreted by me.  Discussion below represents my analysis of pertinent findings related to patient's condition, differential diagnosis, treatment plan and final disposition.    Differential diagnosis includes but is not limited to ***.    Clinical Scores:                              AS OF 20:00 EST VITALS:    BP - 161/79  HR - 61  TEMP - (!) 100.9 °F (38.3 °C) (Tympanic)  O2 SATS - 94%    COMPLEXITY OF CARE  {Admission Statement:63913}      DIAGNOSIS  Final diagnoses:   None         DISPOSITION  ED Disposition       None             Please note that portions of this document were completed with a voice recognition program.    Note Disclaimer: At [facility], we believe that sharing information builds trust and better relationships. You are receiving this note because you recently visited [facility]. It is possible you will see health information before a provider has talked with you about it. This kind of information can be easy to misunderstand. To help you fully understand what it means for your health, we urge you to discuss this note with your provider.

## 2025-02-03 NOTE — ED PROVIDER NOTES
ED Course as of 02/02/25 2149   Sun Feb 02, 2025 2012 Temp(!): 100.9 °F (38.3 °C) [TR]   2036 The patient's respiratory viral panel is negative.  Chest x-ray added. [TR]   2103 XR Chest 2 View  My independent interpretation of the imaging study is cardiomegaly [TR]   2128 Reassessed the patient and updated him on workup findings.  Will treat him empirically with doxycycline for possible developing cellulitis.  Encouraged him to follow-up closely with primary care provider and discussed ED return precautions.  He is otherwise well-appearing, hemodynamically stable, and therefore appropriate for discharge. [MP]      ED Course User Index  [MP] Sanaz Griggs PA-C  [TR] Neal Luque MD Pleiss, Melanie M, PA-C  02/02/25 2149

## 2025-02-03 NOTE — ED PROVIDER NOTES
EMERGENCY DEPARTMENT MD ATTESTATION NOTE    Room Number:  HA1/A  PCP: Provider, No Known  Independent Historians: Patient    HPI:  A complete HPI/ROS/PMH/PSH/SH/FH are unobtainable due to:     Chronic or social conditions impacting patient care (Social Determinants of Health): None      Context: Shashi Engel is a 64 y.o. male with a medical history of hyperlipidemia, hypertension, A-fib, cardiomyopathy who presents to the ED c/o acute chills and cough.  The patient reports he was driving today and suddenly developed chills, cough, body aches.  It started around 11 AM.  He states that he went to [employer] but denies any known sick contacts.  He reports he has a history of cellulitis on his right leg.  He states his right lower leg started hurting a little today.  He thought he might have cellulitis.        Review of prior external notes (non-ED) -and- Review of prior external test results outside of this encounter:  BMP dated 5/15/2024 shows an elevated glucose of 155    Prescription drug monitoring program review:           PHYSICAL EXAM    I have reviewed the triage vital signs and nursing notes.    ED Triage Vitals   Temp Heart Rate Resp BP SpO2   02/02/25 1815 02/02/25 1816 02/02/25 1816 02/02/25 1819 02/02/25 1816   (!) 100.9 °F (38.3 °C) 61 20 161/79 94 %      Temp src Heart Rate Source Patient Position BP Location FiO2 (%)   02/02/25 1815 02/02/25 1816 02/02/25 1817 02/02/25 1817 --   Tympanic Monitor Sitting Left arm        Physical Exam  GENERAL: Awake, alert, no acute distress  SKIN: Warm, dry  HENT: Normocephalic, atraumatic  EYES: no scleral icterus  CV: regular rhythm, regular rate  RESPIRATORY: normal effort, lungs clear  ABDOMEN: soft, nontender, nondistended  MUSCULOSKELETAL: no deformity.  Right lower leg with minimal erythema.  No significant swelling.  No significant calf tenderness.  No edema.  NEURO: alert, moves all extremities, follows commands            MEDICATIONS GIVEN IN ER  Medications    acetaminophen (TYLENOL) tablet 1,000 mg (1,000 mg Oral Given 2/2/25 2015)   doxycycline (MONODOX) capsule 100 mg (100 mg Oral Given 2/2/25 2142)         ORDERS PLACED DURING THIS VISIT:  Orders Placed This Encounter   Procedures    Respiratory Panel PCR w/COVID-19(SARS-CoV-2) MARANDA/HAILY/BASIM/PAD/COR/TONY In-House, NP Swab in UTM/VTM, 2 HR TAT - Swab, Nasopharynx    XR Chest 2 View    Comprehensive Metabolic Panel    [place] Draw    CBC Auto Differential    CBC & Differential    Green Top (Gel)    Lavender Top    Gold Top - SST    Light Blue Top         PROCEDURES  Procedures            PROGRESS, DATA ANALYSIS, CONSULTS, AND MEDICAL DECISION MAKING  All labs have been independently interpreted by me.  All radiology studies have been reviewed by me. All EKG's have been independently viewed and interpreted by me.  Discussion below represents my analysis of pertinent findings related to patient's condition, differential diagnosis, treatment plan and final disposition.    Differential diagnosis includes but is not limited to viral syndrome, flu, COVID, pneumonia, cellulitis, sepsis.    Clinical Scores:                                     ED Course as of 02/02/25 2227   Sun Feb 02, 2025 2012 Temp(!): 100.9 °F (38.3 °C) [TR]   2036 The patient's respiratory viral panel is negative.  Chest x-ray added. [TR]   2103 XR Chest 2 View  My independent interpretation of the imaging study is cardiomegaly [TR]   2128 Reassessed the patient and updated him on workup findings.  Will treat him empirically with doxycycline for possible developing cellulitis.  Encouraged him to follow-up closely with primary care provider and discussed ED return precautions.  He is otherwise well-appearing, hemodynamically stable, and therefore appropriate for discharge. [MP]      ED Course User Index  [MP] Sanaz Griggs PA-C  [TR] Neal Luque MD       MDM: I suspect the patient has one of the many respiratory pathogens circulating in the  community.  Plan respiratory viral panel.  He does have a fever here we will give Tylenol.  His right leg does not show any significant signs of cellulitis or DVT.      COMPLEXITY OF CARE  Admission was considered but after careful review of the patient's presentation, physical examination, diagnostic results, and response to treatment the patient may be safely discharged with outpatient follow-up.    Please note that portions of this document were completed with a voice recognition program.    Note Disclaimer: At [facility], we believe that sharing information builds trust and better relationships. You are receiving this note because you recently visited [facility]. It is possible you will see health information before a provider has talked with you about it. This kind of information can be easy to misunderstand. To help you fully understand what it means for your health, we urge you to discuss this note with your provider.         Neal Luque MD  02/02/25 2548

## 2025-02-03 NOTE — DISCHARGE INSTRUCTIONS
Follow-up with primary care provider.  Take doxycycline as prescribed.  Return to emergency department for any worsening symptoms.

## 2025-05-13 ENCOUNTER — TRANSCRIBE ORDERS (OUTPATIENT)
Dept: ADMINISTRATIVE | Facility: HOSPITAL | Age: 65
End: 2025-05-13
Payer: MEDICARE

## 2025-05-13 DIAGNOSIS — R91.8 LUNG NODULES: Primary | ICD-10-CM

## (undated) DEVICE — RESUSCITATOR,MAN.ADLT,TUBE,FILTER: Brand: MEDLINE INDUSTRIES, INC.

## (undated) DEVICE — FRCP BX RADJAW4 NDL 2.8 240CM LG OG BX40

## (undated) DEVICE — PLASMABLADE PS210-030S 3.0S LOCK: Brand: PLASMABLADE™

## (undated) DEVICE — LOU PACE DEFIB: Brand: MEDLINE INDUSTRIES, INC.

## (undated) DEVICE — RADIFOCUS OPTITORQUE ANGIOGRAPHIC CATHETER: Brand: OPTITORQUE

## (undated) DEVICE — TUBING, SUCTION, 1/4" X 10', STRAIGHT: Brand: MEDLINE

## (undated) DEVICE — PK CATH CARD 40

## (undated) DEVICE — LN SMPL CO2 SHTRM SD STREAM W/M LUER

## (undated) DEVICE — ADAPT CLN BIOGUARD AIR/H2O DISP

## (undated) DEVICE — GW INQWIRE FC PTFE J/3MM .021 180

## (undated) DEVICE — KT ORCA ORCAPOD DISP STRL

## (undated) DEVICE — CANN O2 ETCO2 FITS ALL CONN CO2 SMPL A/ 7IN DISP LF

## (undated) DEVICE — KT MANIFLD CARDIAC

## (undated) DEVICE — GLIDESHEATH BASIC HYDROPHILIC COATED INTRODUCER SHEATH: Brand: GLIDESHEATH

## (undated) DEVICE — SENSR O2 OXIMAX FNGR A/ 18IN NONSTR

## (undated) DEVICE — BALN PRESS WEDGE 5F 110CM

## (undated) DEVICE — GLIDESHEATH SLENDER STAINLESS STEEL KIT: Brand: GLIDESHEATH SLENDER

## (undated) DEVICE — GW EMR FIX EXCHG J STD .035 3MM 260CM

## (undated) DEVICE — BITEBLOCK OMNI BLOC